# Patient Record
Sex: FEMALE | Race: WHITE | NOT HISPANIC OR LATINO | Employment: OTHER | URBAN - METROPOLITAN AREA
[De-identification: names, ages, dates, MRNs, and addresses within clinical notes are randomized per-mention and may not be internally consistent; named-entity substitution may affect disease eponyms.]

---

## 2017-01-27 ENCOUNTER — APPOINTMENT (OUTPATIENT)
Dept: LAB | Facility: HOSPITAL | Age: 67
End: 2017-01-27
Attending: FAMILY MEDICINE
Payer: MEDICARE

## 2017-01-27 ENCOUNTER — LAB REQUISITION (OUTPATIENT)
Dept: LAB | Facility: HOSPITAL | Age: 67
End: 2017-01-27
Payer: MEDICARE

## 2017-01-27 DIAGNOSIS — M05.731: ICD-10-CM

## 2017-01-27 DIAGNOSIS — M81.8 OTHER OSTEOPOROSIS WITHOUT CURRENT PATHOLOGICAL FRACTURE: ICD-10-CM

## 2017-01-27 LAB
25(OH)D3 SERPL-MCNC: 34.5 NG/ML (ref 30–100)
BASOPHILS # BLD AUTO: 0.06 THOUSANDS/ΜL (ref 0–0.1)
BASOPHILS NFR BLD AUTO: 1 % (ref 0–1)
EOSINOPHIL # BLD AUTO: 0.1 THOUSAND/ΜL (ref 0–0.61)
EOSINOPHIL NFR BLD AUTO: 2 % (ref 0–6)
ERYTHROCYTE [DISTWIDTH] IN BLOOD BY AUTOMATED COUNT: 13.2 % (ref 11.6–15.1)
HCT VFR BLD AUTO: 42.9 % (ref 34.8–46.1)
HGB BLD-MCNC: 14 G/DL (ref 11.5–15.4)
LYMPHOCYTES # BLD AUTO: 1.58 THOUSANDS/ΜL (ref 0.6–4.47)
LYMPHOCYTES NFR BLD AUTO: 33 % (ref 14–44)
MCH RBC QN AUTO: 30 PG (ref 26.8–34.3)
MCHC RBC AUTO-ENTMCNC: 32.6 G/DL (ref 31.4–37.4)
MCV RBC AUTO: 92 FL (ref 82–98)
MONOCYTES # BLD AUTO: 0.48 THOUSAND/ΜL (ref 0.17–1.22)
MONOCYTES NFR BLD AUTO: 10 % (ref 4–12)
NEUTROPHILS # BLD AUTO: 2.53 THOUSANDS/ΜL (ref 1.85–7.62)
NEUTS SEG NFR BLD AUTO: 54 % (ref 43–75)
NRBC BLD AUTO-RTO: 0 /100 WBCS
PLATELET # BLD AUTO: 250 THOUSANDS/UL (ref 149–390)
PMV BLD AUTO: 10.1 FL (ref 8.9–12.7)
RBC # BLD AUTO: 4.66 MILLION/UL (ref 3.81–5.12)
WBC # BLD AUTO: 4.75 THOUSAND/UL (ref 4.31–10.16)

## 2017-01-27 PROCEDURE — 85025 COMPLETE CBC W/AUTO DIFF WBC: CPT | Performed by: INTERNAL MEDICINE

## 2017-01-27 PROCEDURE — 82306 VITAMIN D 25 HYDROXY: CPT

## 2017-01-27 PROCEDURE — 36415 COLL VENOUS BLD VENIPUNCTURE: CPT

## 2017-02-08 ENCOUNTER — ALLSCRIPTS OFFICE VISIT (OUTPATIENT)
Dept: OTHER | Facility: OTHER | Age: 67
End: 2017-02-08

## 2017-02-17 ENCOUNTER — CONVERSION ENCOUNTER (OUTPATIENT)
Dept: RADIOLOGY | Facility: IMAGING CENTER | Age: 67
End: 2017-02-17

## 2017-04-11 ENCOUNTER — HOSPITAL ENCOUNTER (OUTPATIENT)
Dept: RADIOLOGY | Age: 67
Discharge: HOME | End: 2017-04-11
Payer: MEDICARE

## 2017-04-11 DIAGNOSIS — M81.8 OTHER OSTEOPOROSIS WITHOUT CURRENT PATHOLOGICAL FRACTURE: ICD-10-CM

## 2017-04-11 PROCEDURE — 77080 DXA BONE DENSITY AXIAL: CPT

## 2017-04-20 ENCOUNTER — GENERIC CONVERSION - ENCOUNTER (OUTPATIENT)
Dept: OTHER | Facility: OTHER | Age: 67
End: 2017-04-20

## 2017-05-18 ENCOUNTER — ALLSCRIPTS OFFICE VISIT (OUTPATIENT)
Dept: OTHER | Facility: OTHER | Age: 67
End: 2017-05-18

## 2017-05-18 DIAGNOSIS — Z12.31 ENCOUNTER FOR SCREENING MAMMOGRAM FOR MALIGNANT NEOPLASM OF BREAST: ICD-10-CM

## 2017-08-22 ENCOUNTER — ALLSCRIPTS OFFICE VISIT (OUTPATIENT)
Dept: OTHER | Facility: OTHER | Age: 67
End: 2017-08-22

## 2017-08-22 DIAGNOSIS — M81.8 OTHER OSTEOPOROSIS WITHOUT CURRENT PATHOLOGICAL FRACTURE: ICD-10-CM

## 2017-08-22 DIAGNOSIS — E78.5 HYPERLIPIDEMIA: ICD-10-CM

## 2017-08-29 ENCOUNTER — ALLSCRIPTS OFFICE VISIT (OUTPATIENT)
Dept: OTHER | Facility: OTHER | Age: 67
End: 2017-08-29

## 2017-09-06 ENCOUNTER — CONVERSION ENCOUNTER (OUTPATIENT)
Dept: MAMMOGRAPHY | Facility: CLINIC | Age: 67
End: 2017-09-06

## 2017-11-24 ENCOUNTER — ALLSCRIPTS OFFICE VISIT (OUTPATIENT)
Dept: OTHER | Facility: OTHER | Age: 67
End: 2017-11-24

## 2017-11-24 DIAGNOSIS — R35.0 FREQUENCY OF MICTURITION: ICD-10-CM

## 2017-11-24 DIAGNOSIS — E78.5 HYPERLIPIDEMIA: ICD-10-CM

## 2017-11-24 LAB
BILIRUB UR QL STRIP: NORMAL
CLARITY UR: NORMAL
COLOR UR: YELLOW
GLUCOSE (HISTORICAL): NORMAL
HGB UR QL STRIP.AUTO: NORMAL
KETONES UR STRIP-MCNC: NORMAL MG/DL
LEUKOCYTE ESTERASE UR QL STRIP: NORMAL
NITRITE UR QL STRIP: NORMAL
PH UR STRIP.AUTO: 6 [PH]
PROT UR STRIP-MCNC: NORMAL MG/DL
SP GR UR STRIP.AUTO: 1.02
UROBILINOGEN UR QL STRIP.AUTO: 0.2

## 2018-01-12 VITALS
DIASTOLIC BLOOD PRESSURE: 62 MMHG | HEART RATE: 72 BPM | HEIGHT: 65 IN | WEIGHT: 133.5 LBS | TEMPERATURE: 98 F | SYSTOLIC BLOOD PRESSURE: 92 MMHG | OXYGEN SATURATION: 96 % | BODY MASS INDEX: 22.24 KG/M2

## 2018-01-13 VITALS
BODY MASS INDEX: 22.18 KG/M2 | HEART RATE: 78 BPM | SYSTOLIC BLOOD PRESSURE: 98 MMHG | HEIGHT: 65 IN | WEIGHT: 133.13 LBS | DIASTOLIC BLOOD PRESSURE: 62 MMHG | TEMPERATURE: 98.5 F | OXYGEN SATURATION: 98 %

## 2018-01-13 VITALS
BODY MASS INDEX: 21.89 KG/M2 | HEART RATE: 42 BPM | SYSTOLIC BLOOD PRESSURE: 98 MMHG | HEIGHT: 65 IN | TEMPERATURE: 97.5 F | DIASTOLIC BLOOD PRESSURE: 68 MMHG | WEIGHT: 131.38 LBS | OXYGEN SATURATION: 97 %

## 2018-01-14 VITALS
SYSTOLIC BLOOD PRESSURE: 100 MMHG | OXYGEN SATURATION: 96 % | HEIGHT: 66 IN | DIASTOLIC BLOOD PRESSURE: 64 MMHG | WEIGHT: 131.38 LBS | HEART RATE: 68 BPM | TEMPERATURE: 97.8 F | BODY MASS INDEX: 21.11 KG/M2

## 2018-01-15 VITALS
HEART RATE: 72 BPM | SYSTOLIC BLOOD PRESSURE: 96 MMHG | OXYGEN SATURATION: 94 % | TEMPERATURE: 98.5 F | DIASTOLIC BLOOD PRESSURE: 58 MMHG | HEIGHT: 64 IN | BODY MASS INDEX: 22.94 KG/M2 | WEIGHT: 134.38 LBS

## 2018-02-14 ENCOUNTER — OFFICE VISIT (OUTPATIENT)
Dept: INTERNAL MEDICINE CLINIC | Facility: CLINIC | Age: 68
End: 2018-02-14
Payer: MEDICARE

## 2018-02-14 VITALS
SYSTOLIC BLOOD PRESSURE: 98 MMHG | OXYGEN SATURATION: 98 % | WEIGHT: 134.4 LBS | TEMPERATURE: 98.1 F | DIASTOLIC BLOOD PRESSURE: 72 MMHG | BODY MASS INDEX: 22.39 KG/M2 | HEART RATE: 64 BPM | HEIGHT: 65 IN

## 2018-02-14 DIAGNOSIS — M35.00 SJOGREN'S SYNDROME, WITH UNSPECIFIED ORGAN INVOLVEMENT (HCC): Primary | ICD-10-CM

## 2018-02-14 DIAGNOSIS — Q85.01 VON RECKLINGHAUSENS DISEASE (HCC): ICD-10-CM

## 2018-02-14 DIAGNOSIS — G37.9 DEMYELINATING DISORDER (HCC): ICD-10-CM

## 2018-02-14 DIAGNOSIS — I87.2 CHRONIC VENOUS INSUFFICIENCY: ICD-10-CM

## 2018-02-14 DIAGNOSIS — I67.850 CADASIL (CEREBRAL AUTOSOMAL DOMINANT ARTERIOPATHY WITH SUBCORTICAL INFARCTS AND LEUKOENCEPHALOPATHY): ICD-10-CM

## 2018-02-14 DIAGNOSIS — M05.79 RHEUMATOID ARTHRITIS INVOLVING MULTIPLE SITES WITH POSITIVE RHEUMATOID FACTOR (HCC): ICD-10-CM

## 2018-02-14 DIAGNOSIS — Z12.31 ENCOUNTER FOR SCREENING MAMMOGRAM FOR MALIGNANT NEOPLASM OF BREAST: ICD-10-CM

## 2018-02-14 PROBLEM — R92.8 ABNORMAL MAMMOGRAM OF RIGHT BREAST: Status: ACTIVE | Noted: 2017-02-18

## 2018-02-14 PROBLEM — L70.0: Status: ACTIVE | Noted: 2017-08-29

## 2018-02-14 PROCEDURE — 99214 OFFICE O/P EST MOD 30 MIN: CPT | Performed by: INTERNAL MEDICINE

## 2018-02-14 RX ORDER — SIMVASTATIN 40 MG
1 TABLET ORAL DAILY
COMMUNITY
End: 2018-07-18 | Stop reason: SDUPTHER

## 2018-02-14 RX ORDER — LATANOPROST 50 UG/ML
1 SOLUTION/ DROPS OPHTHALMIC DAILY
COMMUNITY
Start: 2017-02-08 | End: 2021-11-29

## 2018-02-14 RX ORDER — HYDROXYCHLOROQUINE SULFATE 200 MG/1
200 TABLET, FILM COATED ORAL DAILY
COMMUNITY

## 2018-02-14 RX ORDER — MULTIVIT-MIN/IRON/FOLIC ACID/K 18-600-40
1 CAPSULE ORAL 2 TIMES DAILY
COMMUNITY
End: 2022-03-01

## 2018-02-14 RX ORDER — GLIMEPIRIDE 2 MG/1
1 TABLET ORAL DAILY
COMMUNITY
End: 2021-11-29

## 2018-02-14 RX ORDER — OXYBUTYNIN CHLORIDE 5 MG/1
0.5 TABLET ORAL 2 TIMES DAILY
COMMUNITY
End: 2018-03-20 | Stop reason: SDUPTHER

## 2018-02-14 RX ORDER — CLOPIDOGREL BISULFATE 75 MG/1
1 TABLET ORAL DAILY
COMMUNITY
End: 2018-02-23 | Stop reason: SDUPTHER

## 2018-02-14 NOTE — PROGRESS NOTES
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) ([facility])   patient need to  Follow-up with the Neurology  Chronic venous insufficiency   stable  Demyelinating disorder ([facility])   stable  Von Reckling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 ([address] )   stable  Rheumatoid arthritis involving multiple sites with positive rheumatoid factor ([facility])   rheumatoid factor and the rheumatoid arthritis is managed by Rheumatology she had mixed connective tissue disorder  Other orders  -     calcium citrate-Vitamin D (CITRACAL PETITES/VITAMIN D) 200 mg-250 units; Take 2 tablets by mouth 2 (two) times a day  -     clopidogrel (PLAVIX) 75 mg tablet; Take 1 tablet by mouth daily  -     hydroxychloroquine (PLAQUENIL) 200 mg tablet; Take 1 tablet by mouth 2 (two) times a day  -     latanoprost (XALATAN) 0 005 % ophthalmic solution; Apply 1 drop to eye  -     oxybutynin (DITROPAN) 5 mg tablet; Take 0 5 tablets by mouth 2 (two) times a day  -     simvastatin (ZOCOR) 40 mg tablet; Take 1 tablet by mouth daily  -     timolol (TIMOPTIC) 0 25 % ophthalmic solution; Apply to eye  -     Cholecalciferol (VITAMIN D) 2000 units CAPS; Take 1 tablet by mouth daily   blood workup discussed with the patient and noted all is well lipid panel was good complete metabolic profile was good CBC was good will follow her up in about 6 month        Subjective:    patient does not have any new problem    Patient ID: Aleja Bhardwaj is a 75 y o  female  HPI  Hyperlipidemia (Follow-Up): The patient states her hyperlipidemia has been stable since the last visit   Comorbid Illnesses: Blood workup from this month was noted lipid profile looking excellent continue with the same treatment no change  She has no comorbid illnesses  Symptoms: denies chest pain,-denies intermittent leg claudication,-denies muscle pain,-denies muscle weakness-and-denies   Rheumatoid Arthritis (Follow-Up): The patient states her rheumatoid arthritis has been stable since the last visit  Comorbid Illnesses: better on prednisone  Interval Events: better with prednisone and Plaquenil , unable to crease the prednisone dose E she is followed up by rheumatologist Dr Ayana Shepherd and he is adjusting the dose of the prednisone in the meanwhile she lost some weight he is doing some exercise and trying to lose weight but we will follow it up  She has no significant interval events  Symptoms: worsened no swelling shoulder pain and swelling,-worsened no swelling hip pain and swelling-and-stable knee pain and swelling  Sjogren Syndrome (Brief): The patient is being seen for a routine clinic follow-up of Sjögren syndrome  Symptoms:  dry eyes-and-dry skin, but-no red eyes,-no photosensitivity,-no blurred vision-and-no contact lens intolerance  Associated symptoms:  anxiety,-fatigue,-arthralgias-and-myalgias, but-no paresthesias,-no swollen glands,-no hearing loss,-no difficulty swallowing,-no gastroesophageal reflux-and-no diarrhea  Osteoporosis (Follow-Up): The patient's osteoporosis has worsened since the last visit  Most recent DXA results: T-score <-2 5  She has no comorbid illnesses  She has no complications  She has no significant interval   The following portions of the patient's history were reviewed and updated as appropriate: allergies, current medications, past family history, past medical history, past social history, past surgical history and problem list     Review of Systems   Constitutional: Negative for activity change, appetite change, chills, fatigue, fever and unexpected weight change  HENT: Negative for congestion, nosebleeds, postnasal drip, sneezing and sore throat  Eyes: Negative for pain, discharge, redness and itching  Respiratory: Negative for cough, chest tightness, shortness of breath and wheezing  Cardiovascular: Negative for chest pain, palpitations and leg swelling  Gastrointestinal: Negative for abdominal distention, abdominal pain, constipation, diarrhea, nausea and vomiting  Genitourinary: Positive for urgency  Negative for difficulty urinating  Skin: Negative for color change, pallor and rash  Neurological: Negative for dizziness, light-headedness and headaches  Hematological: Bruises/bleeds easily (bruises easily while on Plavix)  Objective:    Vitals:    02/14/18 1447   BP: 98/72   Pulse: 64   Temp: 98 1 °F (36 7 °C)   SpO2: 98%        Physical Exam   Constitutional: She is oriented to person, place, and time  She appears well-developed and well-nourished  No distress  HENT:   Head: Normocephalic and atraumatic  Eyes: Conjunctivae and EOM are normal  Pupils are equal, round, and reactive to light  Neck: Normal range of motion  Neck supple  No JVD present  No thyromegaly present  Cardiovascular: Normal rate, regular rhythm and normal heart sounds  No murmur heard  Pulmonary/Chest: Effort normal and breath sounds normal  No respiratory distress  She has no wheezes  Abdominal: Soft  Bowel sounds are normal  She exhibits no distension  Musculoskeletal: Normal range of motion  She exhibits edema  Lymphadenopathy:     She has no cervical adenopathy  Neurological: She is alert and oriented to person, place, and time  She has normal reflexes  No neurological deficit noted   Skin: Skin is warm and dry  No rash noted  No erythema  Multiple small fibromas  Of neurofibromatosis noted   Psychiatric: She has a normal mood and affect   Her behavior is normal

## 2018-02-23 DIAGNOSIS — Z86.73 HISTORY OF STROKE: Primary | ICD-10-CM

## 2018-02-23 RX ORDER — CLOPIDOGREL BISULFATE 75 MG/1
75 TABLET ORAL DAILY
Qty: 90 TABLET | Refills: 0 | Status: SHIPPED | OUTPATIENT
Start: 2018-02-23 | End: 2018-05-23 | Stop reason: SDUPTHER

## 2018-03-07 NOTE — PROGRESS NOTES
History of Present Illness    Revaccination   Vaccine Information: Vaccine(s) Given (names): Pneumovax  Vaccine(s) Given (names): Boostrix  Spoke with patient regarding vaccine out of temperature range and risks and benefits of revaccination  Action(s): Pt will be revaccinated  Appointment scheduled: [phone]  Pt called (attempt 1): [phone]   Messages left on [phone] and [phone]  Other Information: [phone] [facility] left on [phone] and [phone] requesting a call back for a non urgent matter  Tura Semen  [phone] Patient returned the call  Appointment scheduled  Adacel will be changed to Boostrix due to age  kz    Revaccination Completed: [id number]  Active Problems    1  CADASIL (cerebral autosomal dominant arteriopathy with subcortical infarcts and   leukoencephalopathy) (434 91,323 81,437 8) (I63 9,G93 49,I67 89)   2  Cellulitis of right leg without foot (682 6) (L03 115)   3  Demyelinating disorder (341 9) (G37 9)   4  Easy bruising (782 9) (R23 8)   5  Hyperlipidemia (272 4) (E78 5)   6  Idiopathic osteoporosis (733 02) (M81 8)   7  Need for influenza vaccination (V04 81) (Z23)   8  Need for revaccination (V05 9) (Z23)   9  Rheumatoid arthritis (714 0) (M06 9)   10  Sjogren's syndrome (710 2) (M35 00)   11  Stroke Syndrome (436)   12  Von Reckling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 (237 71) (Q85 01)    Immunizations  Influenza --- Paris Dumont: 18-Oct-2010; Jeremy Toledo: [id number] Snowball: [id number] Brightly:  15-Oct-2013; Series5: 14-Oct-2014; Series6: 30-Sep-2015; Series7: 14-Sep-2016   PPSV --- Series1: 15-Oct-2009; Jeremy Toledo: 12-Nov-2014   Td/DT --- Series1: 25-Jul-2012   Tdap --- Series1: 14-Walt-2016   Zoster --- Series1: 20-Feb-2010     Current Meds   1  Cephalexin 500 MG Oral Capsule; TAKE 1 CAPSULE 3 TIMES DAILY UNTIL GONE   2  Citracal Petites/Vitamin D TABS; take 2 tablet twice daily   3   Clopidogrel Bisulfate 75 MG Oral Tablet; TAKE 1 TABLET DAILY   4  Hydroxychloroquine Sulfate 200 MG Oral Tablet; Take 1 tablet twice daily   5  Oxybutynin Chloride 5 MG Oral Tablet; take 1/2 tablet twice daily   6  PredniSONE 5 MG Oral Tablet; Take 1 tablet twice daily   7  Prolia 60 MG/ML Subcutaneous Solution; INJECT SUBCUTANEOUSLY  60 MG / 1 ML   EVERY 6 MONTHS   8  Simvastatin 40 MG Oral Tablet; TAKE 1 TABLET DAILY   9  Timolol Maleate 0 25 % Ophthalmic Solution; INSTILL 1 DROP IN BOTH EYES ONCE   DAILY   10  Travatan Z 0 004 % Ophthalmic Solution; INSTILL 1 DROP IN BOTH EYES ONCE DAILY   11  Vitamin D 2000 UNIT Oral Tablet; Take 1 tablet daily    Allergies    1  No Known Drug Allergies    2  No Known Environmental Allergies   3  No Known Food Allergies    Plan    1   RVAC-Tdap (Boostrix)    Future Appointments    Date/Time Provider Specialty Site   05/18/2017 02:30 PM Radha Main DO Internal Medicine [facility]   02/08/2017 10:45 AM Nicci Velázquez MD Internal Medicine [facility]     Signatures   Electronically signed by : Clark Webb MD; Jan 13 2017 11:00AM EST

## 2018-03-20 DIAGNOSIS — R35.0 URINARY FREQUENCY: Primary | ICD-10-CM

## 2018-03-20 RX ORDER — OXYBUTYNIN CHLORIDE 5 MG/1
2.5 TABLET ORAL 2 TIMES DAILY
Qty: 90 TABLET | Refills: 0 | Status: SHIPPED | OUTPATIENT
Start: 2018-03-20 | End: 2018-03-21 | Stop reason: SDUPTHER

## 2018-03-21 DIAGNOSIS — R35.0 URINARY FREQUENCY: ICD-10-CM

## 2018-03-21 RX ORDER — OXYBUTYNIN CHLORIDE 5 MG/1
2.5 TABLET ORAL 2 TIMES DAILY
Qty: 90 TABLET | Refills: 0 | Status: SHIPPED | OUTPATIENT
Start: 2018-03-21 | End: 2018-06-08 | Stop reason: SDUPTHER

## 2018-03-21 NOTE — TELEPHONE ENCOUNTER
Only med recently ordered and sent to [facility] was Oxybutynin  Please resend to mail order  Called and canceled Rx sent to [facility]  Thank you!

## 2018-03-21 NOTE — TELEPHONE ENCOUNTER
Patient is asking for her medications to be sent to [facility] instead of [facility]    She would like a 90 day supply

## 2018-04-06 ENCOUNTER — OFFICE VISIT (OUTPATIENT)
Dept: NEUROLOGY | Facility: CLINIC | Age: 68
End: 2018-04-06
Payer: MEDICARE

## 2018-04-06 VITALS
HEART RATE: 65 BPM | BODY MASS INDEX: 22.16 KG/M2 | WEIGHT: 133 LBS | DIASTOLIC BLOOD PRESSURE: 65 MMHG | HEIGHT: 65 IN | SYSTOLIC BLOOD PRESSURE: 113 MMHG

## 2018-04-06 DIAGNOSIS — I67.850 CADASIL (CEREBRAL AUTOSOMAL DOMINANT ARTERIOPATHY WITH SUBCORTICAL INFARCTS AND LEUKOENCEPHALOPATHY): ICD-10-CM

## 2018-04-06 DIAGNOSIS — Q85.01 VON RECKLINGHAUSENS DISEASE (HCC): ICD-10-CM

## 2018-04-06 DIAGNOSIS — G37.9 DEMYELINATING DISORDER (HCC): ICD-10-CM

## 2018-04-06 PROCEDURE — 99205 OFFICE O/P NEW HI 60 MIN: CPT | Performed by: PSYCHIATRY & NEUROLOGY

## 2018-04-06 NOTE — PROGRESS NOTES
Patient ID: Tony Patel is a 75 y o  female  Assessment/Plan:                                                                                                                                                           76 y/o F who is here as a new patient for CADASIL  She has had several mini-strokes and is on plavix for them  She used to have headaches when she was younger and they have been better since then  I personally reviewed x-ray films that they brought  PLAN:  -Continue with Plavix for now  -migraines are much better and does not need any medications for it    -recommended that her kids and grandkids should be tested  -will get a MRI brain to see if the disease has progressed  -continue with simvastatin 40mg PO QHS  -no concerns for memory loss yet  We will do [place] at next visit  I would like to follow up in 4 months, I would be happy to see her sooner if the need arises  Patient/Guardian was advised to the call the office if they have any questions and concerns in the meantime  Patient/Guardian does understand that if they have any new stroke like symptoms such as facial droop on one side, weakness/paralysis on either side, speech trouble, numbness on one side, balance issues, any vision changes, or any new headache, to call 9-1-1 immediately or to proceed to the nearest ER immediately  Problem List Items Addressed This Visit     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) ([facility] )    Demyelinating disorder ([facility] )    Von Reckling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 ([facility] )             Subjective:    HPI    This is a 76 y/o F who is here as a new patient to establish care  She was diagnosed with CADASIL in 2005 and was diagnosed at [facility]   She has a sister with CADASIL  She has had a few mini strokes over the years  She used to get migraines but sine menopause they have calmed down    She got [address] mutation tested and it was positive  She does have memory loss and at times she has trouble remembering stuff, but nothing out of the ordinary or concerning  She is on plavix for the history of strokes  She never smoked  She has no questions or concerns today but is wondering if her kids and grandkids to be tested  She is on plaquenil for rheumatoid arthritis  The following portions of the patient's history were reviewed and updated as appropriate:   She  has a past medical history of Arthralgia; Neutropenia ([facility] ); Stroke (cerebrum) ([facility] ); and Transient cerebral ischemia  She   Patient Active Problem List    Diagnosis Date Noted    Closed follicle comedo 46/53/7215    Abnormal mammogram of right breast 02/18/2017    Chronic venous insufficiency 02/08/2017    Easy bruising 01/14/2016    Rheumatoid arthritis with positive rheumatoid factor ([place] [address] ) 11/24/2015    Increased frequency of urination 11/12/2014    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) ([place] [address] ) 09/10/2014    Demyelinating disorder ([place] [address] ) 10/29/2013    Hyperlipidemia 10/29/2013    Idiopathic osteoporosis 10/29/2013    Sjogren's syndrome ([facility] ) 10/29/2013    Von Reckling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 ([place] [address] ) 10/29/2013    Cerebral artery occlusion with cerebral infarction ([place] [address] ) 02/01/2008     She  has a past surgical history that includes Hysterectomy  Her family history includes Diabetes in her father; Lymphoma in her mother; Stroke (age of onset: 68) in her mother  She  reports that she has never smoked  She has never used smokeless tobacco  She reports that she drinks alcohol  She reports that she does not use drugs    Current Outpatient Prescriptions   Medication Sig Dispense Refill    calcium citrate-Vitamin D (CITRACAL PETITES/VITAMIN D) 200 mg-250 units Take 2 tablets by mouth 2 (two) times a day      Cholecalciferol (VITAMIN D) 2000 units CAPS Take 1 tablet by mouth daily      clopidogrel (PLAVIX) 75 mg tablet Take 1 tablet (75 mg total) by mouth daily 90 tablet 0    denosumab (PROLIA) 60 mg/mL Inject 60 mg under the skin every 6 (six) months      hydroxychloroquine (PLAQUENIL) 200 mg tablet Take 1 tablet by mouth 2 (two) times a day      latanoprost (XALATAN) 0 005 % ophthalmic solution Apply 1 drop to eye      oxybutynin (DITROPAN) 5 mg tablet Take 0 5 tablets (2 5 mg total) by mouth 2 (two) times a day 90 tablet 0    simvastatin (ZOCOR) 40 mg tablet Take 1 tablet by mouth daily      timolol (TIMOPTIC) 0 25 % ophthalmic solution Apply to eye       No current facility-administered medications for this visit  Current Outpatient Prescriptions on File Prior to Visit   Medication Sig    calcium citrate-Vitamin D (CITRACAL PETITES/VITAMIN D) 200 mg-250 units Take 2 tablets by mouth 2 (two) times a day    Cholecalciferol (VITAMIN D) 2000 units CAPS Take 1 tablet by mouth daily    clopidogrel (PLAVIX) 75 mg tablet Take 1 tablet (75 mg total) by mouth daily    hydroxychloroquine (PLAQUENIL) 200 mg tablet Take 1 tablet by mouth 2 (two) times a day    latanoprost (XALATAN) 0 005 % ophthalmic solution Apply 1 drop to eye    oxybutynin (DITROPAN) 5 mg tablet Take 0 5 tablets (2 5 mg total) by mouth 2 (two) times a day    simvastatin (ZOCOR) 40 mg tablet Take 1 tablet by mouth daily    timolol (TIMOPTIC) 0 25 % ophthalmic solution Apply to eye     No current facility-administered medications on file prior to visit  She has No Known Allergies        Objective:    Blood pressure 113/65, pulse 65, height 5' 4 75" (1 645 m), weight 60 3 kg (133 lb)  Physical Exam  General: Patient is not in any acute distress  HEENT: normocephalic, atraumatic  Neck: supple  Heart: regular heart rate and rhythm, no murmurs, rubs and or gallops  Chest: Clear to auscultation bilaterally  Abdomen: soft and non-tender  Skin: no lesions  Neurologic Examination:   Mental status: alert, awake, and following commands     Speech: Speech is fluent without any dysarthria, no aphasia noted  Cranial Nerves: Pupils equal round reactive to light and extraocular movements intact  Visual fields full to confrontation  Fundus exam - normal, no papilledema noted  Facial sensation intact to soft touch in V1, V2 and V3 distributions  Face symmetric  Tongue midline, able to move side to side  Palate elevation symmetric uvula midline, no deviation noted  Shoulder shrug strong  Motor: Strength 5/5 in all 4 extremities  No drift  Normal rapid alternating movements  Normal tone  Sensory: Sensation intact to soft touch, vibration and pinprick in all 4 extremities  Cerebellar: Finger-to-nose intact, normal heel to shin  Reflexes: 2+ in all 4 extremities, downgoing toes bilaterally  Gait: Normal gait, tandem walking, normal arm to swing  Neurological Exam      ROS:    Review of Systems   Constitutional: Negative for appetite change and fever  HENT: Negative  Negative for hearing loss, tinnitus, trouble swallowing and voice change  Eyes: Negative  Negative for photophobia and pain  Respiratory: Negative  Negative for shortness of breath  Cardiovascular: Negative  Negative for palpitations  Gastrointestinal: Negative  Negative for nausea and vomiting  Endocrine: Negative  Negative for cold intolerance and heat intolerance  Genitourinary: Negative  Negative for dysuria, frequency and urgency  Musculoskeletal: Negative  Negative for myalgias and neck pain  Skin: Negative  Negative for rash  Neurological: Negative  Negative for dizziness, tremors, seizures, syncope, facial asymmetry, speech difficulty, weakness, light-headedness, numbness and headaches  Hematological: Negative  Does not bruise/bleed easily  Psychiatric/Behavioral: Negative  Negative for confusion, hallucinations and sleep disturbance

## 2018-04-10 ENCOUNTER — CLINICAL SUPPORT (OUTPATIENT)
Dept: INTERNAL MEDICINE CLINIC | Facility: CLINIC | Age: 68
End: 2018-04-10
Payer: MEDICARE

## 2018-04-10 DIAGNOSIS — Z11.59 NEED FOR HEPATITIS C SCREENING TEST: Primary | ICD-10-CM

## 2018-04-10 DIAGNOSIS — I67.850 CADASIL (CEREBRAL AUTOSOMAL DOMINANT ARTERIOPATHY WITH SUBCORTICAL INFARCTS AND LEUKOENCEPHALOPATHY): ICD-10-CM

## 2018-04-10 LAB
BUN SERPL-MCNC: 16 MG/DL (ref 5–25)
CREAT SERPL-MCNC: 0.62 MG/DL (ref 0.6–1.3)
GFR SERPL CREATININE-BSD FRML MDRD: 93 ML/MIN/1.73SQ M
HCV AB SER QL: NORMAL

## 2018-04-10 PROCEDURE — 86803 HEPATITIS C AB TEST: CPT | Performed by: OBSTETRICS & GYNECOLOGY

## 2018-04-10 PROCEDURE — 36415 COLL VENOUS BLD VENIPUNCTURE: CPT

## 2018-04-10 PROCEDURE — 84520 ASSAY OF UREA NITROGEN: CPT | Performed by: PSYCHIATRY & NEUROLOGY

## 2018-04-10 PROCEDURE — 82565 ASSAY OF CREATININE: CPT | Performed by: PSYCHIATRY & NEUROLOGY

## 2018-04-13 DIAGNOSIS — Z86.73 HISTORY OF STROKE: ICD-10-CM

## 2018-04-13 RX ORDER — CLOPIDOGREL BISULFATE 75 MG/1
TABLET ORAL
Qty: 90 TABLET | OUTPATIENT
Start: 2018-04-13

## 2018-04-20 ENCOUNTER — HOSPITAL ENCOUNTER (OUTPATIENT)
Dept: MRI IMAGING | Facility: HOSPITAL | Age: 68
Discharge: HOME | End: 2018-04-20
Attending: PSYCHIATRY & NEUROLOGY
Payer: MEDICARE

## 2018-04-20 DIAGNOSIS — I67.850 CADASIL (CEREBRAL AUTOSOMAL DOMINANT ARTERIOPATHY WITH SUBCORTICAL INFARCTS AND LEUKOENCEPHALOPATHY): ICD-10-CM

## 2018-04-20 PROCEDURE — 70553 MRI BRAIN STEM W/O & W/DYE: CPT

## 2018-04-20 PROCEDURE — A9585 GADOBUTROL INJECTION: HCPCS | Performed by: PSYCHIATRY & NEUROLOGY

## 2018-04-20 RX ADMIN — GADOBUTROL 6 ML: 604.72 INJECTION INTRAVENOUS at 13:12

## 2018-05-14 RX ORDER — SIMVASTATIN 40 MG
TABLET ORAL DAILY
Qty: 90 TABLET | OUTPATIENT
Start: 2018-05-14

## 2018-05-23 DIAGNOSIS — Z86.73 HISTORY OF STROKE: ICD-10-CM

## 2018-05-23 RX ORDER — CLOPIDOGREL BISULFATE 75 MG/1
75 TABLET ORAL DAILY
Qty: 90 TABLET | Refills: 0 | Status: SHIPPED | OUTPATIENT
Start: 2018-05-23 | End: 2018-09-12 | Stop reason: SDUPTHER

## 2018-06-08 ENCOUNTER — OFFICE VISIT (OUTPATIENT)
Dept: INTERNAL MEDICINE CLINIC | Facility: CLINIC | Age: 68
End: 2018-06-08
Payer: MEDICARE

## 2018-06-08 VITALS
SYSTOLIC BLOOD PRESSURE: 98 MMHG | OXYGEN SATURATION: 99 % | BODY MASS INDEX: 21.96 KG/M2 | DIASTOLIC BLOOD PRESSURE: 66 MMHG | HEIGHT: 65 IN | TEMPERATURE: 97.1 F | WEIGHT: 131.8 LBS | HEART RATE: 76 BPM

## 2018-06-08 DIAGNOSIS — I01.1 RHEUMATOID AORTITIS: Primary | ICD-10-CM

## 2018-06-08 DIAGNOSIS — Z12.31 SCREENING MAMMOGRAM, ENCOUNTER FOR: ICD-10-CM

## 2018-06-08 DIAGNOSIS — R35.0 URINARY FREQUENCY: ICD-10-CM

## 2018-06-08 DIAGNOSIS — E78.2 MIXED HYPERLIPIDEMIA: ICD-10-CM

## 2018-06-08 DIAGNOSIS — M81.8 IDIOPATHIC OSTEOPOROSIS: ICD-10-CM

## 2018-06-08 DIAGNOSIS — M81.0 OSTEOPOROSIS, UNSPECIFIED OSTEOPOROSIS TYPE, UNSPECIFIED PATHOLOGICAL FRACTURE PRESENCE: ICD-10-CM

## 2018-06-08 PROCEDURE — 99214 OFFICE O/P EST MOD 30 MIN: CPT | Performed by: FAMILY MEDICINE

## 2018-06-08 PROCEDURE — 96372 THER/PROPH/DIAG INJ SC/IM: CPT | Performed by: FAMILY MEDICINE

## 2018-06-08 RX ORDER — TRAMADOL HYDROCHLORIDE 50 MG/1
50 TABLET ORAL EVERY 6 HOURS PRN
Qty: 30 TABLET | Refills: 2 | Status: SHIPPED | OUTPATIENT
Start: 2018-06-08 | End: 2020-01-02 | Stop reason: ALTCHOICE

## 2018-06-08 RX ORDER — OXYBUTYNIN CHLORIDE 5 MG/1
2.5 TABLET ORAL 2 TIMES DAILY
Qty: 90 TABLET | Refills: 1 | Status: SHIPPED | OUTPATIENT
Start: 2018-06-08 | End: 2018-10-26 | Stop reason: SDUPTHER

## 2018-06-08 NOTE — PROGRESS NOTES
Assessment/Plan:    Rheumatoid aortitis  Throw out old tramadol, new rx given today  1  Idiopathic osteoporosis (733 02) (M81 8)   2  Rheumatoid arthritis (714 0) (M06 9)   3  Hyperlipidemia (272 4) (E78 5)   · Mixed      Discussion Summary:   PROLIA Injection R arm , next due in 6 months, DEXA due after april , 2019  recent DEXA reviewed  increase vit D to 4000IU daily  discussed chronic prednisone use with osteoporosis- off prednisone since June 2017          Problem List Items Addressed This Visit        Cardiovascular and Mediastinum    Rheumatoid aortitis - Primary     Throw out old tramadol, new rx given today         Relevant Medications    traMADol (ULTRAM) 50 mg tablet       Other    Hyperlipidemia    Relevant Orders    Lipid panel    Screening mammogram, encounter for    Relevant Orders    Mammo screening bilateral w cad      Other Visit Diagnoses     Idiopathic osteoporosis        Relevant Orders    Vitamin D 25 hydroxy    Comprehensive metabolic panel            Subjective:      Patient ID: Deborah Otto is a 70 y o  female  HPI  Osteoporosis (Follow-Up): The patient's osteoporosis has been stable since the last visit  Most recent DXA results: T-score <-2 5  Comorbid Illnesses: -3 3 is the worst- on chronic prednisone  She has no comorbid illnesses  She has no complications  She has no significant interval events  Symptoms: The patient is currently asymptomatic  denies back pain-- and-- denies   Associated symptoms include no decrease in height  Home precautions: Osteoporosis counseling and education was reviewed with patient and caregivers, including prevention of falls  Medications: the patient is adherent with her medication regimen  Medication(s): vitamin D,-- a calcium supplement-- and-- prolia  Disease Management: the patient is doing well with her osteoporosis goals   The patient is due for DEXA feb 2017  The following portions of the patient's history were reviewed and updated as appropriate: allergies, current medications, past family history, past medical history, past social history, past surgical history and problem list     Review of Systems      Female:  Constitutional: no fever-- and-- no chills  Eyes: no purulent discharge from the eyes  ENT: no nasal discharge  Cardiovascular: no palpitations-- and-- no lower extremity edema  Respiratory: No complaints of shortness of breath, no wheezing, no cough, no SOB on exertion, no orthopnea, no PND  Gastrointestinal: no nausea-- and-- no vomiting  Genitourinary: nocturia, but-- No complaints of dysuria, no incontinence, no pelvic pain, no dysmenorrhea, no vaginal discharge or bleeding  Musculoskeletal: arthralgias,-- myalgias-- and-- knee pain, not new  Neurological: no dizziness  Psychiatric: no sleep disturbances-- and-- no depression  Hematologic/Lymphatic: a tendency for easy bleeding-- and-- a tendency for easy bruising  Objective:      BP 98/66 (BP Location: Left arm, Patient Position: Sitting, Cuff Size: Child)   Pulse 76   Temp (!) 97 1 °F (36 2 °C) (Oral)   Ht 5' 4 5" (1 638 m)   Wt 59 8 kg (131 lb 12 8 oz)   SpO2 99%   BMI 22 27 kg/m²          Physical Exam     Constitutional  General appearance: No acute distress, well appearing and well nourished  Eyes  Conjunctiva and lids: No swelling, erythema or discharge  Pupils and irises: Equal, round and reactive to light  Ears, Nose, Mouth, and Throat  External inspection of ears and nose: Normal    Oropharynx: Normal with no erythema, edema, exudate or lesions  Pulmonary  Respiratory effort: No increased work of breathing or signs of respiratory distress  Auscultation of lungs: Clear to auscultation  Cardiovascular  Auscultation of heart: Normal rate and rhythm, normal S1 and S2, without murmurs  Examination of extremities for edema and/or varicosities: Normal    Abdomen  Abdomen: Non-tender, no masses     Liver and spleen: No hepatomegaly or splenomegaly     Musculoskeletal  Gait and station: Normal    Skin  Skin and subcutaneous tissue: normal   Psychiatric  Orientation to person, place, and time: Normal    Mood and affect: Normal

## 2018-06-20 LAB
LEFT EYE DIABETIC RETINOPATHY: NORMAL
RIGHT EYE DIABETIC RETINOPATHY: NORMAL

## 2018-07-10 NOTE — TELEPHONE ENCOUNTER
L/m on [facility] Rx line for Oxybutynin  Rx filled for 90 day supply and 1 refill on 6/8/18  Pt was given a printed Rx on the day of her OV

## 2018-07-11 DIAGNOSIS — Z86.73 HISTORY OF STROKE: ICD-10-CM

## 2018-07-11 RX ORDER — CLOPIDOGREL BISULFATE 75 MG/1
TABLET ORAL
Qty: 90 TABLET | OUTPATIENT
Start: 2018-07-11

## 2018-07-18 DIAGNOSIS — E78.5 HYPERLIPIDEMIA, UNSPECIFIED HYPERLIPIDEMIA TYPE: Primary | ICD-10-CM

## 2018-07-18 DIAGNOSIS — R35.0 URINARY FREQUENCY: ICD-10-CM

## 2018-07-18 RX ORDER — SIMVASTATIN 40 MG
40 TABLET ORAL DAILY
Qty: 30 TABLET | Refills: 3 | Status: SHIPPED | OUTPATIENT
Start: 2018-07-18 | End: 2018-10-26 | Stop reason: SDUPTHER

## 2018-07-27 NOTE — TELEPHONE ENCOUNTER
Called [facility] to find out what was going on with the Rx for the pt and they never received the Rx mailed to them by the pt  Gave verbal for them to fill  Called and notified pt

## 2018-08-07 ENCOUNTER — OFFICE VISIT (OUTPATIENT)
Dept: NEUROLOGY | Facility: CLINIC | Age: 68
End: 2018-08-07
Payer: MEDICARE

## 2018-08-07 VITALS
HEART RATE: 63 BPM | SYSTOLIC BLOOD PRESSURE: 110 MMHG | WEIGHT: 131.6 LBS | DIASTOLIC BLOOD PRESSURE: 78 MMHG | BODY MASS INDEX: 22.24 KG/M2

## 2018-08-07 DIAGNOSIS — I67.850 CADASIL (CEREBRAL AUTOSOMAL DOMINANT ARTERIOPATHY WITH SUBCORTICAL INFARCTS AND LEUKOENCEPHALOPATHY): Primary | ICD-10-CM

## 2018-08-07 DIAGNOSIS — E78.2 MIXED HYPERLIPIDEMIA: ICD-10-CM

## 2018-08-07 DIAGNOSIS — I63.50 CEREBRAL ARTERY OCCLUSION WITH CEREBRAL INFARCTION (HCC): ICD-10-CM

## 2018-08-07 PROCEDURE — 99214 OFFICE O/P EST MOD 30 MIN: CPT | Performed by: PSYCHIATRY & NEUROLOGY

## 2018-08-07 NOTE — PROGRESS NOTES
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  She does not have any new stroke-like symptoms  Neurologic examination today is non-focal      Plan:  - for secondary stroke prevention, continue with Plavix and lipitor for stroke prevention    -Will get MRI brain without contrast in 1 year to see the progression of the disease  -As far as memory is concerned her [place] today was a 22/30, she is okay to drive for now  Will repeat [place] test every year will do one next year at her 1 year appointment   -I did recommend that her kids be tested for [address] 3 gene mutation    -Patient does not have migraine headaches  I would like to follow up in 1 year, I would be happy to see her sooner if the need arises  Patient/Guardian was advised to the call the office if they have any questions and concerns in the meantime  Patient/Guardian does understand that if they have any new stroke like symptoms such as facial droop on one side, weakness/paralysis on either side, speech trouble, numbness on one side, balance issues, any vision changes, or any new headache, to call 9-1-1 immediately or to proceed to the nearest ER immediately  Problem List Items Addressed This Visit     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) ([facility] ) - Primary    Cerebral artery occlusion with cerebral infarction ([facility] )    Hyperlipidemia             Subjective:    HPI    This is a 70-year-old female who is here as a follow-up for CADASIL ([address] 3 mutation positive)  Overall she is doing well no new stroke/TIA like symptoms  She is compliant with Plavix and is doing well and tolerating the medication well  She is having trouble with memory, but not with recall with names, locations   She will be at the closet and does not know why she was there, and has some word finding difficulty  She does have headaches, and they are more tension like headaches, and they resolve with tylenol  She used to get migraine when she was younger  She has been on plavix for stroke prevention for several years  She used to see [facility] (Dr Kimi Conrad)  Her sister has CADASIL  She has one son and a daughter who are not tested for the disease  Overall patient doing well  The following portions of the patient's history were reviewed and updated as appropriate:   She  has a past medical history of Arthralgia; Neutropenia ([facility] [address] ); Stroke (cerebrum) ([facility] ); and Transient cerebral ischemia  She   Patient Active Problem List    Diagnosis Date Noted    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    Screening mammogram, encounter for 69/06/2455    Closed follicle comedo 69/30/4852    Abnormal mammogram of right breast 02/18/2017    Chronic venous insufficiency 02/08/2017    Easy bruising 01/14/2016    Rheumatoid arthritis with positive rheumatoid factor ([facility] ) 11/24/2015    Increased frequency of urination 11/12/2014    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) ([facility] ) 09/10/2014    Demyelinating disorder ([place] [address] ) 10/29/2013    Hyperlipidemia 10/29/2013    Osteoporosis 10/29/2013    Sjogren's syndrome ([facility] ) 10/29/2013    [facility]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 ([facility] ) 10/29/2013    Cerebral artery occlusion with cerebral infarction ([facility] ) 02/01/2008     She  has a past surgical history that includes Hysterectomy  Her family history includes Diabetes in her father; Lymphoma in her mother; Stroke (age of onset: 68) in her mother  She  reports that she has never smoked  She has never used smokeless tobacco  She reports that she drinks alcohol  She reports that she does not use drugs    Current Outpatient Prescriptions   Medication Sig Dispense Refill    calcium citrate-Vitamin D (CITRACAL PETITES/VITAMIN D) 200 mg-250 units Take 2 tablets by mouth 2 (two) times a day      Cholecalciferol (VITAMIN D) 2000 units CAPS Take 1 tablet by mouth daily      clopidogrel (PLAVIX) 75 mg tablet Take 1 tablet (75 mg total) by mouth daily 90 tablet 0    denosumab (PROLIA) 60 mg/mL Inject 60 mg under the skin every 6 (six) months      hydroxychloroquine (PLAQUENIL) 200 mg tablet Take 1 tablet by mouth 2 (two) times a day      latanoprost (XALATAN) 0 005 % ophthalmic solution Apply 1 drop to eye      oxybutynin (DITROPAN) 5 mg tablet Take 0 5 tablets (2 5 mg total) by mouth 2 (two) times a day 90 tablet 1    simvastatin (ZOCOR) 40 mg tablet Take 1 tablet (40 mg total) by mouth daily 30 tablet 3    timolol (TIMOPTIC) 0 25 % ophthalmic solution Apply to eye      traMADol (ULTRAM) 50 mg tablet Take 1 tablet (50 mg total) by mouth every 6 (six) hours as needed for moderate pain 30 tablet 2     No current facility-administered medications for this visit  Current Outpatient Prescriptions on File Prior to Visit   Medication Sig    calcium citrate-Vitamin D (CITRACAL PETITES/VITAMIN D) 200 mg-250 units Take 2 tablets by mouth 2 (two) times a day    Cholecalciferol (VITAMIN D) 2000 units CAPS Take 1 tablet by mouth daily    clopidogrel (PLAVIX) 75 mg tablet Take 1 tablet (75 mg total) by mouth daily    denosumab (PROLIA) 60 mg/mL Inject 60 mg under the skin every 6 (six) months    hydroxychloroquine (PLAQUENIL) 200 mg tablet Take 1 tablet by mouth 2 (two) times a day    latanoprost (XALATAN) 0 005 % ophthalmic solution Apply 1 drop to eye    oxybutynin (DITROPAN) 5 mg tablet Take 0 5 tablets (2 5 mg total) by mouth 2 (two) times a day    simvastatin (ZOCOR) 40 mg tablet Take 1 tablet (40 mg total) by mouth daily    timolol (TIMOPTIC) 0 25 % ophthalmic solution Apply to eye    traMADol (ULTRAM) 50 mg tablet Take 1 tablet (50 mg total) by mouth every 6 (six) hours as needed for moderate pain     No current facility-administered medications on file prior to visit  She has No Known Allergies            Objective:    Blood pressure 110/78, pulse 63, weight 59 7 kg (131 lb 9 6 oz)  Physical Exam  General - alert, awake, follows commands  Speech - fluent, no dysarthria noted, no aphasia noted  skin -  no lesions  Cranial nerves: PERRL, EOMI, no facial droop noted, facial sensation intact, tongue midline  Motor 5/5 throughout  Coordination - no ataxia/dysmetria noted  gait - normal    Neurological Exam      ROS:    Review of Systems   Constitutional: Negative  Negative for appetite change and fever  HENT: Negative  Negative for hearing loss, tinnitus, trouble swallowing and voice change  Eyes: Negative  Negative for photophobia and pain  Respiratory: Negative  Negative for shortness of breath  Cardiovascular: Negative  Negative for palpitations  Gastrointestinal: Negative  Negative for nausea and vomiting  Endocrine: Negative  Negative for cold intolerance and heat intolerance  Genitourinary: Negative  Negative for dysuria, frequency and urgency  Musculoskeletal: Negative  Negative for myalgias and neck pain  Skin: Negative  Negative for rash  Neurological: Negative  Negative for dizziness, tremors, seizures, syncope, facial asymmetry, speech difficulty, weakness, light-headedness, numbness and headaches  Hematological: Negative  Does not bruise/bleed easily  Psychiatric/Behavioral: Negative  Negative for confusion, hallucinations and sleep disturbance

## 2018-09-05 ENCOUNTER — HOSPITAL ENCOUNTER (OUTPATIENT)
Dept: MAMMOGRAPHY | Facility: CLINIC | Age: 68
Discharge: HOME | End: 2018-09-05
Payer: MEDICARE

## 2018-09-05 DIAGNOSIS — Z12.31 SCREENING MAMMOGRAM, ENCOUNTER FOR: ICD-10-CM

## 2018-09-05 PROCEDURE — 77067 SCR MAMMO BI INCL CAD: CPT

## 2018-09-12 DIAGNOSIS — Z86.73 HISTORY OF STROKE: ICD-10-CM

## 2018-09-12 RX ORDER — CLOPIDOGREL BISULFATE 75 MG/1
75 TABLET ORAL DAILY
Qty: 90 TABLET | Refills: 0 | Status: SHIPPED | OUTPATIENT
Start: 2018-09-12 | End: 2018-12-10 | Stop reason: SDUPTHER

## 2018-10-10 ENCOUNTER — CLINICAL SUPPORT (OUTPATIENT)
Dept: INTERNAL MEDICINE CLINIC | Facility: CLINIC | Age: 68
End: 2018-10-10
Payer: MEDICARE

## 2018-10-10 DIAGNOSIS — Z23 ENCOUNTER FOR IMMUNIZATION: ICD-10-CM

## 2018-10-10 PROCEDURE — 90662 IIV NO PRSV INCREASED AG IM: CPT

## 2018-10-10 PROCEDURE — G0008 ADMIN INFLUENZA VIRUS VAC: HCPCS

## 2018-10-26 DIAGNOSIS — E78.5 HYPERLIPIDEMIA, UNSPECIFIED HYPERLIPIDEMIA TYPE: ICD-10-CM

## 2018-10-26 DIAGNOSIS — R35.0 URINARY FREQUENCY: ICD-10-CM

## 2018-10-26 RX ORDER — OXYBUTYNIN CHLORIDE 5 MG/1
2.5 TABLET ORAL 2 TIMES DAILY
Qty: 90 TABLET | Refills: 1 | Status: SHIPPED | OUTPATIENT
Start: 2018-10-26 | End: 2019-01-02 | Stop reason: SDUPTHER

## 2018-10-26 RX ORDER — OXYBUTYNIN CHLORIDE 5 MG/1
2.5 TABLET ORAL 2 TIMES DAILY
Qty: 90 TABLET | Refills: 1 | Status: SHIPPED | OUTPATIENT
Start: 2018-10-26 | End: 2018-10-26 | Stop reason: SDUPTHER

## 2018-10-26 RX ORDER — SIMVASTATIN 40 MG
40 TABLET ORAL DAILY
Qty: 90 TABLET | Refills: 1 | Status: SHIPPED | OUTPATIENT
Start: 2018-10-26 | End: 2019-01-02 | Stop reason: SDUPTHER

## 2018-12-04 ENCOUNTER — CLINICAL SUPPORT (OUTPATIENT)
Dept: INTERNAL MEDICINE CLINIC | Facility: CLINIC | Age: 68
End: 2018-12-04
Payer: MEDICARE

## 2018-12-04 DIAGNOSIS — M81.0 OSTEOPOROSIS, UNSPECIFIED OSTEOPOROSIS TYPE, UNSPECIFIED PATHOLOGICAL FRACTURE PRESENCE: Primary | ICD-10-CM

## 2018-12-04 DIAGNOSIS — E78.2 MIXED HYPERLIPIDEMIA: ICD-10-CM

## 2018-12-04 LAB
25(OH)D3 SERPL-MCNC: 53.7 NG/ML (ref 30–100)
ALBUMIN SERPL BCP-MCNC: 3.7 G/DL (ref 3.5–5)
ALP SERPL-CCNC: 49 U/L (ref 46–116)
ALT SERPL W P-5'-P-CCNC: 24 U/L (ref 12–78)
ANION GAP SERPL CALCULATED.3IONS-SCNC: 5 MMOL/L (ref 4–13)
AST SERPL W P-5'-P-CCNC: 17 U/L (ref 5–45)
BILIRUB SERPL-MCNC: 0.75 MG/DL (ref 0.2–1)
BUN SERPL-MCNC: 16 MG/DL (ref 5–25)
CALCIUM SERPL-MCNC: 9 MG/DL (ref 8.3–10.1)
CHLORIDE SERPL-SCNC: 108 MMOL/L (ref 100–108)
CHOLEST SERPL-MCNC: 138 MG/DL (ref 50–200)
CO2 SERPL-SCNC: 31 MMOL/L (ref 21–32)
CREAT SERPL-MCNC: 0.67 MG/DL (ref 0.6–1.3)
GFR SERPL CREATININE-BSD FRML MDRD: 90 ML/MIN/1.73SQ M
GLUCOSE P FAST SERPL-MCNC: 82 MG/DL (ref 65–99)
HDLC SERPL-MCNC: 79 MG/DL (ref 40–60)
LDLC SERPL CALC-MCNC: 47 MG/DL (ref 0–100)
NONHDLC SERPL-MCNC: 59 MG/DL
POTASSIUM SERPL-SCNC: 4 MMOL/L (ref 3.5–5.3)
PROT SERPL-MCNC: 6.5 G/DL (ref 6.4–8.2)
SODIUM SERPL-SCNC: 144 MMOL/L (ref 136–145)
TRIGL SERPL-MCNC: 62 MG/DL

## 2018-12-04 PROCEDURE — 80061 LIPID PANEL: CPT | Performed by: FAMILY MEDICINE

## 2018-12-04 PROCEDURE — 80053 COMPREHEN METABOLIC PANEL: CPT | Performed by: FAMILY MEDICINE

## 2018-12-04 PROCEDURE — 36415 COLL VENOUS BLD VENIPUNCTURE: CPT

## 2018-12-04 PROCEDURE — 82306 VITAMIN D 25 HYDROXY: CPT | Performed by: FAMILY MEDICINE

## 2018-12-10 DIAGNOSIS — Z86.73 HISTORY OF STROKE: ICD-10-CM

## 2018-12-10 RX ORDER — CLOPIDOGREL BISULFATE 75 MG/1
75 TABLET ORAL DAILY
Qty: 90 TABLET | Refills: 1 | Status: SHIPPED | OUTPATIENT
Start: 2018-12-10 | End: 2019-03-04 | Stop reason: SDUPTHER

## 2018-12-11 ENCOUNTER — OFFICE VISIT (OUTPATIENT)
Dept: INTERNAL MEDICINE CLINIC | Age: 68
End: 2018-12-11
Payer: MEDICARE

## 2018-12-11 VITALS
SYSTOLIC BLOOD PRESSURE: 108 MMHG | DIASTOLIC BLOOD PRESSURE: 80 MMHG | HEART RATE: 68 BPM | OXYGEN SATURATION: 98 % | BODY MASS INDEX: 22.36 KG/M2 | HEIGHT: 65 IN | TEMPERATURE: 99 F | WEIGHT: 134.2 LBS

## 2018-12-11 DIAGNOSIS — R79.89 HIGH SERUM HIGH DENSITY LIPOPROTEIN (HDL): Primary | ICD-10-CM

## 2018-12-11 DIAGNOSIS — E78.2 MIXED HYPERLIPIDEMIA: ICD-10-CM

## 2018-12-11 DIAGNOSIS — Z23 NEED FOR VACCINATION AGAINST STREPTOCOCCUS PNEUMONIAE USING PNEUMOCOCCAL CONJUGATE VACCINE 13: ICD-10-CM

## 2018-12-11 DIAGNOSIS — M81.0 AGE RELATED OSTEOPOROSIS, UNSPECIFIED PATHOLOGICAL FRACTURE PRESENCE: ICD-10-CM

## 2018-12-11 DIAGNOSIS — R53.82 CHRONIC FATIGUE: ICD-10-CM

## 2018-12-11 DIAGNOSIS — M05.79 RHEUMATOID ARTHRITIS INVOLVING MULTIPLE SITES WITH POSITIVE RHEUMATOID FACTOR (HCC): ICD-10-CM

## 2018-12-11 PROBLEM — L70.0: Status: RESOLVED | Noted: 2017-08-29 | Resolved: 2018-12-11

## 2018-12-11 PROCEDURE — G0009 ADMIN PNEUMOCOCCAL VACCINE: HCPCS

## 2018-12-11 PROCEDURE — 99214 OFFICE O/P EST MOD 30 MIN: CPT | Performed by: FAMILY MEDICINE

## 2018-12-11 PROCEDURE — 90670 PCV13 VACCINE IM: CPT

## 2018-12-11 PROCEDURE — 96372 THER/PROPH/DIAG INJ SC/IM: CPT | Performed by: FAMILY MEDICINE

## 2018-12-11 NOTE — PROGRESS NOTES
Assessment/Plan:    No problem-specific Assessment & Plan notes found for this encounter  1  Idiopathic osteoporosis (733 02) (M81 8)   2  Rheumatoid arthritis (714 0) (M06 9)   3  Hyperlipidemia (272 4) (E78 5)   · Mixed      Discussion Summary:   PROLIA Injection L arm , next due in 6 months, DEXA due after april , 2019  increase vit D to 4000IU daily  discussed chronic prednisone use with osteoporosis- off prednisone since June 2017, follows with Rheumatology          Problem List Items Addressed This Visit        Musculoskeletal and Integument    Osteoporosis    Relevant Orders    DXA bone density spine hip and pelvis    CBC and differential    TSH baseline    Rheumatoid arthritis with positive rheumatoid factor (HCC)    Relevant Orders    TSH baseline       Other    Hyperlipidemia    Relevant Orders    Lipid panel    Comprehensive metabolic panel    CBC and differential    High serum high density lipoprotein (HDL) - Primary    Relevant Orders    DXA bone density spine hip and pelvis      Other Visit Diagnoses     Chronic fatigue         Relevant Orders    TSH baseline            Subjective:  F/up PROLIA and labs     Patient ID: Mayte Ridley is a 70 y o  female  HPI  Osteoporosis (Follow-Up): The patient's osteoporosis has been stable since the last visit  Most recent DXA results: T-score <-2 5  Comorbid Illnesses: -3 3 is the worst- on chronic prednisone  She has no comorbid illnesses  She has no complications  She has no significant interval events  Symptoms: The patient is currently asymptomatic  denies back pain-- and-- denies   Associated symptoms include no decrease in height  Home precautions: Osteoporosis counseling and education was reviewed with patient and caregivers, including prevention of falls  Medications: the patient is adherent with her medication regimen  Medication(s): vitamin D,-- a calcium supplement-- and-- prolia    Disease Management: the patient is doing well with her osteoporosis goals  The patient is due for DEXA  April 2019        The following portions of the patient's history were reviewed and updated as appropriate: allergies, current medications, past family history, past medical history, past social history, past surgical history and problem list     Review of Systems      Female:  Constitutional: no fever-- and-- no chills  3 lb weight gain   Eyes: no purulent discharge from the eyes  ENT: no nasal discharge  Cardiovascular: no palpitations-- and-- no lower extremity edema  Respiratory: No complaints of shortness of breath, no wheezing, no cough, no SOB on exertion, no orthopnea, no PND  Gastrointestinal: no nausea-- and-- no vomiting  Genitourinary: nocturia, but-- No complaints of dysuria, no incontinence, no pelvic pain, no dysmenorrhea, no vaginal discharge or bleeding  Musculoskeletal: arthralgias,-- myalgias-- and-- knee pain, not new  Neurological: no dizziness  Psychiatric: irregular sleep cycle  Hematologic/Lymphatic: a tendency for easy bleeding-- and-- a tendency for easy bruising  : 1-2 nocturia    Objective:      /80 (BP Location: Left arm, Patient Position: Sitting, Cuff Size: Standard)   Pulse 68   Temp 99 °F (37 2 °C) (Tympanic)   Ht 5' 4 5" (1 638 m)   Wt 60 9 kg (134 lb 3 2 oz)   SpO2 98%   BMI 22 68 kg/m²          Physical Exam     Constitutional  General appearance: No acute distress, well appearing and well nourished  Eyes  Conjunctiva and lids: No swelling, erythema or discharge  Pupils and irises: Equal, round and reactive to light  Ears, Nose, Mouth, and Throat  External inspection of ears and nose: Normal    Oropharynx: Normal with no erythema, edema, exudate or lesions  Pulmonary  Respiratory effort: No increased work of breathing or signs of respiratory distress  Auscultation of lungs: Clear to auscultation       Cardiovascular  Auscultation of heart: Normal rate and rhythm, normal S1 and S2, without murmurs  Examination of extremities for edema and/or varicosities: Normal   Abdomen  Abdomen: Non-tender, no masses  Liver and spleen: No hepatomegaly or splenomegaly  Musculoskeletal  Gait and station: Normal     thin arms bilaterally  Skin  Skin and subcutaneous tissue: normal   Hyper pigmentation right lower extremity    Not new   Psychiatric  Orientation to person, place, and time: Normal    Mood and affect: Normal

## 2019-01-02 DIAGNOSIS — R35.0 URINARY FREQUENCY: ICD-10-CM

## 2019-01-02 DIAGNOSIS — E78.5 HYPERLIPIDEMIA, UNSPECIFIED HYPERLIPIDEMIA TYPE: ICD-10-CM

## 2019-01-02 RX ORDER — OXYBUTYNIN CHLORIDE 5 MG/1
2.5 TABLET ORAL 2 TIMES DAILY
Qty: 90 TABLET | Refills: 1 | Status: SHIPPED | OUTPATIENT
Start: 2019-01-02 | End: 2019-07-08 | Stop reason: SDUPTHER

## 2019-01-02 RX ORDER — SIMVASTATIN 40 MG
40 TABLET ORAL DAILY
Qty: 90 TABLET | Refills: 1 | Status: SHIPPED | OUTPATIENT
Start: 2019-01-02 | End: 2019-07-08 | Stop reason: SDUPTHER

## 2019-02-05 ENCOUNTER — TELEPHONE (OUTPATIENT)
Dept: INTERNAL MEDICINE CLINIC | Age: 69
End: 2019-02-05

## 2019-02-05 NOTE — TELEPHONE ENCOUNTER
Patient is scheduled for her Next Prolia on 06/11/2019 with Dr Majo Drew at the [facility] office

## 2019-03-04 DIAGNOSIS — Z86.73 HISTORY OF STROKE: ICD-10-CM

## 2019-03-04 RX ORDER — CLOPIDOGREL BISULFATE 75 MG/1
75 TABLET ORAL DAILY
Qty: 90 TABLET | Refills: 1 | Status: SHIPPED | OUTPATIENT
Start: 2019-03-04 | End: 2019-09-03 | Stop reason: CLARIF

## 2019-04-04 ENCOUNTER — OFFICE VISIT (OUTPATIENT)
Dept: INTERNAL MEDICINE CLINIC | Facility: CLINIC | Age: 69
End: 2019-04-04
Payer: MEDICARE

## 2019-04-04 ENCOUNTER — TELEPHONE (OUTPATIENT)
Dept: OTHER | Facility: OTHER | Age: 69
End: 2019-04-04

## 2019-04-04 VITALS
HEIGHT: 65 IN | HEART RATE: 80 BPM | SYSTOLIC BLOOD PRESSURE: 122 MMHG | DIASTOLIC BLOOD PRESSURE: 80 MMHG | TEMPERATURE: 98.2 F | BODY MASS INDEX: 22.42 KG/M2 | WEIGHT: 134.6 LBS | OXYGEN SATURATION: 99 %

## 2019-04-04 DIAGNOSIS — S09.93XA FACIAL TRAUMA, INITIAL ENCOUNTER: Primary | ICD-10-CM

## 2019-04-04 PROCEDURE — 99213 OFFICE O/P EST LOW 20 MIN: CPT | Performed by: INTERNAL MEDICINE

## 2019-04-12 ENCOUNTER — TELEPHONE (OUTPATIENT)
Dept: NEUROLOGY | Facility: CLINIC | Age: 69
End: 2019-04-12

## 2019-04-24 ENCOUNTER — EVALUATION (OUTPATIENT)
Dept: PHYSICAL THERAPY | Facility: REHABILITATION | Age: 69
End: 2019-04-24
Payer: MEDICARE

## 2019-04-24 DIAGNOSIS — M79.672 LEFT FOOT PAIN: Primary | ICD-10-CM

## 2019-04-24 PROCEDURE — 97035 APP MDLTY 1+ULTRASOUND EA 15: CPT | Performed by: PHYSICAL THERAPIST

## 2019-04-24 PROCEDURE — 97161 PT EVAL LOW COMPLEX 20 MIN: CPT | Performed by: PHYSICAL THERAPIST

## 2019-04-26 ENCOUNTER — TRANSCRIBE ORDERS (OUTPATIENT)
Dept: PHYSICAL THERAPY | Facility: REHABILITATION | Age: 69
End: 2019-04-26

## 2019-04-26 ENCOUNTER — OFFICE VISIT (OUTPATIENT)
Dept: PHYSICAL THERAPY | Facility: REHABILITATION | Age: 69
End: 2019-04-26
Payer: MEDICARE

## 2019-04-26 DIAGNOSIS — M79.672 LEFT FOOT PAIN: Primary | ICD-10-CM

## 2019-04-26 PROCEDURE — 97140 MANUAL THERAPY 1/> REGIONS: CPT

## 2019-04-26 PROCEDURE — 97112 NEUROMUSCULAR REEDUCATION: CPT

## 2019-04-26 PROCEDURE — 97035 APP MDLTY 1+ULTRASOUND EA 15: CPT

## 2019-04-30 ENCOUNTER — OFFICE VISIT (OUTPATIENT)
Dept: PHYSICAL THERAPY | Facility: REHABILITATION | Age: 69
End: 2019-04-30
Payer: MEDICARE

## 2019-04-30 DIAGNOSIS — M79.672 LEFT FOOT PAIN: Primary | ICD-10-CM

## 2019-04-30 PROCEDURE — 97035 APP MDLTY 1+ULTRASOUND EA 15: CPT | Performed by: PHYSICAL THERAPIST

## 2019-04-30 PROCEDURE — 97112 NEUROMUSCULAR REEDUCATION: CPT | Performed by: PHYSICAL THERAPIST

## 2019-04-30 PROCEDURE — 97140 MANUAL THERAPY 1/> REGIONS: CPT | Performed by: PHYSICAL THERAPIST

## 2019-05-02 ENCOUNTER — OFFICE VISIT (OUTPATIENT)
Dept: PHYSICAL THERAPY | Facility: REHABILITATION | Age: 69
End: 2019-05-02
Payer: MEDICARE

## 2019-05-02 DIAGNOSIS — M79.672 LEFT FOOT PAIN: Primary | ICD-10-CM

## 2019-05-02 PROCEDURE — 97140 MANUAL THERAPY 1/> REGIONS: CPT | Performed by: PHYSICAL THERAPIST

## 2019-05-16 ENCOUNTER — OFFICE VISIT (OUTPATIENT)
Dept: NEUROLOGY | Facility: CLINIC | Age: 69
End: 2019-05-16
Payer: MEDICARE

## 2019-05-16 VITALS
HEART RATE: 80 BPM | BODY MASS INDEX: 22.33 KG/M2 | DIASTOLIC BLOOD PRESSURE: 60 MMHG | WEIGHT: 134 LBS | SYSTOLIC BLOOD PRESSURE: 102 MMHG | HEIGHT: 65 IN

## 2019-05-16 DIAGNOSIS — E78.2 MIXED HYPERLIPIDEMIA: ICD-10-CM

## 2019-05-16 DIAGNOSIS — G45.9 TIA DUE TO EMBOLISM (HCC): ICD-10-CM

## 2019-05-16 DIAGNOSIS — I63.50 CEREBRAL ARTERY OCCLUSION WITH CEREBRAL INFARCTION (HCC): ICD-10-CM

## 2019-05-16 DIAGNOSIS — I74.9 TIA DUE TO EMBOLISM (HCC): ICD-10-CM

## 2019-05-16 DIAGNOSIS — I67.850 CADASIL (CEREBRAL AUTOSOMAL DOMINANT ARTERIOPATHY WITH SUBCORTICAL INFARCTS AND LEUKOENCEPHALOPATHY): Primary | ICD-10-CM

## 2019-05-16 PROCEDURE — 99214 OFFICE O/P EST MOD 30 MIN: CPT | Performed by: PSYCHIATRY & NEUROLOGY

## 2019-05-28 ENCOUNTER — CLINICAL SUPPORT (OUTPATIENT)
Dept: INTERNAL MEDICINE CLINIC | Facility: CLINIC | Age: 69
End: 2019-05-28
Payer: MEDICARE

## 2019-05-28 ENCOUNTER — OFFICE VISIT (OUTPATIENT)
Dept: INTERNAL MEDICINE CLINIC | Facility: CLINIC | Age: 69
End: 2019-05-28
Payer: MEDICARE

## 2019-05-28 VITALS
WEIGHT: 132 LBS | TEMPERATURE: 97.6 F | HEIGHT: 65 IN | SYSTOLIC BLOOD PRESSURE: 116 MMHG | OXYGEN SATURATION: 99 % | DIASTOLIC BLOOD PRESSURE: 80 MMHG | BODY MASS INDEX: 21.99 KG/M2 | HEART RATE: 68 BPM

## 2019-05-28 DIAGNOSIS — E78.2 MIXED HYPERLIPIDEMIA: Primary | ICD-10-CM

## 2019-05-28 DIAGNOSIS — M80.00XA AGE-RELATED OSTEOPOROSIS WITH CURRENT PATHOLOGICAL FRACTURE, INITIAL ENCOUNTER: ICD-10-CM

## 2019-05-28 DIAGNOSIS — R53.82 CHRONIC FATIGUE: ICD-10-CM

## 2019-05-28 DIAGNOSIS — R82.90 URINE ABNORMALITY: ICD-10-CM

## 2019-05-28 DIAGNOSIS — N30.01 ACUTE CYSTITIS WITH HEMATURIA: Primary | ICD-10-CM

## 2019-05-28 DIAGNOSIS — M05.79 RHEUMATOID ARTHRITIS INVOLVING MULTIPLE SITES WITH POSITIVE RHEUMATOID FACTOR (HCC): ICD-10-CM

## 2019-05-28 LAB
ALBUMIN SERPL BCP-MCNC: 4 G/DL (ref 3.5–5)
ALP SERPL-CCNC: 52 U/L (ref 46–116)
ALT SERPL W P-5'-P-CCNC: 24 U/L (ref 12–78)
ANION GAP SERPL CALCULATED.3IONS-SCNC: 7 MMOL/L (ref 4–13)
AST SERPL W P-5'-P-CCNC: 17 U/L (ref 5–45)
BACTERIA UR QL AUTO: ABNORMAL /HPF
BASOPHILS # BLD AUTO: 0.07 THOUSANDS/ΜL (ref 0–0.1)
BASOPHILS NFR BLD AUTO: 2 % (ref 0–1)
BILIRUB SERPL-MCNC: 0.68 MG/DL (ref 0.2–1)
BILIRUB UR QL STRIP: NEGATIVE
BUN SERPL-MCNC: 14 MG/DL (ref 5–25)
CALCIUM SERPL-MCNC: 9.3 MG/DL (ref 8.3–10.1)
CHLORIDE SERPL-SCNC: 109 MMOL/L (ref 100–108)
CHOLEST SERPL-MCNC: 167 MG/DL (ref 50–200)
CLARITY UR: ABNORMAL
CO2 SERPL-SCNC: 30 MMOL/L (ref 21–32)
COLOR UR: YELLOW
CREAT SERPL-MCNC: 0.64 MG/DL (ref 0.6–1.3)
EOSINOPHIL # BLD AUTO: 0.12 THOUSAND/ΜL (ref 0–0.61)
EOSINOPHIL NFR BLD AUTO: 3 % (ref 0–6)
ERYTHROCYTE [DISTWIDTH] IN BLOOD BY AUTOMATED COUNT: 12.5 % (ref 11.6–15.1)
GFR SERPL CREATININE-BSD FRML MDRD: 91 ML/MIN/1.73SQ M
GLUCOSE P FAST SERPL-MCNC: 93 MG/DL (ref 65–99)
GLUCOSE UR STRIP-MCNC: NEGATIVE MG/DL
HCT VFR BLD AUTO: 44.3 % (ref 34.8–46.1)
HDLC SERPL-MCNC: 81 MG/DL (ref 40–60)
HGB BLD-MCNC: 14.1 G/DL (ref 11.5–15.4)
HGB UR QL STRIP.AUTO: ABNORMAL
HYALINE CASTS #/AREA URNS LPF: ABNORMAL /LPF
IMM GRANULOCYTES # BLD AUTO: 0.01 THOUSAND/UL (ref 0–0.2)
IMM GRANULOCYTES NFR BLD AUTO: 0 % (ref 0–2)
KETONES UR STRIP-MCNC: NEGATIVE MG/DL
LDLC SERPL CALC-MCNC: 69 MG/DL (ref 0–100)
LEUKOCYTE ESTERASE UR QL STRIP: ABNORMAL
LYMPHOCYTES # BLD AUTO: 1.64 THOUSANDS/ΜL (ref 0.6–4.47)
LYMPHOCYTES NFR BLD AUTO: 43 % (ref 14–44)
MCH RBC QN AUTO: 30.2 PG (ref 26.8–34.3)
MCHC RBC AUTO-ENTMCNC: 31.8 G/DL (ref 31.4–37.4)
MCV RBC AUTO: 95 FL (ref 82–98)
MONOCYTES # BLD AUTO: 0.41 THOUSAND/ΜL (ref 0.17–1.22)
MONOCYTES NFR BLD AUTO: 11 % (ref 4–12)
NEUTROPHILS # BLD AUTO: 1.58 THOUSANDS/ΜL (ref 1.85–7.62)
NEUTS SEG NFR BLD AUTO: 41 % (ref 43–75)
NITRITE UR QL STRIP: POSITIVE
NON-SQ EPI CELLS URNS QL MICRO: ABNORMAL /HPF
NONHDLC SERPL-MCNC: 86 MG/DL
NRBC BLD AUTO-RTO: 0 /100 WBCS
PH UR STRIP.AUTO: 7 [PH]
PLATELET # BLD AUTO: 201 THOUSANDS/UL (ref 149–390)
PMV BLD AUTO: 10.5 FL (ref 8.9–12.7)
POTASSIUM SERPL-SCNC: 4 MMOL/L (ref 3.5–5.3)
PROT SERPL-MCNC: 6.6 G/DL (ref 6.4–8.2)
PROT UR STRIP-MCNC: NEGATIVE MG/DL
RBC # BLD AUTO: 4.67 MILLION/UL (ref 3.81–5.12)
RBC #/AREA URNS AUTO: ABNORMAL /HPF
SL AMB  POCT GLUCOSE, UA: ABNORMAL
SL AMB LEUKOCYTE ESTERASE,UA: ABNORMAL
SL AMB POCT BILIRUBIN,UA: ABNORMAL
SL AMB POCT BLOOD,UA: ABNORMAL
SL AMB POCT CLARITY,UA: ABNORMAL
SL AMB POCT COLOR,UA: YELLOW
SL AMB POCT KETONES,UA: ABNORMAL
SL AMB POCT NITRITE,UA: ABNORMAL
SL AMB POCT PH,UA: 7
SL AMB POCT SPECIFIC GRAVITY,UA: 1.02
SL AMB POCT URINE PROTEIN: ABNORMAL
SL AMB POCT UROBILINOGEN: 0.2
SODIUM SERPL-SCNC: 146 MMOL/L (ref 136–145)
SP GR UR STRIP.AUTO: 1.01 (ref 1–1.03)
TRIGL SERPL-MCNC: 86 MG/DL
TSH SERPL DL<=0.05 MIU/L-ACNC: 2.1 UIU/ML (ref 0.36–3.74)
UROBILINOGEN UR QL STRIP.AUTO: 0.2 E.U./DL
WBC # BLD AUTO: 3.83 THOUSAND/UL (ref 4.31–10.16)
WBC #/AREA URNS AUTO: ABNORMAL /HPF

## 2019-05-28 PROCEDURE — 85025 COMPLETE CBC W/AUTO DIFF WBC: CPT | Performed by: FAMILY MEDICINE

## 2019-05-28 PROCEDURE — 81001 URINALYSIS AUTO W/SCOPE: CPT | Performed by: NURSE PRACTITIONER

## 2019-05-28 PROCEDURE — 84443 ASSAY THYROID STIM HORMONE: CPT | Performed by: FAMILY MEDICINE

## 2019-05-28 PROCEDURE — 87077 CULTURE AEROBIC IDENTIFY: CPT | Performed by: NURSE PRACTITIONER

## 2019-05-28 PROCEDURE — 99213 OFFICE O/P EST LOW 20 MIN: CPT | Performed by: NURSE PRACTITIONER

## 2019-05-28 PROCEDURE — 87186 SC STD MICRODIL/AGAR DIL: CPT | Performed by: NURSE PRACTITIONER

## 2019-05-28 PROCEDURE — 87086 URINE CULTURE/COLONY COUNT: CPT | Performed by: NURSE PRACTITIONER

## 2019-05-28 PROCEDURE — 80061 LIPID PANEL: CPT | Performed by: FAMILY MEDICINE

## 2019-05-28 PROCEDURE — 80053 COMPREHEN METABOLIC PANEL: CPT | Performed by: FAMILY MEDICINE

## 2019-05-28 PROCEDURE — 81002 URINALYSIS NONAUTO W/O SCOPE: CPT | Performed by: NURSE PRACTITIONER

## 2019-05-28 PROCEDURE — 36415 COLL VENOUS BLD VENIPUNCTURE: CPT

## 2019-05-28 RX ORDER — CEPHALEXIN 500 MG/1
500 CAPSULE ORAL EVERY 6 HOURS SCHEDULED
Qty: 20 CAPSULE | Refills: 0 | Status: SHIPPED | OUTPATIENT
Start: 2019-05-28 | End: 2019-06-02

## 2019-05-30 LAB — BACTERIA UR CULT: ABNORMAL

## 2019-06-04 ENCOUNTER — TRANSCRIBE ORDERS (OUTPATIENT)
Dept: PHYSICAL THERAPY | Facility: REHABILITATION | Age: 69
End: 2019-06-04

## 2019-06-04 DIAGNOSIS — M79.672 LEFT FOOT PAIN: Primary | ICD-10-CM

## 2019-06-07 DIAGNOSIS — Z86.73 HISTORY OF STROKE: ICD-10-CM

## 2019-06-07 RX ORDER — CLOPIDOGREL BISULFATE 75 MG/1
TABLET ORAL
Qty: 90 TABLET | Refills: 0 | OUTPATIENT
Start: 2019-06-07

## 2019-06-11 ENCOUNTER — OFFICE VISIT (OUTPATIENT)
Dept: INTERNAL MEDICINE CLINIC | Facility: CLINIC | Age: 69
End: 2019-06-11
Payer: MEDICARE

## 2019-06-11 VITALS
DIASTOLIC BLOOD PRESSURE: 70 MMHG | TEMPERATURE: 98.4 F | HEIGHT: 65 IN | OXYGEN SATURATION: 94 % | HEART RATE: 68 BPM | SYSTOLIC BLOOD PRESSURE: 102 MMHG | WEIGHT: 131.4 LBS | BODY MASS INDEX: 21.89 KG/M2

## 2019-06-11 DIAGNOSIS — E83.51 HYPOCALCEMIA: ICD-10-CM

## 2019-06-11 DIAGNOSIS — R79.89 HIGH SERUM HIGH DENSITY LIPOPROTEIN (HDL): ICD-10-CM

## 2019-06-11 DIAGNOSIS — M05.79 RHEUMATOID ARTHRITIS INVOLVING MULTIPLE SITES WITH POSITIVE RHEUMATOID FACTOR (HCC): ICD-10-CM

## 2019-06-11 DIAGNOSIS — M80.00XA AGE-RELATED OSTEOPOROSIS WITH CURRENT PATHOLOGICAL FRACTURE, INITIAL ENCOUNTER: Primary | ICD-10-CM

## 2019-06-11 DIAGNOSIS — I63.81 LACUNAR INFARCTION (HCC): ICD-10-CM

## 2019-06-11 DIAGNOSIS — Z00.00 MEDICARE ANNUAL WELLNESS VISIT, SUBSEQUENT: ICD-10-CM

## 2019-06-11 DIAGNOSIS — M35.00 SJOGREN'S SYNDROME, WITH UNSPECIFIED ORGAN INVOLVEMENT (HCC): ICD-10-CM

## 2019-06-11 DIAGNOSIS — J00 ACUTE NASOPHARYNGITIS: ICD-10-CM

## 2019-06-11 DIAGNOSIS — Q85.01 VON RECKLINGHAUSENS DISEASE (HCC): ICD-10-CM

## 2019-06-11 DIAGNOSIS — E78.2 MIXED HYPERLIPIDEMIA: ICD-10-CM

## 2019-06-11 PROBLEM — N30.01 ACUTE CYSTITIS WITH HEMATURIA: Status: RESOLVED | Noted: 2019-05-28 | Resolved: 2019-06-11

## 2019-06-11 PROCEDURE — 96372 THER/PROPH/DIAG INJ SC/IM: CPT | Performed by: FAMILY MEDICINE

## 2019-06-11 PROCEDURE — 99214 OFFICE O/P EST MOD 30 MIN: CPT | Performed by: FAMILY MEDICINE

## 2019-06-11 PROCEDURE — G0439 PPPS, SUBSEQ VISIT: HCPCS | Performed by: FAMILY MEDICINE

## 2019-07-08 DIAGNOSIS — R35.0 URINARY FREQUENCY: ICD-10-CM

## 2019-07-08 DIAGNOSIS — E78.5 HYPERLIPIDEMIA, UNSPECIFIED HYPERLIPIDEMIA TYPE: ICD-10-CM

## 2019-07-08 NOTE — TELEPHONE ENCOUNTER
MED: Simvastatin 40mg - Ditropan 5mg    SUPPLY: 90Day  PHARMACY: [facility] mail order   PATIENT PHONE #: [phone]  LAST OV: 6/11/2019  UPCOMING OV: 12/27/2019

## 2019-07-09 DIAGNOSIS — E78.5 HYPERLIPIDEMIA, UNSPECIFIED HYPERLIPIDEMIA TYPE: ICD-10-CM

## 2019-07-09 DIAGNOSIS — R35.0 URINARY FREQUENCY: ICD-10-CM

## 2019-07-09 RX ORDER — OXYBUTYNIN CHLORIDE 5 MG/1
2.5 TABLET ORAL 2 TIMES DAILY
Qty: 90 TABLET | Refills: 1 | Status: SHIPPED | OUTPATIENT
Start: 2019-07-09 | End: 2019-12-16 | Stop reason: SDUPTHER

## 2019-07-09 RX ORDER — SIMVASTATIN 40 MG
40 TABLET ORAL DAILY
Qty: 90 TABLET | Refills: 1 | Status: SHIPPED | OUTPATIENT
Start: 2019-07-09 | End: 2019-07-09 | Stop reason: SDUPTHER

## 2019-07-09 RX ORDER — SIMVASTATIN 40 MG
40 TABLET ORAL DAILY
Qty: 90 TABLET | Refills: 1 | Status: SHIPPED | OUTPATIENT
Start: 2019-07-09 | End: 2020-01-20 | Stop reason: SDUPTHER

## 2019-07-09 RX ORDER — OXYBUTYNIN CHLORIDE 5 MG/1
2.5 TABLET ORAL 2 TIMES DAILY
Qty: 90 TABLET | Refills: 1 | Status: SHIPPED | OUTPATIENT
Start: 2019-07-09 | End: 2019-07-09 | Stop reason: SDUPTHER

## 2019-07-09 NOTE — TELEPHONE ENCOUNTER
Patient called and stated that she received a phone call from [facility] stating that her prescription for Simvastatin and Ditropan are ready to be picked up  She stated that the medications were supposed to be sent to [address] [place] [address], which is the pharmacy she stated she wanted the medications to be sent to on the refill line  If we could please have them resent to [facility] mail order pharmacy  The patient is extremely upset and would like a call back once the medication has been resent

## 2019-07-23 ENCOUNTER — HOSPITAL ENCOUNTER (OUTPATIENT)
Dept: RADIOLOGY | Age: 69
Discharge: HOME | End: 2019-07-23
Payer: MEDICARE

## 2019-07-23 DIAGNOSIS — R79.89 HIGH SERUM HIGH DENSITY LIPOPROTEIN (HDL): ICD-10-CM

## 2019-07-23 DIAGNOSIS — M81.0 AGE RELATED OSTEOPOROSIS, UNSPECIFIED PATHOLOGICAL FRACTURE PRESENCE: ICD-10-CM

## 2019-07-23 PROCEDURE — 77080 DXA BONE DENSITY AXIAL: CPT

## 2019-08-12 ENCOUNTER — APPOINTMENT (EMERGENCY)
Dept: RADIOLOGY | Facility: HOSPITAL | Age: 69
DRG: 070 | End: 2019-08-12
Payer: MEDICARE

## 2019-08-12 ENCOUNTER — HOSPITAL ENCOUNTER (INPATIENT)
Facility: HOSPITAL | Age: 69
LOS: 1 days | Discharge: HOME | DRG: 070 | End: 2019-08-14
Attending: EMERGENCY MEDICINE | Admitting: INTERNAL MEDICINE
Payer: MEDICARE

## 2019-08-12 DIAGNOSIS — R47.01 APHASIA: Primary | ICD-10-CM

## 2019-08-12 DIAGNOSIS — I67.850 CADASIL (CEREBRAL AUTOSOMAL DOMINANT ARTERIOPATHY WITH SUBCORTICAL INFARCTS AND LEUKOENCEPHALOPATHY): ICD-10-CM

## 2019-08-12 DIAGNOSIS — I63.81 CEREBROVASCULAR ACCIDENT (CVA) DUE TO OCCLUSION OF SMALL ARTERY (HCC): ICD-10-CM

## 2019-08-12 DIAGNOSIS — E34.8 PINEAL GLAND CYST: ICD-10-CM

## 2019-08-12 LAB
ALBUMIN SERPL BCP-MCNC: 3.8 G/DL (ref 3.5–5)
ALP SERPL-CCNC: 74 U/L (ref 46–116)
ALT SERPL W P-5'-P-CCNC: 19 U/L (ref 12–78)
ANION GAP SERPL CALCULATED.3IONS-SCNC: 7 MMOL/L (ref 4–13)
APTT PPP: 25 SECONDS (ref 23–37)
AST SERPL W P-5'-P-CCNC: 15 U/L (ref 5–45)
ATRIAL RATE: 69 BPM
BASOPHILS # BLD AUTO: 0.05 THOUSANDS/ΜL (ref 0–0.1)
BASOPHILS NFR BLD AUTO: 1 % (ref 0–1)
BILIRUB SERPL-MCNC: 0.39 MG/DL (ref 0.2–1)
BUN SERPL-MCNC: 14 MG/DL (ref 5–25)
CALCIUM SERPL-MCNC: 9.4 MG/DL (ref 8.3–10.1)
CHLORIDE SERPL-SCNC: 112 MMOL/L (ref 100–108)
CO2 SERPL-SCNC: 28 MMOL/L (ref 21–32)
CREAT SERPL-MCNC: 0.58 MG/DL (ref 0.6–1.3)
EOSINOPHIL # BLD AUTO: 0.07 THOUSAND/ΜL (ref 0–0.61)
EOSINOPHIL NFR BLD AUTO: 1 % (ref 0–6)
ERYTHROCYTE [DISTWIDTH] IN BLOOD BY AUTOMATED COUNT: 12.9 % (ref 11.6–15.1)
GFR SERPL CREATININE-BSD FRML MDRD: 94 ML/MIN/1.73SQ M
GLUCOSE SERPL-MCNC: 96 MG/DL (ref 65–140)
HCT VFR BLD AUTO: 42.7 % (ref 34.8–46.1)
HGB BLD-MCNC: 13.9 G/DL (ref 11.5–15.4)
IMM GRANULOCYTES # BLD AUTO: 0.02 THOUSAND/UL (ref 0–0.2)
IMM GRANULOCYTES NFR BLD AUTO: 0 % (ref 0–2)
INR PPP: 1.02 (ref 0.84–1.19)
LYMPHOCYTES # BLD AUTO: 1.6 THOUSANDS/ΜL (ref 0.6–4.47)
LYMPHOCYTES NFR BLD AUTO: 26 % (ref 14–44)
MCH RBC QN AUTO: 30 PG (ref 26.8–34.3)
MCHC RBC AUTO-ENTMCNC: 32.6 G/DL (ref 31.4–37.4)
MCV RBC AUTO: 92 FL (ref 82–98)
MONOCYTES # BLD AUTO: 0.6 THOUSAND/ΜL (ref 0.17–1.22)
MONOCYTES NFR BLD AUTO: 10 % (ref 4–12)
NEUTROPHILS # BLD AUTO: 3.74 THOUSANDS/ΜL (ref 1.85–7.62)
NEUTS SEG NFR BLD AUTO: 62 % (ref 43–75)
NRBC BLD AUTO-RTO: 0 /100 WBCS
P AXIS: 112 DEGREES
PLATELET # BLD AUTO: 252 THOUSANDS/UL (ref 149–390)
PMV BLD AUTO: 9.4 FL (ref 8.9–12.7)
POTASSIUM SERPL-SCNC: 4.1 MMOL/L (ref 3.5–5.3)
PR INTERVAL: 160 MS
PROT SERPL-MCNC: 6.7 G/DL (ref 6.4–8.2)
PROTHROMBIN TIME: 13 SECONDS (ref 11.6–14.5)
QRS AXIS: 29 DEGREES
QRSD INTERVAL: 112 MS
QT INTERVAL: 380 MS
QTC INTERVAL: 407 MS
RBC # BLD AUTO: 4.63 MILLION/UL (ref 3.81–5.12)
SODIUM SERPL-SCNC: 147 MMOL/L (ref 136–145)
T WAVE AXIS: 66 DEGREES
VENTRICULAR RATE: 69 BPM
WBC # BLD AUTO: 6.08 THOUSAND/UL (ref 4.31–10.16)

## 2019-08-12 PROCEDURE — 93010 ELECTROCARDIOGRAM REPORT: CPT | Performed by: INTERNAL MEDICINE

## 2019-08-12 PROCEDURE — 70450 CT HEAD/BRAIN W/O DYE: CPT

## 2019-08-12 PROCEDURE — 99220 PR INITIAL OBSERVATION CARE/DAY 70 MINUTES: CPT | Performed by: INTERNAL MEDICINE

## 2019-08-12 PROCEDURE — 80053 COMPREHEN METABOLIC PANEL: CPT | Performed by: EMERGENCY MEDICINE

## 2019-08-12 PROCEDURE — 85730 THROMBOPLASTIN TIME PARTIAL: CPT | Performed by: EMERGENCY MEDICINE

## 2019-08-12 PROCEDURE — NC001 PR NO CHARGE: Performed by: INTERNAL MEDICINE

## 2019-08-12 PROCEDURE — 93005 ELECTROCARDIOGRAM TRACING: CPT

## 2019-08-12 PROCEDURE — 99285 EMERGENCY DEPT VISIT HI MDM: CPT

## 2019-08-12 PROCEDURE — 85610 PROTHROMBIN TIME: CPT | Performed by: EMERGENCY MEDICINE

## 2019-08-12 PROCEDURE — 71046 X-RAY EXAM CHEST 2 VIEWS: CPT

## 2019-08-12 PROCEDURE — 85025 COMPLETE CBC W/AUTO DIFF WBC: CPT | Performed by: EMERGENCY MEDICINE

## 2019-08-12 PROCEDURE — 36415 COLL VENOUS BLD VENIPUNCTURE: CPT | Performed by: EMERGENCY MEDICINE

## 2019-08-12 PROCEDURE — 99284 EMERGENCY DEPT VISIT MOD MDM: CPT | Performed by: EMERGENCY MEDICINE

## 2019-08-12 RX ORDER — MELATONIN
2000 2 TIMES DAILY
Status: DISCONTINUED | OUTPATIENT
Start: 2019-08-13 | End: 2019-08-14 | Stop reason: HOSPADM

## 2019-08-12 RX ORDER — OXYBUTYNIN CHLORIDE 5 MG/1
2.5 TABLET ORAL 2 TIMES DAILY
Status: DISCONTINUED | OUTPATIENT
Start: 2019-08-12 | End: 2019-08-14 | Stop reason: HOSPADM

## 2019-08-12 RX ORDER — PRAVASTATIN SODIUM 80 MG/1
80 TABLET ORAL
Status: DISCONTINUED | OUTPATIENT
Start: 2019-08-12 | End: 2019-08-14 | Stop reason: HOSPADM

## 2019-08-12 RX ORDER — B-COMPLEX WITH VITAMIN C
1 TABLET ORAL
Status: DISCONTINUED | OUTPATIENT
Start: 2019-08-13 | End: 2019-08-14 | Stop reason: HOSPADM

## 2019-08-12 RX ORDER — LATANOPROST 50 UG/ML
1 SOLUTION/ DROPS OPHTHALMIC DAILY
Status: DISCONTINUED | OUTPATIENT
Start: 2019-08-13 | End: 2019-08-14 | Stop reason: HOSPADM

## 2019-08-12 RX ORDER — ACETAMINOPHEN 325 MG/1
650 TABLET ORAL EVERY 6 HOURS PRN
Status: DISCONTINUED | OUTPATIENT
Start: 2019-08-12 | End: 2019-08-14 | Stop reason: HOSPADM

## 2019-08-12 RX ORDER — CLOPIDOGREL BISULFATE 75 MG/1
75 TABLET ORAL DAILY
Status: DISCONTINUED | OUTPATIENT
Start: 2019-08-13 | End: 2019-08-14 | Stop reason: HOSPADM

## 2019-08-12 RX ORDER — TRAMADOL HYDROCHLORIDE 50 MG/1
50 TABLET ORAL EVERY 6 HOURS PRN
Status: DISCONTINUED | OUTPATIENT
Start: 2019-08-12 | End: 2019-08-14 | Stop reason: HOSPADM

## 2019-08-12 RX ORDER — HYDROXYCHLOROQUINE SULFATE 200 MG/1
200 TABLET, FILM COATED ORAL 2 TIMES DAILY
Status: DISCONTINUED | OUTPATIENT
Start: 2019-08-12 | End: 2019-08-14 | Stop reason: HOSPADM

## 2019-08-12 RX ORDER — GLIMEPIRIDE 2 MG/1
1 TABLET ORAL DAILY
Status: DISCONTINUED | OUTPATIENT
Start: 2019-08-13 | End: 2019-08-14 | Stop reason: HOSPADM

## 2019-08-12 RX ADMIN — HYDROXYCHLOROQUINE SULFATE 200 MG: 200 TABLET, FILM COATED ORAL at 21:17

## 2019-08-12 RX ADMIN — PRAVASTATIN SODIUM 80 MG: 80 TABLET ORAL at 21:16

## 2019-08-12 RX ADMIN — OXYBUTYNIN CHLORIDE 2.5 MG: 5 TABLET ORAL at 21:17

## 2019-08-12 NOTE — H&P
History & Physical - SOD      PATIENT INFORMATION      Trae Caceres 78 y o  female MRN: [id number]  Unit/Bed#: [facility] 704-01 Encounter: [id number]  Admitting Physician: Lola Bailey DO  PCP: Chantale Wood DO  Date of Admission:  08/12/19      ASSESSMENTS & PLAN     No problem-specific Assessment & Plan notes found for this encounter  1  Presumed TIA in the setting of CADASIL  · Current stable, patient asymptomatic, back to her baseline  · History of multiple CVAs  · Cranial nerve 3 not intact, unknown if this is new  · CT scan negative for acute pathology, shows chronic lacunar infarcts  · On Plavix 75 mg daily at home, aspirin no longer recommended by neurologist, can continue  · On simvastatin 40 mg daily, switch to pravastatin 80 mg daily  · Neuro checks  · Neurology consult ordered  · MRI ordered  · CBC, BMP ordered  · Follow-up results    2  Hyperlipidemia  · Stable  · On simvastatin 40 mg daily at home, switch to pravastatin 80 mg daily      3  Rheumatoid arthritis/Sjogren syndrome  · Currently on hydroxychloroquine 200 mg b i d   · Stable, can continue home regimen    5  Osteoporosis  · Stable, received Prolia injections every month  · Can continue vitamin-D, calcium    VTE Pharmacologic Prophylaxis: Enoxaparin (Lovenox)   VTE Mechanical Prophylaxis: SCD's      CHIEF COMPLAINT      Aphasia    HISTORY OF PRESENT ILLNESS      Trae Caceres is a 78 y o  female with a past medical history significant for CADASIL and multiple CVAs who presents with aphasia  Patient states she woke up this morning at 10:00 a m , which is later than she usually does, with the frontal headache, difficulty expressing herself  , who was in the room, notes that she was acting strange, was unable to text daughter and required 's assistance  He states that she was still comprehensible, however what she was saying did not make much sense    She denies any blurry vision, changes in hearing, weakness, facial drooping, gait imbalance, dizziness  She states that the headache and aphasia has disappeared by the time she arrived in the ED  Patient has had multiple "mini strokes"in the past, this feels like one of those episodes to her  She currently is asymptomatic, feels like she is back to baseline  While in the ED, patient was hemodynamically stable, had NIH stroke score of 0  CT of head was negative for any acute infarct, did show old chronic lacunar infarcts  REVIEW OF SYSTEMS      ROS  CONSTITUTIONAL: Denies any fever, chills, rigors, and weight loss  HEENT: No earache or tinnitus  Denies hearing loss or visual disturbances  CARDIOVASCULAR: No chest pain or palpitations  RESPIRATORY: Denies any cough, hemoptysis, shortness of breath or dyspnea on exertion  GASTROINTESTINAL: Denies abdominal pain, nausea, vomitng   GENITOURINARY: No problems with urination  Denies any hematuria or dysuria  NEUROLOGIC:  Positive for headache, speech difficulty  Denies any vision change, hearing loss, weakness, gait disturbance, dizziness or vertigo  MUSCULOSKELETAL: Denies any muscle or joint pain  SKIN: Denies skin rashes or itching  ENDOCRINE: Denies excessive thirst  Denies intolerance to heat or cold  PSYCHOSOCIAL: Denies depression or anxiety  Denies any recent memory loss  All other systems reviewed and are negative  PAST MEDICAL & SURGICAL HISTORY      Past Medical History:   Diagnosis Date    Arthralgia     Neutropenia ([address] )     Osteoporosis     [facility]     [facility]og[facility]'s [facility]     Stroke [facility]cerebrum) [facility] [facility]     Transient cerebral ischemia        Past Surgical History:   Procedure Laterality Date    HYSTERECTOMY           MEDICATIONS & ALLERGIES     Prior to Admission medications    Medication Sig Start Date End Date Taking?  Authorizing Provider   calcium citrate-Vitamin D (CITRACAL PETITES/VITAMIN D) 200 mg-250 units Take 2 tablets by mouth 2 (two) times a day   Yes Historical Provider, MD   Cholecalciferol (VITAMIN D) 2000 units CAPS Take 1 tablet by mouth 2 (two) times a day     Yes Historical Provider, MD   clopidogrel (PLAVIX) 75 mg tablet Take 1 tablet (75 mg total) by mouth daily 3/4/19  Yes Pinedale Knee, DO   hydroxychloroquine (PLAQUENIL) 200 mg tablet Take 1 tablet by mouth 2 (two) times a day   Yes Historical Provider, MD   latanoprost (XALATAN) 0 005 % ophthalmic solution Apply 1 drop to eye daily   2/8/17  Yes Historical Provider, MD   oxybutynin (DITROPAN) 5 mg tablet Take 0 5 tablets (2 5 mg total) by mouth 2 (two) times a day 7/9/19  Yes Pinedale Knee, DO   simvastatin (ZOCOR) 40 mg tablet Take 1 tablet (40 mg total) by mouth daily 7/9/19  Yes Sunshine Knee, DO   timolol (TIMOPTIC) 0 25 % ophthalmic solution Apply 1 drop to eye daily     Yes Historical Provider, MD   denosumab (PROLIA) 60 mg/mL Inject 60 mg under the skin every 6 (six) months    Historical Provider, MD   traMADol (ULTRAM) 50 mg tablet Take 1 tablet (50 mg total) by mouth every 6 (six) hours as needed for moderate pain 6/8/18   Pinedale Knee, DO         Allergies: No Known Allergies      SOCIAL HISTORY      Substance Use History:   Social History     Substance and Sexual Activity   Alcohol Use Yes    Alcohol/week: 1 0 standard drinks    Types: 1 Glasses of wine per week    Frequency: 2-4 times a month    Drinks per session: 1 or 2    Binge frequency: Never    Comment: RARELY     Social History     Tobacco Use   Smoking Status Never Smoker   Smokeless Tobacco Never Used     Social History     Substance and Sexual Activity   Drug Use No         FAMILY HISTORY      Family History   Problem Relation Age of Onset    Lymphoma Mother         ACUTE    Stroke Mother 68    Diabetes Father     Stroke Sister        PHYSICAL EXAM      Vitals:   Blood Pressure: 110/63 (08/12/19 1800)  Pulse: 78 (08/12/19 1800)  Temperature: 98 7 °F (37 1 °C) (08/12/19 1538)  Temp Source: Oral (08/12/19 1538)  Respirations: 18 (08/12/19 1800)  Weight - Scale: 59 6 kg (131 lb 6 3 oz) (08/12/19 1531)  SpO2: 98 % (08/12/19 1800)    Physical Exam:   GENERAL: NAD  HEENT:  NC/AT, PERRL, EOMI, MMM, no scleral icterus  CARDIAC:  RRR, +S1/S2, no S3/S4 heard, no m/g/r  PULMONARY:  CTA B/L, no wheezing/rales/rhonci, non-labored breathing  ABDOMEN:  Soft, NT/ND, +BS, no rebound/guarding/rigidity  Extremities:  2+ Pulses in DP/PT  No edema, cyanosis, or clubbing  NEUROLOGIC:  Alert/oriented x3  Cranial nerve 3 not intact, rest were intact  no focal weakness in the upper or lower extremity,  strength 5/5, gross sensation intact  Finger-to-nose normal   SKIN:  No rashes or erythema          ADDITIONAL DATA     Lab Results:     Results from last 7 days   Lab Units 08/12/19  1545   WBC Thousand/uL 6 08   HEMOGLOBIN g/dL 13 9   HEMATOCRIT % 42 7   PLATELETS Thousands/uL 252   NEUTROS PCT % 62   LYMPHS PCT % 26   MONOS PCT % 10   EOS PCT % 1     Results from last 7 days   Lab Units 08/12/19  1545   POTASSIUM mmol/L 4 1   CHLORIDE mmol/L 112*   CO2 mmol/L 28   BUN mg/dL 14   CREATININE mg/dL 0 58*   CALCIUM mg/dL 9 4   ALK PHOS U/L 74   ALT U/L 19   AST U/L 15     Results from last 7 days   Lab Units 08/12/19  1545   INR  1 02       Imaging:     Ct Head Without Contrast    Result Date: 8/12/2019  Narrative: CT BRAIN - WITHOUT CONTRAST INDICATION:   Speech changes (or aphasia), new or progressive  COMPARISON:  None  TECHNIQUE:  CT examination of the brain was performed  In addition to axial images, coronal 2D reformatted images were created and submitted for interpretation  Radiation dose length product (DLP) for this visit:  966 93 mGy-cm   This examination, like all CT scans performed in the [place], was performed utilizing techniques to minimize radiation dose exposure, including the use of iterative  reconstruction and automated exposure control  IMAGE QUALITY:  Diagnostic   FINDINGS: PARENCHYMA: Age-related Central volume loss  Decreased attenuation is noted in periventricular and subcortical white matter demonstrating an appearance that is statistically most likely to represent moderate-advanced microangiopathic change  Age-indeterminate lacunar infarcts in the basal ganglia and thalami bilaterally  No CT signs of acute infarction  No intracranial mass, mass effect or midline shift  No acute parenchymal hemorrhage  VENTRICLES AND EXTRA-AXIAL SPACES:  Normal for the patient's age  VISUALIZED ORBITS AND PARANASAL SINUSES: Large mucous retention cyst in the left maxillary antrum is  CALVARIUM AND EXTRACRANIAL SOFT TISSUES:  Normal      Impression: Age-indeterminate lacunar infarcts in the basal ganglia and thalami bilaterally  No evidence for territorial infarct or hemorrhage  Consider follow-up MRI if there are persistent new neurologic findings  Workstation performed: [id number]     Dxa Bone Density Spine Hip And Pelvis    Result Date: 7/24/2019  Narrative: CENTRAL  DXA SCAN CLINICAL HISTORY:   78year old post-menopausal  female risk factors include rheumatoid arthritis  Prolia, calcium and vitamin D use  TECHNIQUE: Bone densitometry was performed using a Hologic Horizon A bone densitometer  Regions of interest appear properly placed  There are no obvious fractures or other confounding variables which could limit the study  Degenerative changes of the lumbar spine and hip  This will falsely elevate the bone mineral densities in these regions  Scoliotic curvature, concave right  COMPARISON:  Prior study April 11, 2017 RESULTS: LUMBAR SPINE:  L1-L4: BMD 0 819 gm/cm2 T-score -2 1 Z-score 0 0 LEFT TOTAL HIP: BMD 0 610 gm/cm2 T-score -2 7 Z-score -1 2 LEFT FEMORAL NECK: BMD 0 507 gm/cm2 T-score -3 1 Z-score -1 3     Impression: 1  Based on the [facility] classification, the T-score of -3 1 in the left hip is consistent with OSTEOPOROSIS   2  When compared to the prior examination, there has been a 3 % DECREASE in the total bone mineral density of the lumbar spine  There has been NO SIGNIFICANT CHANGE in the total bone mineral density of the left hip  3   According to the [facility], prescription therapy is recommended with a T-score of -2 5 or less in the spine or hip after appropriate evaluation to exclude secondary causes  4   A daily intake of at least 1200 mg Calcium and 800 to 1000 IU of Vitamin D, as well as weight bearing and muscle strengthening exercise, fall prevention and avoidance of tobacco and excessive alcohol intake as  basic preventive measures are suggested  5   Repeat DXA  in 18 - 24 months, on the same machine, as clinically indicated  The 10 year risk of hip fracture is 8%, with the 10 year risk of major osteoporotic fracture being 22%, as calculated by the [facility] fracture risk assessment tool (FRAX)  The current NOF guidelines recommend treating patients with FRAX 10 year risk score of  >3% for hip fracture and >20% for major osteoporotic fracture  WHO CLASSIFICATION: Normal (a T-score of -1 0 or higher) Low bone mineral density (a T-score of less than -1 0 but higher than -2 5) Osteoporosis (a T-score of -2 5 or less) Severe osteoporosis (a T-score of -2 5 or less with a fragility fracture)   Workstation performed: [id number]       EKG, Pathology, and Other Studies Reviewed on Admission:   · EKG: Reviewed      Code Status: Level 1 - Full Code  Admission Status: [facility] D O  Internal Medicine PGY-1  8/12/2019 6:43 PM      Total Time for Visit, including Counseling / Coordination of Care: 1 hour total time spent admitting patient  Greater than 50% of this total time spent on direct patient counseling and coordination of care         ** Please Note: This note is constructed using a voice recognition dictation system   **

## 2019-08-12 NOTE — ED PROVIDER NOTES
History  Chief Complaint   Patient presents with    Weakness - Generalized     Pt reports she "has a history of mini strokes" and thinks she may be experiencing a stroke currently  Pt reporting headache, slurred speech earlier this am       69-year-old female past medical history significant for CADASIL and multiple TIAs who presents for evaluation of difficulty speaking  The patient was in her normal state of health before bed last night, when she awoke this morning around 10:00 a m  Patient states she had a headache and Her daughter noted that she was speaking oddly and did not appear to be her normal self, but then returned to her baseline  Roughly an hour prior to arrival, the patient was having difficulty texting requiring her 's assistance  Patient now feels like she is back to her normal state of health  Patient states she has a history of "mini strokes " She denies any fevers, chills, chest pain, palpitations, shortness of breath, abdominal pain, nausea, vomiting, diarrhea, abdominal pain, calf or swelling  She denies any vision changes or weakness  Prior to Admission Medications   Prescriptions Last Dose Informant Patient Reported? Taking?    Cholecalciferol (VITAMIN D) 2000 units CAPS 8/12/2019 at Unknown time Self Yes Yes   Sig: Take 1 tablet by mouth 2 (two) times a day     calcium citrate-Vitamin D (CITRACAL PETITES/VITAMIN D) 200 mg-250 units 8/12/2019 at Unknown time Self Yes Yes   Sig: Take 2 tablets by mouth 2 (two) times a day   clopidogrel (PLAVIX) 75 mg tablet 8/12/2019 at Unknown time Self No Yes   Sig: Take 1 tablet (75 mg total) by mouth daily   denosumab (PROLIA) 60 mg/mL More than a month at Unknown time Self Yes No   Sig: Inject 60 mg under the skin every 6 (six) months   hydroxychloroquine (PLAQUENIL) 200 mg tablet 8/12/2019 at Unknown time Self Yes Yes   Sig: Take 1 tablet by mouth 2 (two) times a day   latanoprost (XALATAN) 0 005 % ophthalmic solution 8/12/2019 at Unknown time Self Yes Yes   Sig: Apply 1 drop to eye daily     oxybutynin (DITROPAN) 5 mg tablet 8/12/2019 at Unknown time  No Yes   Sig: Take 0 5 tablets (2 5 mg total) by mouth 2 (two) times a day   simvastatin (ZOCOR) 40 mg tablet 8/11/2019 at Unknown time  No Yes   Sig: Take 1 tablet (40 mg total) by mouth daily   timolol (TIMOPTIC) 0 25 % ophthalmic solution 8/11/2019 at Unknown time Self Yes Yes   Sig: Apply 1 drop to eye daily     traMADol (ULTRAM) 50 mg tablet More than a month at Unknown time Self No No   Sig: Take 1 tablet (50 mg total) by mouth every 6 (six) hours as needed for moderate pain      Facility-Administered Medications: None       Past Medical History:   Diagnosis Date    Arthralgia     Neutropenia (HCC)     Stroke (cerebrum) (HCC)     Transient cerebral ischemia        Past Surgical History:   Procedure Laterality Date    HYSTERECTOMY         Family History   Problem Relation Age of Onset    Lymphoma Mother         ACUTE    Stroke Mother 68    Diabetes Father      I have reviewed and agree with the history as documented  Social History     Tobacco Use    Smoking status: Never Smoker    Smokeless tobacco: Never Used   Substance Use Topics    Alcohol use: Yes     Alcohol/week: 1 0 standard drinks     Types: 1 Glasses of wine per week     Frequency: 2-4 times a month     Drinks per session: 1 or 2     Binge frequency: Never     Comment: RARELY    Drug use: No        Review of Systems   Constitutional: Negative for activity change, appetite change, chills, fatigue and fever  HENT: Negative for ear discharge, ear pain, facial swelling, postnasal drip, rhinorrhea, sinus pressure, sinus pain, sore throat, tinnitus, trouble swallowing and voice change  Eyes: Negative for photophobia, pain, discharge, redness, itching and visual disturbance  Respiratory: Negative for cough, choking, chest tightness, shortness of breath and wheezing      Cardiovascular: Negative for chest pain, palpitations and leg swelling  Gastrointestinal: Negative for abdominal pain, constipation, diarrhea, nausea and vomiting  Genitourinary: Negative for dysuria, flank pain and hematuria  Musculoskeletal: Negative for arthralgias, back pain, joint swelling, myalgias, neck pain and neck stiffness  Neurological: Positive for speech difficulty  Negative for dizziness, tremors, seizures, syncope, weakness, light-headedness, numbness and headaches  Hematological: Negative  Psychiatric/Behavioral: Negative  Physical Exam  ED Triage Vitals   Temperature Pulse Respirations Blood Pressure SpO2   08/12/19 1538 08/12/19 1531 08/12/19 1531 08/12/19 1531 08/12/19 1531   98 7 °F (37 1 °C) 85 16 118/77 95 %      Temp Source Heart Rate Source Patient Position - Orthostatic VS BP Location FiO2 (%)   08/12/19 1538 08/12/19 1531 08/12/19 1531 08/12/19 1531 --   Oral Monitor Lying Right arm       Pain Score       08/12/19 1531       No Pain             Orthostatic Vital Signs  Vitals:    08/12/19 1531 08/12/19 1700 08/12/19 1730 08/12/19 1800   BP: 118/77 115/72 103/63 110/63   Pulse: 85 76 76 78   Patient Position - Orthostatic VS: Lying Lying Lying Lying       Physical Exam   Constitutional: She is oriented to person, place, and time  She appears well-developed and well-nourished  HENT:   Head: Normocephalic and atraumatic  Right Ear: External ear normal    Left Ear: External ear normal    Nose: Nose normal    Mouth/Throat: Oropharynx is clear and moist    Eyes: Pupils are equal, round, and reactive to light  Conjunctivae and EOM are normal    Neck: Normal range of motion  Neck supple  No JVD present  Cardiovascular: Normal rate, regular rhythm, normal heart sounds and intact distal pulses  Exam reveals no gallop and no friction rub  No murmur heard  Pulmonary/Chest: Effort normal and breath sounds normal  She has no wheezes  Abdominal: Soft  Bowel sounds are normal  She exhibits no distension   There is no tenderness  There is no rebound  Musculoskeletal: Normal range of motion  She exhibits no edema, tenderness or deformity  Neurological: She is alert and oriented to person, place, and time  She has normal strength  No cranial nerve deficit or sensory deficit  She exhibits normal muscle tone  Coordination normal  GCS eye subscore is 4  GCS verbal subscore is 5  GCS motor subscore is 6  Skin: Skin is warm and dry  Capillary refill takes less than 2 seconds  Psychiatric: She has a normal mood and affect   Her behavior is normal  Thought content normal        ED Medications  Medications - No data to display    Diagnostic Studies  Results Reviewed     Procedure Component Value Units Date/Time    Comprehensive metabolic panel [[id number]]  (Abnormal) Collected:  08/12/19 1545    Lab Status:  Final result Specimen:  Blood from Arm, Right Updated:  08/12/19 1611     Sodium 147 mmol/L      Potassium 4 1 mmol/L      Chloride 112 mmol/L      CO2 28 mmol/L      ANION GAP 7 mmol/L      BUN 14 mg/dL      Creatinine 0 58 mg/dL      Glucose 96 mg/dL      Calcium 9 4 mg/dL      AST 15 U/L      ALT 19 U/L      Alkaline Phosphatase 74 U/L      Total Protein 6 7 g/dL      Albumin 3 8 g/dL      Total Bilirubin 0 39 mg/dL      eGFR 94 ml/min/1 73sq m     Narrative:       [place] guidelines for Chronic Kidney Disease (CKD):     Stage 1 with normal or high GFR (GFR > 90 mL/min/1 73 square meters)    Stage 2 Mild CKD (GFR = 60-89 mL/min/1 73 square meters)    Stage 3A Moderate CKD (GFR = 45-59 mL/min/1 73 square meters)    Stage 3B Moderate CKD (GFR = 30-44 mL/min/1 73 square meters)    Stage 4 Severe CKD (GFR = 15-29 mL/min/1 73 square meters)    Stage 5 End Stage CKD (GFR <15 mL/min/1 73 square meters)  Note: GFR calculation is accurate only with a steady state creatinine    Protime-INR [[id number]]  (Normal) Collected:  08/12/19 1545    Lab Status:  Final result Specimen:  Blood from Arm, Right Updated:  08/12/19 1605     Protime 13 0 seconds      INR 1 02    APTT [[id number]]  (Normal) Collected:  08/12/19 1545    Lab Status:  Final result Specimen:  Blood from Arm, Right Updated:  08/12/19 1605     PTT 25 seconds     CBC and differential [[id number]] Collected:  08/12/19 1545    Lab Status:  Final result Specimen:  Blood from Arm, Right Updated:  08/12/19 1553     WBC 6 08 Thousand/uL      RBC 4 63 Million/uL      Hemoglobin 13 9 g/dL      Hematocrit 42 7 %      MCV 92 fL      MCH 30 0 pg      MCHC 32 6 g/dL      RDW 12 9 %      MPV 9 4 fL      Platelets 644 Thousands/uL      nRBC 0 /100 WBCs      Neutrophils Relative 62 %      Immat GRANS % 0 %      Lymphocytes Relative 26 %      Monocytes Relative 10 %      Eosinophils Relative 1 %      Basophils Relative 1 %      Neutrophils Absolute 3 74 Thousands/µL      Immature Grans Absolute 0 02 Thousand/uL      Lymphocytes Absolute 1 60 Thousands/µL      Monocytes Absolute 0 60 Thousand/µL      Eosinophils Absolute 0 07 Thousand/µL      Basophils Absolute 0 05 Thousands/µL                  CT head without contrast   Final Result by Anjali Garcia DO (08/12 1704)   Age-indeterminate lacunar infarcts in the basal ganglia and thalami bilaterally  No evidence for territorial infarct or hemorrhage  Consider follow-up MRI if there are persistent new neurologic findings                 Workstation performed: [id number]         XR chest 2 views    (Results Pending)         Procedures  ECG 12 Lead Documentation Only  Date/Time: 8/12/2019 3:53 PM  Performed by: Denisha Vila MD  Authorized by: Denisha Vila MD     Indications / Diagnosis:  Stroke eval  ECG reviewed by me, the ED Provider: yes    Patient location:  ED  Previous ECG:     Previous ECG:  Unavailable  Interpretation:     Interpretation: normal    Rate:     ECG rate:  69    ECG rate assessment: normal    Rhythm:     Rhythm: sinus rhythm              ED Course  ED Course as of Aug 12 1810   Mon Aug 12, 2019   1537 Blood Pressure: 118/77   1537 Pulse: 85   1537 Respirations: 16   1537 SpO2: 95 %             Stroke Assessment     Row Name 08/12/19 1546             NIH Stroke Scale    Interval  Baseline      Level of Consciousness (1a )  0      LOC Questions (1b )  0      LOC Commands (1c )  0      Best Gaze (2 )  0      Visual (3 )  0      Facial Palsy (4 )  0      Motor Arm, Left (5a )  0      Motor Arm, Right (5b )  0      Motor Leg, Left (6a )  0      Motor Leg, Right (6b )  0      Limb Ataxia (7 )  0      Sensory (8 )  0      Best Language (9 )  0      Dysarthria (10 )  0      Extinction and Inattention (11 ) (Formerly Neglect)  0      Total  0          First Filed Value   TPA Decision  Patient not a TPA candidate  Patient is not a candidate options  Symptoms resolved/clearly non disabling  MDM  Number of Diagnoses or Management Options  Aphasia: established and improving  Diagnosis management comments: A 23-year-old female presents for evaluation of possible stroke  Symptoms appear to have resolved upon arrival   NIH stroke scale 0  No focal neurologic deficits noted on physical exam   Will order basic labs, EKG, chest x-ray, CT head  Will admit patient for stroke pathway  Labs are grossly unremarkable, CT head negative for acute intracranial abnormality  Patient currently remains asymptomatic  Patient will be admitted to SOD  She remains hemodynamically stable         Amount and/or Complexity of Data Reviewed  Clinical lab tests: ordered and reviewed  Tests in the radiology section of CPT®: ordered and reviewed        Disposition  Final diagnoses:   Aphasia     Time reflects when diagnosis was documented in both MDM as applicable and the Disposition within this note     Time User Action Codes Description Comment    8/12/2019  5:39 PM Adelia Shields Add [R47 [id number]] Aphasia       ED Disposition     ED Disposition Condition Date/Time Comment    Admit Stable Mon Aug 12, 2019  5:41 PM Case was discussed with Dr Nell Moreno and the patient's admission status was agreed to be Admission Status: observation status to the service of Dr Shireen Garcia   Follow-up Information    None         Patient's Medications   Discharge Prescriptions    No medications on file     No discharge procedures on file  ED Provider  Attending physically available and evaluated Yuli Goldsmith  I managed the patient along with the ED Attending      Electronically Signed by         Elle Figueroa MD  08/12/19 2488

## 2019-08-12 NOTE — ED ATTENDING ATTESTATION
Ike Shultz MD, saw and evaluated the patient  I have discussed the patient with the resident/non-physician practitioner and agree with the resident's/non-physician practitioner's findings, Plan of Care, and MDM as documented in the resident's/non-physician practitioner's note, except where noted  All available labs and Radiology studies were reviewed  I was present for key portions of any procedure(s) performed by the resident/non-physician practitioner and I was immediately available to provide assistance  At this point I agree with the current assessment done in the Emergency Department  I have conducted an independent evaluation of this patient a history and physical is as follows:      Final Diagnosis:  1  Aphasia      Chief Complaint   Patient presents with    Weakness - Generalized     Pt reports she "has a history of mini strokes" and thinks she may be experiencing a stroke currently  Pt reporting headache, slurred speech earlier this am         This is a 69-year-old female with a history of TIA, hyperlipidemia, Sjogren syndrome, CADASIL who presents with difficulty speaking  The patient states that she woke up this morning and her daughter was concerned that she was speaking oddly  Also, approximately 1 hour prior to arrival, she was having difficulty texting  The patient states that she does have a history of mini strokes  She states that her symptoms have currently resolved  Denies fever/chills, nausea/vomiting, lightheadedness/dizziness, numbness/weakness, headache, change in vision, URI symptoms, neck pain, chest pain, palpitations, shortness of breath, cough, back pain, flank pain, abdominal pain, diarrhea, hematochezia, melena, dysuria, hematuria, abnormal vaginal discharge/bleeding        PMH:  - TIA, hyperlipidemia, Sjogren syndrome, CADASIL   PSH:  - hysterectomy    PE:   Vitals:    08/12/19 1531 08/12/19 1538 08/12/19 1700   BP: 118/77  115/72   BP Location: Right arm  Right arm   Pulse: 85  76   Resp: 16  16   Temp:  98 7 °F (37 1 °C)    TempSrc:  Oral    SpO2: 95%  95%   Weight: 59 6 kg (131 lb 6 3 oz)         Constitutional: Vital signs are normal  She appears well-developed and well-nourished  She is cooperative  Non-toxic appearance  She does not have a sickly appearance  She does not appear ill  No distress  HENT:   Head: Normocephalic and atraumatic  Right Ear: Hearing, tympanic membrane, external ear and ear canal normal    Left Ear: Hearing, tympanic membrane, external ear and ear canal normal    Nose: Nose normal    Mouth/Throat: Uvula is midline, oropharynx is clear and moist and mucous membranes are normal  No tonsillar exudate  Eyes: Pupils are equal, round, and reactive to light  Conjunctivae, EOM and lids are normal    Neck: Trachea normal, normal range of motion and full passive range of motion without pain  Neck supple  No Brudzinski's sign and no Kernig's sign noted  No thyroid mass present  Cardiovascular: Normal rate, regular rhythm, normal heart sounds and normal pulses  No murmur heard  Pulmonary/Chest: Effort normal and breath sounds normal    Abdominal: Soft  Normal appearance and bowel sounds are normal  There is no tenderness  There is no rigidity, no rebound, no guarding and no CVA tenderness  Neurological: She is alert  She has normal strength and normal reflexes  No cranial nerve deficit or sensory deficit  Cranial nerves 2-12 intact, strength 5/5 throughout, sensation intact throughout  Visual fields normal  Normal finger-to-nose and heel-to-shin  No drift bilateral upper or lower extremities  NIH stroke scale is 0 (see stroke scale in chart)  Psychiatric: She has a normal mood and affect  Her speech is normal and behavior is normal  Thought content normal        A:  - this is a 23-year-old female who presents with stroke-like symptoms  P:  - will check CT head, labs, EKG, chest x-ray    Ultimately, admit to the hospital on the stroke pathway  - 13 point ROS was performed and all are normal unless stated in the history above  - Nursing note reviewed  Vitals reviewed  - Orders placed by myself and/or advanced practitioner / resident     - Previous chart was reviewed  - No language barrier    - History obtained from patient  - There are no limitations to the history obtained  - Critical care time: Not applicable for this patient  - Patient does not need initiation of IV thrombolytics:  NIHSS Score = 0     Medications - No data to display  CT head without contrast   Final Result   Age-indeterminate lacunar infarcts in the basal ganglia and thalami bilaterally  No evidence for territorial infarct or hemorrhage  Consider follow-up MRI if there are persistent new neurologic findings  Workstation performed: [id number]         XR chest 2 views    (Results Pending)     Orders Placed This Encounter   Procedures    ED ECG Documentation Only    CT head without contrast    XR chest 2 views    CBC and differential    Comprehensive metabolic panel    Protime-INR    APTT    Insert peripheral IV    ECG 12 lead    Place in Observation     Labs Reviewed   COMPREHENSIVE METABOLIC PANEL - Abnormal       Result Value Ref Range Status    Sodium 147 (*) 136 - 145 mmol/L Final    Potassium 4 1  3 5 - 5 3 mmol/L Final    Chloride 112 (*) 100 - 108 mmol/L Final    CO2 28  21 - 32 mmol/L Final    ANION GAP 7  4 - 13 mmol/L Final    BUN 14  5 - 25 mg/dL Final    Creatinine 0 58 (*) 0 60 - 1 30 mg/dL Final    Comment: Standardized to IDMS reference method    Glucose 96  65 - 140 mg/dL Final    Comment:   If the patient is fasting, the ADA then defines impaired fasting glucose as > 100 mg/dL and diabetes as > or equal to 123 mg/dL  Specimen collection should occur prior to Sulfasalazine administration due to the potential for falsely depressed results   Specimen collection should occur prior to Sulfapyridine administration due to the potential for falsely elevated results  Calcium 9 4  8 3 - 10 1 mg/dL Final    AST 15  5 - 45 U/L Final    Comment:   Specimen collection should occur prior to Sulfasalazine administration due to the potential for falsely depressed results  ALT 19  12 - 78 U/L Final    Comment:   Specimen collection should occur prior to Sulfasalazine and/or Sulfapyridine administration due to the potential for falsely depressed results       Alkaline Phosphatase 74  46 - 116 U/L Final    Total Protein 6 7  6 4 - 8 2 g/dL Final    Albumin 3 8  3 5 - 5 0 g/dL Final    Total Bilirubin 0 39  0 20 - 1 00 mg/dL Final    eGFR 94  ml/min/1 73sq m Final    Narrative:     [place] guidelines for Chronic Kidney Disease (CKD):     Stage 1 with normal or high GFR (GFR > 90 mL/min/1 73 square meters)    Stage 2 Mild CKD (GFR = 60-89 mL/min/1 73 square meters)    Stage 3A Moderate CKD (GFR = 45-59 mL/min/1 73 square meters)    Stage 3B Moderate CKD (GFR = 30-44 mL/min/1 73 square meters)    Stage 4 Severe CKD (GFR = 15-29 mL/min/1 73 square meters)    Stage 5 End Stage CKD (GFR <15 mL/min/1 73 square meters)  Note: GFR calculation is accurate only with a steady state creatinine   PROTIME-INR - Normal    Protime 13 0  11 6 - 14 5 seconds Final    INR 1 02  0 84 - 1 19 Final   APTT - Normal    PTT 25  23 - 37 seconds Final    Comment: Therapeutic Heparin Range =  60-90 seconds   CBC AND DIFFERENTIAL    WBC 6 08  4 31 - 10 16 Thousand/uL Final    RBC 4 63  3 81 - 5 12 Million/uL Final    Hemoglobin 13 9  11 5 - 15 4 g/dL Final    Hematocrit 42 7  34 8 - 46 1 % Final    MCV 92  82 - 98 fL Final    MCH 30 0  26 8 - 34 3 pg Final    MCHC 32 6  31 4 - 37 4 g/dL Final    RDW 12 9  11 6 - 15 1 % Final    MPV 9 4  8 9 - 12 7 fL Final    Platelets 399  613 - 390 Thousands/uL Final    nRBC 0  /100 WBCs Final    Neutrophils Relative 62  43 - 75 % Final    Immat GRANS % 0  0 - 2 % Final    Lymphocytes Relative 26  14 - 44 % Final    Monocytes Relative 10  4 - 12 % Final    Eosinophils Relative 1  0 - 6 % Final    Basophils Relative 1  0 - 1 % Final    Neutrophils Absolute 3 74  1 85 - 7 62 Thousands/µL Final    Immature Grans Absolute 0 02  0 00 - 0 20 Thousand/uL Final    Lymphocytes Absolute 1 60  0 60 - 4 47 Thousands/µL Final    Monocytes Absolute 0 60  0 17 - 1 22 Thousand/µL Final    Eosinophils Absolute 0 07  0 00 - 0 61 Thousand/µL Final    Basophils Absolute 0 05  0 00 - 0 10 Thousands/µL Final     Time reflects when diagnosis was documented in both MDM as applicable and the Disposition within this note     Time User Action Codes Description Comment    8/12/2019  5:39 PM Check, Allie Rachelle Add [R47 01] Aphasia       ED Disposition     ED Disposition Condition Date/Time Comment    Admit Stable Mon Aug 12, 2019  5:41 PM Case was discussed with Dr Gabbi Aguillon and the patient's admission status was agreed to be Admission Status: observation status to the service of Dr Landy Hensley   Follow-up Information    None       Patient's Medications   Discharge Prescriptions    No medications on file     No discharge procedures on file  Prior to Admission Medications   Prescriptions Last Dose Informant Patient Reported? Taking?    Cholecalciferol (VITAMIN D) 2000 units CAPS 8/12/2019 at Unknown time Self Yes Yes   Sig: Take 1 tablet by mouth 2 (two) times a day     calcium citrate-Vitamin D (CITRACAL PETITES/VITAMIN D) 200 mg-250 units 8/12/2019 at Unknown time Self Yes Yes   Sig: Take 2 tablets by mouth 2 (two) times a day   clopidogrel (PLAVIX) 75 mg tablet 8/12/2019 at Unknown time Self No Yes   Sig: Take 1 tablet (75 mg total) by mouth daily   denosumab (PROLIA) 60 mg/mL More than a month at Unknown time Self Yes No   Sig: Inject 60 mg under the skin every 6 (six) months   hydroxychloroquine (PLAQUENIL) 200 mg tablet 8/12/2019 at Unknown time Self Yes Yes   Sig: Take 1 tablet by mouth 2 (two) times a day   latanoprost (XALATAN) 0 005 % ophthalmic solution 8/12/2019 at Unknown time Self Yes Yes   Sig: Apply 1 drop to eye daily     oxybutynin (DITROPAN) 5 mg tablet 8/12/2019 at Unknown time  No Yes   Sig: Take 0 5 tablets (2 5 mg total) by mouth 2 (two) times a day   simvastatin (ZOCOR) 40 mg tablet 8/11/2019 at Unknown time  No Yes   Sig: Take 1 tablet (40 mg total) by mouth daily   timolol (TIMOPTIC) 0 25 % ophthalmic solution 8/11/2019 at Unknown time Self Yes Yes   Sig: Apply 1 drop to eye daily     traMADol (ULTRAM) 50 mg tablet More than a month at Unknown time Self No No   Sig: Take 1 tablet (50 mg total) by mouth every 6 (six) hours as needed for moderate pain      Facility-Administered Medications: None       Portions of the record may have been created with voice recognition software  Occasional wrong word or "sound a like" substitutions may have occurred due to the inherent limitations of voice recognition software  Read the chart carefully and recognize, using context, where substitutions have occurred        Critical Care Time  Procedures

## 2019-08-13 ENCOUNTER — APPOINTMENT (OUTPATIENT)
Dept: RADIOLOGY | Facility: HOSPITAL | Age: 69
DRG: 070 | End: 2019-08-13
Payer: MEDICARE

## 2019-08-13 ENCOUNTER — APPOINTMENT (OUTPATIENT)
Dept: NON INVASIVE DIAGNOSTICS | Facility: HOSPITAL | Age: 69
DRG: 070 | End: 2019-08-13
Payer: MEDICARE

## 2019-08-13 PROBLEM — E87.0 HYPERNATREMIA: Status: ACTIVE | Noted: 2019-08-13

## 2019-08-13 LAB
ANION GAP SERPL CALCULATED.3IONS-SCNC: 5 MMOL/L (ref 4–13)
BUN SERPL-MCNC: 13 MG/DL (ref 5–25)
CALCIUM SERPL-MCNC: 9 MG/DL (ref 8.3–10.1)
CHLORIDE SERPL-SCNC: 114 MMOL/L (ref 100–108)
CHOLEST SERPL-MCNC: 127 MG/DL (ref 50–200)
CO2 SERPL-SCNC: 29 MMOL/L (ref 21–32)
CREAT SERPL-MCNC: 0.54 MG/DL (ref 0.6–1.3)
ERYTHROCYTE [DISTWIDTH] IN BLOOD BY AUTOMATED COUNT: 12.9 % (ref 11.6–15.1)
EST. AVERAGE GLUCOSE BLD GHB EST-MCNC: 117 MG/DL
GFR SERPL CREATININE-BSD FRML MDRD: 96 ML/MIN/1.73SQ M
GLUCOSE SERPL-MCNC: 86 MG/DL (ref 65–140)
HBA1C MFR BLD: 5.7 % (ref 4.2–6.3)
HCT VFR BLD AUTO: 39.6 % (ref 34.8–46.1)
HDLC SERPL-MCNC: 69 MG/DL (ref 40–60)
HGB BLD-MCNC: 12.6 G/DL (ref 11.5–15.4)
LDLC SERPL CALC-MCNC: 47 MG/DL (ref 0–100)
MCH RBC QN AUTO: 29.9 PG (ref 26.8–34.3)
MCHC RBC AUTO-ENTMCNC: 31.8 G/DL (ref 31.4–37.4)
MCV RBC AUTO: 94 FL (ref 82–98)
PLATELET # BLD AUTO: 213 THOUSANDS/UL (ref 149–390)
PMV BLD AUTO: 9.7 FL (ref 8.9–12.7)
POTASSIUM SERPL-SCNC: 3.7 MMOL/L (ref 3.5–5.3)
RBC # BLD AUTO: 4.22 MILLION/UL (ref 3.81–5.12)
SODIUM SERPL-SCNC: 148 MMOL/L (ref 136–145)
TRIGL SERPL-MCNC: 54 MG/DL
WBC # BLD AUTO: 4.39 THOUSAND/UL (ref 4.31–10.16)

## 2019-08-13 PROCEDURE — 93306 TTE W/DOPPLER COMPLETE: CPT | Performed by: INTERNAL MEDICINE

## 2019-08-13 PROCEDURE — 85027 COMPLETE CBC AUTOMATED: CPT | Performed by: INTERNAL MEDICINE

## 2019-08-13 PROCEDURE — G8979 MOBILITY GOAL STATUS: HCPCS

## 2019-08-13 PROCEDURE — 93306 TTE W/DOPPLER COMPLETE: CPT

## 2019-08-13 PROCEDURE — 80061 LIPID PANEL: CPT | Performed by: INTERNAL MEDICINE

## 2019-08-13 PROCEDURE — 83036 HEMOGLOBIN GLYCOSYLATED A1C: CPT | Performed by: INTERNAL MEDICINE

## 2019-08-13 PROCEDURE — 70551 MRI BRAIN STEM W/O DYE: CPT

## 2019-08-13 PROCEDURE — 99221 1ST HOSP IP/OBS SF/LOW 40: CPT | Performed by: PSYCHIATRY & NEUROLOGY

## 2019-08-13 PROCEDURE — 97162 PT EVAL MOD COMPLEX 30 MIN: CPT

## 2019-08-13 PROCEDURE — G8980 MOBILITY D/C STATUS: HCPCS

## 2019-08-13 PROCEDURE — 99233 SBSQ HOSP IP/OBS HIGH 50: CPT | Performed by: INTERNAL MEDICINE

## 2019-08-13 PROCEDURE — G8978 MOBILITY CURRENT STATUS: HCPCS

## 2019-08-13 PROCEDURE — 80048 BASIC METABOLIC PNL TOTAL CA: CPT | Performed by: INTERNAL MEDICINE

## 2019-08-13 RX ORDER — ASPIRIN 81 MG/1
81 TABLET, CHEWABLE ORAL DAILY
Status: DISCONTINUED | OUTPATIENT
Start: 2019-08-13 | End: 2019-08-14 | Stop reason: HOSPADM

## 2019-08-13 RX ORDER — POTASSIUM CHLORIDE 20 MEQ/1
40 TABLET, EXTENDED RELEASE ORAL ONCE
Status: COMPLETED | OUTPATIENT
Start: 2019-08-13 | End: 2019-08-13

## 2019-08-13 RX ORDER — ASPIRIN 81 MG/1
81 TABLET, CHEWABLE ORAL DAILY
Status: DISCONTINUED | OUTPATIENT
Start: 2019-08-14 | End: 2019-08-13

## 2019-08-13 RX ORDER — LANOLIN ALCOHOL/MO/W.PET/CERES
3 CREAM (GRAM) TOPICAL
Status: DISCONTINUED | OUTPATIENT
Start: 2019-08-13 | End: 2019-08-14 | Stop reason: HOSPADM

## 2019-08-13 RX ADMIN — CALCIUM CARBONATE-VITAMIN D TAB 500 MG-200 UNIT 1 TABLET: 500-200 TAB at 08:57

## 2019-08-13 RX ADMIN — LATANOPROST 1 DROP: 50 SOLUTION OPHTHALMIC at 21:00

## 2019-08-13 RX ADMIN — PRAVASTATIN SODIUM 80 MG: 80 TABLET ORAL at 17:16

## 2019-08-13 RX ADMIN — VITAMIN D, TAB 1000IU (100/BT) 2000 UNITS: 25 TAB at 17:16

## 2019-08-13 RX ADMIN — VITAMIN D, TAB 1000IU (100/BT) 2000 UNITS: 25 TAB at 08:57

## 2019-08-13 RX ADMIN — ASPIRIN 81 MG 81 MG: 81 TABLET ORAL at 17:18

## 2019-08-13 RX ADMIN — POTASSIUM CHLORIDE 40 MEQ: 1500 TABLET, EXTENDED RELEASE ORAL at 08:57

## 2019-08-13 RX ADMIN — OXYBUTYNIN CHLORIDE 2.5 MG: 5 TABLET ORAL at 08:57

## 2019-08-13 RX ADMIN — HYDROXYCHLOROQUINE SULFATE 200 MG: 200 TABLET, FILM COATED ORAL at 17:16

## 2019-08-13 RX ADMIN — TIMOLOL MALEATE 1 DROP: 2.5 SOLUTION OPHTHALMIC at 08:58

## 2019-08-13 RX ADMIN — OXYBUTYNIN CHLORIDE 2.5 MG: 5 TABLET ORAL at 17:16

## 2019-08-13 RX ADMIN — MELATONIN 3 MG: 3 TAB ORAL at 21:45

## 2019-08-13 RX ADMIN — CLOPIDOGREL BISULFATE 75 MG: 75 TABLET ORAL at 08:57

## 2019-08-13 RX ADMIN — HYDROXYCHLOROQUINE SULFATE 200 MG: 200 TABLET, FILM COATED ORAL at 08:58

## 2019-08-13 RX ADMIN — ENOXAPARIN SODIUM 40 MG: 40 INJECTION SUBCUTANEOUS at 09:00

## 2019-08-13 RX ADMIN — LATANOPROST 1 DROP: 50 SOLUTION OPHTHALMIC at 08:58

## 2019-08-13 NOTE — SOCIAL WORK
CM met with patient to explain role and discuss DC planning  Daughter Bethanie at bedside  Patient lives in a 2 floor home with 2 DAWN with her   Patient functions independent PTA with no use of DME  No Hx of HHC or IP SNF  Patient reports occasional drinker, no other drug/substance use  No Hx of mental health  Patient drives  PCP is Dr Gopal Bosch, she has prescription coverage, and uses [facility] mail order pharmacy and [facility] in [place] as needed  Patient has living will and Eric Clock is reported to be her  then children as alternative healthcare agents, CM requested those documents  Upon DC, patient states she can be transported home by her   Contact: -Agustin Phone: [phone]    CM reviewed d/c planning process including the following: identifying help at home, patient preference for d/c planning needs, Discharge Lounge, [facility] Meds to Bed program, availability of treatment team to discuss questions or concerns patient and/or family may have regarding understanding medications and recognizing signs and symptoms once discharged  CM also encouraged patient to follow up with all recommended appointments after discharge  Patient advised of importance for patient and family to participate in managing patients medical well being  Patient/caregiver received discharge checklist   Content reviewed  Patient/caregiver encouraged to participate in discharge plan of care prior to discharge home  Keri Baig (Student)  Reviewed and approved by JANA Al

## 2019-08-13 NOTE — ASSESSMENT & PLAN NOTE
Lipid panel demonstrates elevated HDL; cholesterol and triglycerides normal    -Switched to pravastatin 80 mg daily, continue

## 2019-08-13 NOTE — H&P
[facility] Senior Admission Note   Unit/Bed # @DBLINK (TQT,89867)@ Encounter: [id number]  SOD Team C           [facility] 78 y o  female [phone]       Patient seen and examined  Reviewed H&P per Dr Larkin Bronx  Agree with the assessment and plan except   67-year-old female with medical history of CADASIL followed by Dr Marquez Neal of neurology as an outpatient and with multiple TIAs over the last decade or so  Patient and  report no chronic neurologic deficits and report the patient is currently at baseline  CT head negative for acute pathology but showing chronic lacunar infarcts  On Plavix and simvastatin outpatient per Neurology  Patient admitted for stroke pathway after episode of expressive aphasia this morning  Patient and family deny any other neurologic symptoms at this time  NIH stroke scale of 0 on presentation to the ED  Vital signs within normal limits  Lab workup centrally unremarkable  Without neurologic deficit on my neuro exam     Assessment/Plan: Principal Problem:    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 [address] )     TIA:  Symptoms currently resolved  Back to baseline  CT head negative  In the setting of CADASIL  -continue Plavix and statin therapy  -frequent neuro checks  -neurology consulted  -MRI/echo ordered  -tele  -A1c/lipid panel  -PT/OT    Hyperlipidemia  -statin therapy    RA/Sjogren syndrome:  Follows with Rheumatology out of [facility]  -continue hydroxychloroquine    Osteoporosis:  -continue home vitamin-D and calcium  -patient received Prolia injections every month      Disposition:  OBSERVATION    Expected LOS: <2 Jackeline Mora MD

## 2019-08-13 NOTE — PHYSICIAN ADVISOR
Current patient class: Observation  The patient is currently on Hospital Day: 2 at [address] [place] [address]      This patient was originally admitted to the hospital under observation status  After admission the patient was reevaluated and determined to require further hospitalization  The patient is now documented to require at least a 2nd midnight in the hospital  As such the patient is now expected to satisfy the 2 midnight benchmark and is therefore eligible for inpatient admission  After review of the relevant documentation, labs, vital signs and test results, the patient is appropriate for INPATIENT ADMISSION  Admission to the hospital as an inpatient is a complex decision making process which requires the practitioner to consider the patients presenting complaint, history and physical examination and all relevant testing  With this in mind, in this case, the patient was deemed appropriate for INPATIENT ADMISSION  After review of the documentation and testing available at the time of the admission I concur with this clinical determination of medical necessity  Rationale is as follows: The patient is a 78 yrs Female who presented to the ED at 8/12/2019  3:22 PM with a chief complaint of Weakness - Generalized (Pt reports she "has a history of mini strokes" and thinks she may be experiencing a stroke currently   Pt reporting headache, slurred speech earlier this am  )    The patients vitals on arrival were ED Triage Vitals   Temperature Pulse Respirations Blood Pressure SpO2   08/12/19 1538 08/12/19 1531 08/12/19 1531 08/12/19 1531 08/12/19 1531   98 7 °F (37 1 °C) 85 16 118/77 95 %      Temp Source Heart Rate Source Patient Position - Orthostatic VS BP Location FiO2 (%)   08/12/19 1538 08/12/19 1531 08/12/19 1531 08/12/19 1531 --   Oral Monitor Lying Right arm       Pain Score       08/12/19 1531       No Pain           Past Medical History:   Diagnosis Date    Arthralgia     Neutropenia (HCC)     Osteoporosis     [facility] [address] )     Sjogren's syndrome ([facility] [address] )     Stroke (cerebrum) ([facility] [address] )     Transient cerebral ischemia      Past Surgical History:   Procedure Laterality Date    HYSTERECTOMY         The patient was admitted to the hospital at N/A on N/A for the following diagnosis:  Aphasia [R47 01]  Weakness [R53 1]    Consults have been placed to:   IP CONSULT TO NEUROLOGY  IP CONSULT TO CASE MANAGEMENT    Vitals:    08/13/19 0600 08/13/19 0741 08/13/19 1047 08/13/19 1525   BP:  125/78 116/76 115/74   Pulse:  73 66 69   Resp:  16 16 18   Temp:  98 4 °F (36 9 °C) 98 2 °F (36 8 °C) 97 6 °F (36 4 °C)   TempSrc:    Oral   SpO2:  97% 97% 97%   Weight: 58 3 kg (128 lb 8 5 oz)      Height: 5' 4" (1 626 m)          Most recent labs:    Recent Labs     08/12/19  1545 08/13/19  0528   WBC 6 08 4 39   HGB 13 9 12 6   HCT 42 7 39 6    213   K 4 1 3 7   CALCIUM 9 4 9 0   BUN 14 13   CREATININE 0 58* 0 54*   INR 1 02  --    AST 15  --    ALT 19  --    ALKPHOS 74  --        Scheduled Meds:  Current Facility-Administered Medications:  acetaminophen 650 mg Oral Q6H PRN Nasir Velásquez MD   calcium carbonate-vitamin D 1 tablet Oral Daily With Breakfast Nasir Velásquez MD   cholecalciferol 2,000 Units Oral BID Nasir Velásquez MD   clopidogrel 75 mg Oral Daily Nasir Velásquez MD   enoxaparin 40 mg Subcutaneous Daily Nasir Velásquez MD   hydroxychloroquine 200 mg Oral BID Nasir Velásquez MD   latanoprost 1 drop Both Eyes Daily Nasir Velásquez MD   oxybutynin 2 5 mg Oral BID Nasir Velásquez MD   pravastatin 80 mg Oral Daily With Tere Marley MD   timolol 1 drop Ophthalmic Daily Nasir Velásquez MD   traMADol 50 mg Oral Q6H PRN Nasir Velásquez MD     Continuous Infusions:   PRN Meds:   acetaminophen    traMADol    Surgical procedures (if appropriate):    Current patient class: Observation  The patient is currently on Hospital Day: 2 at   [facility] [address]      This patient was originally admitted to the hospital under observation status  After admission the patient was reevaluated and determined to require further hospitalization  The patient is now documented to require at least a 2nd midnight in the hospital  As such the patient is now expected to satisfy the 2 midnight benchmark and is therefore eligible for inpatient admission  After review of the relevant documentation, labs, vital signs and test results, the patient is appropriate for INPATIENT ADMISSION  Admission to the hospital as an inpatient is a complex decision making process which requires the practitioner to consider the patients presenting complaint, history and physical examination and all relevant testing  With this in mind, in this case, the patient was deemed appropriate for INPATIENT ADMISSION  After review of the documentation and testing available at the time of the admission I concur with this clinical determination of medical necessity  Rationale is as follows: The patient is a 78 yrs Female who presented to the ED at 8/12/2019  3:22 PM with a chief complaint of Weakness - Generalized (Pt reports she "has a history of mini strokes" and thinks she may be experiencing a stroke currently  Pt reporting headache, slurred speech earlier this am  )  The patient does have a history that is complicated by 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  Patient presented with aphasia and issues with fine motor movement as well as frontal headaches  Given the patient's history the patient was admitted to the hospital   Upon further imaging on MRI the patient was found to have a punctate acute infarct in the left occipital lobe    Given the positive findings and speaking to the primary team the patient is going to cross the 2 midnight benchmark given the above and is therefore inpatient admission appropriate based on Medicare guidelines  This is secondary to medical necessity with an acute CVA      The patients vitals on arrival were ED Triage Vitals   Temperature Pulse Respirations Blood Pressure SpO2   08/12/19 1538 08/12/19 1531 08/12/19 1531 08/12/19 1531 08/12/19 1531   98 7 °F (37 1 °C) 85 16 118/77 95 %      Temp Source Heart Rate Source Patient Position - Orthostatic VS BP Location FiO2 (%)   08/12/19 1538 08/12/19 1531 08/12/19 1531 08/12/19 1531 --   Oral Monitor Lying Right arm       Pain Score       08/12/19 1531       No Pain           Past Medical History:   Diagnosis Date    Arthralgia     Neutropenia (HCC)     Osteoporosis     Rheumatoid arthritis ([address] )     Sjogren's syndrome ([facility] [address] )     Stroke (cerebrum) (HCC)     Transient cerebral ischemia      Past Surgical History:   Procedure Laterality Date    HYSTERECTOMY         The patient was admitted to the hospital at N/A on N/A for the following diagnosis:  Aphasia [R47 01]  Weakness [R53 1]    Consults have been placed to:   IP CONSULT TO NEUROLOGY  IP CONSULT TO CASE MANAGEMENT    Vitals:    08/13/19 0600 08/13/19 0741 08/13/19 1047 08/13/19 1525   BP:  125/78 116/76 115/74   Pulse:  73 66 69   Resp:  16 16 18   Temp:  98 4 °F (36 9 °C) 98 2 °F (36 8 °C) 97 6 °F (36 4 °C)   TempSrc:    Oral   SpO2:  97% 97% 97%   Weight: 58 3 kg (128 lb 8 5 oz)      Height: 5' 4" (1 626 m)          Most recent labs:    Recent Labs     08/12/19  1545 08/13/19  0528   WBC 6 08 4 39   HGB 13 9 12 6   HCT 42 7 39 6    213   K 4 1 3 7   CALCIUM 9 4 9 0   BUN 14 13   CREATININE 0 58* 0 54*   INR 1 02  --    AST 15  --    ALT 19  --    ALKPHOS 74  --        Scheduled Meds:  Current Facility-Administered Medications:  acetaminophen 650 mg Oral Q6H PRN Sunil Collins MD   calcium carbonate-vitamin D 1 tablet Oral Daily With Breakfast Sunil Collins MD   cholecalciferol 2,000 Units Oral BID Sunil Collins MD   clopidogrel 75 mg Oral Daily Sunil Collins MD   enoxaparin 40 mg Subcutaneous Daily Dick Ayala MD   hydroxychloroquine 200 mg Oral BID Dick Ayala MD   latanoprost 1 drop Both Eyes Daily Dick Ayala MD   oxybutynin 2 5 mg Oral BID Dick Ayala MD   pravastatin 80 mg Oral Daily With Linnette Lobato MD   timolol 1 drop Ophthalmic Daily Dick Ayala MD   traMADol 50 mg Oral Q6H PRN Dick Ayala MD     Continuous Infusions:   PRN Meds:   acetaminophen    traMADol    Surgical procedures (if appropriate):

## 2019-08-13 NOTE — OCCUPATIONAL THERAPY NOTE
Occupational Therapy Cancellation Note  OT orders received, pt's chart reviewed  Attempted to see patient for initial OT evaluation, however pt unavailable  OT to continue to follow and re-attempt as time permits      Gaston Market, MOT, OTR/L

## 2019-08-13 NOTE — UTILIZATION REVIEW
Initial Clinical Review    Admission: Date/Time/Statement: 08/12/19 1741  Observation changed to inpatient 8-13-19 08/13/19 1611 for continued evaluation of tia      Inpatient Admission Once     Transfer Service: General Medicine    Expected Discharge Time: Evening    Expected Discharge Date: 08/14/19       Question Answer   Admitting Physician Shane Loya [facility]   Estimated length of stay More than 2 Midnights   Certification I certify that inpatient services are medically necessary for this patient for a duration of greater than two midnights  See H&P and MD Progress Notes for additional information about the patient's course of treatment  ED Arrival Information     Expected Arrival Acuity Means of Arrival Escorted By Service Admission Type    - 8/12/2019 15:16 Emergent Walk-In Family Member General Medicine Emergency    Arrival Complaint    possible stroke        Chief Complaint   Patient presents with    Weakness - Generalized     Pt reports she "has a history of mini strokes" and thinks she may be experiencing a stroke currently  Pt reporting headache, slurred speech earlier this am       Assessment/Plan:    48-year-old female past medical history significant for Sjogren syndrome, CADASIL and multiple TIAs who presents for evaluation of difficulty speaking  The patient was in her normal state of health before bed last night, when she awoke this morning around 10:00 a m  Patient states she had a headache and Her daughter noted that she was speaking oddly and did not appear to be her normal self, but then returned to her baseline  Roughly an hour prior to arrival, the patient was having difficulty texting requiring her 's assistance  Patient now feels like she is back to her normal state of health  On arrival  GCS =15  CT of the brain shows age indeterminate lacunar infarcts in the basal ganglia and thalami bilaterally    No territorial infarct of hemorrhage noted   NihSS =0  Patient not a candidate for TPA because symptoms resolved/ clearly non disabling  Ed medication : none  Admit to observation medical for TIA   Cranial nerve 3 not intact, unknown if this is new  Plan includes   · CT scan negative for acute pathology, shows chronic lacunar infarcts  · On Plavix 75 mg daily at home, aspirin no longer recommended by neurologist, can continue  · On simvastatin 40 mg daily, switch to pravastatin 80 mg daily  · Neuro checks  · telemetry  · Neurology consult ordered  · MRI ordered  · CBC, BMP ordered    Addendum  Echo results pending   MRI brain  Results pending     ED Triage Vitals   08/12/19 1538 08/12/19 1531 08/12/19 1531 08/12/19 1531 08/12/19 1531   98 7 °F (37 1 °C) 85 16 118/77 95 %      No Pain       08/13/19 57 7 kg (127 lb 3 3 oz)     Additional Vital Signs:     Date/Time  Temp  Pulse  Resp  BP  MAP (mmHg)  SpO2  O2 Device  Patient Position - Orthostatic VS   08/13/19 07:41:43  98 4 °F (36 9 °C)  73  16  125/78  94  97 %       08/13/19 01:08:15    65    127/78  94  98 %       08/12/19 2000    70    122/72  89  98 %       08/12/19 1900    75  16  126/70  92  98 %  None (Room air)     08/12/19 18:52:23  98 7 °F (37 1 °C)  76  16  124/79  94  98 %       08/12/19 1800    78  18  110/63    98 %  None (Room air)  Lying   08/12/19 1730    76  16  103/63    95 %  None (Room air)  Lying   08/12/19 1700    76  16  115/72    95 %  None (Room air)  Lying     Date and Time Eye Opening Best Verbal Response Best Motor Response Kt Coma Scale Score   08/13/19 0000 4 5 6 15   08/12/19 2200 4 5 6 15   08/12/19 2100 4 5 6 15   08/12/19 2000 4 5 6 15   08/12/19 1900 4 5 6 15   08/12/19 1618 4 5 6 15       Pertinent Labs/Diagnostic Test Results:     ECHO  Ef =60%    MRI brain    There is a punctate acute infarct identified within the left occipital lobe seen on series 3 image 13 without mass effect or hemorrhagic transformation    Extensive confluent white matter change throughout both cerebral hemispheres with multiple old lacunar infarcts noted within the basal ganglia and brainstem consistent with the provided history of CADASIL    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 in long axis  6-12 month follow-up examination recommended with contrast enhancement  EKG   Normal sinus rhythm  Normal ECG  No previous ECGs available    CT Brain   Impression:     Age-indeterminate lacunar infarcts in the basal ganglia and thalami bilaterally  No evidence for territorial infarct or hemorrhage  Consider follow-up MRI if there are persistent new neurologic findings       Chest x ray  No acute finding    Results from last 7 days   Lab Units 08/13/19  0528 08/12/19  1545   WBC Thousand/uL 4 39 6 08   HEMOGLOBIN g/dL 12 6 13 9   HEMATOCRIT % 39 6 42 7   PLATELETS Thousands/uL 213 252   NEUTROS ABS Thousands/µL  --  3 74         Results from last 7 days   Lab Units 08/13/19  0528 08/12/19  1545   SODIUM mmol/L 148* 147*   POTASSIUM mmol/L 3 7 4 1   CHLORIDE mmol/L 114* 112*   CO2 mmol/L 29 28   ANION GAP mmol/L 5 7   BUN mg/dL 13 14   CREATININE mg/dL 0 54* 0 58*   EGFR ml/min/1 73sq m 96 94   CALCIUM mg/dL 9 0 9 4     Results from last 7 days   Lab Units 08/12/19  1545   AST U/L 15   ALT U/L 19   ALK PHOS U/L 74   TOTAL PROTEIN g/dL 6 7   ALBUMIN g/dL 3 8   TOTAL BILIRUBIN mg/dL 0 39         Results from last 7 days   Lab Units 08/13/19  0528 08/12/19  1545   GLUCOSE RANDOM mg/dL 86 96       Results from last 7 days   Lab Units 08/13/19  0528   HEMOGLOBIN A1C % 5 7   EAG mg/dl 117       Results from last 7 days   Lab Units 08/12/19  1545   PROTIME seconds 13 0   INR  1 02   PTT seconds 25       ED Treatment:   Medication Administration from 08/12/2019 1516 to 08/12/2019 1838     None        Past Medical History:   Diagnosis    Arthralgia    Neutropenia (HCC)    Osteoporosis    [facility] arthritis [facility]    [facility]jogren's syndrome [facility]    [facility]    Transient cerebral ischemia     Present on Admission:   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)   Hyperlipidemia   Osteoporosis   Rheumatoid arthritis with positive rheumatoid factor (HCC)   [facility]'s syndrome [facility]   Von Reckling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 ([facility]      Admitting Diagnosis:     Aphasia [R47 01]  Weakness [R53 1]    Age/Sex: 78 y o  female     Admission Orders:    Telemetry  Neuro checks q4 hr  Stroke education  Dysphagia assessment prior to po intake  Npo sips  PT OT eval and atreat  Consult neurology   Sequential compression device   ECHO  MRI BRAIN  HBA1C   LIPID PANEL WITH LDL     acetaminophen 650 mg Oral Q6H PRN   calcium carbonate-vitamin D 1 tablet Oral Daily With Breakfast   cholecalciferol 2,000 Units Oral BID   clopidogrel 75 mg Oral Daily   enoxaparin 40 mg Subcutaneous Daily   hydroxychloroquine 200 mg Oral BID   latanoprost 1 drop Both Eyes Daily   oxybutynin 2 5 mg Oral BID   pravastatin 80 mg Oral Daily With Dinner   timolol 1 drop Ophthalmic Daily   traMADol 50 mg Oral Q6H PRN       IP CONSULT TO NEUROLOGY  IP CONSULT TO CASE MANAGEMENT    Network Utilization Review Department  Phone: [phone]; Fax [phone]  [email or web address]  ATTENTION: Please call with any questions or concerns to [phone]  and carefully listen to the prompts so that you are directed to the right person  Send all requests for admission clinical reviews, approved or denied determinations and any other requests to fax [phone]   All voicemails are confidential

## 2019-08-13 NOTE — RESTORATIVE TECHNICIAN NOTE
Restorative Specialist Mobility Note       Activity: Ambulate in [place], [address] privileges, Ambulate in room, Chair, Dangle, Stand at bedside(Educated/encouraged pt to ambulate with assistance 3-4 x's/day   Pt callbell, phone/tray within reach )     Assistive Device: None       Kailey SANCHEZ, Restorative Technician, [employer]

## 2019-08-13 NOTE — PHYSICAL THERAPY NOTE
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)    [facility]erebral artery occlusion with [facility] infarction [facility]    Chronic venous insufficiency    Hyperlipidemia    Osteoporosis    Rheumatoid arthritis with positive rheumatoid factor ([facility]    [facility]jogren's syndrome [facility]    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    Rheumatoid aortitis    Screening mammogram, encounter for    High serum high density lipoprotein (HDL)    Facial trauma, initial encounter    Lacunar infarction ([facility]    Medicare annual wellness visit, subsequent    Acute nasopharyngitis    Hypernatremia       Past Medical History  Past Medical History:   Diagnosis Date    Arthralgia     Neutropenia ([facility]     Osteoporosis     Rheumatoid arthritis ([facility]     Sjogren's syndrome ([facility]     Stroke [facility]cerebrum) ([facility]     Transient cerebral ischemia        Past Surgical History  Past Surgical History:   Procedure Laterality Date    HYSTERECTOMY          08/13/19 1015   Note Type   Note type Eval only   Pain Assessment   Pain Assessment 0-10   Pain Score No Pain   Home Living   Type of Home House   Additional Comments Resides w/  in 2 story home    Indep self care, ambulates w/ out device   Prior Function   Level of [place] Independent with ADLs and functional mobility   Falls in the last 6 months 0   Restrictions/Precautions   Weight Bearing Precautions Per Order No   General   Family/Caregiver Present Yes  (dtr)   Cognition   Overall Cognitive Status WFL   Arousal/Participation Responsive   Orientation Level Oriented X4   Memory Unable to assess   Following Commands Follows all commands and directions without difficulty   RLE Assessment   RLE Assessment WFL   LLE Assessment   LLE Assessment WFL   Bed Mobility   Supine to Sit 7  Independent   Sit to Supine 7  Independent   Transfers   Sit to Stand 7  Independent   Stand to Sit 7  Independent   Ambulation/Elevation   Gait pattern   (mild lateral sway, no LOB)   Gait Assistance 5  Supervision   Additional items Assist x 1   Assistive Device None   Distance 360'   Balance   Static Sitting Normal   Dynamic Sitting Good   Static Standing Good   Dynamic Standing Good   Ambulatory Good   Endurance Deficit   Endurance Deficit No   Activity Tolerance   Activity Tolerance Patient tolerated treatment well   Nurse Made Aware yes   Assessment   Prognosis Good   Assessment Pt seen for moderate complexity physical therapy evaluation  Pt is a 77 y/o female w/ history/comorbidities of CADASIL (cerebral autosomal dominant arteriopathy w/ subcortical infarcts and leukoencephalopathy), multiple CVAs, OP, RA, who is now admitted w/ acute onset word finding issues, frontal HA  Due to acute medical issues, unclear medical dx, note evolving clinical picture  PT consulted to assess mobility, dc needs  At present time, despite acute medical issues, note pt to be functioning close to or at mobility baseline  No continued [place] acute care PT needs identified  will sign off    Pt may mobilize ad anai while here, and may d/c home when stable w/ no continued therapy needs   Goals   Patient Goals to go home   Treatment Day 0   Plan   PT Frequency   (d/c PT)   Recommendation   Recommendation D/C PT   PT - OK to Discharge Yes   Modified Frandy Scale   Modified [place] Scale 0   Barthel Index   Feeding 10   Bathing 5   Grooming Score 5   Dressing Score 10   Bladder Score 10   Bowels Score 10   Toilet Use Score 10   Transfers (Bed/Chair) Score 15   Mobility (Level Surface) Score 15   Stairs Score 10   Barthel Index Score 100           Torsten Mendez PT, DPT, CSRS

## 2019-08-13 NOTE — PLAN OF CARE
Problem: Potential for Falls  Goal: Patient will remain free of falls  Description  INTERVENTIONS:  - Assess patient frequently for physical needs  -  Identify cognitive and physical deficits and behaviors that affect risk of falls  -  [place] fall precautions as indicated by assessment   - Educate patient/family on patient safety including physical limitations  - Instruct patient to call for assistance with activity based on assessment  - Modify environment to reduce risk of injury  - Consider OT/PT consult to assist with strengthening/mobility  Outcome: Progressing     Problem: Communication Impairment  Goal: Ability to express needs and understand communication  Description  Assess patient's communication skills and ability to understand information  Patient will demonstrate use of effective communication techniques, alternative methods of communication and understanding even if not able to speak  - Encourage communication and provide alternate methods of communication as needed  - Collaborate with case management/ for discharge needs  - Include patient/family/caregiver in decisions related to communication  Outcome: Progressing     Problem: NEUROSENSORY - ADULT  Goal: Achieves stable or improved neurological status  Description  INTERVENTIONS  - Monitor and report changes in neurological status  - Maintain blood pressure and fluid volume within ordered parameters to optimize cerebral perfusion  - Monitor temperature, glucose, and sodium or any other associated labs   Initiate appropriate interventions as ordered  - Monitor for seizure activity      Outcome: Progressing     Problem: PAIN - ADULT  Goal: Verbalizes/displays adequate comfort level or baseline comfort level  Description  Interventions:  - Encourage patient to monitor pain and request assistance  - Assess pain using appropriate pain scale  - Administer analgesics based on type and severity of pain and evaluate response  - Implement non-pharmacological measures as appropriate and evaluate response     Outcome: Progressing     Problem: INFECTION - ADULT  Goal: Absence or prevention of progression during hospitalization  Description  INTERVENTIONS:  - Assess and monitor for signs and symptoms of infection  - Monitor lab/diagnostic results  - Monitor all insertion sites, i e  indwelling lines, tubes, and drains  - Administer medications as ordered  - Instruct and encourage patient and family to use good hand hygiene technique     Outcome: Progressing     Problem: SAFETY ADULT  Goal: Patient will remain free of falls  Description  INTERVENTIONS:  - Assess patient frequently for physical needs  -  Identify cognitive and physical deficits and behaviors that affect risk of falls    -  [place] fall precautions as indicated by assessment   - Educate patient/family on patient safety including physical limitations  - Instruct patient to call for assistance with activity based on assessment  - Modify environment to reduce risk of injury  - Consider OT/PT consult to assist with strengthening/mobility  Outcome: Progressing     Problem: DISCHARGE PLANNING  Goal: Discharge to home or other facility with appropriate resources  Description  INTERVENTIONS:  - Identify barriers to discharge w/patient and caregiver  - Arrange for needed discharge resources and transportation as appropriate  - Identify discharge learning needs (meds, wound care, etc )  - Arrange for interpretive services to assist at discharge as needed  - Refer to Case Management Department for coordinating discharge planning if the patient needs post-hospital services based on physician/advanced practitioner order or complex needs related to functional status, cognitive ability, or social support system  Outcome: Progressing     Problem: Knowledge Deficit  Goal: Patient/family/caregiver demonstrates understanding of disease process, treatment plan, medications, and discharge instructions  Description  Complete learning assessment and assess knowledge base  Interventions:  - Provide teaching at level of understanding  - Provide teaching via preferred learning methods  Outcome: Progressing     Problem: Nutrition/Hydration-ADULT  Goal: Nutrient/Hydration intake appropriate for improving, restoring or maintaining nutritional needs  Description  Monitor and assess patient's nutrition/hydration status for malnutrition (ex- brittle hair, bruises, dry skin, pale skin and conjunctiva, muscle wasting, smooth red tongue, and disorientation)  Collaborate with interdisciplinary team and initiate plan and interventions as ordered  Monitor patient's weight and dietary intake as ordered or per policy  Utilize nutrition screening tool and intervene per policy  Determine patient's food preferences and provide high-protein, high-caloric foods as appropriate  INTERVENTIONS:  - Monitor oral intake, urinary output, labs, and treatment plans  - Assess nutrition and hydration status and recommend course of action  - Evaluate amount of meals eaten  - Assist patient with eating if necessary   - Allow adequate time for meals  - Order, calculate, and assess calorie counts as needed  - Assess need for intravenous fluids  - Provide specific nutrition/hydration education as appropriate   Outcome: Progressing     Problem: Neurological Deficit  Goal: Neurological status is stable or improving  Description  Interventions:  - Monitor and assess patient's level of consciousness, motor function, sensory function, and level of assistance needed for ADLs  - Monitor and report changes from baseline  Collaborate with interdisciplinary team to initiate plan and implement interventions as ordered  - Provide and maintain a safe environment  - Utilize fall precautions  - Utilize aspiration precautions       Outcome: Progressing

## 2019-08-13 NOTE — ASSESSMENT & PLAN NOTE
Present on admission  Unknown etiology possibly 2/2 Sjogren's vs medications   Na 148 from 147; baseline Na 143  -encourage PO intake   -Follow-up with BMP in outpatient with PCP for resolution

## 2019-08-13 NOTE — CONSULTS
HPI:  Ms Adore Mazariegos is a 76-year-old female with a history of multiple strokes, hyperlipidemia, Sjogren syndrome, osteoporosis and NOTCH3 gene mutation positive CADASIL who presented yesterday due to difficulty expressing herself and incomprehensible speech that had resolved upon arrival to the ED  NIH stroke score was 0  She felt it was a typical episode for her and she has had 4-5 "mini strokes" like this in the past  CT scan showed only chronic lacunar infarcts  She has no chronic neurological deficits  She was continued on plavix 75mg and switched from home simvastatin 40mg to pravastatin 80mg daily  She has been at her baseline neurologically since arrival  She feels like her normal self and has no complaints  Objective:  MS  AAO x3    CNs  CN II - pupils equal and reactive to light  CN III, IV, VI - EOM intact  Visual field intact  CN V - temporalis and masseter are intact and symmetric  Light touch sensation intact bilaterally in V1, V2, and V3    CN VII - symmetrical face expression and smile  CN VIII - hearing grossly intact  CN IX - uvula midline  CN XI - SCM and trapezius 5/5  CN XII - tongue is midline     Motor  Full range of motion and 5/5 strength in bilateral upper and lower extremities  Normal bulk and tone  Sensory  Light touch, vibration, temperature, and pinprick sensation are intact bilaterally in upper and lower extremities  Gait, coordination, reflexes  Gait was normal  Reflexes 2+ in upper and lower extremities bilaterally      Scheduled Meds:  Current Facility-Administered Medications:  acetaminophen 650 mg Oral Q6H PRN Ashlee Velasco MD   calcium carbonate-vitamin D 1 tablet Oral Daily With Breakfast Ashlee Velasco MD   cholecalciferol 2,000 Units Oral BID Ashlee Velasco MD   clopidogrel 75 mg Oral Daily Ashlee Velasco MD   enoxaparin 40 mg Subcutaneous Daily Ashlee Velasco MD   hydroxychloroquine 200 mg Oral BID Ashlee Velasco MD   latanoprost 1 drop Both Eyes Daily Cedric Herrera Clair Barrera MD   oxybutynin 2 5 mg Oral BID Doroteo Rosado MD   pravastatin 80 mg Oral Daily With Carla Jeffers MD   timolol 1 drop Ophthalmic Daily Doroteo Rosado MD   traMADol 50 mg Oral Q6H PRN Doroteo Rosado MD     Continuous Infusions:   PRN Meds:     acetaminophen    traMADol    Ct Head Without Contrast   Result Date: 8/12/2019  Impression: Age-indeterminate lacunar infarcts in the basal ganglia and thalami bilaterally  No evidence for territorial infarct or hemorrhage  MRI without contrast 8/13/19  IMPRESSION:     There is a punctate acute infarct identified within the left occipital lobe seen on series 3 image 13 without mass effect or hemorrhagic transformation      Extensive confluent white matter change throughout both cerebral hemispheres with multiple old lacunar infarcts noted within the basal ganglia and brainstem consistent with the provided history of CADASIL      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 in long axis  6-12 month follow-up examination recommended with contrast enhancement  Assessment:  #TIA/Stroke  Her aphasia resolved rapidly and spontaneously on its own  Although CT was negative, MRI did show an acute infarct in the left occipital lobe  However, the location of the infarct correlates poorly with her aphasia symptoms  #CADASIL  Known +NOTCH3 mutation  Multiple TIAs/strokes in the past likely secondary to CADASIL  MRI shows extensive white matter change and multiple chronic infarcts, as expected  Follows with Dr Kami Veras outpatient  #Pineal mass  1 5cm complex cystic mass within pineal gland was identified on MRI  Noted to be enlarged on scan from 2012, but unclear whether size is stable  #Hyperlipidemia  Pravastatin 80 mg daily    #Sjogren syndrome  Continue home medications    #Osteoporosis  Continue home medications    Plan:  Given acute infarct, we will keep her overnight tonight to monitor     Continue plavix 75mg and pravastatin 80mg for stroke prevention  Continue stroke workup including echo, telemetry, HbA1c, and lipid panel  Consult PT and OT  Follow up with Dr Matt Ragsdale outpatient  MRI with contrast in 6-12 months for evaluation of pineal mass

## 2019-08-13 NOTE — PROGRESS NOTES
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)  Active Problems:    Hypernatremia    Hyperlipidemia    Osteoporosis    Rheumatoid arthritis with positive rheumatoid factor (HCC)    Sjogren's syndrome ([address] )    Amaya Flock Reckling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 ([facility] [address] )      * 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)  Assessment & Plan  Patient with history of CADASIL diagnosed over 10 years presents aphasia, issues with fine motor movement, frontal headache discovered by  upon waking up yesterday morning at 10 am   Her last episode was a few months ago  Per patient, symptoms seem to have resolved  CT head 8/13: shows chronic lacunar infarcts    -Continue plavix 75 mg and pravastatin 80 mg daily   -Continue neuro checks   -Appreciate neurology recommendations  -MRI pending; echo pending      Hypernatremia  Assessment & Plan  Present on admission  Unknown etiology possibly 2/2 Sjogren's vs medications   Na 148 from 147; baseline Na 143  -Monitor with daily BMPs       Von Reckling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 ([facility] [address] )  Assessment & Plan  Stable    Sjogren's syndrome ([facility] [address] )  Assessment & Plan  Stable; continue home regimen hydroxychloroquine 200 mg bid    Rheumatoid arthritis with positive rheumatoid factor (HCC)  Assessment & Plan  Stable; Continue home regimen of hydroxychloroquine 200 mg bid    Osteoporosis  Assessment & Plan  Patient receives Prolia injections every month   -Continue home vitamin-D and calcium    Hyperlipidemia  Assessment & Plan  Lipid panel demonstrates elevated HDL; cholesterol and triglycerides normal    -Switched to pravastatin 80 mg daily, continue  Disposition: Discharge after neurology recommendations and MRI     SUBJECTIVE     Patient seen and examined  No acute events overnight  Patient reports resolution of symptoms  She wonders whether she had to come to the hospital floor with she should have just waited for symptoms resolved on their own  She is advised that she should seek medical attention any symptoms arise because they may be ischemic strokes and not just TIAs  She follows in the outpatient neurologist regularly  Explained plan as of now and she understands and agrees  OBJECTIVE     Vitals:    19 0108 19 0600 19 0741   BP: 122/72 127/78  125/78   BP Location:       Pulse: 70 65  73   Resp:    16   Temp:    98 4 °F (36 9 °C)   TempSrc:       SpO2: 98% 98%  97%   Weight:   57 7 kg (127 lb 3 3 oz)    Height:   5' 4" (1 626 m)       Temperature:   Temp (24hrs), Av 6 °F (37 °C), Min:98 4 °F (36 9 °C), Max:98 7 °F (37 1 °C)    Temperature: 98 4 °F (36 9 °C)  Intake & Output:  I/O        07 -  0700 701 -  0700 701 -  0700           Unmeasured Urine Occurrence  1 x         Weights:   IBW: 54 7 kg    Body mass index is 21 83 kg/m²  Weight (last 2 days)     Date/Time   Weight    19 0600   57 7 (127 21)    19 1531   59 6 (131 39)            Physical Exam   Constitutional: She is oriented to person, place, and time  She appears well-developed and well-nourished  HENT:   Head: Normocephalic and atraumatic  Mouth/Throat: Oropharynx is clear and moist    Eyes: Conjunctivae are normal  No scleral icterus  Cardiovascular: Normal rate, regular rhythm and normal heart sounds  Pulmonary/Chest: Effort normal and breath sounds normal    Abdominal: Soft  Bowel sounds are normal  She exhibits no distension  There is no tenderness     Genitourinary:   Genitourinary Comments: deferred Musculoskeletal: Normal range of motion  She exhibits no edema  Neurological: She is alert and oriented to person, place, and time  She has normal strength  She displays no atrophy and no tremor  No cranial nerve deficit or sensory deficit  Coordination normal    Skin: Skin is warm and dry  Psychiatric: She has a normal mood and affect  Her speech is normal and behavior is normal  Thought content normal  She is attentive  Nursing note and vitals reviewed  LABORATORY DATA     Labs: I have personally reviewed pertinent reports  Results from last 7 days   Lab Units 08/13/19  0528 08/12/19  1545   WBC Thousand/uL 4 39 6 08   HEMOGLOBIN g/dL 12 6 13 9   HEMATOCRIT % 39 6 42 7   PLATELETS Thousands/uL 213 252   NEUTROS PCT %  --  62   MONOS PCT %  --  10      Results from last 7 days   Lab Units 08/13/19 0528 08/12/19  1545   POTASSIUM mmol/L 3 7 4 1   CHLORIDE mmol/L 114* 112*   CO2 mmol/L 29 28   BUN mg/dL 13 14   CREATININE mg/dL 0 54* 0 58*   CALCIUM mg/dL 9 0 9 4   ALK PHOS U/L  --  74   ALT U/L  --  19   AST U/L  --  15              Results from last 7 days   Lab Units 08/12/19  1545   INR  1 02   PTT seconds 25               IMAGING & DIAGNOSTIC TESTING     Radiology Results: I have personally reviewed pertinent reports  Xr Chest 2 Views    Result Date: 8/13/2019  Impression: No acute cardiopulmonary disease  Workstation performed: [id number]     Ct Head Without Contrast    Result Date: 8/12/2019  Impression: Age-indeterminate lacunar infarcts in the basal ganglia and thalami bilaterally  No evidence for territorial infarct or hemorrhage  Consider follow-up MRI if there are persistent new neurologic findings  Workstation performed: [id number]     Other Diagnostic Testing: I have personally reviewed pertinent reports      ACTIVE MEDICATIONS     Current Facility-Administered Medications   Medication Dose Route Frequency    acetaminophen (TYLENOL) tablet 650 mg  650 mg Oral Q6H PRN    calcium carbonate-vitamin D (OSCAL-D) 500 mg-200 units per tablet 1 tablet  1 tablet Oral Daily With Breakfast    cholecalciferol (VITAMIN D3) tablet 2,000 Units  2,000 Units Oral BID    clopidogrel (PLAVIX) tablet 75 mg  75 mg Oral Daily    enoxaparin (LOVENOX) subcutaneous injection 40 mg  40 mg Subcutaneous Daily    hydroxychloroquine (PLAQUENIL) tablet 200 mg  200 mg Oral BID    latanoprost (XALATAN) 0 005 % ophthalmic solution 1 drop  1 drop Both Eyes Daily    oxybutynin (DITROPAN) tablet 2 5 mg  2 5 mg Oral BID    potassium chloride (K-DUR,KLOR-CON) CR tablet 40 mEq  40 mEq Oral Once    pravastatin (PRAVACHOL) tablet 80 mg  80 mg Oral Daily With Dinner    timolol (TIMOPTIC) 0 25 % ophthalmic solution 1 drop  1 drop Ophthalmic Daily    traMADol (ULTRAM) tablet 50 mg  50 mg Oral Q6H PRN       VTE Pharmacologic Prophylaxis: Enoxaparin (Lovenox)  VTE Mechanical Prophylaxis: sequential compression device    Portions of the record may have been created with voice recognition software  Occasional wrong word or "sound a like" substitutions may have occurred due to the inherent limitations of voice recognition software    Read the chart carefully and recognize, using context, where substitutions have occurred   ==  Kodak Aldridge MD  [address] [facility]  Internal Medicine Residency PGY-1

## 2019-08-13 NOTE — ASSESSMENT & PLAN NOTE
Patient with history of CADASIL diagnosed over 10 years presents aphasia, issues with fine motor movement, frontal headache discovered by  upon waking up yesterday morning at 10 am   Her last episode was a few months ago  Per patient, symptoms seem to have resolved  CT head 8/13: shows chronic lacunar infarcts  MR head demonstrated new small area of ischemic, stroke in L occipital lobe   Echo unremarkable    -Continue neuro checks   -Will require outpatient neurology follow-up in 1 month

## 2019-08-14 ENCOUNTER — TELEPHONE (OUTPATIENT)
Dept: NEUROLOGY | Facility: CLINIC | Age: 69
End: 2019-08-14

## 2019-08-14 VITALS
HEIGHT: 64 IN | DIASTOLIC BLOOD PRESSURE: 69 MMHG | HEART RATE: 60 BPM | WEIGHT: 128.53 LBS | TEMPERATURE: 98.4 F | OXYGEN SATURATION: 97 % | BODY MASS INDEX: 21.94 KG/M2 | RESPIRATION RATE: 18 BRPM | SYSTOLIC BLOOD PRESSURE: 105 MMHG

## 2019-08-14 PROCEDURE — 99233 SBSQ HOSP IP/OBS HIGH 50: CPT | Performed by: INTERNAL MEDICINE

## 2019-08-14 PROCEDURE — 99231 SBSQ HOSP IP/OBS SF/LOW 25: CPT | Performed by: PSYCHIATRY & NEUROLOGY

## 2019-08-14 RX ORDER — ASPIRIN 81 MG/1
81 TABLET, CHEWABLE ORAL DAILY
Qty: 30 TABLET | Refills: 0 | Status: SHIPPED | OUTPATIENT
Start: 2019-08-15 | End: 2019-09-03 | Stop reason: CLARIF

## 2019-08-14 RX ADMIN — TIMOLOL MALEATE 1 DROP: 2.5 SOLUTION OPHTHALMIC at 09:43

## 2019-08-14 RX ADMIN — VITAMIN D, TAB 1000IU (100/BT) 2000 UNITS: 25 TAB at 09:34

## 2019-08-14 RX ADMIN — CALCIUM CARBONATE-VITAMIN D TAB 500 MG-200 UNIT 1 TABLET: 500-200 TAB at 09:34

## 2019-08-14 RX ADMIN — ASPIRIN 81 MG 81 MG: 81 TABLET ORAL at 09:34

## 2019-08-14 RX ADMIN — HYDROXYCHLOROQUINE SULFATE 200 MG: 200 TABLET, FILM COATED ORAL at 09:43

## 2019-08-14 RX ADMIN — OXYBUTYNIN CHLORIDE 2.5 MG: 5 TABLET ORAL at 09:35

## 2019-08-14 RX ADMIN — CLOPIDOGREL BISULFATE 75 MG: 75 TABLET ORAL at 09:34

## 2019-08-14 RX ADMIN — ENOXAPARIN SODIUM 40 MG: 40 INJECTION SUBCUTANEOUS at 09:34

## 2019-08-14 NOTE — ASSESSMENT & PLAN NOTE
Likely 2/2 CADASIL  Small area of infarction in L occiptal lobe   No current symptoms   -add ASA 81 mg  -Will require outpatient follow-up within this month with outpatient neurology   -Continue plavix 75 mg and pravastatin 80 mg daily   -Per neurology, added daily ASA 81 mg

## 2019-08-14 NOTE — PROGRESS NOTES
Progress Note - Neurology   Yeny Blanco 78 y o  female MRN: [id number]  [facility] Encounter: [id number]    Assessment:  #TIA/Stroke  Her aphasia resolved rapidly and spontaneously on its own  Although CT was negative, MRI did show an acute infarct in the left occipital lobe  However, the location of the infarct correlates poorly with her aphasia symptoms  HbA1c and LDL within normal limits  #CADASIL  Known +NOTCH3 mutation  Multiple TIAs/strokes in the past likely secondary to CADASIL  MRI shows extensive white matter change and multiple chronic infarcts, as expected  Follows with Dr Luis Alberto Jade outpatient  #Pineal mass  1 5cm complex cystic mass within pineal gland was identified on MRI  Noted to be enlarged on scan from 2012, but unclear whether size is stable  #Hyperlipidemia  Pravastatin 80 mg daily  LDL of 47  #Sjogren syndrome  Continue home medications    #Osteoporosis  Continue home medications     Plan:  Continue plavix 75mg, aspirin 81mg, and pravastatin 80mg for stroke prevention  Follow up with Dr Luis Alberto Jade outpatient  MRI with contrast in 6-12 months for evaluation of pineal mass  Subjective:   Ms Carl Polo continues to have no complaints and no symptoms  No acute events overnight  She was started on aspirin 81mg along with the plavix 75mg and pravastatin 80mg  Vitals: Blood pressure 113/90, pulse 71, temperature 98 2 °F (36 8 °C), temperature source Oral, resp  rate 18, height 5' 4" (1 626 m), weight 58 3 kg (128 lb 8 5 oz), SpO2 97 %  ,Body mass index is 22 06 kg/m²        Scheduled Meds:  Current Facility-Administered Medications:  acetaminophen 650 mg Oral Q6H PRN Soledad Juárez MD   aspirin 81 mg Oral Daily Kodak Aldridge MD   calcium carbonate-vitamin D 1 tablet Oral Daily With Breakfast Soledad Juárez MD   cholecalciferol 2,000 Units Oral BID Soledad Juárez MD   clopidogrel 75 mg Oral Daily Soledad Juárez MD   enoxaparin 40 mg Subcutaneous Daily Fay Trevino Yuli Wallace MD   hydroxychloroquine 200 mg Oral BID Nasir Velásquez MD   latanoprost 1 drop Both Eyes Daily Nasir Velásquez MD   melatonin 3 mg Oral HS Julienandre DO Ruben   oxybutynin 2 5 mg Oral BID Nasir Velásquez MD   pravastatin 80 mg Oral Daily With Tere Marley MD   timolol 1 drop Ophthalmic Daily Nasir Velásquez MD   traMADol 50 mg Oral Q6H PRN Nasir Velásquez MD     Continuous Infusions:   PRN Meds:   acetaminophen    traMADol    Lab, Imaging and other studies:   CBC:   Results from last 7 days   Lab Units 08/13/19  0528 08/12/19  1545   WBC Thousand/uL 4 39 6 08   RBC Million/uL 4 22 4 63   HEMOGLOBIN g/dL 12 6 13 9   HEMATOCRIT % 39 6 42 7   MCV fL 94 92   PLATELETS Thousands/uL 213 252   , BMP/CMP:   Results from last 7 days   Lab Units 08/13/19  0528 08/12/19  1545   SODIUM mmol/L 148* 147*   POTASSIUM mmol/L 3 7 4 1   CHLORIDE mmol/L 114* 112*   CO2 mmol/L 29 28   BUN mg/dL 13 14   CREATININE mg/dL 0 54* 0 58*   CALCIUM mg/dL 9 0 9 4   AST U/L  --  15   ALT U/L  --  19   ALK PHOS U/L  --  74   EGFR ml/min/1 73sq m 96 94   , HgBA1C:   Results from last 7 days   Lab Units 08/13/19  0528   HEMOGLOBIN A1C % 5 7   , Lipid Profile:   Results from last 7 days   Lab Units 08/13/19  0528   HDL mg/dL 69*   LDL CALC mg/dL 47   TRIGLYCERIDES mg/dL 54     VTE Prophylaxis: Enoxaparin (Lovenox)      Objective:  MS  AAO x3    CNs  CN II - pupils equal and reactive to light  CN III, IV, VI - EOM intact  Visual field intact  CN V - temporalis and masseter are intact and symmetric  Light touch sensation intact bilaterally in V1, V2, and V3    CN VII - symmetrical face expression and smile  CN VIII - hearing grossly intact  CN IX - uvula midline  CN XI - SCM and trapezius 5/5  CN XII - tongue is midline     Motor  Full range of motion and 5/5 strength in bilateral upper and lower extremities  Normal bulk and tone    Sensory  Light touch, vibration, temperature, and pinprick sensation are intact bilaterally in upper and lower extremities  Gait, coordination, reflexes  Gait was normal  Reflexes 2+ in upper and lower extremities bilaterally  Scheduled Meds:  Current Facility-Administered Medications:  acetaminophen 650 mg Oral Q6H PRN Lucita Norwood MD   aspirin 81 mg Oral Daily Nicholas Downing MD   calcium carbonate-vitamin D 1 tablet Oral Daily With Breakfast Lucita Norwood MD   cholecalciferol 2,000 Units Oral BID Lucita Norwood MD   clopidogrel 75 mg Oral Daily Lucita Norwood MD   enoxaparin 40 mg Subcutaneous Daily Lucita Norwood MD   hydroxychloroquine 200 mg Oral BID Lucita Norwood MD   latanoprost 1 drop Both Eyes Daily Lucita Norwood MD   melatonin 3 mg Oral HS Noe Banks DO   oxybutynin 2 5 mg Oral BID Lucita Norwood MD   pravastatin 80 mg Oral Daily With Ryanul MD Demetria   timolol 1 drop Ophthalmic Daily Lucita Norwood MD   traMADol 50 mg Oral Q6H PRN Lucita Norwood MD     Continuous Infusions:   PRN Meds:   acetaminophen    traMADol    Ct Head Without Contrast   Result Date: 8/12/2019  Impression: Age-indeterminate lacunar infarcts in the basal ganglia and thalami bilaterally  No evidence for territorial infarct or hemorrhage  MRI without contrast 8/13/19  IMPRESSION:     There is a punctate acute infarct identified within the left occipital lobe seen on series 3 image 13 without mass effect or hemorrhagic transformation      Extensive confluent white matter change throughout both cerebral hemispheres with multiple old lacunar infarcts noted within the basal ganglia and brainstem consistent with the provided history of CADASIL      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 in long axis  6-12 month follow-up examination recommended with contrast enhancement

## 2019-08-14 NOTE — PLAN OF CARE
Problem: Nutrition/Hydration-ADULT  Goal: Nutrient/Hydration intake appropriate for improving, restoring or maintaining nutritional needs  Description  Monitor and assess patient's nutrition/hydration status for malnutrition (ex- brittle hair, bruises, dry skin, pale skin and conjunctiva, muscle wasting, smooth red tongue, and disorientation)  Collaborate with interdisciplinary team and initiate plan and interventions as ordered  Monitor patient's weight and dietary intake as ordered or per policy  Utilize nutrition screening tool and intervene per policy  Determine patient's food preferences and provide high-protein, high-caloric foods as appropriate       INTERVENTIONS:  - Monitor oral intake, urinary output, labs, and treatment plans  - Assess nutrition and hydration status and recommend course of action  - Evaluate amount of meals eaten  - Assist patient with eating if necessary   - Allow adequate time for meals  - Order, calculate, and assess calorie counts as needed  - Assess need for intravenous fluids  - Provide specific nutrition/hydration education as appropriate   Outcome: Completed

## 2019-08-14 NOTE — PROGRESS NOTES
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)    Hypernatremia    Hyperlipidemia    Osteoporosis    Rheumatoid arthritis with positive rheumatoid factor (HCC)    Sjogren's syndrome ([facility] [address] )    Vane Lars Reckling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 ([facility] [address] )      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)  Assessment & Plan  Patient with history of CADASIL diagnosed over 10 years presents aphasia, issues with fine motor movement, frontal headache discovered by  upon waking up yesterday morning at 10 am   Her last episode was a few months ago  Per patient, symptoms seem to have resolved  CT head 8/13: shows chronic lacunar infarcts  MR head demonstrated new small area of ischemic, stroke in L occipital lobe  Echo unremarkable    -Continue plavix 75 mg and pravastatin 80 mg daily   -Per neurology, added daily ASA 81 mg   -Continue neuro checks   -Will require outpatient neurology follow-up in 1 month      * Cerebrovascular accident (CVA) due to vascular occlusion [facility])  Assessment & Plan  Likely 2/2 CADASIL  Small area of infarction in L occiptal lobe  No current symptoms   -Added 81 mg ASA   -Continue plavix and statin   -Will require outpatient follow-up within this month with outpatient neurology     Hypernatremia  Assessment & Plan  Present on admission  Unknown etiology possibly 2/2 Sjogren's vs medications   Na 148 from 147; baseline Na 143  -encourage PO intake   -Follow-up with BMP in outpatient with PCP for resolution      Von Reckling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 ([facility] [address] )  Assessment & Plan  Stable    Sjogren's syndrome ([facility] [address] )  Assessment & Plan  Stable; continue home regimen hydroxychloroquine 200 mg bid    Rheumatoid arthritis with positive rheumatoid factor (HCC)  Assessment & Plan  Stable; Continue home regimen of hydroxychloroquine 200 mg bid    Osteoporosis  Assessment & Plan  Patient receives Prolia injections every month   -Continue home vitamin-D and calcium    Hyperlipidemia  Assessment & Plan  Lipid panel demonstrates elevated HDL; cholesterol and triglycerides normal    -Switched to pravastatin 80 mg daily, continue  Disposition: Discharge today     SUBJECTIVE     Patient seen and examined  No acute events overnight  She is nervous about falling asleep and perhaps having a stroke that may irreversible  Reassured her that with the medications she is on this risk is low, however still present  She is ready to leave  No current complaints  OBJECTIVE     Vitals:    19 1939 19 2215 19 0745 19 1122   BP: 112/72 110/70 113/90 105/69   Pulse: 68 71 71 60   Resp:   18 18   Temp: 97 7 °F (36 5 °C) 98 3 °F (36 8 °C) 98 2 °F (36 8 °C) 98 4 °F (36 9 °C)   TempSrc:   Oral    SpO2: 96% 96% 97% 97%   Weight:       Height:          Temperature:   Temp (24hrs), Av °F (36 7 °C), Min:97 6 °F (36 4 °C), Max:98 4 °F (36 9 °C)    Temperature: 98 4 °F (36 9 °C)  Intake & Output:  I/O        07 -  0700  07 -  0700  07 - 08/15 0700    P  O   720     Total Intake(mL/kg)  720 (12 3)     Net  +720            Unmeasured Urine Occurrence 1 x 3 x         Weights:   IBW: 54 7 kg    Body mass index is 22 06 kg/m²  Weight (last 2 days)     Date/Time   Weight    19 0600   58 3 (128 53)    19 1531   59 6 (131 39)            Physical Exam   Constitutional: She is oriented to person, place, and time  She appears well-developed and well-nourished  She appears cachectic     HENT: Head: Normocephalic and atraumatic  Mouth/Throat: Oropharynx is clear and moist    Eyes: Conjunctivae are normal  No scleral icterus  Neck: No thyromegaly present  Cardiovascular: Normal rate, regular rhythm and normal heart sounds  Pulmonary/Chest: Effort normal and breath sounds normal    Abdominal: Soft  Bowel sounds are normal  She exhibits no distension  There is no tenderness  Genitourinary:   Genitourinary Comments: deferred   Musculoskeletal: Normal range of motion  She exhibits no edema  Lymphadenopathy:     She has no cervical adenopathy  Neurological: She is alert and oriented to person, place, and time  No cranial nerve deficit or sensory deficit  She exhibits normal muscle tone  Skin: Skin is warm and dry  Nursing note and vitals reviewed  LABORATORY DATA     Labs: I have personally reviewed pertinent reports  Results from last 7 days   Lab Units 08/13/19  0528 08/12/19  1545   WBC Thousand/uL 4 39 6 08   HEMOGLOBIN g/dL 12 6 13 9   HEMATOCRIT % 39 6 42 7   PLATELETS Thousands/uL 213 252   NEUTROS PCT %  --  62   MONOS PCT %  --  10      Results from last 7 days   Lab Units 08/13/19  0528 08/12/19  1545   POTASSIUM mmol/L 3 7 4 1   CHLORIDE mmol/L 114* 112*   CO2 mmol/L 29 28   BUN mg/dL 13 14   CREATININE mg/dL 0 54* 0 58*   CALCIUM mg/dL 9 0 9 4   ALK PHOS U/L  --  74   ALT U/L  --  19   AST U/L  --  15              Results from last 7 days   Lab Units 08/12/19  1545   INR  1 02   PTT seconds 25               IMAGING & DIAGNOSTIC TESTING     Radiology Results: I have personally reviewed pertinent reports  Xr Chest 2 Views    Result Date: 8/13/2019  Impression: No acute cardiopulmonary disease  Workstation performed: [id number]     Ct Head Without Contrast    Result Date: 8/12/2019  Impression: Age-indeterminate lacunar infarcts in the basal ganglia and thalami bilaterally  No evidence for territorial infarct or hemorrhage   Consider follow-up MRI if there are persistent new neurologic findings  Workstation performed: [id number]     Mri Brain Wo Contrast    Result Date: 8/13/2019  Impression: There is a punctate acute infarct identified within the left occipital lobe seen on series 3 image 13 without mass effect or hemorrhagic transformation  Extensive confluent white matter change throughout both cerebral hemispheres with multiple old lacunar infarcts noted within the basal ganglia and brainstem consistent with the provided history of CADASIL  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 in long axis  6-12 month follow-up examination recommended with contrast enhancement  Workstation performed: [id number]     Other Diagnostic Testing: I have personally reviewed pertinent reports  ACTIVE MEDICATIONS     Current Facility-Administered Medications   Medication Dose Route Frequency    acetaminophen (TYLENOL) tablet 650 mg  650 mg Oral Q6H PRN    aspirin chewable tablet 81 mg  81 mg Oral Daily    calcium carbonate-vitamin D (OSCAL-D) 500 mg-200 units per tablet 1 tablet  1 tablet Oral Daily With Breakfast    cholecalciferol (VITAMIN D3) tablet 2,000 Units  2,000 Units Oral BID    clopidogrel (PLAVIX) tablet 75 mg  75 mg Oral Daily    enoxaparin (LOVENOX) subcutaneous injection 40 mg  40 mg Subcutaneous Daily    hydroxychloroquine (PLAQUENIL) tablet 200 mg  200 mg Oral BID    latanoprost (XALATAN) 0 005 % ophthalmic solution 1 drop  1 drop Both Eyes Daily    melatonin tablet 3 mg  3 mg Oral HS    oxybutynin (DITROPAN) tablet 2 5 mg  2 5 mg Oral BID    pravastatin (PRAVACHOL) tablet 80 mg  80 mg Oral Daily With Dinner    timolol (TIMOPTIC) 0 25 % ophthalmic solution 1 drop  1 drop Ophthalmic Daily    traMADol (ULTRAM) tablet 50 mg  50 mg Oral Q6H PRN       VTE Pharmacologic Prophylaxis: Enoxaparin (Lovenox)  VTE Mechanical Prophylaxis: sequential compression device    Portions of the record may have been created with voice recognition software  Occasional wrong word or "sound a like" substitutions may have occurred due to the inherent limitations of voice recognition software    Read the chart carefully and recognize, using context, where substitutions have occurred   ==  Naa Rico MD  [address]  Internal Medicine Residency PGY-1

## 2019-08-14 NOTE — RESTORATIVE TECHNICIAN NOTE
Restorative Specialist Mobility Note       Activity: Ambulate in [place], Ambulate in room, Bathroom privileges, Chair, Dangle, Stand at bedside(Educated/encouraged pt to ambulate with assistance 3-4 x's/day   Pt callbell, phone/tray within reach )     Assistive Device: None       Jacques SANCHEZ, Restorative Technician, [employer]

## 2019-08-15 ENCOUNTER — TRANSITIONAL CARE MANAGEMENT (OUTPATIENT)
Dept: INTERNAL MEDICINE CLINIC | Facility: CLINIC | Age: 69
End: 2019-08-15

## 2019-08-15 ENCOUNTER — TELEPHONE (OUTPATIENT)
Dept: INTERNAL MEDICINE CLINIC | Facility: CLINIC | Age: 69
End: 2019-08-15

## 2019-08-16 ENCOUNTER — OFFICE VISIT (OUTPATIENT)
Dept: INTERNAL MEDICINE CLINIC | Facility: CLINIC | Age: 69
End: 2019-08-16
Payer: MEDICARE

## 2019-08-16 VITALS
BODY MASS INDEX: 21.86 KG/M2 | DIASTOLIC BLOOD PRESSURE: 80 MMHG | WEIGHT: 131.2 LBS | OXYGEN SATURATION: 98 % | HEIGHT: 65 IN | TEMPERATURE: 97.9 F | HEART RATE: 70 BPM | SYSTOLIC BLOOD PRESSURE: 118 MMHG

## 2019-08-16 DIAGNOSIS — E87.0 HYPERNATREMIA: ICD-10-CM

## 2019-08-16 DIAGNOSIS — I63.81 CEREBROVASCULAR ACCIDENT (CVA) DUE TO OCCLUSION OF SMALL ARTERY (HCC): Primary | ICD-10-CM

## 2019-08-16 DIAGNOSIS — E78.2 MIXED HYPERLIPIDEMIA: ICD-10-CM

## 2019-08-16 LAB
ANION GAP SERPL CALCULATED.3IONS-SCNC: 4 MMOL/L (ref 4–13)
BUN SERPL-MCNC: 15 MG/DL (ref 5–25)
CALCIUM SERPL-MCNC: 9 MG/DL (ref 8.3–10.1)
CHLORIDE SERPL-SCNC: 107 MMOL/L (ref 100–108)
CO2 SERPL-SCNC: 31 MMOL/L (ref 21–32)
CREAT SERPL-MCNC: 0.62 MG/DL (ref 0.6–1.3)
GFR SERPL CREATININE-BSD FRML MDRD: 92 ML/MIN/1.73SQ M
GLUCOSE SERPL-MCNC: 94 MG/DL (ref 65–140)
POTASSIUM SERPL-SCNC: 4.1 MMOL/L (ref 3.5–5.3)
SODIUM SERPL-SCNC: 142 MMOL/L (ref 136–145)

## 2019-08-16 PROCEDURE — 80048 BASIC METABOLIC PNL TOTAL CA: CPT | Performed by: NURSE PRACTITIONER

## 2019-08-16 PROCEDURE — 99496 TRANSJ CARE MGMT HIGH F2F 7D: CPT | Performed by: NURSE PRACTITIONER

## 2019-08-16 PROCEDURE — 36415 COLL VENOUS BLD VENIPUNCTURE: CPT | Performed by: NURSE PRACTITIONER

## 2019-08-16 NOTE — PATIENT INSTRUCTIONS
Continue same medications     Keep neurology follow up as scheduled in September  For any new symptoms of facial drooping, one sided weakness, speech difficulty, trouble swallowing, etc  Call 9-1-1 or go directly to ER    Go for BMP

## 2019-08-16 NOTE — ASSESSMENT & PLAN NOTE
Reviewed CT scan and MRI with patient in office  Neuro exam matches neuro exam from hospital   Convergence is still not present but otherwise cranial nerves are intact  Strength is intact bilaterally  Rapid alternating movements intact  Finger to nose, heel to shin intact  Romberg negative  Gait normal   Speech clear  Thought content normal    Patient is now currently on Plavix as well as aspirin 81 mg daily  Continue to keep LDL less than 70 which is currently at goal   Continue simvastatin 40 mg daily    Patient will follow up with her neurologist on 09/03/2019    Reviewed red flag symptoms  Patient asked what to do if this would happen again as she is not sure because sometimes it resolves on its own  I told her to always error on the side of calling 911 or going to the ER for any of these symptoms even as minor as they may seem  Discussed the importance of timing with stroke  Discussed that strokes could be fatal and cause permanent damage    Patient verbalized understanding and is aware that for any stroke-like symptoms she will call 911 or go to the ER

## 2019-08-16 NOTE — PROGRESS NOTES
[facility]  Transition of Care Visit  Patient ID: Ismael Ritter    : 1950  Age/Gender: 78 y o  female     DATE: 2019      Assessment/Plan:    Problem List Items Addressed This Visit        Cardiovascular and Mediastinum    Cerebrovascular accident (CVA) due to vascular occlusion ([address] ) - Primary     Reviewed CT scan and MRI with patient in office  Neuro exam matches neuro exam from hospital   Convergence is still not present but otherwise cranial nerves are intact  Strength is intact bilaterally  Rapid alternating movements intact  Finger to nose, heel to shin intact  Romberg negative  Gait normal   Speech clear  Thought content normal    Patient is now currently on Plavix as well as aspirin 81 mg daily  Continue to keep LDL less than 70 which is currently at goal   Continue simvastatin 40 mg daily    Patient will follow up with her neurologist on 2019    Reviewed red flag symptoms  Patient asked what to do if this would happen again as she is not sure because sometimes it resolves on its own  I told her to always error on the side of calling 911 or going to the ER for any of these symptoms even as minor as they may seem  Discussed the importance of timing with stroke  Discussed that strokes could be fatal and cause permanent damage  Patient verbalized understanding and is aware that for any stroke-like symptoms she will call 911 or go to the ER            Other    Hyperlipidemia     Cholesterol at goal  Goal of LDL less than 70  LDL currently 47  Patient has resume simvastatin 40 mg q h s  Hypernatremia     Recheck BMP         Relevant Orders    Basic metabolic panel (Completed)            Subjective:       Ismael Ritter is a 78 y o  female who is here for TCM follow up on 2019    During the TCM phone call the patient stated:    TCM Call (since 2019)     Date and time call was made  8/15/2019  8:36 AM Hospital care reviewed  Records reviewed    Patient was hospitialized at  [facility]    Date of Admission  08/12/19    Date of discharge  08/14/19    Diagnosis  CVA due to vascular occlusion    Disposition  Home    Were the patients medications reviewed and updated  Yes    Current Symptoms  None      TCM Call (since 7/16/2019)     Post hospital issues  None    Should patient be enrolled in anticoag monitoring? No    Scheduled for follow up? Yes    Patients specialists  Neurologist    Neurologist name  Jinny Wilson MD 9/3/19    Neurologist contact #  [phone]    Did you obtain your prescribed medications  Yes    Do you need help managing your prescriptions or medications  No    Is transportation to your appointment needed  No    I have advised the patient to call PCP with any new or worsening symptoms  Luz Maria Sierra MA          [facility]     Patient is being seen today transition of care management from her recent hospitalization at [facility]  She was admitted on 8:12 through the emergency department  She went in complaining of aphasia and then later trouble texting on her way to the ER  Her symptoms had resolved by the time she reached the ER  Patient does have a medical history of CAD a BRIANNA and is followed by Neurology, Dr Mcgowan   She has a history of multiple TIAs but now chronic neurological deficit  She was admitted for stroke pathway  A CT of the head showed   "IMPRESSION:  Age-indeterminate lacunar infarcts in the basal ganglia and thalami bilaterally  No evidence for territorial infarct or hemorrhage    Consider follow-up MRI if there are persistent new neurologic findings "   And later an MRI of the brain showed,  "IMPRESSION:     There is a punctate acute infarct identified within the left occipital lobe seen on series 3 image 13 without mass effect or hemorrhagic transformation      Extensive confluent white matter change throughout both cerebral hemispheres with multiple old lacunar infarcts noted within the basal ganglia and brainstem consistent with the provided history of CADASIL      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 in long axis  6-12 month follow-up examination recommended with contrast enhancement "    Per the discharge note for her TIA/stroke the area on the MRI that showed the acute infarct in the left occipital lobe correlated poorly with the aphasia symptoms she was experiencing  She will follow up with Neurology with a repeat MRI with and without contrast    Her hemoglobin A1c and LDL are within normal limits  Goal of LDL is less than 70  Patient's current LDL is 47  Her statin medication was changed to pravastatin 80 mg daily only was inpatient, discharged back on her simvastatin 40mg     Upon her admission to the hospital she was currently on Plavix 75 mg daily  Aspirin 81 mg was added to her regimen    She denies any lasting effects of the episode  She states she went for a walk with PT  She states does not need any outpatient therapy  She feels that she is back at baseline  Denies any speech impairments, weakness, trouble swallowing or trouble ambulating  She is leaving Sunday for a trip to [facility] with her  to take care of their grandchild for a week  She has an appt to see her neurologist on 9/3/2019      The following portions of the patient's history were reviewed and updated as appropriate: allergies, current medications, past family history, past medical history, past social history, past surgical history and problem list     Review of Systems   Constitutional: Negative for activity change, appetite change, chills and fever  HENT: Negative for trouble swallowing  Respiratory: Negative for cough, chest tightness, shortness of breath and wheezing  Cardiovascular: Negative for chest pain, palpitations and leg swelling  Gastrointestinal: Negative for diarrhea, nausea and vomiting     Neurological: Positive for headaches (mild headache this morning, frontal  )  Negative for dizziness, seizures, syncope, speech difficulty, weakness and light-headedness           Patient Active Problem List   Diagnosis    Abnormal mammogram of right breast    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)    Cerebrovascular accident (CVA) due to vascular occlusion (HCC)    Chronic venous insufficiency    Hyperlipidemia    Osteoporosis    Rheumatoid arthritis with positive rheumatoid factor (HCC)    Sjogren's syndrome (HCC)    [facility]ckling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 )    Rheumatoid aortitis    Screening mammogram, encounter for    High serum high density lipoprotein (HDL)    Facial trauma, initial encounter    [facility] infarction [facility] )    Medicare annual wellness visit, subsequent    Acute nasopharyngitis    Hypernatremia       Past Medical History:   Diagnosis Date    Arthralgia     Neutropenia (HCC)     Osteoporosis     Rheumatoid arthritis ([facility] )     Sjogren's syndrome ([facility] )     Stroke (cerebrum) ([place] [address] )     Transient cerebral ischemia        Past Surgical History:   Procedure Laterality Date    HYSTERECTOMY           Current Outpatient Medications:     aspirin 81 mg chewable tablet, Chew 1 tablet (81 mg total) daily for 30 days, Disp: 30 tablet, Rfl: 0    calcium citrate-Vitamin D (CITRACAL PETITES/VITAMIN D) 200 mg-250 units, Take 2 tablets by mouth 2 (two) times a day, Disp: , Rfl:     Cholecalciferol (VITAMIN D) 2000 units CAPS, Take 1 tablet by mouth 2 (two) times a day  , Disp: , Rfl:     clopidogrel (PLAVIX) 75 mg tablet, Take 1 tablet (75 mg total) by mouth daily, Disp: 90 tablet, Rfl: 1    denosumab (PROLIA) 60 mg/mL, Inject 60 mg under the skin every 6 (six) months, Disp: , Rfl:     hydroxychloroquine (PLAQUENIL) 200 mg tablet, Take 1 tablet by mouth 2 (two) times a day, Disp: , Rfl:     latanoprost (XALATAN) 0 005 % ophthalmic solution, Apply 1 drop to eye daily  , Disp: , Rfl:     oxybutynin (DITROPAN) 5 mg tablet, Take 0 5 tablets (2 5 mg total) by mouth 2 (two) times a day, Disp: 90 tablet, Rfl: 1    simvastatin (ZOCOR) 40 mg tablet, Take 1 tablet (40 mg total) by mouth daily, Disp: 90 tablet, Rfl: 1    timolol (TIMOPTIC) 0 25 % ophthalmic solution, Apply 1 drop to eye daily  , Disp: , Rfl:     traMADol (ULTRAM) 50 mg tablet, Take 1 tablet (50 mg total) by mouth every 6 (six) hours as needed for moderate pain, Disp: 30 tablet, Rfl: 2    No Known Allergies    Social History     Socioeconomic History    Marital status: /Civil Union     Spouse name: None    Number of children: None    Years of education: None    Highest education level: None   Occupational History    Occupation: Privately   Social Needs    Financial resource strain: None    Food insecurity:     Worry: None     Inability: None    Transportation needs:     Medical: None     Non-medical: None   Tobacco Use    Smoking status: Never Smoker    Smokeless tobacco: Never Used   Substance and Sexual Activity    Alcohol use:  Yes     Alcohol/week: 1 0 standard drinks     Types: 1 Glasses of wine per week     Frequency: 2-4 times a month     Drinks per session: 1 or 2     Binge frequency: Never     Comment: RARELY    Drug use: No    Sexual activity: None   Lifestyle    Physical activity:     Days per week: None     Minutes per session: None    Stress: None   Relationships    Social connections:     Talks on phone: None     Gets together: None     Attends Congregation service: None     Active member of club or organization: None     Attends meetings of clubs or organizations: None     Relationship status: None    Intimate partner violence:     Fear of current or ex partner: None     Emotionally abused: None     Physically abused: None     Forced sexual activity: None   Other Topics Concern    None   Social History Narrative    Exercise habits: the gym; frequency is 2 days per week    Travel history: patient denies being  Out of the country during 1/2014-11/12/2014       Family History   Problem Relation Age of Onset    Lymphoma Mother         ACUTE    Stroke Mother 68    Diabetes Father     Stroke Sister        PHQ-9 Depression Screening    PHQ-9:    Frequency of the following problems over the past two weeks:              Health Maintenance   Topic Date Due    INFLUENZA VACCINE  10/15/2019 (Originally 7/1/2019)    Fall Risk  06/11/2020    Depression Screening PHQ  06/11/2020    Urinary Incontinence Screening  06/11/2020    Medicare Annual Wellness Visit (AWV)  06/11/2020    BMI: Adult  08/13/2020    DXA SCAN  07/23/2021    CRC Screening: Colonoscopy  12/24/2024    DTaP,Tdap,and Td Vaccines (3 - Td) 12/28/2026    Hepatitis C Screening  Completed    Pneumococcal Vaccine: 65+ Years  Completed    Pneumococcal Vaccine: Pediatrics (0 to 5 Years) and At-Risk Patients (6 to 59 Years)  Aged Out    HEPATITIS B VACCINES  Aged [employer] History   Administered Date(s) Administered    INFLUENZA 12/18/2013, 10/01/2014, 11/18/2014, 09/30/2015, 09/14/2016, 10/03/2017    Influenza Split High Dose Preservative Free IM 10/14/2014, 09/30/2015, 09/14/2016, 10/03/2017    Influenza TIV (IM) 10/18/2010, 10/25/2011, 10/17/2012, 10/15/2013    Influenza, high dose seasonal 0 5 mL 10/10/2018    Pneumococcal Conjugate 13-Valent 12/11/2018    Pneumococcal Polysaccharide PPV23 10/15/2009, 11/12/2014, 12/28/2016    Td (adult), adsorbed 07/25/2012    Tdap 01/14/2016, 12/28/2016    Zoster 02/20/2010        Objective:  Vitals:    08/16/19 0819   BP: 98/69   BP Location: Left arm   Patient Position: Sitting   Cuff Size: Adult   Pulse: 70   Temp: 97 9 °F (36 6 °C)   TempSrc: Oral   SpO2: 98%   Weight: 59 5 kg (131 lb 3 2 oz)   Height: 5' 4 5" (1 638 m)     Wt Readings from Last 3 Encounters:   08/16/19 59 5 kg (131 lb 3 2 oz)   08/13/19 58 3 kg (128 lb 8 5 oz)   06/11/19 59 6 kg (131 lb 6 4 oz)     Body mass index is 22 17 kg/m²  No exam data present       Physical Exam   Constitutional: She is oriented to person, place, and time  She appears well-developed and well-nourished  No distress  HENT:   Head: Normocephalic and atraumatic  Right Ear: Tympanic membrane and external ear normal    Left Ear: Tympanic membrane and external ear normal    Nose: Nose normal    Mouth/Throat: Oropharynx is clear and moist  No oropharyngeal exudate, posterior oropharyngeal edema or posterior oropharyngeal erythema  Eyes: Pupils are equal, round, and reactive to light  Conjunctivae and EOM are normal    Neck: Normal range of motion  Neck supple  Carotid bruit is not present  Cardiovascular: Normal rate, regular rhythm and normal heart sounds  No murmur heard  Pulmonary/Chest: Effort normal and breath sounds normal  No respiratory distress  She has no decreased breath sounds  She has no wheezes  She has no rhonchi  Musculoskeletal: She exhibits no edema  Lymphadenopathy:     She has no cervical adenopathy  Neurological: She is alert and oriented to person, place, and time  She has normal strength  She displays no tremor  A cranial nerve deficit (intact except for convergence was not intact) is present  She exhibits normal muscle tone  She displays a negative Romberg sign  Coordination (Duyen intact, finger to nose intact, heel to shin intact) and gait normal    Speech clear   Skin: Skin is warm and dry  She is not diaphoretic  Psychiatric: She has a normal mood and affect  Her behavior is normal    Vitals reviewed            Future Appointments   Date Time Provider Delicia Sanchezi   9/3/2019 10:00 AM Godfrey Stewart MD NEURO [facility]   11/5/2019 10:00 AM Godfrey Stewart MD NEURO [facility]   12/27/2019 10:00 AM Vasyl Huynh DO [address]    Patient Care Team:  Vasyl Huynh DO as PCP - General (Family Medicine)  Pam Proctor MD (Rheumatology)

## 2019-08-16 NOTE — ASSESSMENT & PLAN NOTE
Cholesterol at goal  Goal of LDL less than 70  LDL currently 47  Patient has resume simvastatin 40 mg q h s

## 2019-08-19 ENCOUNTER — TELEPHONE (OUTPATIENT)
Dept: NEUROLOGY | Facility: CLINIC | Age: 69
End: 2019-08-19

## 2019-08-19 NOTE — TELEPHONE ENCOUNTER
Called patient LVM for patient to call back to make apt with Dr Elsy Becerril and it can be for 30mins   Bogdan Bhatt

## 2019-08-20 ENCOUNTER — TELEPHONE (OUTPATIENT)
Dept: NEUROLOGY | Facility: CLINIC | Age: 69
End: 2019-08-20

## 2019-08-20 NOTE — TELEPHONE ENCOUNTER
Lea, were you able to reach patient regarding the reschedule? Looks like best contact number is [phone]

## 2019-08-20 NOTE — TELEPHONE ENCOUNTER
The purpose of this phone call is to assess patient's general wellbeing or for any assistance needed with follow-up care  Called patient since discharge, she has not experienced any new or worsening stroke symptoms  Denies any residual symptoms following hospitalization  States she has returned to baseline  Patient ambulates independently, preforms all her own ADLs, also manages her own medications, appointments, and affairs  Patient had a follow up appointment with her PCP 8/16  She has appointments with Dr Oracio Archibald 9/3 and 11/5  Informed patient that she was trying to be reached to reschedule  Patient states she did already  Will route this encounter to [address]  I reviewed medications with her  There have not been any changes since discharge  she had no difficulties obtaining medications  Reports taking all as prescribed with no missed doses or medication side effects  Patient denies signs of bleeding  I reviewed stroke symptoms and risk factor management with her  she verbalizes understanding of information  Patient does not monitor BP at home  she is a non smoker  No specific diet  Patient requesting additional information  Mailed "what you need to know about stroke" binder and my card to home address on file  Patient does not have any questions or concerns at this time

## 2019-08-26 ENCOUNTER — TRANSCRIBE ORDERS (OUTPATIENT)
Dept: ADMINISTRATIVE | Facility: HOSPITAL | Age: 69
End: 2019-08-26

## 2019-08-26 ENCOUNTER — TELEPHONE (OUTPATIENT)
Dept: NEUROLOGY | Facility: CLINIC | Age: 69
End: 2019-08-26

## 2019-08-26 ENCOUNTER — APPOINTMENT (OUTPATIENT)
Dept: LAB | Facility: HOSPITAL | Age: 69
End: 2019-08-26
Attending: PSYCHIATRY & NEUROLOGY
Payer: MEDICARE

## 2019-08-26 DIAGNOSIS — G45.9 TIA DUE TO EMBOLISM (HCC): ICD-10-CM

## 2019-08-26 DIAGNOSIS — M80.00XA AGE-RELATED OSTEOPOROSIS WITH CURRENT PATHOLOGICAL FRACTURE, INITIAL ENCOUNTER: ICD-10-CM

## 2019-08-26 DIAGNOSIS — E83.51 HYPOCALCEMIA: ICD-10-CM

## 2019-08-26 DIAGNOSIS — E78.2 MIXED HYPERLIPIDEMIA: ICD-10-CM

## 2019-08-26 DIAGNOSIS — I74.9 TIA DUE TO EMBOLISM (HCC): ICD-10-CM

## 2019-08-26 LAB
25(OH)D3 SERPL-MCNC: 54.4 NG/ML (ref 30–100)
ALBUMIN SERPL BCP-MCNC: 3.9 G/DL (ref 3.5–5)
ALP SERPL-CCNC: 69 U/L (ref 46–116)
ALT SERPL W P-5'-P-CCNC: 17 U/L (ref 12–78)
ANION GAP SERPL CALCULATED.3IONS-SCNC: 7 MMOL/L (ref 4–13)
AST SERPL W P-5'-P-CCNC: 15 U/L (ref 5–45)
BILIRUB SERPL-MCNC: 0.48 MG/DL (ref 0.2–1)
BUN SERPL-MCNC: 14 MG/DL (ref 5–25)
CALCIUM SERPL-MCNC: 9.2 MG/DL (ref 8.3–10.1)
CHLORIDE SERPL-SCNC: 111 MMOL/L (ref 100–108)
CK SERPL-CCNC: 59 U/L (ref 26–192)
CO2 SERPL-SCNC: 27 MMOL/L (ref 21–32)
CREAT SERPL-MCNC: 0.62 MG/DL (ref 0.6–1.3)
GFR SERPL CREATININE-BSD FRML MDRD: 92 ML/MIN/1.73SQ M
GLUCOSE SERPL-MCNC: 81 MG/DL (ref 65–140)
PA ADP BLD-ACNC: 54 PRU (ref 194–418)
POTASSIUM SERPL-SCNC: 3.8 MMOL/L (ref 3.5–5.3)
PROT SERPL-MCNC: 6.7 G/DL (ref 6.4–8.2)
SODIUM SERPL-SCNC: 145 MMOL/L (ref 136–145)
TSH SERPL DL<=0.05 MIU/L-ACNC: 1.04 UIU/ML (ref 0.36–3.74)

## 2019-08-26 PROCEDURE — 82306 VITAMIN D 25 HYDROXY: CPT

## 2019-08-26 PROCEDURE — 80053 COMPREHEN METABOLIC PANEL: CPT

## 2019-08-26 PROCEDURE — 36415 COLL VENOUS BLD VENIPUNCTURE: CPT

## 2019-08-26 PROCEDURE — 82550 ASSAY OF CK (CPK): CPT

## 2019-08-26 PROCEDURE — 85576 BLOOD PLATELET AGGREGATION: CPT

## 2019-08-26 PROCEDURE — 84443 ASSAY THYROID STIM HORMONE: CPT

## 2019-08-26 NOTE — TELEPHONE ENCOUNTER
Spoke with patient and she did not want to come in today for her follow-up post stroke  Patient stated that she would like to wait until her apt which is on 09-03-19   [facility]

## 2019-09-03 ENCOUNTER — OFFICE VISIT (OUTPATIENT)
Dept: NEUROLOGY | Facility: CLINIC | Age: 69
End: 2019-09-03
Payer: MEDICARE

## 2019-09-03 VITALS
WEIGHT: 132 LBS | HEIGHT: 64 IN | DIASTOLIC BLOOD PRESSURE: 70 MMHG | SYSTOLIC BLOOD PRESSURE: 110 MMHG | BODY MASS INDEX: 22.53 KG/M2 | HEART RATE: 72 BPM

## 2019-09-03 DIAGNOSIS — I67.850 CADASIL (CEREBRAL AUTOSOMAL DOMINANT ARTERIOPATHY WITH SUBCORTICAL INFARCTS AND LEUKOENCEPHALOPATHY): Primary | ICD-10-CM

## 2019-09-03 DIAGNOSIS — R41.3 MEMORY LOSS: ICD-10-CM

## 2019-09-03 DIAGNOSIS — I63.81 LACUNAR INFARCTION (HCC): ICD-10-CM

## 2019-09-03 PROCEDURE — 99214 OFFICE O/P EST MOD 30 MIN: CPT | Performed by: PSYCHIATRY & NEUROLOGY

## 2019-09-03 PROCEDURE — 1124F ACP DISCUSS-NO DSCNMKR DOCD: CPT | Performed by: PSYCHIATRY & NEUROLOGY

## 2019-09-03 NOTE — PROGRESS NOTES
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  No new TIA/CVA like symptoms  She had a small punctate left occipital stroke on MRI brain from 8/18 which I personally reviewed the images of  Etiology is likely 2/2 CADASIL, patient was plavix with a therapeutic P2y12 assay while she had the stroke, but aspirin was added to the regimen while in the hospital     History of multiple CVA/TIA with CADASIL  Memory loss (last MOCA 22/30)     PLAN:  -c/w with combination of aspirin/plavix and simvastatin for secondary stroke prevention  -if experiencing any side effects from any of these medications, I advised the patient to please call us    -Continue with BP goal < 130/80, BP is at goal currently  -does not smoke  -I advised patient to avoid using NSAIDs for headaches or other pain and instead just stick to tylenol    -I educated regarding mediterranean style diet and regular exercise regimen of brisk walking atleast 4-5 times a week  -I educated patient and family regarding medication compliance and stroke risk factor modifications  Mild memory issues  -[facility] [address] [address] [facility]/30   -will get another MOCA today     -I would like to follow up in 6 months  I would be happy to see the patient sooner if any new questions/concerns arise  Patient/Guardian was advised to the call the office if they have any questions and concerns in the meantime  Patient/Guardian does understand that if they have any new stroke like symptoms such as facial droop on one side, weakness/paralysis on either side, speech trouble, numbness on one side, balance issues, any vision changes, extreme dizziness or any new headache, to call 9-1-1 immediately or to proceed to the nearest ER immediately            Problem List Items Addressed This Visit        Cardiovascular and Mediastinum    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) - Primary       Nervous and Auditory    Lacunar infarction [facility])       Other    Memory loss             Subjective:    HPI    This is a 77 y/o F who is here as a follow up today of CADASIL  She had an episode of expressive aphasia and trouble using her phone and this occurred in August 2019 which lasted for a few minutes and resolved by the time she arrived to the ER  Patient went to the ED and reviewed her records  She states that she did not any other symptoms at this time  She was already maintained on plavix and atorvastatin for stroke prevention  She had P2y12 assay which was therapeutic  She was started on aspirin while she was in the hospital     She is on aspirin and plavix now and is doing well  She is compliant with her medications  She denies any new TIA/CVA like symptoms  She says that she still has trouble with word finding and recall at times  She continues to drive and does not have any issues with her ADLs  The following portions of the patient's history were reviewed and updated as appropriate:   She  has a past medical history of Arthralgia, Memory loss (9/3/2019), Neutropenia ([address] ), Osteoporosis, Rheumatoid arthritis ([address] ), Sjogren's syndrome ([address] ), Stroke (cerebrum) ([address] ), and Transient cerebral ischemia    She   Patient Active Problem List    Diagnosis Date Noted    Memory loss 09/03/2019    Hypernatremia 08/13/2019    Lacunar infarction ([address] ) 06/11/2019    Medicare annual wellness visit, subsequent 06/11/2019    Acute nasopharyngitis 06/11/2019    Facial trauma, initial encounter 04/04/2019    High serum high density lipoprotein (HDL) 12/11/2018    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    Screening mammogram, encounter for 06/08/2018    Abnormal mammogram of right breast 02/18/2017    Chronic venous insufficiency 02/08/2017    Rheumatoid arthritis with positive rheumatoid factor ([place] [address] ) 11/24/2015    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) 09/10/2014    Hyperlipidemia 10/29/2013    Osteoporosis 10/29/2013    Sjogren's syndrome ([place] [address] ) 10/29/2013    Von Reckling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 ([place] [address] ) 10/29/2013    Cerebrovascular accident (CVA) due to vascular occlusion ([place] [address] ) 02/01/2008     She  has a past surgical history that includes Hysterectomy  Her family history includes Diabetes in her father; Lymphoma in her mother; Stroke in her sister; Stroke (age of onset: 68) in her mother  She  reports that she has never smoked  She has never used smokeless tobacco  She reports that she drinks about 1 0 standard drinks of alcohol per week  She reports that she does not use drugs  Current Outpatient Medications   Medication Sig Dispense Refill    calcium citrate-Vitamin D (CITRACAL PETITES/VITAMIN D) 200 mg-250 units Take 2 tablets by mouth 2 (two) times a day      Cholecalciferol (VITAMIN D) 2000 units CAPS Take 1 tablet by mouth 2 (two) times a day        denosumab (PROLIA) 60 mg/mL Inject 60 mg under the skin every 6 (six) months      hydroxychloroquine (PLAQUENIL) 200 mg tablet Take 1 tablet by mouth 2 (two) times a day      latanoprost (XALATAN) 0 005 % ophthalmic solution Apply 1 drop to eye daily        oxybutynin (DITROPAN) 5 mg tablet Take 0 5 tablets (2 5 mg total) by mouth 2 (two) times a day 90 tablet 1    simvastatin (ZOCOR) 40 mg tablet Take 1 tablet (40 mg total) by mouth daily 90 tablet 1    timolol (TIMOPTIC) 0 25 % ophthalmic solution Apply 1 drop to eye daily        traMADol (ULTRAM) 50 mg tablet Take 1 tablet (50 mg total) by mouth every 6 (six) hours as needed for moderate pain 30 tablet 2     No current facility-administered medications for this visit        Current Outpatient Medications on File Prior to Visit   Medication Sig    calcium citrate-Vitamin D (CITRACAL PETITES/VITAMIN D) 200 mg-250 units Take 2 tablets by mouth 2 (two) times a day    Cholecalciferol (VITAMIN D) 2000 units CAPS Take 1 tablet by mouth 2 (two) times a day      denosumab (PROLIA) 60 mg/mL Inject 60 mg under the skin every 6 (six) months    hydroxychloroquine (PLAQUENIL) 200 mg tablet Take 1 tablet by mouth 2 (two) times a day    latanoprost (XALATAN) 0 005 % ophthalmic solution Apply 1 drop to eye daily      oxybutynin (DITROPAN) 5 mg tablet Take 0 5 tablets (2 5 mg total) by mouth 2 (two) times a day    simvastatin (ZOCOR) 40 mg tablet Take 1 tablet (40 mg total) by mouth daily    timolol (TIMOPTIC) 0 25 % ophthalmic solution Apply 1 drop to eye daily      traMADol (ULTRAM) 50 mg tablet Take 1 tablet (50 mg total) by mouth every 6 (six) hours as needed for moderate pain    [DISCONTINUED] aspirin 81 mg chewable tablet Chew 1 tablet (81 mg total) daily for 30 days    [DISCONTINUED] clopidogrel (PLAVIX) 75 mg tablet Take 1 tablet (75 mg total) by mouth daily     No current facility-administered medications on file prior to visit  She has No Known Allergies            Objective:    Blood pressure 110/70, pulse 72, height 5' 4" (1 626 m), weight 59 9 kg (132 lb)  Physical Exam  General - Patient is alert, awake, follows commands  Speech - no dysarthria noted, no aphasia noted  Neuro:   Cranial nerves: PERRL, EOMI, no facial droop noted, facial sensation intact, tongue midline  Motor: 5/5 throughout  Sensory - intact to soft touch throughout  Reflexes - 2+ throughout  Coordination - no ataxia/dysmetria noted    ROS:  I reviewed ROS  Review of Systems   Constitutional: Negative  Negative for appetite change and fever  HENT: Negative  Negative for hearing loss, tinnitus, trouble swallowing and voice change  Eyes: Negative  Negative for photophobia and pain  Respiratory: Negative  Negative for shortness of breath  Cardiovascular: Negative  Negative for palpitations  Gastrointestinal: Negative    Negative for nausea and vomiting  Endocrine: Negative  Negative for cold intolerance and heat intolerance  Genitourinary: Negative  Negative for dysuria, frequency and urgency  Musculoskeletal: Negative  Negative for myalgias and neck pain  Skin: Negative  Negative for rash  Neurological: Negative for dizziness, tremors, seizures, syncope, facial asymmetry, speech difficulty, weakness, light-headedness, numbness and headaches  Hematological: Negative  Does not bruise/bleed easily  Psychiatric/Behavioral: Negative  Negative for confusion, hallucinations and sleep disturbance

## 2019-09-06 ENCOUNTER — TELEPHONE (OUTPATIENT)
Dept: INTERNAL MEDICINE CLINIC | Facility: CLINIC | Age: 69
End: 2019-09-06

## 2019-09-10 ENCOUNTER — TRANSCRIBE ORDERS (OUTPATIENT)
Dept: ADMINISTRATIVE | Facility: HOSPITAL | Age: 69
End: 2019-09-10

## 2019-09-10 ENCOUNTER — HOSPITAL ENCOUNTER (OUTPATIENT)
Dept: RADIOLOGY | Facility: IMAGING CENTER | Age: 69
Discharge: HOME | End: 2019-09-10
Payer: MEDICARE

## 2019-09-10 VITALS — WEIGHT: 132 LBS | BODY MASS INDEX: 22.53 KG/M2 | HEIGHT: 64 IN

## 2019-09-10 DIAGNOSIS — Z12.31 ENCOUNTER FOR SCREENING MAMMOGRAM FOR BREAST CANCER: ICD-10-CM

## 2019-09-10 DIAGNOSIS — Z12.31 ENCOUNTER FOR SCREENING MAMMOGRAM FOR BREAST CANCER: Primary | ICD-10-CM

## 2019-09-10 DIAGNOSIS — Z12.31 ENCOUNTER FOR SCREENING MAMMOGRAM FOR MALIGNANT NEOPLASM OF BREAST: ICD-10-CM

## 2019-09-10 PROCEDURE — 77067 SCR MAMMO BI INCL CAD: CPT

## 2019-09-20 DIAGNOSIS — I63.81 CEREBROVASCULAR ACCIDENT (CVA) DUE TO OCCLUSION OF SMALL ARTERY (HCC): Primary | ICD-10-CM

## 2019-09-20 RX ORDER — CLOPIDOGREL BISULFATE 75 MG/1
75 TABLET ORAL DAILY
Qty: 90 TABLET | Refills: 1 | Status: SHIPPED | OUTPATIENT
Start: 2019-09-20 | End: 2020-02-26 | Stop reason: SDUPTHER

## 2019-09-20 RX ORDER — CLOPIDOGREL BISULFATE 75 MG/1
75 TABLET ORAL DAILY
COMMUNITY
End: 2019-09-20 | Stop reason: SDUPTHER

## 2019-09-20 NOTE — TELEPHONE ENCOUNTER
Looks like pt was to continue Plavix according to last OV note on 08/16/2019 and was mistakenly d/c'd by Neuro  Their not also states that she should be taking it along w ASA 81

## 2019-09-27 DIAGNOSIS — E78.5 HYPERLIPIDEMIA, UNSPECIFIED HYPERLIPIDEMIA TYPE: ICD-10-CM

## 2019-09-27 DIAGNOSIS — R35.0 URINARY FREQUENCY: ICD-10-CM

## 2019-09-27 RX ORDER — OXYBUTYNIN CHLORIDE 5 MG/1
TABLET ORAL
Qty: 90 TABLET | Refills: 0 | OUTPATIENT
Start: 2019-09-27

## 2019-09-27 RX ORDER — SIMVASTATIN 40 MG
TABLET ORAL
Qty: 90 TABLET | Refills: 0 | OUTPATIENT
Start: 2019-09-27

## 2019-10-08 ENCOUNTER — CLINICAL SUPPORT (OUTPATIENT)
Dept: INTERNAL MEDICINE CLINIC | Facility: CLINIC | Age: 69
End: 2019-10-08
Payer: MEDICARE

## 2019-10-08 DIAGNOSIS — Z23 FLU VACCINE NEED: ICD-10-CM

## 2019-10-08 DIAGNOSIS — E78.2 MIXED HYPERLIPIDEMIA: Primary | ICD-10-CM

## 2019-10-08 LAB
CHOLEST SERPL-MCNC: 163 MG/DL (ref 50–200)
HDLC SERPL-MCNC: 81 MG/DL (ref 40–60)
LDLC SERPL CALC-MCNC: 71 MG/DL (ref 0–100)
NONHDLC SERPL-MCNC: 82 MG/DL
TRIGL SERPL-MCNC: 54 MG/DL

## 2019-10-08 PROCEDURE — G0008 ADMIN INFLUENZA VIRUS VAC: HCPCS

## 2019-10-08 PROCEDURE — 36415 COLL VENOUS BLD VENIPUNCTURE: CPT

## 2019-10-08 PROCEDURE — 80061 LIPID PANEL: CPT | Performed by: FAMILY MEDICINE

## 2019-10-08 PROCEDURE — 90662 IIV NO PRSV INCREASED AG IM: CPT

## 2019-12-16 ENCOUNTER — OFFICE VISIT (OUTPATIENT)
Dept: INTERNAL MEDICINE CLINIC | Facility: CLINIC | Age: 69
End: 2019-12-16
Payer: MEDICARE

## 2019-12-16 VITALS
TEMPERATURE: 98.6 F | HEART RATE: 64 BPM | OXYGEN SATURATION: 99 % | SYSTOLIC BLOOD PRESSURE: 118 MMHG | HEIGHT: 64 IN | WEIGHT: 132.6 LBS | BODY MASS INDEX: 22.64 KG/M2 | DIASTOLIC BLOOD PRESSURE: 70 MMHG

## 2019-12-16 DIAGNOSIS — R35.0 URINARY FREQUENCY: ICD-10-CM

## 2019-12-16 DIAGNOSIS — R39.9 UTI SYMPTOMS: Primary | ICD-10-CM

## 2019-12-16 PROBLEM — J00 ACUTE NASOPHARYNGITIS: Status: RESOLVED | Noted: 2019-06-11 | Resolved: 2019-12-16

## 2019-12-16 LAB
SL AMB  POCT GLUCOSE, UA: ABNORMAL
SL AMB LEUKOCYTE ESTERASE,UA: ABNORMAL
SL AMB POCT BILIRUBIN,UA: ABNORMAL
SL AMB POCT BLOOD,UA: ABNORMAL
SL AMB POCT CLARITY,UA: CLEAR
SL AMB POCT COLOR,UA: YELLOW
SL AMB POCT KETONES,UA: ABNORMAL
SL AMB POCT NITRITE,UA: ABNORMAL
SL AMB POCT PH,UA: 7
SL AMB POCT SPECIFIC GRAVITY,UA: 1.02
SL AMB POCT URINE PROTEIN: ABNORMAL
SL AMB POCT UROBILINOGEN: 0.2

## 2019-12-16 PROCEDURE — 81003 URINALYSIS AUTO W/O SCOPE: CPT | Performed by: INTERNAL MEDICINE

## 2019-12-16 PROCEDURE — 99213 OFFICE O/P EST LOW 20 MIN: CPT | Performed by: INTERNAL MEDICINE

## 2019-12-16 RX ORDER — ASPIRIN 81 MG
81 TABLET, DELAYED RELEASE (ENTERIC COATED) ORAL DAILY
COMMUNITY

## 2019-12-16 RX ORDER — OXYBUTYNIN CHLORIDE 5 MG/1
5 TABLET ORAL 2 TIMES DAILY
Qty: 180 TABLET | Refills: 1 | Status: SHIPPED | OUTPATIENT
Start: 2019-12-16 | End: 2020-06-22 | Stop reason: SDUPTHER

## 2019-12-16 NOTE — PROGRESS NOTES
Assessment/Plan:    Urinary frequency  Point of care urine dip was negative for leukocytes and nitrites, with trace blood  Will defer on antibiotics at this time  Discussed scheduled bathroom breaks as well as increasing oxybutynin for 5 mg daily to 5 mg twice a day  She is to contact office for persistent uncontrolled symptoms  Diagnoses and all orders for this visit:    UTI symptoms  -     POCT urine dip auto non-scope    Urinary frequency  -     oxybutynin (DITROPAN) 5 mg tablet; Take 1 tablet (5 mg total) by mouth 2 (two) times a day    Other orders  -     aspirin (ASPIR-LOW) 81 mg EC tablet; Take 81 mg by mouth daily                Subjective:      Patient ID: Yeyo Mai is a 78 y o  female  78year old female is seen today with concern for UTI  She has been experiencing increased urinary urgency and frequency  She denies any dysuria or incontinence  She is on oxybutynin 5 mg daily and does not follow up with urology since 2014  Urinary Frequency    This is a chronic problem  The current episode started more than 1 year ago  The problem occurs every urination  The problem has been unchanged  There has been no fever  She is not sexually active  There is no history of pyelonephritis  Associated symptoms include frequency and urgency  Pertinent negatives include no chills, discharge, flank pain, hematuria, hesitancy, nausea, possible pregnancy, sweats or vomiting  Chief Complaint   Patient presents with    Urinary Tract Infection     C/O uti symptoms  C/O frquency and urgenct, urinary intontinance         The following portions of the patient's history were reviewed and updated as appropriate: allergies, current medications, past family history, past medical history, past social history, past surgical history and problem list     Review of Systems   Constitutional: Negative for activity change, appetite change, chills, diaphoresis, fatigue and fever     HENT: Negative for congestion, postnasal drip, rhinorrhea, sinus pressure, sinus pain, sneezing and sore throat  Eyes: Negative for visual disturbance  Respiratory: Negative for apnea, cough, choking, chest tightness, shortness of breath and wheezing  Cardiovascular: Negative for chest pain, palpitations and leg swelling  Gastrointestinal: Negative for abdominal distention, abdominal pain, anal bleeding, blood in stool, constipation, diarrhea, nausea and vomiting  Endocrine: Negative for cold intolerance and heat intolerance  Genitourinary: Positive for frequency and urgency  Negative for difficulty urinating, dysuria, flank pain, hematuria and hesitancy  Musculoskeletal: Negative  Skin: Negative  Neurological: Negative for dizziness, weakness, light-headedness, numbness and headaches  Hematological: Negative for adenopathy  Psychiatric/Behavioral: Negative for agitation, sleep disturbance and suicidal ideas  All other systems reviewed and are negative          Past Medical History:   Diagnosis Date    Arthralgia     Memory loss 9/3/2019    [facility])     Osteoporosis     [facility]     Sjogren's syndrome ([facility])     Stroke (cerebrum) ([facility])     Transient cerebral ischemia          Current Outpatient Medications:     aspirin (ASPIR-LOW) 81 mg EC tablet, Take 81 mg by mouth daily, Disp: , Rfl:     calcium citrate-Vitamin D (CITRACAL PETITES/VITAMIN D) 200 mg-250 units, Take 2 tablets by mouth 2 (two) times a day, Disp: , Rfl:     Cholecalciferol (VITAMIN D) 2000 units CAPS, Take 1 tablet by mouth 2 (two) times a day  , Disp: , Rfl:     clopidogrel (PLAVIX) 75 mg tablet, Take 1 tablet (75 mg total) by mouth daily, Disp: 90 tablet, Rfl: 1    denosumab (PROLIA) 60 mg/mL, Inject 60 mg under the skin every 6 (six) months, Disp: , Rfl:     hydroxychloroquine (PLAQUENIL) 200 mg tablet, Take 1 tablet by mouth 2 (two) times a day, Disp: , Rfl:     latanoprost (XALATAN) 0 005 % ophthalmic solution, Apply 1 drop to eye daily  , Disp: , Rfl:     oxybutynin (DITROPAN) 5 mg tablet, Take 1 tablet (5 mg total) by mouth 2 (two) times a day, Disp: 180 tablet, Rfl: 1    simvastatin (ZOCOR) 40 mg tablet, Take 1 tablet (40 mg total) by mouth daily, Disp: 90 tablet, Rfl: 1    timolol (TIMOPTIC) 0 25 % ophthalmic solution, Apply 1 drop to eye daily  , Disp: , Rfl:     traMADol (ULTRAM) 50 mg tablet, Take 1 tablet (50 mg total) by mouth every 6 (six) hours as needed for moderate pain, Disp: 30 tablet, Rfl: 2    No Known Allergies    Social History   Past Surgical History:   Procedure Laterality Date    BREAST EXCISIONAL BIOPSY Left 1983    benign    BREAST EXCISIONAL BIOPSY Left 1982    benign    BREAST EXCISIONAL BIOPSY Right 1982    benign    HYSTERECTOMY      age 47   [facility] OOPHORECTOMY      both     Family History   Problem Relation Age of Onset    Lymphoma Mother 61        ACUTE    Stroke Mother 68    Diabetes Father     No Known Problems Maternal Grandmother     No Known Problems Maternal Grandfather     No Known Problems Paternal Grandmother     No Known Problems Paternal Grandfather     Stroke Sister     No Known Problems Daughter     No Known Problems Sister     No Known Problems Maternal Aunt     No Known Problems Maternal Aunt     No Known Problems Paternal Aunt     No Known Problems Paternal Aunt        Objective:  /70 (BP Location: Left arm, Patient Position: Sitting, Cuff Size: Child)   Pulse 64   Temp 98 6 °F (37 °C) (Oral)   Ht 5' 4" (1 626 m)   Wt 60 1 kg (132 lb 9 6 oz)   SpO2 99%   BMI 22 76 kg/m²     Recent Results (from the past 1344 hour(s))   POCT urine dip auto non-scope    Collection Time: 12/16/19 11:13 AM   Result Value Ref Range     COLOR,UA yellow     CLARITY,UA clear     SPECIFIC GRAVITY,UA 1 020      PH,UA 7 0     LEUKOCYTE ESTERASE,UA Neg     NITRITE,UA Neg     GLUCOSE, UA Neg     KETONES,UA Neg     BILIRUBIN,UA Neg     BLOOD,UA trace-intact     POCT URINE PROTEIN Neg     SL AMB POCT UROBILINOGEN 0 2             Physical Exam

## 2020-01-02 ENCOUNTER — OFFICE VISIT (OUTPATIENT)
Dept: INTERNAL MEDICINE CLINIC | Age: 70
End: 2020-01-02
Payer: MEDICARE

## 2020-01-02 VITALS
HEIGHT: 68 IN | WEIGHT: 132.4 LBS | DIASTOLIC BLOOD PRESSURE: 60 MMHG | OXYGEN SATURATION: 97 % | HEART RATE: 59 BPM | SYSTOLIC BLOOD PRESSURE: 104 MMHG | BODY MASS INDEX: 20.07 KG/M2 | TEMPERATURE: 97.2 F

## 2020-01-02 DIAGNOSIS — M80.00XA AGE-RELATED OSTEOPOROSIS WITH CURRENT PATHOLOGICAL FRACTURE, INITIAL ENCOUNTER: Primary | ICD-10-CM

## 2020-01-02 DIAGNOSIS — I67.850 CADASIL (CEREBRAL AUTOSOMAL DOMINANT ARTERIOPATHY WITH SUBCORTICAL INFARCTS AND LEUKOENCEPHALOPATHY): ICD-10-CM

## 2020-01-02 DIAGNOSIS — I63.81 CEREBROVASCULAR ACCIDENT (CVA) DUE TO OCCLUSION OF SMALL ARTERY (HCC): ICD-10-CM

## 2020-01-02 DIAGNOSIS — I63.81 MULTIPLE LACUNAR INFARCTS (HCC): ICD-10-CM

## 2020-01-02 PROCEDURE — 99214 OFFICE O/P EST MOD 30 MIN: CPT | Performed by: INTERNAL MEDICINE

## 2020-01-02 PROCEDURE — 96372 THER/PROPH/DIAG INJ SC/IM: CPT | Performed by: INTERNAL MEDICINE

## 2020-01-02 NOTE — PROGRESS NOTES
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)  -     CBC and differential; Future  -     Comprehensive metabolic panel; Future  -     UA w Reflex to Microscopic w Reflex to Culture  -     Lipid panel; Future    Cerebrovascular accident (CVA) due to occlusion of small artery (HCC)  -     Comprehensive metabolic panel; Future  -     Lipid panel; Future    Multiple lacunar infarcts [facility])             Subjective:   Chief Complaint   Patient presents with    Follow-up     Chronic conditions-Prolia due today  No refills needed  No new health concerns to discuss  Would like to have results of DEXA scan reviewed  Patient ID: Miguel Pham is a 78 y o  female  HPI  This is a very pleasant 78 years young lady who has a history of CADASIL with multiple lacunar infarcts also history of a mixed connective tissue disorder she is followed by the neurologist and rheumatologist she is here for her Prolia injection and regular follow-up  Last year she had DEXA scan which shows the osteoporosis and no significant improvement in her osteoporosis are vitamin-D levels are normal she is taking calcium she is the high risk for the fracture I will give her injection of the Prolia but she may need a little bit more than that in case if the next time the DEXA scan is not improving  Change in the DEXA scan could be secondary to areas which were scanned    Overall she is doing good she is stable I reviewed her medications she also have a history of hypercholesterolemia  The following portions of the patient's history were reviewed and updated as appropriate: allergies, current medications, past family history, past medical history, past social history, past surgical history and problem list     Review of Systems   Constitutional: Positive for fatigue  Negative for chills  HENT: Negative for congestion, ear pain, hearing loss, postnasal drip, sinus pressure, sore throat and voice change  Eyes: Negative for pain, discharge and visual disturbance  Respiratory: Negative for cough, chest tightness and shortness of breath  Cardiovascular: Negative for chest pain, palpitations and leg swelling  Gastrointestinal: Negative for abdominal pain, blood in stool, diarrhea, nausea and rectal pain  Genitourinary: Negative for difficulty urinating, dysuria and urgency  Musculoskeletal: Positive for arthralgias  Negative for joint swelling  Skin: Negative for rash  Allergic/Immunologic: Negative for environmental allergies and food allergies  Neurological: Positive for speech difficulty and weakness  Negative for dizziness, tremors, numbness and headaches  Hematological: Negative for adenopathy  Psychiatric/Behavioral: Negative for behavioral problems and hallucinations           Past Medical History:   Diagnosis Date    Arthralgia     Memory loss 9/3/2019    [facility])     Osteoporosis     [facility] [address] )     Sjogren's syndrome ([facility])     Stroke (cerebrum) ([facility])     Transient cerebral ischemia          Current Outpatient Medications:     aspirin (ASPIR-LOW) 81 mg EC tablet, Take 81 mg by mouth daily, Disp: , Rfl:     calcium citrate-Vitamin D (CITRACAL PETITES/VITAMIN D) 200 mg-250 units, Take 2 tablets by mouth 2 (two) times a day, Disp: , Rfl:     Cholecalciferol (VITAMIN D) 2000 units CAPS, Take 1 tablet by mouth 2 (two) times a day  , Disp: , Rfl:     clopidogrel (PLAVIX) 75 mg tablet, Take 1 tablet (75 mg total) by mouth daily, Disp: 90 tablet, Rfl: 1    denosumab (PROLIA) 60 mg/mL, Inject 60 mg under the skin every 6 (six) months, Disp: , Rfl:     hydroxychloroquine (PLAQUENIL) 200 mg tablet, Take 1 tablet by mouth AM dose once every other day, PM dose is daily  , Disp: , Rfl:     latanoprost (XALATAN) 0 005 % ophthalmic solution, Apply 1 drop to eye daily  , Disp: , Rfl:     oxybutynin (DITROPAN) 5 mg tablet, Take 1 tablet (5 mg total) by mouth 2 (two) times a day, Disp: 180 tablet, Rfl: 1    simvastatin (ZOCOR) 40 mg tablet, Take 1 tablet (40 mg total) by mouth daily, Disp: 90 tablet, Rfl: 1    timolol (TIMOPTIC) 0 25 % ophthalmic solution, Apply 1 drop to eye daily  , Disp: , Rfl:     Current Facility-Administered Medications:     denosumab (PROLIA) subcutaneous injection 60 mg, 60 mg, Subcutaneous, Once, Leobardo Bullock MD    No Known Allergies    Social History   Past Surgical History:   Procedure Laterality Date    BREAST EXCISIONAL BIOPSY Left 1983    benign    BREAST EXCISIONAL BIOPSY Left 1982    benign    BREAST EXCISIONAL BIOPSY Right 1982    benign    HYSTERECTOMY      age 47   [facility] OOPHORECTOMY      both     Family History   Problem Relation Age of Onset    Lymphoma Mother 61        ACUTE    Stroke Mother 68    Diabetes Father     No Known Problems Maternal Grandmother     No Known Problems Maternal Grandfather     No Known Problems Paternal Grandmother     No Known Problems Paternal Grandfather     Stroke Sister     No Known Problems Daughter     No Known Problems Sister     No Known Problems Maternal Aunt     No Known Problems Maternal Aunt     No Known Problems Paternal Aunt     No Known Problems Paternal Aunt        Objective:  /60 (BP Location: Left arm, Patient Position: Sitting, Cuff Size: Standard)   Pulse 59   Temp (!) 97 2 °F (36 2 °C) (Tympanic)   Ht 5' 8" (1 727 m)   Wt 60 1 kg (132 lb 6 4 oz)   SpO2 97%   BMI 20 13 kg/m²        Physical Exam   Constitutional: She is oriented to person, place, and time  She appears well-developed  HENT:   Head: Normocephalic  Eyes: Pupils are equal, round, and reactive to light  Neck: Normal range of motion  Cardiovascular: Intact distal pulses  No murmur heard  Pulmonary/Chest: Breath sounds normal    Abdominal: Bowel sounds are normal    Musculoskeletal: She exhibits no edema or tenderness  Neurological: She is alert and oriented to person, place, and time  Skin: Skin is warm

## 2020-01-10 ENCOUNTER — OFFICE VISIT (OUTPATIENT)
Dept: INTERNAL MEDICINE CLINIC | Age: 70
End: 2020-01-10
Payer: MEDICARE

## 2020-01-10 VITALS
BODY MASS INDEX: 22.53 KG/M2 | HEART RATE: 60 BPM | HEIGHT: 64 IN | OXYGEN SATURATION: 98 % | TEMPERATURE: 98.8 F | WEIGHT: 132 LBS | DIASTOLIC BLOOD PRESSURE: 80 MMHG | SYSTOLIC BLOOD PRESSURE: 110 MMHG

## 2020-01-10 DIAGNOSIS — R35.0 URINARY FREQUENCY: ICD-10-CM

## 2020-01-10 DIAGNOSIS — R39.15 URINARY URGENCY: Primary | ICD-10-CM

## 2020-01-10 LAB
BACTERIA UR QL AUTO: ABNORMAL /HPF
BILIRUB UR QL STRIP: NEGATIVE
CLARITY UR: CLEAR
COLOR UR: YELLOW
GLUCOSE UR STRIP-MCNC: NEGATIVE MG/DL
HGB UR QL STRIP.AUTO: NEGATIVE
HYALINE CASTS #/AREA URNS LPF: ABNORMAL /LPF
KETONES UR STRIP-MCNC: NEGATIVE MG/DL
LEUKOCYTE ESTERASE UR QL STRIP: ABNORMAL
NITRITE UR QL STRIP: NEGATIVE
NON-SQ EPI CELLS URNS QL MICRO: ABNORMAL /HPF
PH UR STRIP.AUTO: 6 [PH]
PROT UR STRIP-MCNC: NEGATIVE MG/DL
RBC #/AREA URNS AUTO: ABNORMAL /HPF
SP GR UR STRIP.AUTO: 1.02 (ref 1–1.03)
UROBILINOGEN UR QL STRIP.AUTO: 0.2 E.U./DL
WBC #/AREA URNS AUTO: ABNORMAL /HPF

## 2020-01-10 PROCEDURE — 4040F PNEUMOC VAC/ADMIN/RCVD: CPT | Performed by: FAMILY MEDICINE

## 2020-01-10 PROCEDURE — 99213 OFFICE O/P EST LOW 20 MIN: CPT | Performed by: FAMILY MEDICINE

## 2020-01-10 PROCEDURE — 1036F TOBACCO NON-USER: CPT | Performed by: FAMILY MEDICINE

## 2020-01-10 PROCEDURE — 81001 URINALYSIS AUTO W/SCOPE: CPT | Performed by: FAMILY MEDICINE

## 2020-01-10 PROCEDURE — 1160F RVW MEDS BY RX/DR IN RCRD: CPT | Performed by: FAMILY MEDICINE

## 2020-01-10 NOTE — PROGRESS NOTES
Assessment/Plan:    No problem-specific Assessment & Plan notes found for this encounter  Problem List Items Addressed This Visit        Other    Urinary frequency    Relevant Orders    UA (URINE) with reflex to Scope    UA w Reflex to Microscopic w Reflex to Culture - Clinic Collect (Completed)    Urine Microscopic (Completed)    Urinary urgency - Primary    Relevant Orders    POCT urine dip auto non-scope    Urine culture    UA w Reflex to Microscopic w Reflex to Culture - Clinic Collect (Completed)    Urine Microscopic (Completed)            Subjective:      Patient ID: Lisa Juares is a 78 y o  female      HPI    The following portions of the patient's history were reviewed and updated as appropriate: allergies, current medications, past family history, past medical history, past social history, past surgical history and problem list     Review of Systems    Constitutional:  Denies fever or chills   Eyes:  Denies double or blurry vision  HENT:  Denies nasal congestion or sore throat   Respiratory:  Denies cough or shortness of breath or wheezing  Cardiovascular:  Denies palpitations or chest pain  GI:  Denies abdominal pain, nausea, or vomiting  Integument:  Denies rash , no open areas  Neurologic:  Denies headache or focal weakness      Objective:      /80 (BP Location: Left arm, Patient Position: Sitting, Cuff Size: Adult)   Pulse 60   Temp 98 8 °F (37 1 °C) (Tympanic)   Ht 5' 4" (1 626 m)   Wt 59 9 kg (132 lb)   SpO2 98%   BMI 22 66 kg/m²          Physical Exam    Constitutional:  Well developed, well nourished, no acute distress, non-toxic appearance   Eyes:  PERRL, conjunctiva normal , non icteric sclera  HENT:  Atraumatic, oropharynx moist  Neck-  supple   Respiratory:  CTA b/l, normal breath sounds, no rales, no wheezing   Cardiovascular:  RRR, no murmurs, no LE edema b/l  GI:  Soft, nondistended, normal bowel sounds x 4, nontender, no organomegaly, no mass, no rebound, no guarding Neurologic:  no focal deficits noted   Psychiatric:  Speech and behavior appropriate , AAO x 3

## 2020-01-15 ENCOUNTER — TELEPHONE (OUTPATIENT)
Dept: INTERNAL MEDICINE CLINIC | Facility: CLINIC | Age: 70
End: 2020-01-15

## 2020-01-15 DIAGNOSIS — R35.0 URINARY FREQUENCY: Primary | ICD-10-CM

## 2020-01-15 PROBLEM — E87.0 HYPERNATREMIA: Status: RESOLVED | Noted: 2019-08-13 | Resolved: 2020-01-15

## 2020-01-15 PROBLEM — R41.3 MEMORY LOSS: Status: RESOLVED | Noted: 2019-09-03 | Resolved: 2020-01-15

## 2020-01-15 NOTE — TELEPHONE ENCOUNTER
Patient is leaving Friday for a trip she would like to know status of urine that was sent out  Please revew and call her

## 2020-01-16 ENCOUNTER — HOSPITAL ENCOUNTER (OUTPATIENT)
Dept: RADIOLOGY | Facility: IMAGING CENTER | Age: 70
Discharge: HOME | End: 2020-01-16
Payer: MEDICARE

## 2020-01-16 DIAGNOSIS — R35.0 URINARY FREQUENCY: ICD-10-CM

## 2020-01-16 PROCEDURE — 76770 US EXAM ABDO BACK WALL COMP: CPT

## 2020-01-20 DIAGNOSIS — E78.5 HYPERLIPIDEMIA, UNSPECIFIED HYPERLIPIDEMIA TYPE: ICD-10-CM

## 2020-01-20 RX ORDER — SIMVASTATIN 40 MG
40 TABLET ORAL DAILY
Qty: 90 TABLET | Refills: 1 | Status: SHIPPED | OUTPATIENT
Start: 2020-01-20 | End: 2020-06-22 | Stop reason: SDUPTHER

## 2020-01-22 ENCOUNTER — TELEPHONE (OUTPATIENT)
Dept: INTERNAL MEDICINE CLINIC | Facility: CLINIC | Age: 70
End: 2020-01-22

## 2020-01-22 NOTE — TELEPHONE ENCOUNTER
Patient calling for results of ultrasound of the kidney and bladder that she had completed on 1/16/20  She stated that if she does not answer you can leave a detailed message on her answering machine

## 2020-01-23 NOTE — TELEPHONE ENCOUNTER
Patient called back and stated that she received her test results yesterday and everything cam back negative in regards to the US of her kidneys  She is asking what she should do if her symptoms continue

## 2020-02-26 DIAGNOSIS — I63.81 CEREBROVASCULAR ACCIDENT (CVA) DUE TO OCCLUSION OF SMALL ARTERY (HCC): ICD-10-CM

## 2020-02-26 RX ORDER — CLOPIDOGREL BISULFATE 75 MG/1
75 TABLET ORAL DAILY
Qty: 90 TABLET | Refills: 1 | Status: SHIPPED | OUTPATIENT
Start: 2020-02-26 | End: 2020-08-19 | Stop reason: SDUPTHER

## 2020-03-05 ENCOUNTER — TELEPHONE (OUTPATIENT)
Dept: NEUROLOGY | Facility: CLINIC | Age: 70
End: 2020-03-05

## 2020-03-05 ENCOUNTER — OFFICE VISIT (OUTPATIENT)
Dept: NEUROLOGY | Facility: CLINIC | Age: 70
End: 2020-03-05
Payer: MEDICARE

## 2020-03-05 VITALS
BODY MASS INDEX: 22.2 KG/M2 | WEIGHT: 130 LBS | SYSTOLIC BLOOD PRESSURE: 94 MMHG | HEART RATE: 69 BPM | HEIGHT: 64 IN | DIASTOLIC BLOOD PRESSURE: 68 MMHG

## 2020-03-05 DIAGNOSIS — I67.850 CADASIL (CEREBRAL AD ARTERIOPATHY W INFARCTS AND LEUKOENCEPHALOPATHY): Primary | ICD-10-CM

## 2020-03-05 DIAGNOSIS — Z86.73 HISTORY OF STROKE: ICD-10-CM

## 2020-03-05 DIAGNOSIS — R41.3 MEMORY LOSS: ICD-10-CM

## 2020-03-05 PROCEDURE — 4040F PNEUMOC VAC/ADMIN/RCVD: CPT | Performed by: PHYSICIAN ASSISTANT

## 2020-03-05 PROCEDURE — 1160F RVW MEDS BY RX/DR IN RCRD: CPT | Performed by: PHYSICIAN ASSISTANT

## 2020-03-05 PROCEDURE — 99214 OFFICE O/P EST MOD 30 MIN: CPT | Performed by: PHYSICIAN ASSISTANT

## 2020-03-05 PROCEDURE — 1036F TOBACCO NON-USER: CPT | Performed by: PHYSICIAN ASSISTANT

## 2020-03-05 PROCEDURE — 3008F BODY MASS INDEX DOCD: CPT | Performed by: PHYSICIAN ASSISTANT

## 2020-03-05 NOTE — ASSESSMENT & PLAN NOTE
· She will continue ASA, Plavix and a statin  · She will follow a Mediterranean diet and get regular exercise

## 2020-03-05 NOTE — ASSESSMENT & PLAN NOTE
· Pt will continue the current medications  · She will return to the ER with any new symptoms suggestive of stroke including focal weakness, sudden visual change or worsening speech problems  · Brain MRI with contrast will be repeated at this time  · Bloodwork will be obtained prior to the MRI  I have spent 25 minutes with Patient and family today in which greater than 50% of this time was spent in counseling/coordination of care regarding Prognosis, Risks and benefits of tx options, Intructions for management, Patient and family education, Importance of tx compliance, Risk factor reductions and Impressions  She will follow-up in 6 months

## 2020-03-05 NOTE — PATIENT INSTRUCTIONS
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)  · Pt will continue the current medications  · She will return to the ER with any new symptoms suggestive of stroke including focal weakness, sudden visual change or worsening speech problems  · Brain MRI with contrast will be repeated at this time  · Bloodwork will be obtained prior to the MRI  I have spent 25 minutes with Patient and family today in which greater than 50% of this time was spent in counseling/coordination of care regarding Prognosis, Risks and benefits of tx options, Intructions for management, Patient and family education, Importance of tx compliance, Risk factor reductions and Impressions  She will follow-up in 6 months  History of stroke  · She will continue ASA, Plavix and a statin  · She will follow a Mediterranean diet and get regular exercise

## 2020-03-05 NOTE — PROGRESS NOTES
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)  · Pt will continue the current medications  · She will return to the ER with any new symptoms suggestive of stroke including focal weakness, sudden visual change or worsening speech problems  · Brain MRI with contrast will be repeated at this time  · Bloodwork will be obtained prior to the MRI  I have spent 25 minutes with Patient and family today in which greater than 50% of this time was spent in counseling/coordination of care regarding Prognosis, Risks and benefits of tx options, Intructions for management, Patient and family education, Importance of tx compliance, Risk factor reductions and Impressions  She will follow-up in 6 months  History of stroke  · She will continue ASA, Plavix and a statin  · She will follow a Mediterranean diet and get regular exercise  Problem List Items Addressed This Visit        Other    History of stroke     · She will continue ASA, Plavix and a statin  · She will follow a Mediterranean diet and get regular exercise  Other Visit Diagnoses     CADASIL (cerebral AD arteriopathy w infarcts and leukoencephalopathy)    -  Primary    Relevant Orders    MRI brain with and without contrast    Basic metabolic panel    Memory loss                 Subjective:    HPI    Raghav Cohen is a 79 y/o F who is here as a follow up today of CADASIL  She also has glaucoma, hyperlipidemia, cataracts and overactive bladder  She had an episode of expressive aphasia and trouble using her phone and this occurred in August 2019 which lasted for a few minutes and resolved by the time she arrived to the ER  Patient went to the ED and reviewed her records  She states that she did not any other symptoms at this time  She was already maintained on plavix and atorvastatin for stroke prevention   She had P2y12 assay which was therapeutic  She was started on aspirin while she was in the hospital      She is on aspirin and plavix now and is doing well  She is compliant with her medications  She denies any new TIA/CVA like symptoms  She says that she still has trouble with word finding and recall at times  She states that at times she really needs to think hard about what she is trying to say in order to say it  Her  has not noticed anything new since her last appt  She continues to drive but not at night  She does not have any issues with her ADLs  The following portions of the patient's history were reviewed and updated as appropriate: allergies, current medications, past family history, past medical history, past social history, past surgical history and problem list          Objective:    Blood pressure 94/68, pulse 69, height 5' 4" (1 626 m), weight 59 kg (130 lb)  Physical Exam   Eyes: Pupils are equal, round, and reactive to light  Lids are normal    Neurological: She has normal strength and normal reflexes  Coordination normal    Psychiatric: Her speech is normal    Vitals reviewed  Neurological Exam  Mental Status   Oriented to person, place, time and situation  Recent and remote memory are intact  Speech is normal  Language is fluent with no aphasia  Attention and concentration are normal  Fund of knowledge is appropriate for level of education  Apraxia absent  Cranial Nerves  CN II: Visual acuity is normal  Visual fields full to confrontation  CN III, IV, VI: Extraocular movements intact bilaterally  Normal lids and orbits bilaterally  Pupils equal round and reactive to light bilaterally  CN V: Facial sensation is normal   CN VII: Full and symmetric facial movement  CN VIII: Hearing is normal   CN IX, X: Palate elevates symmetrically  Normal gag reflex  CN XI: Shoulder shrug strength is normal   CN XII: Tongue midline without atrophy or fasciculations      Motor   Strength is 5/5 throughout all four extremities  Sensory  Sensation is intact to light touch, pinprick, vibration and proprioception in all four extremities  Reflexes  Deep tendon reflexes are 2+ and symmetric in all four extremities with downgoing toes bilaterally  Coordination  Finger-to-nose, rapid alternating movements and heel-to-shin normal bilaterally without dysmetria  Gait  Normal casual, toe, heel and tandem gait  ROS:    Review of Systems   Constitutional: Negative  Negative for appetite change and fever  HENT: Negative  Negative for hearing loss, tinnitus, trouble swallowing and voice change  Eyes: Negative  Negative for photophobia and pain  Respiratory: Negative  Negative for shortness of breath  Cardiovascular: Negative  Negative for palpitations  Gastrointestinal: Negative  Negative for nausea and vomiting  Endocrine: Negative  Negative for cold intolerance and heat intolerance  Genitourinary: Negative for dysuria and urgency  Musculoskeletal: Negative  Negative for myalgias and neck pain  Skin: Negative  Negative for rash  Allergic/Immunologic: Negative  Neurological: Negative  Negative for dizziness, tremors, seizures, syncope, facial asymmetry, speech difficulty, weakness, light-headedness, numbness and headaches  Hematological: Negative  Does not bruise/bleed easily  Psychiatric/Behavioral: Positive for sleep disturbance (not well )  Negative for confusion and hallucinations  Review of systems personally reviewed

## 2020-03-20 ENCOUNTER — HOSPITAL ENCOUNTER (OUTPATIENT)
Dept: RADIOLOGY | Facility: IMAGING CENTER | Age: 70
Discharge: HOME | End: 2020-03-20
Payer: MEDICARE

## 2020-03-20 DIAGNOSIS — I67.850 CADASIL (CEREBRAL AD ARTERIOPATHY W INFARCTS AND LEUKOENCEPHALOPATHY): ICD-10-CM

## 2020-03-20 PROCEDURE — 70553 MRI BRAIN STEM W/O & W/DYE: CPT

## 2020-03-20 PROCEDURE — A9585 GADOBUTROL INJECTION: HCPCS | Performed by: PHYSICIAN ASSISTANT

## 2020-03-20 RX ADMIN — GADOBUTROL 6 ML: 604.72 INJECTION INTRAVENOUS at 11:00

## 2020-06-22 DIAGNOSIS — R35.0 URINARY FREQUENCY: ICD-10-CM

## 2020-06-22 DIAGNOSIS — E78.5 HYPERLIPIDEMIA, UNSPECIFIED HYPERLIPIDEMIA TYPE: ICD-10-CM

## 2020-06-22 RX ORDER — OXYBUTYNIN CHLORIDE 5 MG/1
5 TABLET ORAL 2 TIMES DAILY
Qty: 180 TABLET | Refills: 1 | Status: SHIPPED | OUTPATIENT
Start: 2020-06-22 | End: 2021-02-05 | Stop reason: SDUPTHER

## 2020-06-22 RX ORDER — SIMVASTATIN 40 MG
40 TABLET ORAL DAILY
Qty: 90 TABLET | Refills: 1 | Status: SHIPPED | OUTPATIENT
Start: 2020-06-22 | End: 2020-11-30 | Stop reason: SDUPTHER

## 2020-07-20 ENCOUNTER — TELEPHONE (OUTPATIENT)
Dept: INTERNAL MEDICINE CLINIC | Facility: CLINIC | Age: 70
End: 2020-07-20

## 2020-07-20 ENCOUNTER — OFFICE VISIT (OUTPATIENT)
Dept: INTERNAL MEDICINE CLINIC | Facility: CLINIC | Age: 70
End: 2020-07-20
Payer: MEDICARE

## 2020-07-20 VITALS
DIASTOLIC BLOOD PRESSURE: 68 MMHG | RESPIRATION RATE: 18 BRPM | WEIGHT: 121.8 LBS | BODY MASS INDEX: 20.79 KG/M2 | SYSTOLIC BLOOD PRESSURE: 102 MMHG | OXYGEN SATURATION: 96 % | TEMPERATURE: 98.2 F | HEIGHT: 64 IN | HEART RATE: 63 BPM

## 2020-07-20 DIAGNOSIS — I67.850 CADASIL (CEREBRAL AUTOSOMAL DOMINANT ARTERIOPATHY WITH SUBCORTICAL INFARCTS AND LEUKOENCEPHALOPATHY): ICD-10-CM

## 2020-07-20 DIAGNOSIS — R79.89 HIGH SERUM HIGH DENSITY LIPOPROTEIN (HDL): ICD-10-CM

## 2020-07-20 DIAGNOSIS — Z12.31 SCREENING MAMMOGRAM, ENCOUNTER FOR: ICD-10-CM

## 2020-07-20 DIAGNOSIS — M80.00XA AGE-RELATED OSTEOPOROSIS WITH CURRENT PATHOLOGICAL FRACTURE, INITIAL ENCOUNTER: ICD-10-CM

## 2020-07-20 DIAGNOSIS — E78.2 MIXED HYPERLIPIDEMIA: ICD-10-CM

## 2020-07-20 DIAGNOSIS — Z00.00 MEDICARE ANNUAL WELLNESS VISIT, SUBSEQUENT: ICD-10-CM

## 2020-07-20 DIAGNOSIS — H04.129 DRY EYE: Primary | ICD-10-CM

## 2020-07-20 PROBLEM — R39.15 URINARY URGENCY: Status: RESOLVED | Noted: 2020-01-10 | Resolved: 2020-07-20

## 2020-07-20 PROBLEM — S09.93XA FACIAL TRAUMA, INITIAL ENCOUNTER: Status: RESOLVED | Noted: 2019-04-04 | Resolved: 2020-07-20

## 2020-07-20 PROCEDURE — 1170F FXNL STATUS ASSESSED: CPT | Performed by: FAMILY MEDICINE

## 2020-07-20 PROCEDURE — 99214 OFFICE O/P EST MOD 30 MIN: CPT | Performed by: FAMILY MEDICINE

## 2020-07-20 PROCEDURE — G0439 PPPS, SUBSEQ VISIT: HCPCS | Performed by: FAMILY MEDICINE

## 2020-07-20 PROCEDURE — 1125F AMNT PAIN NOTED PAIN PRSNT: CPT | Performed by: FAMILY MEDICINE

## 2020-07-20 PROCEDURE — 1123F ACP DISCUSS/DSCN MKR DOCD: CPT | Performed by: FAMILY MEDICINE

## 2020-07-20 PROCEDURE — 1160F RVW MEDS BY RX/DR IN RCRD: CPT | Performed by: FAMILY MEDICINE

## 2020-07-20 PROCEDURE — 96372 THER/PROPH/DIAG INJ SC/IM: CPT | Performed by: FAMILY MEDICINE

## 2020-07-20 PROCEDURE — 1036F TOBACCO NON-USER: CPT | Performed by: FAMILY MEDICINE

## 2020-07-20 PROCEDURE — 4040F PNEUMOC VAC/ADMIN/RCVD: CPT | Performed by: FAMILY MEDICINE

## 2020-07-20 RX ORDER — CYCLOSPORINE 0.5 MG/ML
1 EMULSION OPHTHALMIC 2 TIMES DAILY
COMMUNITY

## 2020-07-20 NOTE — PROGRESS NOTES
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)       Musculoskeletal and Integument    Osteoporosis    Relevant Medications    denosumab (PROLIA) subcutaneous injection 60 mg    Other Relevant Orders    Vitamin D 25 hydroxy       Other    Hyperlipidemia    Relevant Orders    Comprehensive metabolic panel    Lipid panel    Screening mammogram, encounter for    Relevant Orders    Mammo screening bilateral w cad    High serum high density lipoprotein (HDL)    Medicare annual wellness visit, subsequent    Dry eye - Primary           Preventive health issues were discussed with patient, and age appropriate screening tests were ordered as noted in patient's After Visit Summary  Personalized health advice and appropriate referrals for health education or preventive services given if needed, as noted in patient's After Visit Summary       History of Present Illness:     Patient presents for Medicare Annual Wellness visit    Patient Care Team:  Vimal Thurman DO as PCP - General (Family Medicine)  Ene Wise MD (Rheumatology)     Problem List:     Patient Active Problem List   Diagnosis    Abnormal mammogram of right breast    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)    Cerebrovascular accident (CVA) due to vascular occlusion ([address]    Chronic venous insufficiency    Hyperlipidemia    Osteoporosis    Rheumatoid arthritis with positive rheumatoid factor ([address] )    Sjogren's syndrome ([address] )    Von Reckling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 ([address] )    Rheumatoid aortitis    Screening mammogram, encounter for    High serum high density lipoprotein (HDL)    History of stroke    Medicare annual wellness visit, subsequent    Multiple lacunar infarcts ([address] )    Dry eye      Past Medical and Surgical History:     Past Medical History:   Diagnosis Date    Arthralgia     Memory loss 9/3/2019    Neutropenia (HCC)     Osteoporosis     Rheumatoid arthritis ([address] )     Sjogren's syndrome ([address] )     Stroke (cerebrum) (HCC)     Transient cerebral ischemia      Past Surgical History:   Procedure Laterality Date    BREAST EXCISIONAL BIOPSY Left 1983    benign    BREAST EXCISIONAL BIOPSY Left 1982    benign    BREAST EXCISIONAL BIOPSY Right 1982    benign    HYSTERECTOMY      age 47   [facility] OOPHORECTOMY      both      Family History:     Family History   Problem Relation Age of Onset    Lymphoma Mother 61        ACUTE    Stroke Mother 68    Diabetes Father     No Known Problems Maternal Grandmother     No Known Problems Maternal Grandfather     No Known Problems Paternal Grandmother     No Known Problems Paternal Grandfather     Stroke Sister     No Known Problems Daughter     No Known Problems Sister     No Known Problems Maternal Aunt     No Known Problems Maternal Aunt     No Known Problems Paternal Aunt     No Known Problems Paternal Aunt       Social History:        Social History     Socioeconomic History    Marital status: /Civil Union     Spouse name: None    Number of children: None    Years of education: None    Highest education level: None   Occupational History    Occupation: Privately   Social Needs    Financial resource strain: None    Food insecurity     Worry: None     Inability: None    Transportation needs     Medical: None     Non-medical: None   Tobacco Use    Smoking status: Never Smoker    Smokeless tobacco: Never Used   Substance and Sexual Activity    Alcohol use:  Yes     Alcohol/week: 1 0 standard drinks     Types: 1 Glasses of wine per week     Frequency: 2-4 times a month     Drinks per session: 1 or 2     Binge frequency: Never     Comment: RARELY    Drug use: No    Sexual activity: None   Lifestyle    Physical activity     Days per week: None     Minutes per session: None    Stress: None   Relationships    Social connections     Talks on phone: None     Gets together: None     Attends Sabianism service: None     Active member of club or organization: None     Attends meetings of clubs or organizations: None     Relationship status: None    Intimate partner violence     Fear of current or ex partner: None     Emotionally abused: None     Physically abused: None     Forced sexual activity: None   Other Topics Concern    None   Social History Narrative    Exercise habits: the gym; frequency is 2 days per week    Travel history: patient denies being  Out of the country during 1/2014-11/12/2014      Medications and Allergies:     Current Outpatient Medications   Medication Sig Dispense Refill    aspirin (ASPIR-LOW) 81 mg EC tablet Take 81 mg by mouth daily      calcium citrate-Vitamin D (CITRACAL PETITES/VITAMIN D) 200 mg-250 units Take 2 tablets by mouth 2 (two) times a day      Cholecalciferol (VITAMIN D) 2000 units CAPS Take 1 tablet by mouth 2 (two) times a day        clopidogrel (PLAVIX) 75 mg tablet Take 1 tablet (75 mg total) by mouth daily 90 tablet 1    denosumab (PROLIA) 60 mg/mL Inject 60 mg under the skin every 6 (six) months      hydroxychloroquine (PLAQUENIL) 200 mg tablet Take 1 tablet by mouth       latanoprost (XALATAN) 0 005 % ophthalmic solution Apply 1 drop to eye daily        oxybutynin (DITROPAN) 5 mg tablet Take 1 tablet (5 mg total) by mouth 2 (two) times a day 180 tablet 1    simvastatin (ZOCOR) 40 mg tablet Take 1 tablet (40 mg total) by mouth daily 90 tablet 1    timolol (TIMOPTIC) 0 25 % ophthalmic solution Apply 1 drop to eye daily        cycloSPORINE (RESTASIS) 0 05 % ophthalmic emulsion Apply 1 drop to eye 2 (two) times a day       Current Facility-Administered Medications   Medication Dose Route Frequency Provider Last Rate Last Dose    denosumab (PROLIA) subcutaneous injection 60 mg  60 mg Subcutaneous Once Katrina Certain, DO         No Known Allergies   Immunizations:     Immunization History   Administered Date(s) Administered    INFLUENZA 12/18/2013, 10/01/2014, 11/18/2014, 09/30/2015, 09/14/2016, 10/03/2017    Influenza Split High Dose Preservative Free IM 10/14/2014, 09/30/2015, 09/14/2016, 10/03/2017    Influenza TIV (IM) 10/18/2010, 10/25/2011, 10/17/2012, 10/15/2013    Influenza, high dose seasonal 0 5 mL 10/10/2018, 10/08/2019    Pneumococcal Conjugate 13-Valent 12/11/2018    Pneumococcal Polysaccharide PPV23 10/15/2009, 11/12/2014, 12/28/2016    Td (adult), adsorbed 07/25/2012    Tdap 01/14/2016, 12/28/2016    Zoster 02/20/2010      Health Maintenance:         Topic Date Due    DXA SCAN  07/23/2021    Hepatitis C Screening  Completed         Topic Date Due    Influenza Vaccine  07/01/2020      Medicare Health Risk Assessment:     /68 (BP Location: Left arm, Patient Position: Sitting, Cuff Size: Standard)   Pulse 63   Temp 98 2 °F (36 8 °C) (Temporal)   Resp 18   Ht 5' 4"   Wt 55 2 kg (121 lb 12 8 oz)   SpO2 96%   BMI 20 91 kg/m²      [employer] is here for her Subsequent Wellness visit  Last Medicare Wellness visit information reviewed, patient interviewed and updates made to the record today  Health Risk Assessment:   Patient rates overall health as very good  Patient feels that their physical health rating is same  Eyesight was rated as slightly worse  Hearing was rated as same  Patient feels that their emotional and mental health rating is same  Pain experienced in the last 7 days has been none  Patient states that she has experienced no weight loss or gain in last 6 months  Depression Screening:   PHQ-2 Score: 0      Fall Risk Screening: In the past year, patient has experienced: no history of falling in past year      Urinary Incontinence Screening:   Patient has not leaked urine accidently in the last six months  Home Safety:  Patient does not have trouble with stairs inside or outside of their home  Patient has working smoke alarms and has no working carbon monoxide detector  Home safety hazards include: loose rugs on the floor  Nutrition:   Current diet is Other (please comment)  [place] to hydrate eyes    Medications:   Patient is currently taking over-the-counter supplements  OTC medications include: see medication list  Patient is able to manage medications  Activities of Daily Living (ADLs)/Instrumental Activities of Daily Living (IADLs):   Walk and transfer into and out of bed and chair?: Yes  Dress and groom yourself?: Yes    Bathe or shower yourself?: Yes    Feed yourself? Yes  Do your laundry/housekeeping?: Yes  Manage your money, pay your bills and track your expenses?: Yes  Make your own meals?: Yes    Do your own shopping?: Yes    Advance Care Planning:   Living will: Yes    Durable POA for healthcare: Yes    Advanced directive: Yes      Comments: Her  Josselyn Hebert    Cognitive Screening:   Provider or family/friend/caregiver concerned regarding cognition?: No    PREVENTIVE SCREENINGS      Cardiovascular Screening:    General: Screening Not Indicated and History Lipid Disorder      Diabetes Screening:     General: Screening Current      Colorectal Cancer Screening:     General: Screening Current      Breast Cancer Screening:     General: Screening Current      Cervical Cancer Screening:    General: Screening Not Indicated      Osteoporosis Screening:    General: Screening Not Indicated and History Osteoporosis      Lung Cancer Screening:     General: Screening Not Indicated      Hepatitis C Screening:    General: Screening Current    Other Counseling Topics:   Alcohol use counseling, car/seat belt/driving safety, skin self-exam, sunscreen and regular weightbearing exercise and calcium and vitamin D intake         Carlos Leonard, DO

## 2020-07-20 NOTE — PROGRESS NOTES
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)    5  Age-related osteoporosis with current pathological fracture, initial encounter  -     Vitamin D 25 hydroxy; Future  -     denosumab (PROLIA) subcutaneous injection 60 mg    6  Medicare annual wellness visit, subsequent    7  Screening mammogram, encounter for  -     Mammo screening bilateral w cad; Future; Expected date: 07/20/2020            There are no Patient Instructions on file for this visit  Return in about 6 months (around 1/20/2021) for Recheck  Subjective:      Patient ID: Scot Lawrence is a 69 y o  female  Chief Complaint   Patient presents with   [facility] Wellness Visit     also needs her prolia injection had bw done last week       HPI    Hyperlipidemia, on Zocor and doing well  Laboratory data reviewed and is all is well controlled  Tolerating well July 2020, excellent controlled lipid levels     Vitamin-D deficiency  Has been on over-the-counter calcium and vitamin-D  Doing well  On Prolia and has bone density    Osteoporosis (Follow-Up): The patient's osteoporosis has been stable since the last visit  Most recent DXA results: T-score <-2 5  Comorbid Illnesses: -3 3 is the worst- on chronic prednisone  She has no comorbid illnesses  She has no complications  She has no significant interval events  Symptoms: The patient is currently asymptomatic  denies back pain-- and-- denies   Associated symptoms include no decrease in height   Home precautions: Osteoporosis counseling and education was reviewed with patient and caregivers, including prevention of falls   Medications: the patient is adherent with her medication regimen   Medication(s): vitamin D,-- a calcium supplement-- and-- prolia  Trinh Bloom Management: the patient is doing well with her osteoporosis goals  The patient is due for DEXA  April 2019-appointment scheduled in July July 2020, here for Prolia  Tolerating well without any issues     Urinary frequency, on Ditropan and sees minimal results but would like to continue  No issues  July 2020, has dry eye and was placed on dry eye diet which is bland and morning according to her however it has helped with her urinary issues     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  Continue with Plavix and Neurology follow-ups  No residual affects but feels that she is slower in her daily activities and speaking      Bilateral cataracts, will be having cataract surgery at the end of August     The following portions of the patient's history were reviewed and updated as appropriate: allergies, current medications, past family history, past medical history, past social history, past surgical history and problem list     Review of Systems      Constitutional:  Denies fever or chills , 9 lb decrease intentional  Eyes:  Denies double or blurry vision  HENT:  Denies nasal congestion or sore throat   Respiratory:  Denies cough or shortness of breath or wheezing  Cardiovascular:  Denies palpitations or chest pain  GI:  Denies abdominal pain, nausea, or vomiting  Integument:  Denies rash , no open areas  Neurologic:  Denies headache or focal weakness, no dizziness        Current Outpatient Medications   Medication Sig Dispense Refill    aspirin (ASPIR-LOW) 81 mg EC tablet Take 81 mg by mouth daily      calcium citrate-Vitamin D (CITRACAL PETITES/VITAMIN D) 200 mg-250 units Take 2 tablets by mouth 2 (two) times a day      Cholecalciferol (VITAMIN D) 2000 units CAPS Take 1 tablet by mouth 2 (two) times a day        clopidogrel (PLAVIX) 75 mg tablet Take 1 tablet (75 mg total) by mouth daily 90 tablet 1    denosumab (PROLIA) 60 mg/mL Inject 60 mg under the skin every 6 (six) months      hydroxychloroquine (PLAQUENIL) 200 mg tablet Take 1 tablet by mouth       latanoprost (XALATAN) 0 005 % ophthalmic solution Apply 1 drop to eye daily        oxybutynin (DITROPAN) 5 mg tablet Take 1 tablet (5 mg total) by mouth 2 (two) times a day 180 tablet 1    simvastatin (ZOCOR) 40 mg tablet Take 1 tablet (40 mg total) by mouth daily 90 tablet 1    timolol (TIMOPTIC) 0 25 % ophthalmic solution Apply 1 drop to eye daily        cycloSPORINE (RESTASIS) 0 05 % ophthalmic emulsion Apply 1 drop to eye 2 (two) times a day       Current Facility-Administered Medications   Medication Dose Route Frequency Provider Last Rate Last Dose    denosumab (PROLIA) subcutaneous injection 60 mg  60 mg Subcutaneous Once Chayo Huff DO           Objective:    /68 (BP Location: Left arm, Patient Position: Sitting, Cuff Size: Standard)   Pulse 63   Temp 98 2 °F (36 8 °C) (Temporal)   Resp 18   Ht 5' 4" (1 626 m)   Wt 55 2 kg (121 lb 12 8 oz)   SpO2 96%   BMI 20 91 kg/m²        Physical Exam       Constitutional:  Well developed, well nourished, no acute distress, non-toxic appearance   Eyes:  PERRL, conjunctiva normal , non icteric sclera  HENT:  Atraumatic, oropharynx moist  Neck-  supple   Respiratory:  CTA b/l, normal breath sounds, no rales, no wheezing   Cardiovascular:  RRR, no murmurs, no LE edema b/l  GI:  Soft, nondistended, normal bowel sounds x 4, nontender, no organomegaly, no mass, no rebound, no guarding   Neurologic:  no focal deficits noted   Psychiatric:  Speech and behavior appropriate , AAO x 3        Chayo Huff DO

## 2020-07-20 NOTE — TELEPHONE ENCOUNTER
Patient had prolia shot today 7/20/20  She is scheduled for a 6 month follow up on 1/25/21, which is 6 months  Her next prolia can be added to that appt

## 2020-08-13 ENCOUNTER — CONSULT (OUTPATIENT)
Dept: INTERNAL MEDICINE CLINIC | Facility: CLINIC | Age: 70
End: 2020-08-13
Payer: MEDICARE

## 2020-08-13 VITALS
OXYGEN SATURATION: 97 % | DIASTOLIC BLOOD PRESSURE: 78 MMHG | SYSTOLIC BLOOD PRESSURE: 108 MMHG | TEMPERATURE: 98.3 F | BODY MASS INDEX: 20.62 KG/M2 | HEIGHT: 64 IN | WEIGHT: 120.8 LBS | HEART RATE: 62 BPM | RESPIRATION RATE: 18 BRPM

## 2020-08-13 DIAGNOSIS — E78.2 MIXED HYPERLIPIDEMIA: ICD-10-CM

## 2020-08-13 DIAGNOSIS — I63.81 CEREBROVASCULAR ACCIDENT (CVA) DUE TO OCCLUSION OF SMALL ARTERY (HCC): ICD-10-CM

## 2020-08-13 DIAGNOSIS — M35.00 SJOGREN'S SYNDROME, WITH UNSPECIFIED ORGAN INVOLVEMENT (HCC): ICD-10-CM

## 2020-08-13 DIAGNOSIS — Z01.818 PREOP EXAM FOR INTERNAL MEDICINE: ICD-10-CM

## 2020-08-13 DIAGNOSIS — H26.9 CATARACT OF BOTH EYES, UNSPECIFIED CATARACT TYPE: Primary | ICD-10-CM

## 2020-08-13 DIAGNOSIS — M05.79 RHEUMATOID ARTHRITIS INVOLVING MULTIPLE SITES WITH POSITIVE RHEUMATOID FACTOR (HCC): ICD-10-CM

## 2020-08-13 DIAGNOSIS — I67.850 CADASIL (CEREBRAL AUTOSOMAL DOMINANT ARTERIOPATHY WITH SUBCORTICAL INFARCTS AND LEUKOENCEPHALOPATHY): ICD-10-CM

## 2020-08-13 PROCEDURE — 1036F TOBACCO NON-USER: CPT | Performed by: NURSE PRACTITIONER

## 2020-08-13 PROCEDURE — 1160F RVW MEDS BY RX/DR IN RCRD: CPT | Performed by: NURSE PRACTITIONER

## 2020-08-13 PROCEDURE — 1170F FXNL STATUS ASSESSED: CPT | Performed by: NURSE PRACTITIONER

## 2020-08-13 PROCEDURE — 4040F PNEUMOC VAC/ADMIN/RCVD: CPT | Performed by: NURSE PRACTITIONER

## 2020-08-13 PROCEDURE — 99214 OFFICE O/P EST MOD 30 MIN: CPT | Performed by: NURSE PRACTITIONER

## 2020-08-13 PROCEDURE — 3008F BODY MASS INDEX DOCD: CPT | Performed by: NURSE PRACTITIONER

## 2020-08-13 NOTE — PATIENT INSTRUCTIONS
We will fax forms to the eye doctors office  Please notify the office of any medical changes prior to your surgery

## 2020-08-13 NOTE — PROGRESS NOTES
Subjective:    Kory Turcios is a 69y o  year old female who presents to the office today for a preoperative consultation at the request of surgeon Dr Rufus Mendes who plans on performing cataract surgery of the left eye on August 25, 2020 and cataract surgery of the right eye on Sept 8, 2020  Planned anesthesia: MAC/topical  The patient has the following known anesthesia issues: she has a history of vomiting with general anesthesia   Patients bleeding risk: no recent abnormal bleeding and she is on ASA 81mg daily and Plavix 75mg daily        Patient is able to walk 4 blocks without symptoms  Patient is able to walk up 2 flights of stairs without symptoms  Significant past medical history includes:    History of stroke - followed by neurology, currently on plavix, ASA and statin  CADASIL - followed by neurology     Rheumatoid arthritis - Plaquenil per rheumatology   Sjogren's -    Patient denies any history of arrythmia, CAD, CAD with hx MI, CAD with recent PCI, CHF, chronic liver disease, acute hepatitis, coagulation delay, primary hypercoagulable state, pulmonary embolism, DVT, use of anticoagulants, diabetes, insulin use, thyroid disease, neck osteoarthritis, TMJ osteoarthritis, wears dentures, seizure disorder, asthma, COPD, MANNIE, renal disease    Tobacco use: None  Alcohol use: Social  Illicit drug use: None    Symptoms:   Easy bleeding: no  Easy bruising: yes  Frequent nose bleeds: no  Chest pain: no  Cough: no  Dyspnea on exertion:no  Edema: no  Palpitations: no  Wheezing: no    Living situation: Patient lives with her   He will be caring for her after surgery  shedoes not have post-op concerns       The following portions of the patient's history were reviewed and updated as appropriate: allergies, current medications, past family history, past medical history, past social history, past surgical history and problem list     Review of Systems  Review of Systems   Constitutional: Negative for appetite change, chills, diaphoresis, fatigue and fever  HENT: Negative for congestion, ear pain, postnasal drip, rhinorrhea, sinus pressure, sinus pain and sore throat  Eyes: Positive for visual disturbance (cataracts bilaterally)  Respiratory: Negative for cough, chest tightness, shortness of breath and wheezing  Cardiovascular: Negative for chest pain, palpitations and leg swelling  Gastrointestinal: Negative for abdominal pain, diarrhea, nausea and vomiting  Genitourinary: Positive for frequency (chronic)  Negative for dysuria and urgency  Neurological: Negative for dizziness, seizures, light-headedness and headaches  Past Medical History:   Diagnosis Date    Arthralgia     Memory loss 9/3/2019    Neutropenia ([facility] [address] [facility]     Osteoporosis     Rheumatoid arthritis [facility] [address] [facility]     Sjogren's syndrome ([facility] [address] [facility]     [facility]     Transient cerebral ischemia      Past Surgical History:   Procedure Laterality Date    BREAST EXCISIONAL BIOPSY Left 1983    benign    BREAST EXCISIONAL BIOPSY Left 1982    benign    BREAST EXCISIONAL BIOPSY Right 1982    benign    HYSTERECTOMY      age 47   Paul Abt OOPHORECTOMY      both     Social History     Tobacco Use    Smoking status: Never Smoker    Smokeless tobacco: Never Used   Substance Use Topics    Alcohol use:  Yes     Alcohol/week: 1 0 standard drinks     Types: 1 Glasses of wine per week     Frequency: 2-4 times a month     Drinks per session: 1 or 2     Binge frequency: Never     Comment: RARELY    Drug use: No     Family History   Problem Relation Age of Onset    Lymphoma Mother 61        ACUTE    Stroke Mother 68    Diabetes Father     No Known Problems Maternal Grandmother     No Known Problems Maternal Grandfather     No Known Problems Paternal Grandmother     No Known Problems Paternal Grandfather     Stroke Sister     No Known Problems Daughter     No Known Problems Sister     No Known Problems Maternal Aunt     No Known Problems Maternal Aunt     No Known Problems Paternal Aunt     No Known Problems Paternal Aunt      Patient has no known allergies  Current Outpatient Medications   Medication Sig Dispense Refill    aspirin (ASPIR-LOW) 81 mg EC tablet Take 81 mg by mouth daily      calcium citrate-Vitamin D (CITRACAL PETITES/VITAMIN D) 200 mg-250 units Take 2 tablets by mouth 2 (two) times a day      Cholecalciferol (VITAMIN D) 2000 units CAPS Take 1 tablet by mouth 2 (two) times a day        clopidogrel (PLAVIX) 75 mg tablet Take 1 tablet (75 mg total) by mouth daily 90 tablet 1    cycloSPORINE (RESTASIS) 0 05 % ophthalmic emulsion Apply 1 drop to eye 2 (two) times a day      denosumab (PROLIA) 60 mg/mL Inject 60 mg under the skin every 6 (six) months      hydroxychloroquine (PLAQUENIL) 200 mg tablet Take 1 tablet by mouth       latanoprost (XALATAN) 0 005 % ophthalmic solution Apply 1 drop to eye daily        oxybutynin (DITROPAN) 5 mg tablet Take 1 tablet (5 mg total) by mouth 2 (two) times a day 180 tablet 1    simvastatin (ZOCOR) 40 mg tablet Take 1 tablet (40 mg total) by mouth daily 90 tablet 1    timolol (TIMOPTIC) 0 25 % ophthalmic solution Apply 1 drop to eye daily         No current facility-administered medications for this visit  Objective:       /78 (BP Location: Left arm, Patient Position: Sitting, Cuff Size: Standard)   Pulse 62   Temp 98 3 °F (36 8 °C) (Temporal)   Resp 18   Ht 5' 4" (1 626 m)   Wt 54 8 kg (120 lb 12 8 oz)   SpO2 97%   BMI 20 74 kg/m²   Physical Exam         Cardiographics  ECG: EKG 8/14/19  Normal sinus rhythm  Normal ECG     Lab Review   Labs reviewed from 7/10/2020  Lipid panel at goal  CBC normal  CMP normal     Assessment:     69 y o  female with planned surgery as above  Known risk factors for perioperative complications: None      Cardiac Risk Estimation: clifford    Rosy Marques is cleared from a cardiovascular standpoint to proceed with surgery    she is at a low risk from a cardiovascular standpoint at this time without any additional cardiac testing  Reevaluation needed if he should present with symptoms prior to surgery  Plan:  Preoperative workup as follows none  Change in medication regimen before surgery: none, continue medication regimen including morning of surgery, with sip of water  Assessment/Plan:    Problem List Items Addressed This Visit        Cardiovascular and Mediastinum    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)     Followed by neurology          Cerebrovascular accident (CVA) due to vascular occlusion (HCC)     ASA, plavix, statin  Followed by neurology            Musculoskeletal and Integument    Rheumatoid arthritis with positive rheumatoid factor ([address] )     On Plaquenil   Managed by rheumatology            Other    Hyperlipidemia     Lipids at goal         Sjogren's syndrome [facility])     Managed by rheumatology         Cataract of both eyes - Primary    Preop exam for internal medicine               BMI Counseling: Body mass index is 20 74 kg/m²   BMI normal

## 2020-08-19 DIAGNOSIS — I63.81 CEREBROVASCULAR ACCIDENT (CVA) DUE TO OCCLUSION OF SMALL ARTERY (HCC): ICD-10-CM

## 2020-08-19 RX ORDER — CLOPIDOGREL BISULFATE 75 MG/1
75 TABLET ORAL DAILY
Qty: 90 TABLET | Refills: 1 | Status: SHIPPED | OUTPATIENT
Start: 2020-08-19 | End: 2020-08-24 | Stop reason: SDUPTHER

## 2020-08-24 RX ORDER — CLOPIDOGREL BISULFATE 75 MG/1
75 TABLET ORAL DAILY
Qty: 90 TABLET | Refills: 1 | Status: SHIPPED | OUTPATIENT
Start: 2020-08-24 | End: 2020-11-30 | Stop reason: SDUPTHER

## 2020-09-01 ENCOUNTER — OFFICE VISIT (OUTPATIENT)
Dept: NEUROLOGY | Facility: CLINIC | Age: 70
End: 2020-09-01
Payer: MEDICARE

## 2020-09-01 VITALS
BODY MASS INDEX: 20.32 KG/M2 | DIASTOLIC BLOOD PRESSURE: 70 MMHG | SYSTOLIC BLOOD PRESSURE: 100 MMHG | WEIGHT: 119 LBS | HEART RATE: 68 BPM | HEIGHT: 64 IN | TEMPERATURE: 97.8 F

## 2020-09-01 DIAGNOSIS — Z86.73 HISTORY OF STROKE: ICD-10-CM

## 2020-09-01 DIAGNOSIS — I67.850 CADASIL (CEREBRAL AUTOSOMAL DOMINANT ARTERIOPATHY WITH SUBCORTICAL INFARCTS AND LEUKOENCEPHALOPATHY): Primary | ICD-10-CM

## 2020-09-01 DIAGNOSIS — I63.81 CEREBROVASCULAR ACCIDENT (CVA) DUE TO OCCLUSION OF SMALL ARTERY (HCC): ICD-10-CM

## 2020-09-01 PROCEDURE — 99215 OFFICE O/P EST HI 40 MIN: CPT | Performed by: PSYCHIATRY & NEUROLOGY

## 2020-09-01 NOTE — PROGRESS NOTES
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)  -continue with aspirin/plavix and simvastatin   -LDL within goal < 100, it is at 64 from 7/10  -no DM  -MOCA today 25/30    -she is bruising with aspirin and plavix but does not want to change her regimen at this time    -will get Mri brain in 6 months  Cerebrovascular accident (CVA) due to occlusion of small artery (HCC)    History of stroke  -c/w with combination of aspirin/plavix and simvastatin  for secondary stroke prevention  -BP goal < 130/80, BP is at goal    -Defer to PCP regarding BP management  -does not smoke at this time   -I advised patient to avoid using NSAIDs for headaches or other pain  -Recommend mediterranean diet & regular exercise regimen atleast 4-5 times a week for 20-30 minutes  -I educated patient/family regarding medication compliance       Follow up - 6 months  I would be happy to see the patient sooner if any new questions/concerns arise  Patient/Guardian was advised to the call the office if they have any questions and concerns in the meantime  Patient/Guardian does understand that if they have any new stroke like symptoms such as facial droop on one side, weakness/paralysis on either side, speech trouble, numbness on one side, balance issues, any vision changes, extreme dizziness or any new headache, to call 9-1-1 immediately or to proceed to the nearest ER immediately  Total combined time spent 41 minutes today  Greater than 50% of total time was spent with the patient and or family counseling and or coordination of care   A brief description of coordination of care: stroke education risk, medication compliance, reviewing EMR, CADASIL, MOCA testing  Subjective:    HPI    This is a 68 y/o F who is here as a follow up for history of CADASIL  She is doing well overall  She does not have any new TIA/CVA like symptoms  She is maintained on aspirin, plavix and simvastatin for stroke prevention  She says that she has been bruising but does not want to change medications at this time  She is concerned about dementia today  She seems forgetful at times especially with trying to find the right wrod  She can perform her ADLs  The following portions of the patient's history were reviewed and updated as appropriate:   She  has a past medical history of Arthralgia, Memory loss (9/3/2019), Neutropenia ([address] ), Osteoporosis, Rheumatoid arthritis ([address] ), Sjogren's syndrome ([address] ), Stroke (cerebrum) ([address] ), and Transient cerebral ischemia    She   Patient Active Problem List    Diagnosis Date Noted    Cataract of both eyes 08/13/2020    Preop exam for internal medicine 08/13/2020    Dry eye 07/20/2020    Multiple lacunar infarcts ([address] ) 01/02/2020    History of stroke 06/11/2019    Medicare annual wellness visit, subsequent 06/11/2019    High serum high density lipoprotein (HDL) 12/11/2018    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    Screening mammogram, encounter for 06/08/2018    Abnormal mammogram of right breast 02/18/2017    Chronic venous insufficiency 02/08/2017    Rheumatoid arthritis with positive rheumatoid factor ([address] ) 11/24/2015    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) 09/10/2014    Hyperlipidemia 10/29/2013    Osteoporosis 10/29/2013    Sjogren's syndrome ([address] ) 10/29/2013    Von Reckling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 ([address] ) 10/29/2013    Cerebrovascular accident (CVA) due to vascular occlusion ([address] ) 02/01/2008     She  has a past surgical history that includes Breast excisional biopsy (Left, 1983); Breast excisional biopsy (Left, 1982); Breast excisional biopsy (Right, 1982); Hysterectomy; and Oophorectomy  Her family history includes Diabetes in her father; Lymphoma (age of onset: 61) in her mother; No Known Problems in her daughter, maternal aunt, maternal aunt, maternal grandfather, maternal grandmother, paternal aunt, paternal aunt, paternal grandfather, paternal grandmother, and sister; Stroke in her sister; Stroke (age of onset: 68) in her mother  She  reports that she has never smoked  She has never used smokeless tobacco  She reports current alcohol use of about 1 0 standard drinks of alcohol per week  She reports that she does not use drugs  Current Outpatient Medications   Medication Sig Dispense Refill    aspirin (ASPIR-LOW) 81 mg EC tablet Take 81 mg by mouth daily      calcium citrate-Vitamin D (CITRACAL PETITES/VITAMIN D) 200 mg-250 units Take 2 tablets by mouth 2 (two) times a day      Cholecalciferol (VITAMIN D) 2000 units CAPS Take 1 tablet by mouth 2 (two) times a day        clopidogrel (PLAVIX) 75 mg tablet Take 1 tablet (75 mg total) by mouth daily 90 tablet 1    cycloSPORINE (RESTASIS) 0 05 % ophthalmic emulsion Apply 1 drop to eye 2 (two) times a day      denosumab (PROLIA) 60 mg/mL Inject 60 mg under the skin every 6 (six) months      hydroxychloroquine (PLAQUENIL) 200 mg tablet Take 1 tablet by mouth       latanoprost (XALATAN) 0 005 % ophthalmic solution Apply 1 drop to eye daily        oxybutynin (DITROPAN) 5 mg tablet Take 1 tablet (5 mg total) by mouth 2 (two) times a day 180 tablet 1    simvastatin (ZOCOR) 40 mg tablet Take 1 tablet (40 mg total) by mouth daily 90 tablet 1    timolol (TIMOPTIC) 0 25 % ophthalmic solution Apply 1 drop to eye daily         No current facility-administered medications for this visit        Current Outpatient Medications on File Prior to Visit   Medication Sig    aspirin (ASPIR-LOW) 81 mg EC tablet Take 81 mg by mouth daily  calcium citrate-Vitamin D (CITRACAL PETITES/VITAMIN D) 200 mg-250 units Take 2 tablets by mouth 2 (two) times a day    Cholecalciferol (VITAMIN D) 2000 units CAPS Take 1 tablet by mouth 2 (two) times a day      clopidogrel (PLAVIX) 75 mg tablet Take 1 tablet (75 mg total) by mouth daily    cycloSPORINE (RESTASIS) 0 05 % ophthalmic emulsion Apply 1 drop to eye 2 (two) times a day    denosumab (PROLIA) 60 mg/mL Inject 60 mg under the skin every 6 (six) months    hydroxychloroquine (PLAQUENIL) 200 mg tablet Take 1 tablet by mouth     latanoprost (XALATAN) 0 005 % ophthalmic solution Apply 1 drop to eye daily      oxybutynin (DITROPAN) 5 mg tablet Take 1 tablet (5 mg total) by mouth 2 (two) times a day    simvastatin (ZOCOR) 40 mg tablet Take 1 tablet (40 mg total) by mouth daily    timolol (TIMOPTIC) 0 25 % ophthalmic solution Apply 1 drop to eye daily       No current facility-administered medications on file prior to visit  She has No Known Allergies            Objective:    Blood pressure 100/70, pulse 68, temperature 97 8 °F (36 6 °C), temperature source Temporal, height 5' 4" (1 626 m), weight 54 kg (119 lb)  Physical Exam  General - alert, awake, follows commands  Cranial nerves - PERRL, no facial droop noted, EOMI  Motor 5/5 throughout  Sensory - intact to soft touch  Gait - normal      ROS:  I personally reviewed ROS   Review of Systems   Constitutional: Negative  Negative for appetite change and fever  HENT: Negative  Negative for hearing loss, tinnitus, trouble swallowing and voice change  Eyes: Negative  Negative for photophobia and pain  Respiratory: Negative  Negative for shortness of breath  Cardiovascular: Negative  Negative for palpitations  Gastrointestinal: Negative  Negative for nausea and vomiting  Endocrine: Negative  Negative for cold intolerance  Genitourinary: Negative  Negative for dysuria, frequency and urgency  Musculoskeletal: Negative  Negative for myalgias and neck pain  Skin: Negative  Negative for rash  Neurological: Negative  Negative for dizziness, tremors, seizures, syncope, facial asymmetry, speech difficulty, weakness, light-headedness, numbness and headaches  Hematological: Negative  Does not bruise/bleed easily  Psychiatric/Behavioral: Negative  Negative for confusion, hallucinations and sleep disturbance

## 2020-10-15 ENCOUNTER — HOSPITAL ENCOUNTER (OUTPATIENT)
Dept: RADIOLOGY | Facility: IMAGING CENTER | Age: 70
Discharge: HOME | End: 2020-10-15
Payer: MEDICARE

## 2020-10-15 VITALS — BODY MASS INDEX: 19.63 KG/M2 | HEIGHT: 64 IN | WEIGHT: 115 LBS

## 2020-10-15 DIAGNOSIS — Z12.31 SCREENING MAMMOGRAM, ENCOUNTER FOR: ICD-10-CM

## 2020-10-15 PROCEDURE — 77067 SCR MAMMO BI INCL CAD: CPT

## 2020-10-19 ENCOUNTER — HOSPITAL ENCOUNTER (OUTPATIENT)
Dept: RADIOLOGY | Facility: IMAGING CENTER | Age: 70
Discharge: HOME | End: 2020-10-19
Payer: MEDICARE

## 2020-10-19 DIAGNOSIS — I67.850 CADASIL (CEREBRAL AUTOSOMAL DOMINANT ARTERIOPATHY WITH SUBCORTICAL INFARCTS AND LEUKOENCEPHALOPATHY): ICD-10-CM

## 2020-10-19 PROCEDURE — G1004 CDSM NDSC: HCPCS

## 2020-10-19 PROCEDURE — 70551 MRI BRAIN STEM W/O DYE: CPT

## 2020-11-10 ENCOUNTER — HOSPITAL ENCOUNTER (OUTPATIENT)
Dept: MAMMOGRAPHY | Facility: CLINIC | Age: 70
Discharge: HOME | End: 2020-11-10
Payer: MEDICARE

## 2020-11-10 VITALS — HEIGHT: 64 IN | WEIGHT: 115 LBS | BODY MASS INDEX: 19.63 KG/M2

## 2020-11-10 DIAGNOSIS — R92.8 ABNORMAL SCREENING MAMMOGRAM: ICD-10-CM

## 2020-11-10 PROCEDURE — 76642 ULTRASOUND BREAST LIMITED: CPT

## 2020-11-10 PROCEDURE — G0279 TOMOSYNTHESIS, MAMMO: HCPCS

## 2020-11-10 PROCEDURE — 77065 DX MAMMO INCL CAD UNI: CPT

## 2020-11-30 ENCOUNTER — TELEPHONE (OUTPATIENT)
Dept: INTERNAL MEDICINE CLINIC | Facility: CLINIC | Age: 70
End: 2020-11-30

## 2020-11-30 DIAGNOSIS — E78.5 HYPERLIPIDEMIA, UNSPECIFIED HYPERLIPIDEMIA TYPE: ICD-10-CM

## 2020-11-30 DIAGNOSIS — I63.81 CEREBROVASCULAR ACCIDENT (CVA) DUE TO OCCLUSION OF SMALL ARTERY (HCC): ICD-10-CM

## 2020-11-30 RX ORDER — SIMVASTATIN 40 MG
40 TABLET ORAL DAILY
Qty: 2 TABLET | Refills: 0 | Status: SHIPPED | OUTPATIENT
Start: 2020-11-30 | End: 2021-01-07 | Stop reason: SDUPTHER

## 2020-11-30 RX ORDER — CLOPIDOGREL BISULFATE 75 MG/1
75 TABLET ORAL DAILY
Qty: 3 TABLET | Refills: 0 | Status: SHIPPED | OUTPATIENT
Start: 2020-11-30 | End: 2021-03-03 | Stop reason: SDUPTHER

## 2020-12-31 ENCOUNTER — TELEPHONE (OUTPATIENT)
Dept: NEUROLOGY | Facility: CLINIC | Age: 70
End: 2020-12-31

## 2021-01-07 DIAGNOSIS — E78.5 HYPERLIPIDEMIA, UNSPECIFIED HYPERLIPIDEMIA TYPE: ICD-10-CM

## 2021-01-07 RX ORDER — SIMVASTATIN 40 MG
40 TABLET ORAL DAILY
Qty: 90 TABLET | Refills: 1 | Status: SHIPPED | OUTPATIENT
Start: 2021-01-07 | End: 2021-07-09 | Stop reason: SDUPTHER

## 2021-01-25 ENCOUNTER — TELEPHONE (OUTPATIENT)
Dept: INTERNAL MEDICINE CLINIC | Age: 71
End: 2021-01-25

## 2021-01-25 ENCOUNTER — OFFICE VISIT (OUTPATIENT)
Dept: INTERNAL MEDICINE CLINIC | Age: 71
End: 2021-01-25
Payer: MEDICARE

## 2021-01-25 ENCOUNTER — TELEPHONE (OUTPATIENT)
Dept: ADMINISTRATIVE | Facility: OTHER | Age: 71
End: 2021-01-25

## 2021-01-25 VITALS
TEMPERATURE: 98.1 F | BODY MASS INDEX: 20.45 KG/M2 | HEART RATE: 58 BPM | DIASTOLIC BLOOD PRESSURE: 76 MMHG | HEIGHT: 64 IN | WEIGHT: 119.8 LBS | SYSTOLIC BLOOD PRESSURE: 110 MMHG

## 2021-01-25 DIAGNOSIS — I63.81 MULTIPLE LACUNAR INFARCTS (HCC): ICD-10-CM

## 2021-01-25 DIAGNOSIS — I63.81 CEREBROVASCULAR ACCIDENT (CVA) DUE TO OCCLUSION OF SMALL ARTERY (HCC): ICD-10-CM

## 2021-01-25 DIAGNOSIS — M81.0 AGE-RELATED OSTEOPOROSIS WITHOUT CURRENT PATHOLOGICAL FRACTURE: ICD-10-CM

## 2021-01-25 DIAGNOSIS — E78.2 MIXED HYPERLIPIDEMIA: Primary | ICD-10-CM

## 2021-01-25 DIAGNOSIS — H26.9 CATARACT OF BOTH EYES, UNSPECIFIED CATARACT TYPE: ICD-10-CM

## 2021-01-25 DIAGNOSIS — H04.129 DRY EYE: ICD-10-CM

## 2021-01-25 DIAGNOSIS — M35.00 SJOGREN'S SYNDROME, WITH UNSPECIFIED ORGAN INVOLVEMENT (HCC): ICD-10-CM

## 2021-01-25 DIAGNOSIS — M80.00XA AGE-RELATED OSTEOPOROSIS WITH CURRENT PATHOLOGICAL FRACTURE, INITIAL ENCOUNTER: ICD-10-CM

## 2021-01-25 DIAGNOSIS — R79.89 HIGH SERUM HIGH DENSITY LIPOPROTEIN (HDL): ICD-10-CM

## 2021-01-25 DIAGNOSIS — M05.79 RHEUMATOID ARTHRITIS INVOLVING MULTIPLE SITES WITH POSITIVE RHEUMATOID FACTOR (HCC): ICD-10-CM

## 2021-01-25 DIAGNOSIS — Z51.81 MEDICATION MONITORING ENCOUNTER: ICD-10-CM

## 2021-01-25 PROBLEM — Z01.818 PREOP EXAM FOR INTERNAL MEDICINE: Status: RESOLVED | Noted: 2020-08-13 | Resolved: 2021-01-25

## 2021-01-25 PROBLEM — I01.1 RHEUMATOID AORTITIS: Status: RESOLVED | Noted: 2018-06-08 | Resolved: 2021-01-25

## 2021-01-25 PROCEDURE — 93000 ELECTROCARDIOGRAM COMPLETE: CPT | Performed by: FAMILY MEDICINE

## 2021-01-25 PROCEDURE — 96372 THER/PROPH/DIAG INJ SC/IM: CPT | Performed by: FAMILY MEDICINE

## 2021-01-25 PROCEDURE — 99214 OFFICE O/P EST MOD 30 MIN: CPT | Performed by: FAMILY MEDICINE

## 2021-01-25 NOTE — PROGRESS NOTES
Assessment/Plan:    1  Mixed hyperlipidemia  -     Lipid panel; Future  -     Comprehensive metabolic panel; Future  -     POCT ECG    2  Age-related osteoporosis with current pathological fracture, initial encounter  -     denosumab (PROLIA) subcutaneous injection 60 mg  -     DXA bone density spine hip and pelvis; Future; Expected date: 01/25/2021    3  High serum high density lipoprotein (HDL)  -     POCT ECG    4  Multiple lacunar infarcts ([facility])    5  Rheumatoid arthritis involving multiple sites with positive rheumatoid factor (HCC)  -     CBC and differential; Future    6  Sjogren's syndrome, with unspecified organ involvement [facility])  -     Ambulatory referral to Ophthalmology; Future    7  Cerebrovascular accident (CVA) due to occlusion of small artery (HCC)    8  Age-related osteoporosis without current pathological fracture   -     DXA bone density spine hip and pelvis; Future; Expected date: 01/25/2021    9  Dry eye  -     Ambulatory referral to Ophthalmology; Future    10  Cataract of both eyes, unspecified cataract type  -     Ambulatory referral to Ophthalmology; Future    11  Medication monitoring encounter        PROLIA given Left arm today, next de in 6 months, DEXA is due after July 24, 2021  There are no Patient Instructions on file for this visit  Return in about 6 months (around 7/25/2021) for Recheck with [address]  Subjective:      Patient ID: Duran Hernandez is a 69 y o  female  Chief Complaint   Patient presents with    Follow-up     pt  presents for follow up for age related osteoporosis  pt  here for PROLIA inj   Review labs        HPI  Hyperlipidemia, on Zocor and doing well   Laboratory data reviewed and is all is well controlled   Tolerating well July 2020, excellent controlled lipid levels     Vitamin-D deficiency   Has been on over-the-counter calcium and vitamin-D   Doing well   On Prolia and has bone density     Osteoporosis (Follow-Up):  The patient's osteoporosis has been stable since the last visit  Most recent DXA results: T-score <-2 5  Comorbid Illnesses: -3 3 is the worst- on chronic prednisone  She has no comorbid illnesses  She has no complications  She has no significant interval events  Symptoms: The patient is currently asymptomatic  denies back pain-- and-- denies   Associated symptoms include no decrease in height   Home precautions: Osteoporosis counseling and education was reviewed with patient and caregivers, including prevention of falls   Medications: the patient is adherent with her medication regimen  Medication(s): vitamin D,-- a calcium supplement-- and-- prolia   Disease Management: the patient is doing well with her osteoporosis goals  The patient is due for DEXA  2019-appointment scheduled in , here for Prolia  Tolerating well without any issues  2021: Prolia due today, doing well  , due for DEXA in july     Urinary frequency, on Ditropan and sees minimal results but would like to continue   No issues  2020, has dry eye and was placed on dry eye diet which is bland and morning according to her however it has helped with her urinary issues  2021 stable,   it to once a day due to dry   Still helping with urine but she is drinking a lot more water, wakes up every 2 hours to urinate      chronic lunar infarct on last CT scan   Continue with Plavix and Neurology follow-ups   No residual affects but feels that she is slower in her daily activities and speaking          The following portions of the patient's history were reviewed and updated as appropriate: allergies, current medications, past family history, past medical history, past social history, past surgical history and problem list     Review of Systems      Constitutional:  Denies fever or chills   Eyes:  Denies double or blurry vision  HENT:  Denies nasal congestion or sore throat   Respiratory:  Denies cough or shortness of breath or wheezing  Cardiovascular:  Denies palpitations or chest pain  GI:  Denies abdominal pain, nausea, or vomiting, no loose stools  Integument:  Denies rash , no open areas  Neurologic:  Denies headache or focal weakness, no dizziness        Current Outpatient Medications   Medication Sig Dispense Refill    aspirin (ASPIR-LOW) 81 mg EC tablet Take 81 mg by mouth daily      calcium citrate-Vitamin D (CITRACAL PETITES/VITAMIN D) 200 mg-250 units Take 1 tablet by mouth 2 (two) times a day       Cholecalciferol (VITAMIN D) 2000 units CAPS Take 1 tablet by mouth 2 (two) times a day        clopidogrel (PLAVIX) 75 mg tablet Take 1 tablet (75 mg total) by mouth daily 3 tablet 0    cycloSPORINE (RESTASIS) 0 05 % ophthalmic emulsion Apply 1 drop to eye 2 (two) times a day      denosumab (PROLIA) 60 mg/mL Inject 60 mg under the skin every 6 (six) months      hydroxychloroquine (PLAQUENIL) 200 mg tablet Take 200 mg by mouth daily       oxybutynin (DITROPAN) 5 mg tablet Take 1 tablet (5 mg total) by mouth 2 (two) times a day 180 tablet 1    simvastatin (ZOCOR) 40 mg tablet Take 1 tablet (40 mg total) by mouth daily 90 tablet 1    latanoprost (XALATAN) 0 005 % ophthalmic solution Apply 1 drop to eye daily        timolol (TIMOPTIC) 0 25 % ophthalmic solution Apply 1 drop to eye daily         Current Facility-Administered Medications   Medication Dose Route Frequency Provider Last Rate Last Admin    denosumab (PROLIA) subcutaneous injection 60 mg  60 mg Subcutaneous Once Leanneadolfo Ramirez DO           Objective:    /76 (BP Location: Left arm, Patient Position: Sitting, Cuff Size: Standard)   Pulse 58   Temp 98 1 °F (36 7 °C) (Temporal)   Ht 5' 4" (1 626 m)   Wt 54 3 kg (119 lb 12 8 oz)   BMI 20 56 kg/m²        Physical Exam    Constitutional:  Well developed, well nourished, no acute distress, non-toxic appearance   Eyes:  PERRL, conjunctiva normal , non icteric sclera  HENT:  Atraumatic, oropharynx moist  Neck- supple   Respiratory:  CTA b/l, normal breath sounds, no rales, no wheezing   Cardiovascular:  RRR, no murmurs, no LE edema b/l  GI:  Soft, nondistended, normal bowel sounds x 4, nontender, no organomegaly, no mass, no rebound, no guarding   Neurologic:  no focal deficits noted   Psychiatric:  Speech and behavior appropriate , AAO x 3     EKG: normal EKG, normal sinus rhythm        Lendel Pyo, DO

## 2021-01-25 NOTE — TELEPHONE ENCOUNTER
Upon review of the In Basket request we have found that the patient has aged out of the measure and/or the document date is outside of the measure timeframe  Any additional questions or concerns should be emailed to the Practice Liaisons via [email or web address] email, please do not reply via In Basket      Thank you  Tracey Moore

## 2021-01-25 NOTE — TELEPHONE ENCOUNTER
----- Message from Isai Hernandez sent at 1/25/2021 10:47 AM EST -----  Regarding: Pap  01/25/21 10:48 AM    Hello, our patient Michelle Dale has had Pap Smear (HPV) aka Cervical Cancer Screening completed/performed  Please assist in updating the patient chart by making an External outreach to [facility] located in [address] [place] [address] (Dr Kayli Chavarria)  The date of service is 2019      Thank you,  Ksenia Polk MA  [facility]

## 2021-01-27 ENCOUNTER — TELEPHONE (OUTPATIENT)
Dept: INTERNAL MEDICINE CLINIC | Facility: CLINIC | Age: 71
End: 2021-01-27

## 2021-01-27 NOTE — TELEPHONE ENCOUNTER
Patient calling because she had a visit with Dr Malik Victor on 1/25/21 but she doesn't remember discussing her most recent lab results  She is asking if someone can give her a call to go over those with her

## 2021-01-28 NOTE — TELEPHONE ENCOUNTER
I called and left a VM message with the response from Dr Malik Victor with a request that she call back for any further questions

## 2021-02-05 DIAGNOSIS — R35.0 URINARY FREQUENCY: ICD-10-CM

## 2021-02-05 RX ORDER — OXYBUTYNIN CHLORIDE 5 MG/1
5 TABLET ORAL 2 TIMES DAILY
Qty: 180 TABLET | Refills: 1 | Status: SHIPPED | OUTPATIENT
Start: 2021-02-05 | End: 2022-01-31 | Stop reason: SDUPTHER

## 2021-03-03 DIAGNOSIS — I63.81 CEREBROVASCULAR ACCIDENT (CVA) DUE TO OCCLUSION OF SMALL ARTERY (HCC): ICD-10-CM

## 2021-03-03 RX ORDER — CLOPIDOGREL BISULFATE 75 MG/1
75 TABLET ORAL DAILY
Qty: 90 TABLET | Refills: 1 | Status: SHIPPED | OUTPATIENT
Start: 2021-03-03 | End: 2021-09-07 | Stop reason: SDUPTHER

## 2021-03-10 DIAGNOSIS — Z23 ENCOUNTER FOR IMMUNIZATION: ICD-10-CM

## 2021-06-23 ENCOUNTER — OFFICE VISIT (OUTPATIENT)
Dept: NEUROLOGY | Facility: CLINIC | Age: 71
End: 2021-06-23
Payer: MEDICARE

## 2021-06-23 VITALS
SYSTOLIC BLOOD PRESSURE: 100 MMHG | BODY MASS INDEX: 20.14 KG/M2 | WEIGHT: 118 LBS | HEIGHT: 64 IN | DIASTOLIC BLOOD PRESSURE: 60 MMHG | HEART RATE: 60 BPM

## 2021-06-23 DIAGNOSIS — I63.81 MULTIPLE LACUNAR INFARCTS (HCC): ICD-10-CM

## 2021-06-23 DIAGNOSIS — I67.850 CADASIL (CEREBRAL AUTOSOMAL DOMINANT ARTERIOPATHY WITH SUBCORTICAL INFARCTS AND LEUKOENCEPHALOPATHY): ICD-10-CM

## 2021-06-23 DIAGNOSIS — I67.850 CADASIL (CEREBRAL AD ARTERIOPATHY W INFARCTS AND LEUKOENCEPHALOPATHY): Primary | ICD-10-CM

## 2021-06-23 DIAGNOSIS — Z86.73 HISTORY OF STROKE: ICD-10-CM

## 2021-06-23 PROCEDURE — 99215 OFFICE O/P EST HI 40 MIN: CPT | Performed by: PSYCHIATRY & NEUROLOGY

## 2021-06-23 NOTE — PROGRESS NOTES
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)  -patient currently on aspirin and plavix combination and simvastatin for stroke prevention  -she is bleeding/bruising but not excessive and she has been doing well on the aspirin and plavix combination with stroke reduction, so will just keep her on it for now  -her MRI brain is stable which I personally reviewed the images of  -will recommend getting MRI brain without contrast in 1 year   -she does have occasional headaches  -no obvious dementia symptoms    -     Cancel: MRI brain without contrast; Future  -     MRI brain without contrast; Future    History of stroke    Multiple lacunar infarcts (HCC)    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)       Follow up - 1 year  I would be happy to see the patient sooner if any new questions/concerns arise  Patient/Guardian was advised to the call the office if they have any questions and concerns in the meantime  Patient/Guardian does understand that if they have any new stroke like symptoms such as facial droop on one side, weakness/paralysis on either side, speech trouble, numbness on one side, balance issues, any vision changes, extreme dizziness or any new headache, to call 9-1-1 immediately or to proceed to the nearest ER immediately  Subjective:    HPI    This is a 65 y/o  Female who is here as a follow up for history of CADASIL with notch 3 mutation  Patient is doing well overall  Denies any new TIA/CVA like symptoms  Patient is complaint w/ her medication and is tolerating them well but does have bleeding/bruising at times   Patient says that she has some short term memory issues, but overall is ok  The following portions of the patient's history were reviewed and updated as appropriate:   She  has a past medical history of Arthralgia, Memory loss (9/3/2019), Neutropenia ([address] ), Osteoporosis, Rheumatoid arthritis ([address] ), Sjogren's syndrome ([address] ), Stroke (cerebrum) ([facility] [address] ), and Transient cerebral ischemia  She   Patient Active Problem List    Diagnosis Date Noted    Medication monitoring encounter 01/25/2021    Cataract of both eyes 08/13/2020    Dry eye 07/20/2020    Multiple lacunar infarcts ([facility] [address] ) 01/02/2020    History of stroke 06/11/2019    Medicare annual wellness visit, subsequent 06/11/2019    High serum high density lipoprotein (HDL) 12/11/2018    Screening mammogram, encounter for 06/08/2018    Abnormal mammogram of right breast 02/18/2017    Chronic venous insufficiency 02/08/2017    Rheumatoid arthritis with positive rheumatoid factor ([facility] [address] ) 11/24/2015    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) 09/10/2014    Hyperlipidemia 10/29/2013    Osteoporosis 10/29/2013    Sjogren's syndrome ([address] ) 10/29/2013    Von Reckling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 ([facility] [address] ) 10/29/2013    Cerebrovascular accident (CVA) due to vascular occlusion ([facility] [address] ) 02/01/2008     She  has a past surgical history that includes Breast excisional biopsy (Left, 1983); Breast excisional biopsy (Left, 1982); Breast excisional biopsy (Right, 1982); Hysterectomy; Oophorectomy; and Cataract extraction, bilateral   Her family history includes Diabetes in her father; Lymphoma (age of onset: 61) in her mother; No Known Problems in her daughter, maternal aunt, maternal aunt, maternal grandfather, maternal grandmother, paternal aunt, paternal aunt, paternal grandfather, paternal grandmother, and sister; Stroke in her sister; Stroke (age of onset: 68) in her mother  She  reports that she has never smoked   She has never used smokeless tobacco  She reports current alcohol use of about 1 0 standard drinks of alcohol per week  She reports that she does not use drugs  Current Outpatient Medications   Medication Sig Dispense Refill    aspirin (ASPIR-LOW) 81 mg EC tablet Take 81 mg by mouth daily      calcium citrate-Vitamin D (CITRACAL PETITES/VITAMIN D) 200 mg-250 units Take 1 tablet by mouth 2 (two) times a day       Cholecalciferol (VITAMIN D) 2000 units CAPS Take 1 tablet by mouth 2 (two) times a day        clopidogrel (PLAVIX) 75 mg tablet Take 1 tablet (75 mg total) by mouth daily 90 tablet 1    cycloSPORINE (RESTASIS) 0 05 % ophthalmic emulsion Apply 1 drop to eye 2 (two) times a day      denosumab (PROLIA) 60 mg/mL Inject 60 mg under the skin every 6 (six) months      hydroxychloroquine (PLAQUENIL) 200 mg tablet Take 200 mg by mouth daily       oxybutynin (DITROPAN) 5 mg tablet Take 1 tablet (5 mg total) by mouth 2 (two) times a day 180 tablet 1    simvastatin (ZOCOR) 40 mg tablet Take 1 tablet (40 mg total) by mouth daily 90 tablet 1    latanoprost (XALATAN) 0 005 % ophthalmic solution Apply 1 drop to eye daily   (Patient not taking: Reported on 6/23/2021)      timolol (TIMOPTIC) 0 25 % ophthalmic solution Apply 1 drop to eye daily   (Patient not taking: Reported on 6/23/2021)       No current facility-administered medications for this visit       Current Outpatient Medications on File Prior to Visit   Medication Sig    aspirin (ASPIR-LOW) 81 mg EC tablet Take 81 mg by mouth daily    calcium citrate-Vitamin D (CITRACAL PETITES/VITAMIN D) 200 mg-250 units Take 1 tablet by mouth 2 (two) times a day     Cholecalciferol (VITAMIN D) 2000 units CAPS Take 1 tablet by mouth 2 (two) times a day      clopidogrel (PLAVIX) 75 mg tablet Take 1 tablet (75 mg total) by mouth daily    cycloSPORINE (RESTASIS) 0 05 % ophthalmic emulsion Apply 1 drop to eye 2 (two) times a day    denosumab (PROLIA) 60 mg/mL Inject 60 mg under the skin every 6 (six) months    hydroxychloroquine (PLAQUENIL) 200 mg tablet Take 200 mg by mouth daily     oxybutynin (DITROPAN) 5 mg tablet Take 1 tablet (5 mg total) by mouth 2 (two) times a day    simvastatin (ZOCOR) 40 mg tablet Take 1 tablet (40 mg total) by mouth daily    latanoprost (XALATAN) 0 005 % ophthalmic solution Apply 1 drop to eye daily   (Patient not taking: Reported on 6/23/2021)    timolol (TIMOPTIC) 0 25 % ophthalmic solution Apply 1 drop to eye daily   (Patient not taking: Reported on 6/23/2021)     No current facility-administered medications on file prior to visit  She has No Known Allergies            Objective:    Blood pressure 100/60, pulse 60, height 5' 4" (1 626 m), weight 53 5 kg (118 lb)  Physical Exam  General - Patient is alert, awake, oriented to time, place and person, follows commands  Speech - no dysarthria noted, no aphasia noted  Neuro:   Cranial nerves: PERRL, EOMI, facial sensation intact, able to raise eyebrows symmetrically, cannot assess facial droop and tongue deviation due to face mask requirement  Motor: 5/5 throughout, normal tone, no pronator drift noted  Sensory - intact to soft touch throughout  Reflexes - 2+ throughout  Coordination - no ataxia/dysmetria noted  Gait - normal    ROS:  I personally reviewed ROS   Review of Systems   Constitutional: Negative  Negative for appetite change and fever  HENT: Negative  Negative for hearing loss, tinnitus, trouble swallowing and voice change  Eyes: Negative  Negative for photophobia and pain  Respiratory: Negative  Negative for shortness of breath  Cardiovascular: Negative  Negative for palpitations  Gastrointestinal: Negative  Negative for nausea and vomiting  Endocrine: Negative  Negative for cold intolerance  Genitourinary: Positive for frequency and urgency  Negative for dysuria  Musculoskeletal: Negative  Negative for myalgias and neck pain  Skin: Negative  Negative for rash  Neurological: Negative    Negative for dizziness, tremors, seizures, syncope, facial asymmetry, speech difficulty, weakness, light-headedness, numbness and headaches  Hematological: Negative  Does not bruise/bleed easily  Psychiatric/Behavioral: Positive for sleep disturbance  Negative for confusion and hallucinations

## 2021-07-09 DIAGNOSIS — E78.5 HYPERLIPIDEMIA, UNSPECIFIED HYPERLIPIDEMIA TYPE: ICD-10-CM

## 2021-07-09 RX ORDER — SIMVASTATIN 40 MG
40 TABLET ORAL DAILY
Qty: 90 TABLET | Refills: 1 | Status: SHIPPED | OUTPATIENT
Start: 2021-07-09 | End: 2022-01-14 | Stop reason: SDUPTHER

## 2021-07-27 ENCOUNTER — OFFICE VISIT (OUTPATIENT)
Dept: INTERNAL MEDICINE CLINIC | Facility: CLINIC | Age: 71
End: 2021-07-27
Payer: MEDICARE

## 2021-07-27 VITALS
TEMPERATURE: 98.1 F | SYSTOLIC BLOOD PRESSURE: 120 MMHG | BODY MASS INDEX: 20.14 KG/M2 | OXYGEN SATURATION: 98 % | DIASTOLIC BLOOD PRESSURE: 74 MMHG | WEIGHT: 118 LBS | HEART RATE: 64 BPM | HEIGHT: 64 IN

## 2021-07-27 DIAGNOSIS — R35.0 FREQUENCY OF URINATION: ICD-10-CM

## 2021-07-27 DIAGNOSIS — N30.01 ACUTE CYSTITIS WITH HEMATURIA: Primary | ICD-10-CM

## 2021-07-27 LAB
BACTERIA UR QL AUTO: ABNORMAL /HPF
BILIRUB UR QL STRIP: NEGATIVE
CLARITY UR: CLEAR
COLOR UR: YELLOW
GLUCOSE UR STRIP-MCNC: NEGATIVE MG/DL
HGB UR QL STRIP.AUTO: ABNORMAL
HYALINE CASTS #/AREA URNS LPF: ABNORMAL /LPF
KETONES UR STRIP-MCNC: NEGATIVE MG/DL
LEUKOCYTE ESTERASE UR QL STRIP: ABNORMAL
NITRITE UR QL STRIP: NEGATIVE
NON-SQ EPI CELLS URNS QL MICRO: ABNORMAL /HPF
PH UR STRIP.AUTO: 6 [PH]
PROT UR STRIP-MCNC: NEGATIVE MG/DL
RBC #/AREA URNS AUTO: ABNORMAL /HPF
SL AMB  POCT GLUCOSE, UA: ABNORMAL
SL AMB LEUKOCYTE ESTERASE,UA: ABNORMAL
SL AMB POCT BILIRUBIN,UA: ABNORMAL
SL AMB POCT BLOOD,UA: ABNORMAL
SL AMB POCT CLARITY,UA: CLEAR
SL AMB POCT COLOR,UA: YELLOW
SL AMB POCT KETONES,UA: ABNORMAL
SL AMB POCT NITRITE,UA: ABNORMAL
SL AMB POCT PH,UA: 5.5
SL AMB POCT SPECIFIC GRAVITY,UA: 1.01
SL AMB POCT URINE PROTEIN: ABNORMAL
SL AMB POCT UROBILINOGEN: 0.2
SP GR UR STRIP.AUTO: 1.01 (ref 1–1.03)
UROBILINOGEN UR QL STRIP.AUTO: 0.2 E.U./DL
WBC #/AREA URNS AUTO: ABNORMAL /HPF

## 2021-07-27 PROCEDURE — 87086 URINE CULTURE/COLONY COUNT: CPT | Performed by: NURSE PRACTITIONER

## 2021-07-27 PROCEDURE — 81001 URINALYSIS AUTO W/SCOPE: CPT | Performed by: NURSE PRACTITIONER

## 2021-07-27 PROCEDURE — 99213 OFFICE O/P EST LOW 20 MIN: CPT | Performed by: NURSE PRACTITIONER

## 2021-07-27 PROCEDURE — 87186 SC STD MICRODIL/AGAR DIL: CPT | Performed by: NURSE PRACTITIONER

## 2021-07-27 PROCEDURE — 87077 CULTURE AEROBIC IDENTIFY: CPT | Performed by: NURSE PRACTITIONER

## 2021-07-27 PROCEDURE — 81003 URINALYSIS AUTO W/O SCOPE: CPT | Performed by: NURSE PRACTITIONER

## 2021-07-27 RX ORDER — CEPHALEXIN 500 MG/1
500 CAPSULE ORAL EVERY 6 HOURS SCHEDULED
Qty: 20 CAPSULE | Refills: 0 | Status: SHIPPED | OUTPATIENT
Start: 2021-07-27 | End: 2021-08-01

## 2021-07-27 NOTE — ASSESSMENT & PLAN NOTE
Urine dip +moderate blood and leukocytes   Start keflex x 5 days   Check UA with reflex culture  Drink plenty of water  Follow up if symptoms worsen or do not improve

## 2021-07-27 NOTE — PROGRESS NOTES
Assessment/Plan:    Problem List Items Addressed This Visit        Genitourinary    Acute cystitis with hematuria - Primary     Urine dip +moderate blood and leukocytes   Start keflex x 5 days   Check UA with reflex culture  Drink plenty of water  Follow up if symptoms worsen or do not improve          Relevant Medications    cephalexin (KEFLEX) 500 mg capsule    Other Relevant Orders    UA w Reflex to Microscopic w Reflex to Culture - Clinic Collect      Other Visit Diagnoses     Frequency of urination        Relevant Orders    POCT urine dip auto non-scope (Completed)          BMI Counseling: Body mass index is 20 25 kg/m²  [employer] software was used to dictate this note  It may contain errors with dictating incorrect words or incorrect spelling  Please contact the provider directly with any questions  Subjective:      Patient ID: Bong Ferrera is a 66 y o  female  HPI     Patient presents today with concern for possible UTI  She has been having dysuria with every episode of voiding, increased frequency, and she is getting the urge to void and sometimes she is unable to void  She denies recurrent UTIs    The following portions of the patient's history were reviewed and updated as appropriate: allergies, current medications, past family history, past medical history, past social history, past surgical history and problem list     Review of Systems   Constitutional: Negative for chills and fever  Gastrointestinal: Negative for abdominal pain, nausea and vomiting  Genitourinary: Positive for dysuria and frequency  Negative for difficulty urinating, hematuria, pelvic pain, vaginal bleeding and vaginal discharge           Past Medical History:   Diagnosis Date    Arthralgia     Memory loss 9/3/2019    Neutropenia ([facility] [address] [address] )     Osteoporosis     Rheumatoid arthritis ([facility] [address] )     Sjogren's syndrome ([facility] [address] )     Stroke (cerebrum) (HCC)     Transient cerebral ischemia          Current Outpatient Medications:     aspirin (ASPIR-LOW) 81 mg EC tablet, Take 81 mg by mouth daily, Disp: , Rfl:     calcium citrate-Vitamin D (CITRACAL PETITES/VITAMIN D) 200 mg-250 units, Take 1 tablet by mouth 2 (two) times a day , Disp: , Rfl:     Cholecalciferol (VITAMIN D) 2000 units CAPS, Take 1 tablet by mouth 2 (two) times a day  , Disp: , Rfl:     clopidogrel (PLAVIX) 75 mg tablet, Take 1 tablet (75 mg total) by mouth daily, Disp: 90 tablet, Rfl: 1    cycloSPORINE (RESTASIS) 0 05 % ophthalmic emulsion, Apply 1 drop to eye 2 (two) times a day, Disp: , Rfl:     denosumab (PROLIA) 60 mg/mL, Inject 60 mg under the skin every 6 (six) months, Disp: , Rfl:     hydroxychloroquine (PLAQUENIL) 200 mg tablet, Take 200 mg by mouth daily , Disp: , Rfl:     oxybutynin (DITROPAN) 5 mg tablet, Take 1 tablet (5 mg total) by mouth 2 (two) times a day (Patient taking differently: Take 5 mg by mouth daily ), Disp: 180 tablet, Rfl: 1    simvastatin (ZOCOR) 40 mg tablet, Take 1 tablet (40 mg total) by mouth daily, Disp: 90 tablet, Rfl: 1    cephalexin (KEFLEX) 500 mg capsule, Take 1 capsule (500 mg total) by mouth every 6 (six) hours for 5 days, Disp: 20 capsule, Rfl: 0    latanoprost (XALATAN) 0 005 % ophthalmic solution, Apply 1 drop to eye daily   (Patient not taking: Reported on 7/27/2021), Disp: , Rfl:     timolol (TIMOPTIC) 0 25 % ophthalmic solution, Apply 1 drop to eye daily   (Patient not taking: Reported on 6/23/2021), Disp: , Rfl:     No Known Allergies    Social History   Past Surgical History:   Procedure Laterality Date    BREAST EXCISIONAL BIOPSY Left 1983    benign    BREAST EXCISIONAL BIOPSY Left 1982    benign    BREAST EXCISIONAL BIOPSY Right 1982    benign    CATARACT EXTRACTION, BILATERAL      HYSTERECTOMY      age 47   [facility] OOPHORECTOMY      both     Family History   Problem Relation Age of Onset    Lymphoma Mother 61        ACUTE    Stroke Mother 68    Diabetes Father     No Known Problems Maternal Grandmother     No Known Problems Maternal Grandfather     No Known Problems Paternal Grandmother     No Known Problems Paternal Grandfather     Stroke Sister     No Known Problems Daughter     No Known Problems Sister     No Known Problems Maternal Aunt     No Known Problems Maternal Aunt     No Known Problems Paternal Aunt     No Known Problems Paternal Aunt        Objective:  /74 (BP Location: Left arm, Patient Position: Sitting, Cuff Size: Adult)   Pulse 64   Temp 98 1 °F (36 7 °C) (Tympanic)   Ht 5' 4" (1 626 m)   Wt 53 5 kg (118 lb)   SpO2 98%   BMI 20 25 kg/m²      Physical Exam  Vitals reviewed  Constitutional:       General: She is not in acute distress  Appearance: Normal appearance  She is not diaphoretic  HENT:      Head: Normocephalic and atraumatic  Eyes:      Extraocular Movements: Extraocular movements intact  Conjunctiva/sclera: Conjunctivae normal       Pupils: Pupils are equal, round, and reactive to light  Cardiovascular:      Rate and Rhythm: Normal rate and regular rhythm  Heart sounds: Normal heart sounds  Pulmonary:      Effort: Pulmonary effort is normal  No respiratory distress  Breath sounds: Normal breath sounds  No wheezing, rhonchi or rales  Abdominal:      General: Abdomen is flat  There is no distension  Palpations: Abdomen is soft  There is no mass  Tenderness: There is no abdominal tenderness  There is no guarding  Neurological:      Mental Status: She is alert and oriented to person, place, and time  Mental status is at baseline     Psychiatric:         Mood and Affect: Mood normal          Behavior: Behavior normal

## 2021-07-29 LAB — BACTERIA UR CULT: ABNORMAL

## 2021-08-02 ENCOUNTER — HOSPITAL ENCOUNTER (OUTPATIENT)
Dept: RADIOLOGY | Facility: IMAGING CENTER | Age: 71
Discharge: HOME | End: 2021-08-02
Payer: MEDICARE

## 2021-08-02 DIAGNOSIS — M81.0 AGE-RELATED OSTEOPOROSIS WITHOUT CURRENT PATHOLOGICAL FRACTURE: ICD-10-CM

## 2021-08-02 DIAGNOSIS — M80.00XA AGE-RELATED OSTEOPOROSIS WITH CURRENT PATHOLOGICAL FRACTURE, INITIAL ENCOUNTER: ICD-10-CM

## 2021-08-02 PROCEDURE — 77080 DXA BONE DENSITY AXIAL: CPT

## 2021-08-05 ENCOUNTER — TELEPHONE (OUTPATIENT)
Dept: INTERNAL MEDICINE CLINIC | Facility: CLINIC | Age: 71
End: 2021-08-05

## 2021-08-05 NOTE — TELEPHONE ENCOUNTER
Per Dr Memo Vega to see if we can get patient approved for the Evenity  I ran the insurance through [address] [place] [address] and she is covered at 100% for the rest of this year    Per Dr Memo Vega to switch her upcoming appt from Prolia to Evenity    I marked in the appointment note to not have patient sign anything till Dr Memo Vega speaks with the patient before giving her the injections     Will order for her as well on this matter

## 2021-08-12 ENCOUNTER — OFFICE VISIT (OUTPATIENT)
Dept: INTERNAL MEDICINE CLINIC | Age: 71
End: 2021-08-12
Payer: MEDICARE

## 2021-08-12 VITALS
WEIGHT: 118.1 LBS | SYSTOLIC BLOOD PRESSURE: 90 MMHG | HEART RATE: 68 BPM | OXYGEN SATURATION: 98 % | DIASTOLIC BLOOD PRESSURE: 60 MMHG | BODY MASS INDEX: 18.98 KG/M2 | TEMPERATURE: 97.8 F | HEIGHT: 66 IN

## 2021-08-12 DIAGNOSIS — E78.2 MIXED HYPERLIPIDEMIA: ICD-10-CM

## 2021-08-12 DIAGNOSIS — Z00.00 MEDICARE ANNUAL WELLNESS VISIT, SUBSEQUENT: ICD-10-CM

## 2021-08-12 DIAGNOSIS — M05.79 RHEUMATOID ARTHRITIS INVOLVING MULTIPLE SITES WITH POSITIVE RHEUMATOID FACTOR (HCC): Primary | ICD-10-CM

## 2021-08-12 DIAGNOSIS — M80.00XA AGE-RELATED OSTEOPOROSIS WITH CURRENT PATHOLOGICAL FRACTURE, INITIAL ENCOUNTER: ICD-10-CM

## 2021-08-12 PROBLEM — N30.01 ACUTE CYSTITIS WITH HEMATURIA: Status: RESOLVED | Noted: 2019-05-28 | Resolved: 2021-08-12

## 2021-08-12 PROCEDURE — 96372 THER/PROPH/DIAG INJ SC/IM: CPT

## 2021-08-12 PROCEDURE — 1123F ACP DISCUSS/DSCN MKR DOCD: CPT | Performed by: FAMILY MEDICINE

## 2021-08-12 PROCEDURE — G0438 PPPS, INITIAL VISIT: HCPCS | Performed by: FAMILY MEDICINE

## 2021-08-12 PROCEDURE — 99214 OFFICE O/P EST MOD 30 MIN: CPT | Performed by: FAMILY MEDICINE

## 2021-08-12 NOTE — PATIENT INSTRUCTIONS
Medicare Preventive Visit Patient Instructions  Thank you for completing your Welcome to Medicare Visit or Medicare Annual Wellness Visit today  Your next wellness visit will be due in one year (8/13/2022)  The screening/preventive services that you may require over the next 5-10 years are detailed below  Some tests may not apply to you based off risk factors and/or age  Screening tests ordered at today's visit but not completed yet may show as past due  Also, please note that scanned in results may not display below  Preventive Screenings:  Service Recommendations Previous Testing/Comments   Colorectal Cancer Screening  * Colonoscopy    * Fecal Occult Blood Test (FOBT)/Fecal Immunochemical Test (FIT)  * Fecal DNA/Cologuard Test  * Flexible Sigmoidoscopy Age: 54-65 years old   Colonoscopy: every 10 years (may be performed more frequently if at higher risk)  OR  FOBT/FIT: every 1 year  OR  Cologuard: every 3 years  OR  Sigmoidoscopy: every 5 years  Screening may be recommended earlier than age 48 if at higher risk for colorectal cancer  Also, an individualized decision between you and your healthcare provider will decide whether screening between the ages of 74-80 would be appropriate  Colonoscopy: 12/24/2014  FOBT/FIT: Not on file  Cologuard: Not on file  Sigmoidoscopy: Not on file    Screening Current     Breast Cancer Screening Age: 36 years old  Frequency: every 1-2 years  Not required if history of left and right mastectomy Mammogram: 11/10/2020    Screening Current   Cervical Cancer Screening Between the ages of 21-29, pap smear recommended once every 3 years  Between the ages of 33-67, can perform pap smear with HPV co-testing every 5 years     Recommendations may differ for women with a history of total hysterectomy, cervical cancer, or abnormal pap smears in past  Pap Smear: Not on file    Screening Not Indicated   Hepatitis C Screening Once for adults born between 1945 and 1965  More frequently in patients at high risk for Hepatitis C Hep C Antibody: 04/10/2018    Screening Current   Diabetes Screening 1-2 times per year if you're at risk for diabetes or have pre-diabetes Fasting glucose: 93 mg/dL   A1C: 5 7 %        Cholesterol Screening Once every 5 years if you don't have a lipid disorder  May order more often based on risk factors  Lipid panel: 07/26/2021    Screening Not Indicated  History Lipid Disorder     Other Preventive Screenings Covered by Medicare:  1  Abdominal Aortic Aneurysm (AAA) Screening: covered once if your at risk  You're considered to be at risk if you have a family history of AAA  2  Lung Cancer Screening: covers low dose CT scan once per year if you meet all of the following conditions: (1) Age 50-69; (2) No signs or symptoms of lung cancer; (3) Current smoker or have quit smoking within the last 15 years; (4) You have a tobacco smoking history of at least 30 pack years (packs per day multiplied by number of years you smoked); (5) You get a written order from a healthcare provider  3  Glaucoma Screening: covered annually if you're considered high risk: (1) You have diabetes OR (2) Family history of glaucoma OR (3)  aged 48 and older OR (3)  American aged 72 and older  3  Osteoporosis Screening: covered every 2 years if you meet one of the following conditions: (1) You're estrogen deficient and at risk for osteoporosis based off medical history and other findings; (2) Have a vertebral abnormality; (3) On glucocorticoid therapy for more than 3 months; (4) Have primary hyperparathyroidism; (5) On osteoporosis medications and need to assess response to drug therapy  · Last bone density test (DXA Scan): 08/02/2021   5  HIV Screening: covered annually if you're between the age of 15-65  Also covered annually if you are younger than 13 and older than 72 with risk factors for HIV infection   For pregnant patients, it is covered up to 3 times per pregnancy  Immunizations:  Immunization Recommendations   Influenza Vaccine Annual influenza vaccination during flu season is recommended for all persons aged >= 6 months who do not have contraindications   Pneumococcal Vaccine (Prevnar and Pneumovax)  * Prevnar = PCV13  * Pneumovax = PPSV23   Adults 25-60 years old: 1-3 doses may be recommended based on certain risk factors  Adults 72 years old: Prevnar (PCV13) vaccine recommended followed by Pneumovax (PPSV23) vaccine  If already received PPSV23 since turning 65, then PCV13 recommended at least one year after PPSV23 dose  Hepatitis B Vaccine 3 dose series if at intermediate or high risk (ex: diabetes, end stage renal disease, liver disease)   Tetanus (Td) Vaccine - COST NOT COVERED BY MEDICARE PART B Following completion of primary series, a booster dose should be given every 10 years to maintain immunity against tetanus  Td may also be given as tetanus wound prophylaxis  Tdap Vaccine - COST NOT COVERED BY MEDICARE PART B Recommended at least once for all adults  For pregnant patients, recommended with each pregnancy  Shingles Vaccine (Shingrix) - COST NOT COVERED BY MEDICARE PART B  2 shot series recommended in those aged 48 and above     Health Maintenance Due:      Topic Date Due    DXA SCAN  08/02/2023    Colorectal Cancer Screening  12/24/2024    Hepatitis C Screening  Completed     Immunizations Due:      Topic Date Due    Influenza Vaccine (1) 09/01/2021     Advance Directives   What are advance directives? Advance directives are legal documents that state your wishes and plans for medical care  These plans are made ahead of time in case you lose your ability to make decisions for yourself  Advance directives can apply to any medical decision, such as the treatments you want, and if you want to donate organs  What are the types of advance directives?   There are many types of advance directives, and each state has rules about how to use them  You may choose a combination of any of the following:  · Living will: This is a written record of the treatment you want  You can also choose which treatments you do not want, which to limit, and which to stop at a certain time  This includes surgery, medicine, IV fluid, and tube feedings  · Durable power of  for healthcare [facility]): This is a written record that states who you want to make healthcare choices for you when you are unable to make them for yourself  This person, called a proxy, is usually a family member or a friend  You may choose more than 1 proxy  · Do not resuscitate (DNR) order:  A DNR order is used in case your heart stops beating or you stop breathing  It is a request not to have certain forms of treatment, such as CPR  A DNR order may be included in other types of advance directives  · Medical directive: This covers the care that you want if you are in a coma, near death, or unable to make decisions for yourself  You can list the treatments you want for each condition  Treatment may include pain medicine, surgery, blood transfusions, dialysis, IV or tube feedings, and a ventilator (breathing machine)  · Values history: This document has questions about your views, beliefs, and how you feel and think about life  This information can help others choose the care that you would choose  Why are advance directives important? An advance directive helps you control your care  Although spoken wishes may be used, it is better to have your wishes written down  Spoken wishes can be misunderstood, or not followed  Treatments may be given even if you do not want them  An advance directive may make it easier for your family to make difficult choices about your care  Urinary Incontinence   Urinary incontinence (UI)  is when you lose control of your bladder  UI develops because your bladder cannot store or empty urine properly   The 3 most common types of UI are stress incontinence, urge incontinence, or both  Medicines:   · May be given to help strengthen your bladder control  Report any side effects of medication to your healthcare provider  Do pelvic muscle exercises often:  Your pelvic muscles help you stop urinating  Squeeze these muscles tight for 5 seconds, then relax for 5 seconds  Gradually work up to squeezing for 10 seconds  Do 3 sets of 15 repetitions a day, or as directed  This will help strengthen your pelvic muscles and improve bladder control  Train your bladder:  Go to the bathroom at set times, such as every 2 hours, even if you do not feel the urge to go  You can also try to hold your urine when you feel the urge to go  For example, hold your urine for 5 minutes when you feel the urge to go  As that becomes easier, hold your urine for 10 minutes  Self-care:   · Keep a UI record  Write down how often you leak urine and how much you leak  Make a note of what you were doing when you leaked urine  · Drink liquids as directed  You may need to limit the amount of liquid you drink to help control your urine leakage  Do not drink any liquid right before you go to bed  Limit or do not have drinks that contain caffeine or alcohol  · Prevent constipation  Eat a variety of high-fiber foods  Good examples are high-fiber cereals, beans, vegetables, and whole-grain breads  Walking is the best way to trigger your intestines to have a bowel movement  · Exercise regularly and maintain a healthy weight  Weight loss and exercise will decrease pressure on your bladder and help you control your leakage  · Use a catheter as directed  to help empty your bladder  A catheter is a tiny, plastic tube that is put into your bladder to drain your urine  · Go to behavior therapy as directed  Behavior therapy may be used to help you learn to control your urge to urinate      Underweight  Underweight is defined as having a body mass index (BMI) of less than 18 5 kg/m2   Anorexia  is a loss of appetite, decreased food intake, or both  Your appetite naturally decreases as you get older  You also get full faster than you used to  This occurs because your body needs less energy  Other body changes can also lead to a decreased appetite  Even though some appetite loss is normal, you still need to get enough calories and nutrients to keep you healthy  You can start to lose too much weight if you do not eat as much food as your body needs  Unwanted weight loss can cause health problems, or worsen health problems you already have  You can also become dehydrated if you do not drink enough liquid  How to eat healthy and get enough nutrients:   · Choose healthy foods  Eat a variety of fruits, vegetables, whole grains, low-fat dairy foods, lean meats, and other protein foods  Limit foods high in fat, sugar, and salt  Limit or avoid alcohol as directed  Work with a dietitian to help you plan your meals if you need to follow a special diet  A dietitian can also teach you how to modify foods if you have trouble chewing or swallowing  · Snack on healthy foods between meals  if you only eat a small amount during meals  Snacks provide extra healthy nutrients and calories between meals  Examples include fruit, cheese, and whole grain crackers  · Drink liquids as directed  to avoid dehydration  Drink liquids between meals if they cause you to get full too quickly during meals  Ask how much liquid to drink each day and which liquids are best for you  · Use herbs, spices, and flavor enhancers to add flavor to foods  Avoid using herbs and spice blends that also contain sodium  Ask your healthcare provider or dietitian about flavor enhancers  Flavor enhancers with ham, natural rodarte, and roast beef flavors can also be sprinkled on food to add flavor  · Share meals with others as often as you can  Eating with others may help you to eat better during meal time   Ask family members, neighbors, or friends to join you for lunch  There are also senior centers where you can meet people, and share meals with them  · Ask family and friends for help  with shopping or preparing foods  Ask for a ride to the grocery store, if needed  © Copyright [employer] 2018 Information is for End User's use only and may not be sold, redistributed or otherwise used for commercial purposes   All illustrations and images included in CareNotes® are the copyrighted property of [employer]  or [address] [place] [address]

## 2021-08-12 NOTE — PROGRESS NOTES
Assessment/Plan:    1  Rheumatoid arthritis involving multiple sites with positive rheumatoid factor ([facility] [address] )    2  Mixed hyperlipidemia  -     Lipid panel; Future  -     Comprehensive metabolic panel; Future    3  Age-related osteoporosis with current pathological fracture, initial encounter  -     romosozumab-aqqg (EVENITY) subcutaneous injection 210 mg  -     Vitamin D 25 hydroxy; Future    4  Medicare annual wellness visit, subsequent            Patient Instructions       Medicare Preventive Visit Patient Instructions  Thank you for completing your Welcome to Medicare Visit or Medicare Annual Wellness Visit today  Your next wellness visit will be due in one year (8/13/2022)  The screening/preventive services that you may require over the next 5-10 years are detailed below  Some tests may not apply to you based off risk factors and/or age  Screening tests ordered at today's visit but not completed yet may show as past due  Also, please note that scanned in results may not display below  Preventive Screenings:  Service Recommendations Previous Testing/Comments   Colorectal Cancer Screening  * Colonoscopy    * Fecal Occult Blood Test (FOBT)/Fecal Immunochemical Test (FIT)  * Fecal DNA/Cologuard Test  * Flexible Sigmoidoscopy Age: 54-65 years old   Colonoscopy: every 10 years (may be performed more frequently if at higher risk)  OR  FOBT/FIT: every 1 year  OR  Cologuard: every 3 years  OR  Sigmoidoscopy: every 5 years  Screening may be recommended earlier than age 48 if at higher risk for colorectal cancer  Also, an individualized decision between you and your healthcare provider will decide whether screening between the ages of 74-80 would be appropriate   Colonoscopy: 12/24/2014  FOBT/FIT: Not on file  Cologuard: Not on file  Sigmoidoscopy: Not on file    Screening Current     Breast Cancer Screening Age: 36 years old  Frequency: every 1-2 years  Not required if history of left and right mastectomy Mammogram: 11/10/2020    Screening Current   Cervical Cancer Screening Between the ages of 18-28, pap smear recommended once every 3 years  Between the ages of 33-67, can perform pap smear with HPV co-testing every 5 years  Recommendations may differ for women with a history of total hysterectomy, cervical cancer, or abnormal pap smears in past  Pap Smear: Not on file    Screening Not Indicated   Hepatitis C Screening Once for adults born between 1945 and 1965  More frequently in patients at high risk for Hepatitis C Hep C Antibody: 04/10/2018    Screening Current   Diabetes Screening 1-2 times per year if you're at risk for diabetes or have pre-diabetes Fasting glucose: 93 mg/dL   A1C: 5 7 %        Cholesterol Screening Once every 5 years if you don't have a lipid disorder  May order more often based on risk factors  Lipid panel: 07/26/2021    Screening Not Indicated  History Lipid Disorder     Other Preventive Screenings Covered by Medicare:  1  Abdominal Aortic Aneurysm (AAA) Screening: covered once if your at risk  You're considered to be at risk if you have a family history of AAA  2  Lung Cancer Screening: covers low dose CT scan once per year if you meet all of the following conditions: (1) Age 50-69; (2) No signs or symptoms of lung cancer; (3) Current smoker or have quit smoking within the last 15 years; (4) You have a tobacco smoking history of at least 30 pack years (packs per day multiplied by number of years you smoked); (5) You get a written order from a healthcare provider  3  Glaucoma Screening: covered annually if you're considered high risk: (1) You have diabetes OR (2) Family history of glaucoma OR (3)  aged 48 and older OR (3)  American aged 72 and older  3   Osteoporosis Screening: covered every 2 years if you meet one of the following conditions: (1) You're estrogen deficient and at risk for osteoporosis based off medical history and other findings; (2) Have a vertebral abnormality; (3) On glucocorticoid therapy for more than 3 months; (4) Have primary hyperparathyroidism; (5) On osteoporosis medications and need to assess response to drug therapy  · Last bone density test (DXA Scan): 08/02/2021   5  HIV Screening: covered annually if you're between the age of 15-65  Also covered annually if you are younger than 13 and older than 72 with risk factors for HIV infection  For pregnant patients, it is covered up to 3 times per pregnancy  Immunizations:  Immunization Recommendations   Influenza Vaccine Annual influenza vaccination during flu season is recommended for all persons aged >= 6 months who do not have contraindications   Pneumococcal Vaccine (Prevnar and Pneumovax)  * Prevnar = PCV13  * Pneumovax = PPSV23   Adults 25-60 years old: 1-3 doses may be recommended based on certain risk factors  Adults 72 years old: Prevnar (PCV13) vaccine recommended followed by Pneumovax (PPSV23) vaccine  If already received PPSV23 since turning 65, then PCV13 recommended at least one year after PPSV23 dose  Hepatitis B Vaccine 3 dose series if at intermediate or high risk (ex: diabetes, end stage renal disease, liver disease)   Tetanus (Td) Vaccine - COST NOT COVERED BY MEDICARE PART B Following completion of primary series, a booster dose should be given every 10 years to maintain immunity against tetanus  Td may also be given as tetanus wound prophylaxis  Tdap Vaccine - COST NOT COVERED BY MEDICARE PART B Recommended at least once for all adults  For pregnant patients, recommended with each pregnancy     Shingles Vaccine (Shingrix) - COST NOT COVERED BY MEDICARE PART B  2 shot series recommended in those aged 48 and above     Health Maintenance Due:      Topic Date Due    DXA SCAN  08/02/2023    Colorectal Cancer Screening  12/24/2024    Hepatitis C Screening  Completed     Immunizations Due:      Topic Date Due    Influenza Vaccine (1) 09/01/2021     Advance Directives   What are advance directives? Advance directives are legal documents that state your wishes and plans for medical care  These plans are made ahead of time in case you lose your ability to make decisions for yourself  Advance directives can apply to any medical decision, such as the treatments you want, and if you want to donate organs  What are the types of advance directives? There are many types of advance directives, and each state has rules about how to use them  You may choose a combination of any of the following:  · Living will: This is a written record of the treatment you want  You can also choose which treatments you do not want, which to limit, and which to stop at a certain time  This includes surgery, medicine, IV fluid, and tube feedings  · Durable power of  for healthcare [facility]): This is a written record that states who you want to make healthcare choices for you when you are unable to make them for yourself  This person, called a proxy, is usually a family member or a friend  You may choose more than 1 proxy  · Do not resuscitate (DNR) order:  A DNR order is used in case your heart stops beating or you stop breathing  It is a request not to have certain forms of treatment, such as CPR  A DNR order may be included in other types of advance directives  · Medical directive: This covers the care that you want if you are in a coma, near death, or unable to make decisions for yourself  You can list the treatments you want for each condition  Treatment may include pain medicine, surgery, blood transfusions, dialysis, IV or tube feedings, and a ventilator (breathing machine)  · Values history: This document has questions about your views, beliefs, and how you feel and think about life  This information can help others choose the care that you would choose  Why are advance directives important? An advance directive helps you control your care   Although spoken wishes may be used, it is better to have your wishes written down  Spoken wishes can be misunderstood, or not followed  Treatments may be given even if you do not want them  An advance directive may make it easier for your family to make difficult choices about your care  Urinary Incontinence   Urinary incontinence (UI)  is when you lose control of your bladder  UI develops because your bladder cannot store or empty urine properly  The 3 most common types of UI are stress incontinence, urge incontinence, or both  Medicines:   · May be given to help strengthen your bladder control  Report any side effects of medication to your healthcare provider  Do pelvic muscle exercises often:  Your pelvic muscles help you stop urinating  Squeeze these muscles tight for 5 seconds, then relax for 5 seconds  Gradually work up to squeezing for 10 seconds  Do 3 sets of 15 repetitions a day, or as directed  This will help strengthen your pelvic muscles and improve bladder control  Train your bladder:  Go to the bathroom at set times, such as every 2 hours, even if you do not feel the urge to go  You can also try to hold your urine when you feel the urge to go  For example, hold your urine for 5 minutes when you feel the urge to go  As that becomes easier, hold your urine for 10 minutes  Self-care:   · Keep a UI record  Write down how often you leak urine and how much you leak  Make a note of what you were doing when you leaked urine  · Drink liquids as directed  You may need to limit the amount of liquid you drink to help control your urine leakage  Do not drink any liquid right before you go to bed  Limit or do not have drinks that contain caffeine or alcohol  · Prevent constipation  Eat a variety of high-fiber foods  Good examples are high-fiber cereals, beans, vegetables, and whole-grain breads  Walking is the best way to trigger your intestines to have a bowel movement  · Exercise regularly and maintain a healthy weight    Weight loss and exercise will decrease pressure on your bladder and help you control your leakage  · Use a catheter as directed  to help empty your bladder  A catheter is a tiny, plastic tube that is put into your bladder to drain your urine  · Go to behavior therapy as directed  Behavior therapy may be used to help you learn to control your urge to urinate  Underweight  Underweight is defined as having a body mass index (BMI) of less than 18 5 kg/m2   Anorexia  is a loss of appetite, decreased food intake, or both  Your appetite naturally decreases as you get older  You also get full faster than you used to  This occurs because your body needs less energy  Other body changes can also lead to a decreased appetite  Even though some appetite loss is normal, you still need to get enough calories and nutrients to keep you healthy  You can start to lose too much weight if you do not eat as much food as your body needs  Unwanted weight loss can cause health problems, or worsen health problems you already have  You can also become dehydrated if you do not drink enough liquid  How to eat healthy and get enough nutrients:   · Choose healthy foods  Eat a variety of fruits, vegetables, whole grains, low-fat dairy foods, lean meats, and other protein foods  Limit foods high in fat, sugar, and salt  Limit or avoid alcohol as directed  Work with a dietitian to help you plan your meals if you need to follow a special diet  A dietitian can also teach you how to modify foods if you have trouble chewing or swallowing  · Snack on healthy foods between meals  if you only eat a small amount during meals  Snacks provide extra healthy nutrients and calories between meals  Examples include fruit, cheese, and whole grain crackers  · Drink liquids as directed  to avoid dehydration  Drink liquids between meals if they cause you to get full too quickly during meals  Ask how much liquid to drink each day and which liquids are best for you     · Use herbs, spices, and flavor enhancers to add flavor to foods  Avoid using herbs and spice blends that also contain sodium  Ask your healthcare provider or dietitian about flavor enhancers  Flavor enhancers with ham, natural rodarte, and roast beef flavors can also be sprinkled on food to add flavor  · Share meals with others as often as you can  Eating with others may help you to eat better during meal time  Ask family members, neighbors, or friends to join you for lunch  There are also senior centers where you can meet people, and share meals with them  · Ask family and friends for help  with shopping or preparing foods  Ask for a ride to the grocery store, if needed  © Copyright [address] 2018 Information is for End User's use only and may not be sold, redistributed or otherwise used for commercial purposes  All illustrations and images included in CareNotes® are the copyrighted property of [employer]  or [employer]         Return for    Subjective:      Patient ID: Mandy Lovelace is a 66 y o  female  Chief Complaint   Patient presents with    Follow-up     Follow up chronic conditions  Labs done 7/26 and 8/27/21  Dexa scan done and she would liket ot discuss findings  She is here today for Prolia/or Weimob Maintenance     AWV due today    Medicare Wellness Visit     Subsequent       HPI    The following portions of the patient's history were reviewed and updated as appropriate: allergies, current medications, past family history, past medical history, past social history, past surgical history and problem list     Review of Systems      Current Outpatient Medications   Medication Sig Dispense Refill    aspirin (ASPIR-LOW) 81 mg EC tablet Take 81 mg by mouth daily      calcium citrate-Vitamin D (CITRACAL PETITES/VITAMIN D) 200 mg-250 units Take 1 tablet by mouth 2 (two) times a day       Cholecalciferol (VITAMIN D) 2000 units CAPS Take 1 tablet by mouth 2 (two) times a day        clopidogrel (PLAVIX) 75 mg tablet Take 1 tablet (75 mg total) by mouth daily 90 tablet 1    cycloSPORINE (RESTASIS) 0 05 % ophthalmic emulsion Apply 1 drop to eye 2 (two) times a day      denosumab (PROLIA) 60 mg/mL Inject 60 mg under the skin every 6 (six) months      hydroxychloroquine (PLAQUENIL) 200 mg tablet Take 200 mg by mouth daily       oxybutynin (DITROPAN) 5 mg tablet Take 1 tablet (5 mg total) by mouth 2 (two) times a day 180 tablet 1    simvastatin (ZOCOR) 40 mg tablet Take 1 tablet (40 mg total) by mouth daily 90 tablet 1    latanoprost (XALATAN) 0 005 % ophthalmic solution Apply 1 drop to eye daily   (Patient not taking: Reported on 7/27/2021)      timolol (TIMOPTIC) 0 25 % ophthalmic solution Apply 1 drop to eye daily   (Patient not taking: Reported on 6/23/2021)       Current Facility-Administered Medications   Medication Dose Route Frequency Provider Last Rate Last Admin    romosozumab-aqqg (EVENITY) subcutaneous injection 210 mg  210 mg Subcutaneous (multi inj) Once Charlotte Matute DO           Objective:    BP 90/60 (BP Location: Left arm, Patient Position: Sitting, Cuff Size: Standard)   Pulse 68   Temp 97 8 °F (36 6 °C) (Temporal)   Ht 5' 5 5" (1 664 m)   Wt 53 6 kg (118 lb 1 6 oz)   SpO2 98% Comment: Room Air  BMI 19 35 kg/m²        Physical Exam           Charlotte Matute DO

## 2021-08-12 NOTE — PROGRESS NOTES
Assessment and Plan:     Problem List Items Addressed This Visit        Musculoskeletal and Integument    Osteoporosis    Relevant Medications    romosozumab-aqqg (EVENITY) subcutaneous injection 210 mg (Start on 8/12/2021 12:15 PM)    Other Relevant Orders    Vitamin D 25 hydroxy    Rheumatoid arthritis with positive rheumatoid factor ([address] ) - Primary       Other    Hyperlipidemia    Relevant Orders    Lipid panel    Comprehensive metabolic panel    Medicare annual wellness visit, subsequent           Preventive health issues were discussed with patient, and age appropriate screening tests were ordered as noted in patient's After Visit Summary  Personalized health advice and appropriate referrals for health education or preventive services given if needed, as noted in patient's After Visit Summary       History of Present Illness:     Patient presents for Medicare Annual Wellness visit    Patient Care Team:  Alejandro Godinez DO as PCP - General (Family Medicine)  Rosita Smith MD (Rheumatology)     Problem List:     Patient Active Problem List   Diagnosis    Abnormal mammogram of right breast    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)    Cerebrovascular accident (CVA) due to vascular occlusion ([address] )    Chronic venous insufficiency    Hyperlipidemia    Osteoporosis    Rheumatoid arthritis with positive rheumatoid factor ([address] )    Sjogren's syndrome ([address] )    Von Reckling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 ([address] )    Screening mammogram, encounter for    High serum high density lipoprotein (HDL)    History of stroke    Medicare annual wellness visit, subsequent    Multiple lacunar infarcts ([address] )    Dry eye    Cataract of both eyes    Medication monitoring encounter      Past Medical and Surgical History:     Past Medical History:   Diagnosis Date    Arthralgia     Memory loss 9/3/2019    Neutropenia ([address] )     Osteoporosis     Rheumatoid arthritis ([address] )     Sjogren's syndrome ([address] )     Stroke (cerebrum) (HCC)     Transient cerebral ischemia      Past Surgical History:   Procedure Laterality Date    BREAST EXCISIONAL BIOPSY Left 1983    benign    BREAST EXCISIONAL BIOPSY Left 1982    benign    BREAST EXCISIONAL BIOPSY Right 1982    benign    CATARACT EXTRACTION, BILATERAL      HYSTERECTOMY      age 47   [facility] OOPHORECTOMY      both      Family History:     Family History   Problem Relation Age of Onset    Lymphoma Mother 61        ACUTE    Stroke Mother 68    Diabetes Father     No Known Problems Maternal Grandmother     No Known Problems Maternal Grandfather     No Known Problems Paternal Grandmother     No Known Problems Paternal Grandfather     Stroke Sister     No Known Problems Daughter     No Known Problems Sister     No Known Problems Maternal Aunt     No Known Problems Maternal Aunt     No Known Problems Paternal Aunt     No Known Problems Paternal Aunt       Social History:     Social History     Socioeconomic History    Marital status: /Civil Union     Spouse name: None    Number of children: None    Years of education: None    Highest education level: None   Occupational History    Occupation: Privately   Tobacco Use    Smoking status: Never Smoker    Smokeless tobacco: Never Used   Vaping Use    Vaping Use: Never used   Substance and Sexual Activity    Alcohol use:  Yes     Alcohol/week: 7 0 standard drinks     Types: 7 Glasses of wine per week    Drug use: No    Sexual activity: None   Other Topics Concern    None   Social History Narrative    Exercise habits: the gym; frequency is 2 days per week    Travel history: patient denies being  Out of the country during 1/2014-11/12/2014     Social Determinants of Health     Financial Resource Strain:     Difficulty of Paying Living Expenses:    Food Insecurity:     Worried About Running Out of Food in the Last Year:     [address] in the Last Year:    Transportation Needs:     Lack of Transportation (Medical):      Lack of Transportation (Non-Medical):    Physical Activity:     Days of Exercise per Week:     Minutes of Exercise per Session:    Stress:     Feeling of Stress :    Social Connections:     Frequency of Communication with Friends and Family:     Frequency of Social Gatherings with Friends and Family:     Attends [employer] Services:     Active Member of Clubs or Organizations:     Attends Club or Organization Meetings:     Marital Status:    Intimate Partner Violence:     Fear of Current or Ex-Partner:     Emotionally Abused:     Physically Abused:     Sexually Abused:       Medications and Allergies:     Current Outpatient Medications   Medication Sig Dispense Refill    aspirin (ASPIR-LOW) 81 mg EC tablet Take 81 mg by mouth daily      calcium citrate-Vitamin D (CITRACAL PETITES/VITAMIN D) 200 mg-250 units Take 1 tablet by mouth 2 (two) times a day       Cholecalciferol (VITAMIN D) 2000 units CAPS Take 1 tablet by mouth 2 (two) times a day        clopidogrel (PLAVIX) 75 mg tablet Take 1 tablet (75 mg total) by mouth daily 90 tablet 1    cycloSPORINE (RESTASIS) 0 05 % ophthalmic emulsion Apply 1 drop to eye 2 (two) times a day      denosumab (PROLIA) 60 mg/mL Inject 60 mg under the skin every 6 (six) months      hydroxychloroquine (PLAQUENIL) 200 mg tablet Take 200 mg by mouth daily       oxybutynin (DITROPAN) 5 mg tablet Take 1 tablet (5 mg total) by mouth 2 (two) times a day 180 tablet 1    simvastatin (ZOCOR) 40 mg tablet Take 1 tablet (40 mg total) by mouth daily 90 tablet 1    latanoprost (XALATAN) 0 005 % ophthalmic solution Apply 1 drop to eye daily   (Patient not taking: Reported on 7/27/2021)      timolol (TIMOPTIC) 0 25 % ophthalmic solution Apply 1 drop to eye daily   (Patient not taking: Reported on 6/23/2021)       Current Facility-Administered Medications   Medication Dose Route Frequency Provider Last Rate Last Admin    romosozumab-aqqg (EVENITY) subcutaneous injection 210 mg  210 mg Subcutaneous (multi inj) Once Kilo Castillo, DO         No Known Allergies   Immunizations:     Immunization History   Administered Date(s) Administered    INFLUENZA 12/18/2013, 10/01/2014, 11/18/2014, 09/30/2015, 09/14/2016, 10/03/2017, 10/20/2020    Influenza Split High Dose Preservative Free IM 10/14/2014, 09/30/2015, 09/14/2016, 10/03/2017    Influenza, high dose seasonal 0 7 mL 10/10/2018, 10/08/2019    Influenza, seasonal, injectable 10/18/2010, 10/25/2011, 10/17/2012, 10/15/2013    Pneumococcal Conjugate 13-Valent 12/11/2018    Pneumococcal Polysaccharide PPV23 10/15/2009, 11/12/2014, 12/28/2016    SARS-CoV-2 / COVID-19 mRNA IM (Pfizer-BioNTech) 03/03/2021, 03/24/2021    Td (adult), adsorbed 07/25/2012    Tdap 01/14/2016, 12/28/2016    Zoster 02/20/2010    influenza, trivalent, adjuvanted 10/05/2020      Health Maintenance:         Topic Date Due    DXA SCAN  08/02/2023    Colorectal Cancer Screening  12/24/2024    Hepatitis C Screening  Completed         Topic Date Due    Influenza Vaccine (1) 09/01/2021      Medicare Health Risk Assessment:     BP 90/60 (BP Location: Left arm, Patient Position: Sitting, Cuff Size: Standard)   Pulse 68   Temp 97 8 °F (36 6 °C) (Temporal)   Ht 5' 5 5" (1 664 m)   Wt 53 6 kg (118 lb 1 6 oz)   SpO2 98% Comment: Room Air  BMI 19 35 kg/m²      Gina He is here for her Subsequent Wellness visit  Health Risk Assessment:   Patient rates overall health as very good  Patient feels that their physical health rating is same  Patient is satisfied with their life  Eyesight was rated as same  Hearing was rated as same  Patient feels that their emotional and mental health rating is same  Patients states they are sometimes angry  Patient states they are often unusually tired/fatigued  Pain experienced in the last 7 days has been none   Patient states that she has experienced no weight loss or gain in last 6 months  Depression Screening:   PHQ-2 Score: 0      Fall Risk Screening: In the past year, patient has experienced: no history of falling in past year      Urinary Incontinence Screening:   Patient has not leaked urine accidently in the last six months  Home Safety:  Patient does not have trouble with stairs inside or outside of their home  Patient has working smoke alarms Home safety hazards include: none  Nutrition:   Current diet is Regular and No Added Salt  Medications:   Patient is currently taking over-the-counter supplements  OTC medications include: see medication list  Patient is able to manage medications  Activities of Daily Living (ADLs)/Instrumental Activities of Daily Living (IADLs):   Walk and transfer into and out of bed and chair?: Yes  Dress and groom yourself?: Yes    Bathe or shower yourself?: Yes    Feed yourself? Yes  Do your laundry/housekeeping?: Yes  Manage your money, pay your bills and track your expenses?: Yes  Make your own meals?: Yes    Do your own shopping?: Yes    Previous Hospitalizations:   Any hospitalizations or ED visits within the last 12 months?: No      Advance Care Planning:   Living will: Yes    Durable POA for healthcare:  Yes    Advanced directive: Yes      PREVENTIVE SCREENINGS      Cardiovascular Screening:    General: Screening Not Indicated and History Lipid Disorder      Colorectal Cancer Screening:     General: Screening Current      Breast Cancer Screening:     General: Screening Current      Cervical Cancer Screening:    General: Screening Not Indicated      Osteoporosis Screening:    General: Screening Not Indicated and History Osteoporosis      Lung Cancer Screening:     General: Screening Not Indicated      Hepatitis C Screening:    General: Screening Current    Screening, Brief Intervention, and Referral to Treatment (SBIRT)    Screening  Typical number of drinks in a day: 0  Typical number of drinks in a week: 1  Interpretation: Low risk drinking behavior      Single Item Drug Screening:  How often have you used an illegal drug (including marijuana) or a prescription medication for non-medical reasons in the past year? never    Single Item Drug Screen Score: 0  Interpretation: Negative screen for possible drug use disorder      Felicia Harris,

## 2021-08-13 ENCOUNTER — TELEPHONE (OUTPATIENT)
Dept: INTERNAL MEDICINE CLINIC | Facility: CLINIC | Age: 71
End: 2021-08-13

## 2021-08-13 NOTE — TELEPHONE ENCOUNTER
Patient will call back to speak with me to reschedule her two appointments for the Evenity that she has on 11/01/2021 and 12/07/2021

## 2021-09-07 DIAGNOSIS — I63.81 CEREBROVASCULAR ACCIDENT (CVA) DUE TO OCCLUSION OF SMALL ARTERY (HCC): ICD-10-CM

## 2021-09-08 RX ORDER — CLOPIDOGREL BISULFATE 75 MG/1
75 TABLET ORAL DAILY
Qty: 90 TABLET | Refills: 1 | Status: SHIPPED | OUTPATIENT
Start: 2021-09-08 | End: 2022-01-31 | Stop reason: SDUPTHER

## 2021-09-22 ENCOUNTER — OFFICE VISIT (OUTPATIENT)
Dept: INTERNAL MEDICINE CLINIC | Age: 71
End: 2021-09-22
Payer: MEDICARE

## 2021-09-22 VITALS
HEART RATE: 63 BPM | TEMPERATURE: 97.7 F | SYSTOLIC BLOOD PRESSURE: 100 MMHG | WEIGHT: 120.6 LBS | HEIGHT: 66 IN | OXYGEN SATURATION: 96 % | BODY MASS INDEX: 19.38 KG/M2 | DIASTOLIC BLOOD PRESSURE: 70 MMHG

## 2021-09-22 DIAGNOSIS — Q85.01 VON RECKLINGHAUSENS DISEASE (HCC): ICD-10-CM

## 2021-09-22 DIAGNOSIS — I74.9 TIA DUE TO EMBOLISM (HCC): ICD-10-CM

## 2021-09-22 DIAGNOSIS — E44.1 MILD PROTEIN-CALORIE MALNUTRITION (HCC): Primary | ICD-10-CM

## 2021-09-22 DIAGNOSIS — G45.9 TIA DUE TO EMBOLISM (HCC): ICD-10-CM

## 2021-09-22 DIAGNOSIS — M80.00XA AGE-RELATED OSTEOPOROSIS WITH CURRENT PATHOLOGICAL FRACTURE, INITIAL ENCOUNTER: ICD-10-CM

## 2021-09-22 PROCEDURE — 96372 THER/PROPH/DIAG INJ SC/IM: CPT | Performed by: INTERNAL MEDICINE

## 2021-09-22 PROCEDURE — 99214 OFFICE O/P EST MOD 30 MIN: CPT | Performed by: INTERNAL MEDICINE

## 2021-09-22 NOTE — PROGRESS NOTES
Assessment/Plan:     Diagnoses and all orders for this visit:    Mild protein-calorie malnutrition ([address] )  She is doing well again she need to gained some weight eat better  TIA due to embolism ([address] )  No neurological deficit  Von Reckling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 ([address] )  Stable  Age-related osteoporosis with current pathological fracture, initial encounter  -     romosozumab-aqqg (EVENITY) subcutaneous injection 210 mg             M*Modal software was used to dictate this note  It may contain errors with dictating incorrect words or incorrect spelling  Please contact the provider directly with any questions  Subjective:   Chief Complaint   Patient presents with    Evenity inj   Follow-up        Patient ID: Maryln Simmonds is a 66 y o  female  HPI  Is a very pleasant 66 years young lady who is here today for the regular follow-up and for her injection for osteoporosis she is doing well review of the system is essentially unremarkable she had history of Sjogren syndrome, cerebral autosomal dominant arteriopathy with subcortical infarct she is doing well neurologically she is doing stable no neurological deficit or neurological problem recently she has already had her COVID vaccine and we will do the booster shot as soon as it available for her  Osteoporosis she is doing well she is taking calcium and vitamin-D today she got her 2nd injection of Evenity she tolerated well follow-up in 1 month  The following portions of the patient's history were reviewed and updated as appropriate: allergies, current medications, past family history, past medical history, past social history, past surgical history and problem list     Review of Systems   HENT: Negative  Eyes: Negative  Respiratory: Negative  Cardiovascular: Negative  Gastrointestinal: Negative  Endocrine: Negative            Past Medical History:   Diagnosis Date    Arthralgia     Memory loss 9/3/2019    Neutropenia ([address] )     Osteoporosis     Rheumat[facility]     Sjogren's syndrome ([facility] )     Stroke (cerebrum) (HCC)     Transient cerebral ischemia          Current Outpatient Medications:     aspirin (ASPIR-LOW) 81 mg EC tablet, Take 81 mg by mouth daily, Disp: , Rfl:     calcium citrate-Vitamin D (CITRACAL PETITES/VITAMIN D) 200 mg-250 units, Take 1 tablet by mouth 2 (two) times a day , Disp: , Rfl:     Cholecalciferol (VITAMIN D) 2000 units CAPS, Take 1 tablet by mouth 2 (two) times a day  , Disp: , Rfl:     clopidogrel (PLAVIX) 75 mg tablet, Take 1 tablet (75 mg total) by mouth daily, Disp: 90 tablet, Rfl: 1    cycloSPORINE (RESTASIS) 0 05 % ophthalmic emulsion, Apply 1 drop to eye 2 (two) times a day, Disp: , Rfl:     hydroxychloroquine (PLAQUENIL) 200 mg tablet, Take 200 mg by mouth daily , Disp: , Rfl:     oxybutynin (DITROPAN) 5 mg tablet, Take 1 tablet (5 mg total) by mouth 2 (two) times a day, Disp: 180 tablet, Rfl: 1    simvastatin (ZOCOR) 40 mg tablet, Take 1 tablet (40 mg total) by mouth daily, Disp: 90 tablet, Rfl: 1    denosumab (PROLIA) 60 mg/mL, Inject 60 mg under the skin every 6 (six) months (Patient not taking: Reported on 9/22/2021), Disp: , Rfl:     latanoprost (XALATAN) 0 005 % ophthalmic solution, Apply 1 drop to eye daily   (Patient not taking: Reported on 7/27/2021), Disp: , Rfl:     timolol (TIMOPTIC) 0 25 % ophthalmic solution, Apply 1 drop to eye daily   (Patient not taking: Reported on 6/23/2021), Disp: , Rfl:     No Known Allergies    Social History   Past Surgical History:   Procedure Laterality Date    BREAST EXCISIONAL BIOPSY Left 1983    benign    BREAST EXCISIONAL BIOPSY Left 1982    benign    BREAST EXCISIONAL BIOPSY Right 1982    benign    CATARACT EXTRACTION, BILATERAL      HYSTERECTOMY      age 47   [employer] OOPHORECTOMY      both     Family History   Problem Relation Age of Onset    Lymphoma Mother 61        ACUTE    Stroke Mother 68    Diabetes Father     No Known Problems Maternal Grandmother     No Known Problems Maternal Grandfather     No Known Problems Paternal Grandmother     No Known Problems Paternal Grandfather     Stroke Sister     No Known Problems Daughter     No Known Problems Sister     No Known Problems Maternal Aunt     No Known Problems Maternal Aunt     No Known Problems Paternal Aunt     No Known Problems Paternal Aunt        Objective:  /70 (BP Location: Left arm, Patient Position: Sitting, Cuff Size: Adult)   Pulse 63   Temp 97 7 °F (36 5 °C) (Temporal)   Ht 5' 5 5" (1 664 m)   Wt 54 7 kg (120 lb 9 6 oz)   SpO2 96%   BMI 19 76 kg/m²        Physical Exam  Constitutional:       Appearance: She is well-developed  Eyes:      Pupils: Pupils are equal, round, and reactive to light  Cardiovascular:      Rate and Rhythm: Normal rate  Pulmonary:      Effort: Pulmonary effort is normal       Breath sounds: Normal breath sounds  Musculoskeletal:      Cervical back: Normal range of motion and neck supple  Neurological:      General: No focal deficit present  Mental Status: She is oriented to person, place, and time  Mental status is at baseline

## 2021-10-15 ENCOUNTER — CLINICAL SUPPORT (OUTPATIENT)
Dept: INTERNAL MEDICINE CLINIC | Facility: CLINIC | Age: 71
End: 2021-10-15
Payer: MEDICARE

## 2021-10-15 DIAGNOSIS — Z23 NEEDS FLU SHOT: Primary | ICD-10-CM

## 2021-10-15 PROCEDURE — 90662 IIV NO PRSV INCREASED AG IM: CPT

## 2021-10-15 PROCEDURE — G0008 ADMIN INFLUENZA VIRUS VAC: HCPCS

## 2021-10-25 ENCOUNTER — OFFICE VISIT (OUTPATIENT)
Dept: INTERNAL MEDICINE CLINIC | Age: 71
End: 2021-10-25
Payer: MEDICARE

## 2021-10-25 VITALS
SYSTOLIC BLOOD PRESSURE: 106 MMHG | OXYGEN SATURATION: 99 % | WEIGHT: 118.3 LBS | HEIGHT: 65 IN | BODY MASS INDEX: 19.71 KG/M2 | DIASTOLIC BLOOD PRESSURE: 70 MMHG | HEART RATE: 56 BPM | TEMPERATURE: 97.9 F

## 2021-10-25 DIAGNOSIS — M25.561 PAIN AND SWELLING OF RIGHT KNEE: ICD-10-CM

## 2021-10-25 DIAGNOSIS — M80.00XA AGE-RELATED OSTEOPOROSIS WITH CURRENT PATHOLOGICAL FRACTURE, INITIAL ENCOUNTER: Primary | ICD-10-CM

## 2021-10-25 DIAGNOSIS — M25.461 PAIN AND SWELLING OF RIGHT KNEE: ICD-10-CM

## 2021-10-25 DIAGNOSIS — R30.0 DYSURIA: ICD-10-CM

## 2021-10-25 PROCEDURE — 99214 OFFICE O/P EST MOD 30 MIN: CPT | Performed by: INTERNAL MEDICINE

## 2021-10-25 PROCEDURE — 96372 THER/PROPH/DIAG INJ SC/IM: CPT | Performed by: INTERNAL MEDICINE

## 2021-10-26 ENCOUNTER — TELEPHONE (OUTPATIENT)
Dept: INTERNAL MEDICINE CLINIC | Facility: CLINIC | Age: 71
End: 2021-10-26

## 2021-10-26 ENCOUNTER — APPOINTMENT (OUTPATIENT)
Dept: LAB | Age: 71
End: 2021-10-26
Payer: MEDICARE

## 2021-10-26 ENCOUNTER — APPOINTMENT (OUTPATIENT)
Dept: RADIOLOGY | Age: 71
End: 2021-10-26
Payer: MEDICARE

## 2021-10-26 DIAGNOSIS — M25.461 PAIN AND SWELLING OF RIGHT KNEE: ICD-10-CM

## 2021-10-26 DIAGNOSIS — M25.561 PAIN AND SWELLING OF RIGHT KNEE: ICD-10-CM

## 2021-10-26 LAB
BACTERIA UR QL AUTO: ABNORMAL /HPF
BILIRUB UR QL STRIP: NEGATIVE
CAOX CRY URNS QL MICRO: ABNORMAL /HPF
CLARITY UR: ABNORMAL
COLOR UR: YELLOW
GLUCOSE UR STRIP-MCNC: NEGATIVE MG/DL
HGB UR QL STRIP.AUTO: ABNORMAL
KETONES UR STRIP-MCNC: NEGATIVE MG/DL
LEUKOCYTE ESTERASE UR QL STRIP: NEGATIVE
MUCOUS THREADS UR QL AUTO: ABNORMAL
NITRITE UR QL STRIP: NEGATIVE
NON-SQ EPI CELLS URNS QL MICRO: ABNORMAL /HPF
PH UR STRIP.AUTO: 5.5 [PH]
PROT UR STRIP-MCNC: ABNORMAL MG/DL
RBC #/AREA URNS AUTO: ABNORMAL /HPF
SP GR UR STRIP.AUTO: 1.02 (ref 1–1.03)
UROBILINOGEN UR QL STRIP.AUTO: 0.2 E.U./DL
WBC #/AREA URNS AUTO: ABNORMAL /HPF

## 2021-10-26 PROCEDURE — 73562 X-RAY EXAM OF KNEE 3: CPT

## 2021-10-26 PROCEDURE — 81001 URINALYSIS AUTO W/SCOPE: CPT | Performed by: INTERNAL MEDICINE

## 2021-10-27 ENCOUNTER — TELEPHONE (OUTPATIENT)
Dept: INTERNAL MEDICINE CLINIC | Facility: CLINIC | Age: 71
End: 2021-10-27

## 2021-10-27 DIAGNOSIS — N39.0 URINARY TRACT INFECTION WITHOUT HEMATURIA, SITE UNSPECIFIED: Primary | ICD-10-CM

## 2021-10-27 RX ORDER — CEPHALEXIN 500 MG/1
500 CAPSULE ORAL EVERY 8 HOURS SCHEDULED
Qty: 15 CAPSULE | Refills: 0 | Status: SHIPPED | OUTPATIENT
Start: 2021-10-27 | End: 2021-11-01

## 2021-11-02 ENCOUNTER — TELEPHONE (OUTPATIENT)
Dept: INTERNAL MEDICINE CLINIC | Facility: CLINIC | Age: 71
End: 2021-11-02

## 2021-11-09 ENCOUNTER — EVALUATION (OUTPATIENT)
Dept: PHYSICAL THERAPY | Facility: REHABILITATION | Age: 71
End: 2021-11-09
Payer: MEDICARE

## 2021-11-09 DIAGNOSIS — M17.11 PRIMARY OSTEOARTHRITIS OF RIGHT KNEE: ICD-10-CM

## 2021-11-09 PROCEDURE — 97110 THERAPEUTIC EXERCISES: CPT | Performed by: PHYSICAL THERAPIST

## 2021-11-09 PROCEDURE — 97162 PT EVAL MOD COMPLEX 30 MIN: CPT | Performed by: PHYSICAL THERAPIST

## 2021-11-11 ENCOUNTER — OFFICE VISIT (OUTPATIENT)
Dept: PHYSICAL THERAPY | Facility: REHABILITATION | Age: 71
End: 2021-11-11
Payer: MEDICARE

## 2021-11-11 DIAGNOSIS — M17.11 PRIMARY OSTEOARTHRITIS OF RIGHT KNEE: Primary | ICD-10-CM

## 2021-11-11 PROCEDURE — 97110 THERAPEUTIC EXERCISES: CPT

## 2021-11-15 ENCOUNTER — OFFICE VISIT (OUTPATIENT)
Dept: PHYSICAL THERAPY | Facility: REHABILITATION | Age: 71
End: 2021-11-15
Payer: MEDICARE

## 2021-11-15 DIAGNOSIS — M17.11 PRIMARY OSTEOARTHRITIS OF RIGHT KNEE: Primary | ICD-10-CM

## 2021-11-15 PROCEDURE — 97110 THERAPEUTIC EXERCISES: CPT | Performed by: PHYSICAL THERAPIST

## 2021-11-16 ENCOUNTER — OFFICE VISIT (OUTPATIENT)
Dept: PHYSICAL THERAPY | Facility: REHABILITATION | Age: 71
End: 2021-11-16
Payer: MEDICARE

## 2021-11-16 DIAGNOSIS — M17.11 PRIMARY OSTEOARTHRITIS OF RIGHT KNEE: Primary | ICD-10-CM

## 2021-11-16 PROCEDURE — 97110 THERAPEUTIC EXERCISES: CPT

## 2021-11-18 ENCOUNTER — OFFICE VISIT (OUTPATIENT)
Dept: PHYSICAL THERAPY | Facility: REHABILITATION | Age: 71
End: 2021-11-18
Payer: MEDICARE

## 2021-11-18 DIAGNOSIS — M17.11 PRIMARY OSTEOARTHRITIS OF RIGHT KNEE: Primary | ICD-10-CM

## 2021-11-18 PROCEDURE — 97110 THERAPEUTIC EXERCISES: CPT

## 2021-11-23 ENCOUNTER — OFFICE VISIT (OUTPATIENT)
Dept: PHYSICAL THERAPY | Facility: REHABILITATION | Age: 71
End: 2021-11-23
Payer: MEDICARE

## 2021-11-23 DIAGNOSIS — M17.11 PRIMARY OSTEOARTHRITIS OF RIGHT KNEE: Primary | ICD-10-CM

## 2021-11-23 PROCEDURE — 97110 THERAPEUTIC EXERCISES: CPT | Performed by: PHYSICAL THERAPIST

## 2021-11-29 ENCOUNTER — TELEPHONE (OUTPATIENT)
Dept: INTERNAL MEDICINE CLINIC | Facility: CLINIC | Age: 71
End: 2021-11-29

## 2021-11-29 ENCOUNTER — OFFICE VISIT (OUTPATIENT)
Dept: INTERNAL MEDICINE CLINIC | Facility: CLINIC | Age: 71
End: 2021-11-29
Payer: MEDICARE

## 2021-11-29 VITALS
TEMPERATURE: 98.5 F | WEIGHT: 118 LBS | HEART RATE: 60 BPM | SYSTOLIC BLOOD PRESSURE: 118 MMHG | BODY MASS INDEX: 19.66 KG/M2 | HEIGHT: 65 IN | OXYGEN SATURATION: 99 % | DIASTOLIC BLOOD PRESSURE: 80 MMHG

## 2021-11-29 DIAGNOSIS — M80.00XA AGE-RELATED OSTEOPOROSIS WITH CURRENT PATHOLOGICAL FRACTURE, INITIAL ENCOUNTER: Primary | ICD-10-CM

## 2021-11-29 DIAGNOSIS — M17.11 PRIMARY OSTEOARTHRITIS OF RIGHT KNEE: ICD-10-CM

## 2021-11-29 PROCEDURE — 99213 OFFICE O/P EST LOW 20 MIN: CPT | Performed by: INTERNAL MEDICINE

## 2021-11-29 PROCEDURE — 96372 THER/PROPH/DIAG INJ SC/IM: CPT | Performed by: INTERNAL MEDICINE

## 2021-11-30 ENCOUNTER — OFFICE VISIT (OUTPATIENT)
Dept: PHYSICAL THERAPY | Facility: REHABILITATION | Age: 71
End: 2021-11-30
Payer: MEDICARE

## 2021-11-30 DIAGNOSIS — M17.11 PRIMARY OSTEOARTHRITIS OF RIGHT KNEE: Primary | ICD-10-CM

## 2021-11-30 PROCEDURE — 97110 THERAPEUTIC EXERCISES: CPT

## 2021-12-02 ENCOUNTER — OFFICE VISIT (OUTPATIENT)
Dept: PHYSICAL THERAPY | Facility: REHABILITATION | Age: 71
End: 2021-12-02
Payer: MEDICARE

## 2021-12-02 DIAGNOSIS — M17.11 PRIMARY OSTEOARTHRITIS OF RIGHT KNEE: Primary | ICD-10-CM

## 2021-12-02 PROCEDURE — 97110 THERAPEUTIC EXERCISES: CPT

## 2021-12-07 ENCOUNTER — APPOINTMENT (OUTPATIENT)
Dept: PHYSICAL THERAPY | Facility: REHABILITATION | Age: 71
End: 2021-12-07
Payer: MEDICARE

## 2021-12-08 ENCOUNTER — TELEPHONE (OUTPATIENT)
Dept: INTERNAL MEDICINE CLINIC | Facility: CLINIC | Age: 71
End: 2021-12-08

## 2021-12-09 ENCOUNTER — OFFICE VISIT (OUTPATIENT)
Dept: PHYSICAL THERAPY | Facility: REHABILITATION | Age: 71
End: 2021-12-09
Payer: MEDICARE

## 2021-12-09 DIAGNOSIS — M17.11 PRIMARY OSTEOARTHRITIS OF RIGHT KNEE: Primary | ICD-10-CM

## 2021-12-09 PROCEDURE — 97110 THERAPEUTIC EXERCISES: CPT

## 2021-12-14 ENCOUNTER — OFFICE VISIT (OUTPATIENT)
Dept: PHYSICAL THERAPY | Facility: REHABILITATION | Age: 71
End: 2021-12-14
Payer: MEDICARE

## 2021-12-14 DIAGNOSIS — M17.11 PRIMARY OSTEOARTHRITIS OF RIGHT KNEE: Primary | ICD-10-CM

## 2021-12-14 PROCEDURE — 97110 THERAPEUTIC EXERCISES: CPT

## 2021-12-16 ENCOUNTER — EVALUATION (OUTPATIENT)
Dept: PHYSICAL THERAPY | Facility: REHABILITATION | Age: 71
End: 2021-12-16
Payer: MEDICARE

## 2021-12-16 DIAGNOSIS — M17.11 PRIMARY OSTEOARTHRITIS OF RIGHT KNEE: Primary | ICD-10-CM

## 2021-12-16 PROCEDURE — 97110 THERAPEUTIC EXERCISES: CPT | Performed by: PHYSICAL THERAPIST

## 2021-12-16 PROCEDURE — 97140 MANUAL THERAPY 1/> REGIONS: CPT | Performed by: PHYSICAL THERAPIST

## 2021-12-21 ENCOUNTER — OFFICE VISIT (OUTPATIENT)
Dept: PHYSICAL THERAPY | Facility: REHABILITATION | Age: 71
End: 2021-12-21
Payer: MEDICARE

## 2021-12-21 DIAGNOSIS — M17.11 PRIMARY OSTEOARTHRITIS OF RIGHT KNEE: Primary | ICD-10-CM

## 2021-12-21 PROCEDURE — 97110 THERAPEUTIC EXERCISES: CPT

## 2021-12-23 ENCOUNTER — OFFICE VISIT (OUTPATIENT)
Dept: PHYSICAL THERAPY | Facility: REHABILITATION | Age: 71
End: 2021-12-23
Payer: MEDICARE

## 2021-12-23 DIAGNOSIS — M17.11 PRIMARY OSTEOARTHRITIS OF RIGHT KNEE: Primary | ICD-10-CM

## 2021-12-23 PROCEDURE — 97110 THERAPEUTIC EXERCISES: CPT

## 2021-12-28 ENCOUNTER — OFFICE VISIT (OUTPATIENT)
Dept: PHYSICAL THERAPY | Facility: REHABILITATION | Age: 71
End: 2021-12-28
Payer: MEDICARE

## 2021-12-28 DIAGNOSIS — M17.11 PRIMARY OSTEOARTHRITIS OF RIGHT KNEE: Primary | ICD-10-CM

## 2021-12-28 PROCEDURE — 97110 THERAPEUTIC EXERCISES: CPT | Performed by: PHYSICAL THERAPIST

## 2021-12-30 ENCOUNTER — OFFICE VISIT (OUTPATIENT)
Dept: PHYSICAL THERAPY | Facility: REHABILITATION | Age: 71
End: 2021-12-30
Payer: MEDICARE

## 2021-12-30 ENCOUNTER — OFFICE VISIT (OUTPATIENT)
Dept: INTERNAL MEDICINE CLINIC | Facility: OTHER | Age: 71
End: 2021-12-30
Payer: MEDICARE

## 2021-12-30 VITALS
OXYGEN SATURATION: 97 % | TEMPERATURE: 97.9 F | HEART RATE: 70 BPM | BODY MASS INDEX: 20.19 KG/M2 | WEIGHT: 121.2 LBS | HEIGHT: 65 IN | SYSTOLIC BLOOD PRESSURE: 102 MMHG | DIASTOLIC BLOOD PRESSURE: 64 MMHG

## 2021-12-30 DIAGNOSIS — M17.11 PRIMARY OSTEOARTHRITIS OF RIGHT KNEE: Primary | ICD-10-CM

## 2021-12-30 DIAGNOSIS — M80.00XA AGE-RELATED OSTEOPOROSIS WITH CURRENT PATHOLOGICAL FRACTURE, INITIAL ENCOUNTER: Primary | ICD-10-CM

## 2021-12-30 PROCEDURE — 97110 THERAPEUTIC EXERCISES: CPT

## 2021-12-30 PROCEDURE — 96372 THER/PROPH/DIAG INJ SC/IM: CPT | Performed by: INTERNAL MEDICINE

## 2021-12-30 PROCEDURE — 99213 OFFICE O/P EST LOW 20 MIN: CPT | Performed by: INTERNAL MEDICINE

## 2022-01-04 ENCOUNTER — OFFICE VISIT (OUTPATIENT)
Dept: PHYSICAL THERAPY | Facility: REHABILITATION | Age: 72
End: 2022-01-04
Payer: MEDICARE

## 2022-01-04 DIAGNOSIS — M17.11 PRIMARY OSTEOARTHRITIS OF RIGHT KNEE: Primary | ICD-10-CM

## 2022-01-04 PROCEDURE — 97110 THERAPEUTIC EXERCISES: CPT

## 2022-01-04 PROCEDURE — 97112 NEUROMUSCULAR REEDUCATION: CPT

## 2022-01-04 NOTE — PROGRESS NOTES
Daily Note     Today's date: 2022  Patient name: Dang Francois  :   MRN: [id number]  Referring provider: Rae Stearns DO  Dx:   Encounter Diagnosis     ICD-10-CM    1  Primary osteoarthritis of right knee  M17 11                   Subjective: Patient reports pain in posterior aspect of knee which she states is worse in the morning  Objective: See treatment diary below      Assessment: Tolerated treatment well  Slight difficulty maintaining knee ext during SLR was able to complete all reps with good form even with the challenge of additional weight  Patient demonstrates valgus with CKC exercises requiring cuing to correct and occasional tactile cues for form  Patient cont to be challenged with the leg press but is able to complete with therapeutic rest between sets  Patient would benefit from continued PT      Plan: Continue per plan of care        Precautions: HTN, Osteoporosis, CVA, RA     Daily Treatment Diary      Assessment 21   Eval/Reval x                 FOTO              **   Manuals    K-tape "I" strips post knee                                            Exercise Diary     TM 2 4 x10' 2 4 x10' 1 8mph x10' 2 0 x10 min 2 0 x10 min 2 0x10 min  2 0x6 min  2 0 mph x6'   2 0 mph x6'  nv    QS w/SLR 1# x20 nv 1# x20 1#x20  #1 5 x20  5"  2x10  5" 1# x15 b/l  nv  1# x20    SL Abd NV       x15  2x10  nv  nv    bridges 5" 2x10 btb 5" 2x10 btb 5" 2x10 5"x20 btb  5"x20 btb 5"x20  5"  2x10  5" OTB x15  gtb 5"2x10  gtb 5"2x10    LAQ 5# 2x10 5# 2x10 5# 2x10 5# 2x10 5#  2x10 5# 2x10   335"x20  3# 5" 2x10  3# 5" 2x10  3# 5" 2x10    mini squats    5"x15 /c cues gtb 2x10 2x10 2x10 2x10  x15  5"x15 c cues  5"x15 c cues  5"x15 c cues    side steps Pink TB 10 ft x 3 laps Pink TB 4 laps  Pink TB 4 laps Pink TB 4 laps Pink TB  4 laps Pink TB 6 laps  YTB x8laps  YTB  x8 laps  YTB  x8 laps  nv    ham curls 5# x10 5# x15  5"x15 5# x20 5# x20 5# x20 3#   5"x10  3#  5" x15  3#  5" x15  nv    clamshells gtb 2x10 btb 5"x20  btb5" 2x10 btb 5" 2x10 btb 5"  2x10 btb   5" 2x10   gtb 2x10  gtb 2x10  gtb 2x10  gtb 2x10    seated ham str 30"x3        30"x3  30"x3    30"x3    side step up  6" x20  6"x20  1R x20 2R x15 2R   x15 2R  2x10  6"x10  6"x15 b/l  6"x20 b/l  nv    leg press       44#2x10  44#  2x10 44#  2x10                                                                                                       Modalities   MH or CP prn

## 2022-01-06 ENCOUNTER — APPOINTMENT (OUTPATIENT)
Dept: PHYSICAL THERAPY | Facility: REHABILITATION | Age: 72
End: 2022-01-06
Payer: MEDICARE

## 2022-01-12 ENCOUNTER — HOSPITAL ENCOUNTER (OUTPATIENT)
Dept: MAMMOGRAPHY | Facility: CLINIC | Age: 72
Discharge: HOME | End: 2022-01-12
Payer: MEDICARE

## 2022-01-12 DIAGNOSIS — Z12.31 ENCOUNTER FOR SCREENING MAMMOGRAM FOR MALIGNANT NEOPLASM OF BREAST: ICD-10-CM

## 2022-01-12 PROCEDURE — 77067 SCR MAMMO BI INCL CAD: CPT

## 2022-01-12 PROCEDURE — 77063 BREAST TOMOSYNTHESIS BI: CPT

## 2022-01-14 DIAGNOSIS — E78.5 HYPERLIPIDEMIA, UNSPECIFIED HYPERLIPIDEMIA TYPE: ICD-10-CM

## 2022-01-14 RX ORDER — SIMVASTATIN 40 MG
40 TABLET ORAL DAILY
Qty: 90 TABLET | Refills: 1 | Status: SHIPPED | OUTPATIENT
Start: 2022-01-14 | End: 2022-04-20 | Stop reason: SDUPTHER

## 2022-01-31 ENCOUNTER — OFFICE VISIT (OUTPATIENT)
Dept: INTERNAL MEDICINE CLINIC | Age: 72
End: 2022-01-31
Payer: MEDICARE

## 2022-01-31 VITALS
HEIGHT: 65 IN | TEMPERATURE: 97.4 F | WEIGHT: 119.7 LBS | SYSTOLIC BLOOD PRESSURE: 118 MMHG | DIASTOLIC BLOOD PRESSURE: 76 MMHG | BODY MASS INDEX: 19.94 KG/M2 | HEART RATE: 64 BPM | OXYGEN SATURATION: 98 %

## 2022-01-31 DIAGNOSIS — G45.9 TIA DUE TO EMBOLISM (HCC): ICD-10-CM

## 2022-01-31 DIAGNOSIS — I63.81 CEREBROVASCULAR ACCIDENT (CVA) DUE TO OCCLUSION OF SMALL ARTERY (HCC): ICD-10-CM

## 2022-01-31 DIAGNOSIS — I74.9 TIA DUE TO EMBOLISM (HCC): ICD-10-CM

## 2022-01-31 DIAGNOSIS — M80.00XA AGE-RELATED OSTEOPOROSIS WITH CURRENT PATHOLOGICAL FRACTURE, INITIAL ENCOUNTER: Primary | ICD-10-CM

## 2022-01-31 DIAGNOSIS — Q85.01 VON RECKLINGHAUSENS DISEASE (HCC): ICD-10-CM

## 2022-01-31 DIAGNOSIS — M05.79 RHEUMATOID ARTHRITIS INVOLVING MULTIPLE SITES WITH POSITIVE RHEUMATOID FACTOR (HCC): ICD-10-CM

## 2022-01-31 DIAGNOSIS — E44.1 MILD PROTEIN-CALORIE MALNUTRITION (HCC): ICD-10-CM

## 2022-01-31 DIAGNOSIS — R35.0 URINARY FREQUENCY: ICD-10-CM

## 2022-01-31 PROCEDURE — 96372 THER/PROPH/DIAG INJ SC/IM: CPT | Performed by: INTERNAL MEDICINE

## 2022-01-31 PROCEDURE — 99214 OFFICE O/P EST MOD 30 MIN: CPT | Performed by: INTERNAL MEDICINE

## 2022-01-31 RX ORDER — CLOPIDOGREL BISULFATE 75 MG/1
75 TABLET ORAL DAILY
Qty: 90 TABLET | Refills: 1 | Status: SHIPPED | OUTPATIENT
Start: 2022-01-31 | End: 2022-06-23 | Stop reason: SDUPTHER

## 2022-01-31 RX ORDER — OXYBUTYNIN CHLORIDE 5 MG/1
5 TABLET ORAL 2 TIMES DAILY
Qty: 180 TABLET | Refills: 1 | Status: SHIPPED | OUTPATIENT
Start: 2022-01-31

## 2022-01-31 NOTE — PROGRESS NOTES
Assessment/Plan:     Diagnoses and all orders for this visit:    Age-related osteoporosis with current pathological fracture, initial encounter  -     romosozumab-aqqg (EVENITY) subcutaneous injection 210 mg  -     CBC and differential; Future  -     Comprehensive metabolic panel; Future  Recheck the CBC and complete metabolic profile her DEXA scan was done in August of 2021 with a T-score of -3 3  Cerebrovascular accident (CVA) due to occlusion of small artery (HCC)  -     clopidogrel (PLAVIX) 75 mg tablet; Take 1 tablet (75 mg total) by mouth daily  Previous history of TIA no recent cerebrovascular accident and no neurological deficit  Urinary frequency  -     oxybutynin (DITROPAN) 5 mg tablet; Take 1 tablet (5 mg total) by mouth 2 (two) times a day    Mild protein-calorie malnutrition (HCC)  -     CBC and differential; Future  -     Comprehensive metabolic panel; Future    Rheumatoid arthritis involving multiple sites with positive rheumatoid factor ([facility] [address] )  Followed up by the Rheumatology  [facility] to [facility]ism [facility] )    Von Reckling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 [facility])             M*Modal software was used to dictate this note  It may contain errors with dictating incorrect words or incorrect spelling  Please contact the provider directly with any questions  Subjective:   Chief Complaint   Patient presents with    Follow-up     pt  here for 7th evenity injection  Patient ID: Cleve Maguire is a 66 y o  female  HPI  This is a very pleasant 66 years young lady who is here today for the injections of her Tilman Cocking this is her 7th injection she is taking it for 1 year and then she will go on Prolia  Previous history of CVA and TIA no neurological deficit patient has a history of 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    Overall she is doing well I will ask her to get her CBC and complete metabolic profile today before she goes home she does not have any history of diabetes but I wanted to see her total protein and albumin and also I would like to see her calcium since she is getting the medication  The following portions of the patient's history were reviewed and updated as appropriate: allergies, current medications, past family history, past medical history, past social history, past surgical history and problem list     Review of Systems   Constitutional: Positive for fatigue  Negative for chills  HENT: Negative for congestion, ear pain, hearing loss, postnasal drip, sinus pressure, sore throat and voice change  Eyes: Negative for pain, discharge and visual disturbance  Respiratory: Negative for cough, chest tightness and shortness of breath  Cardiovascular: Negative for chest pain, palpitations and leg swelling  Gastrointestinal: Negative for abdominal pain, blood in stool, diarrhea, nausea and rectal pain  Genitourinary: Negative for difficulty urinating, dysuria and urgency  Musculoskeletal: Positive for back pain  Negative for arthralgias and joint swelling  Bilateral knee pain   Skin: Negative for rash  Allergic/Immunologic: Negative for environmental allergies and food allergies  Neurological: Negative for dizziness, tremors, weakness, numbness and headaches  Hematological: Negative for adenopathy  Psychiatric/Behavioral: Negative for behavioral problems and hallucinations           Past Medical History:   Diagnosis Date    Arthralgia     Memory loss 9/3/2019    [facility]ropenia ([facility]     Osteoporosis     Rheumatoid arthritis ([facility] [address] )     Sjogren's syndrome ([facility])     Stroke (cerebrum) ([facility])     Transient cerebral ischemia          Current Outpatient Medications:     aspirin (ASPIR-LOW) 81 mg EC tablet, Take 81 mg by mouth daily, Disp: , Rfl:     calcium citrate-Vitamin D (CITRACAL PETITES/VITAMIN D) 200 mg-250 units, Take 1 tablet by mouth 2 (two) times a day , Disp: , Rfl:     Cholecalciferol (VITAMIN D) 2000 units CAPS, Take 1 tablet by mouth 2 (two) times a day  , Disp: , Rfl:     clopidogrel (PLAVIX) 75 mg tablet, Take 1 tablet (75 mg total) by mouth daily, Disp: 90 tablet, Rfl: 1    cycloSPORINE (RESTASIS) 0 05 % ophthalmic emulsion, Apply 1 drop to eye 2 (two) times a day, Disp: , Rfl:     hydroxychloroquine (PLAQUENIL) 200 mg tablet, Take 200 mg by mouth daily , Disp: , Rfl:     oxybutynin (DITROPAN) 5 mg tablet, Take 1 tablet (5 mg total) by mouth 2 (two) times a day, Disp: 180 tablet, Rfl: 1    simvastatin (ZOCOR) 40 mg tablet, Take 1 tablet (40 mg total) by mouth daily, Disp: 90 tablet, Rfl: 1  No current facility-administered medications for this visit  No Known Allergies    Social History   Past Surgical History:   Procedure Laterality Date    BREAST EXCISIONAL BIOPSY Left 1983    benign    BREAST EXCISIONAL BIOPSY Left 1982    benign    BREAST EXCISIONAL BIOPSY Right 1982    benign    CATARACT EXTRACTION, BILATERAL      HYSTERECTOMY      age 47    [de-identified]      both     Family History   Problem Relation Age of Onset    Lymphoma Mother 61        ACUTE    Stroke Mother 68    Diabetes Father     No Known Problems Maternal Grandmother     No Known Problems Maternal Grandfather     No Known Problems Paternal Grandmother     No Known Problems Paternal Grandfather     Stroke Sister     No Known Problems Daughter     No Known Problems Sister     No Known Problems Maternal Aunt     No Known Problems Maternal Aunt     No Known Problems Paternal Aunt     No Known Problems Paternal Aunt        Objective:  /76 (BP Location: Left arm, Patient Position: Sitting, Cuff Size: Standard)   Pulse 64   Temp (!) 97 4 °F (36 3 °C) (Temporal)   Ht 5' 5" (1 651 m)   Wt 54 3 kg (119 lb 11 2 oz)   SpO2 98%   BMI 19 92 kg/m²        Physical Exam  Constitutional:       Appearance: She is well-developed  Eyes:      Pupils: Pupils are equal, round, and reactive to light     Cardiovascular:      Rate and Rhythm: Normal rate  Pulmonary:      Effort: Pulmonary effort is normal       Breath sounds: Normal breath sounds  Musculoskeletal:      Cervical back: Normal range of motion and neck supple

## 2022-02-10 ENCOUNTER — OFFICE VISIT (OUTPATIENT)
Dept: OBGYN CLINIC | Facility: CLINIC | Age: 72
End: 2022-02-10
Payer: MEDICARE

## 2022-02-10 VITALS
WEIGHT: 119 LBS | HEIGHT: 65 IN | BODY MASS INDEX: 19.83 KG/M2 | DIASTOLIC BLOOD PRESSURE: 72 MMHG | HEART RATE: 73 BPM | SYSTOLIC BLOOD PRESSURE: 111 MMHG

## 2022-02-10 DIAGNOSIS — M17.11 PRIMARY OSTEOARTHRITIS OF RIGHT KNEE: Primary | ICD-10-CM

## 2022-02-10 PROCEDURE — 20610 DRAIN/INJ JOINT/BURSA W/O US: CPT | Performed by: ORTHOPAEDIC SURGERY

## 2022-02-10 PROCEDURE — 99204 OFFICE O/P NEW MOD 45 MIN: CPT | Performed by: ORTHOPAEDIC SURGERY

## 2022-02-10 RX ORDER — BUPIVACAINE HYDROCHLORIDE 2.5 MG/ML
1 INJECTION, SOLUTION INFILTRATION; PERINEURAL
Status: COMPLETED | OUTPATIENT
Start: 2022-02-10 | End: 2022-02-10

## 2022-02-10 RX ORDER — LIDOCAINE HYDROCHLORIDE 10 MG/ML
1 INJECTION, SOLUTION INFILTRATION; PERINEURAL
Status: COMPLETED | OUTPATIENT
Start: 2022-02-10 | End: 2022-02-10

## 2022-02-10 RX ORDER — BETAMETHASONE SODIUM PHOSPHATE AND BETAMETHASONE ACETATE 3; 3 MG/ML; MG/ML
12 INJECTION, SUSPENSION INTRA-ARTICULAR; INTRALESIONAL; INTRAMUSCULAR; SOFT TISSUE
Status: COMPLETED | OUTPATIENT
Start: 2022-02-10 | End: 2022-02-10

## 2022-02-10 RX ADMIN — LIDOCAINE HYDROCHLORIDE 1 ML: 10 INJECTION, SOLUTION INFILTRATION; PERINEURAL at 11:25

## 2022-02-10 RX ADMIN — BUPIVACAINE HYDROCHLORIDE 1 ML: 2.5 INJECTION, SOLUTION INFILTRATION; PERINEURAL at 11:25

## 2022-02-10 RX ADMIN — BETAMETHASONE SODIUM PHOSPHATE AND BETAMETHASONE ACETATE 12 MG: 3; 3 INJECTION, SUSPENSION INTRA-ARTICULAR; INTRALESIONAL; INTRAMUSCULAR; SOFT TISSUE at 11:25

## 2022-02-10 NOTE — PROGRESS NOTES
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    Dry eye    Cataract of both eyes    Medication monitoring encounter    Mild protein-calorie malnutrition ([facility] )    T[facility] to embolism [facility]           Plan      71F with a PMH of rheumatoid arthritis and TIAs on plavix who is presenting today with R knee osteoarthritis  Treatment options discussed with the patient including observation, injections/bracing, and operative intervention  At this time patient is interested in trial of injections  A corticosteroid injection was administered in the office today, and prior authorization for visco supplementation was also requested  Will defer on lateral  brace at this time given that most of her pain is start up pain, but this may be an option for her in the future if she develops more activity related pain  She may continue her home exercise program  She should follow up once her visco injections are approved  Agree with assessment and plan by Dr Aki Haddad  Patient has some laxity with varus stress but no instability  We discussed all non operative interventions    We also discussed cortisone lubricant injections and bracing we declined bracing at this time because her pain is more associated with start up and go  Subjective:     Patient ID:    Chief Complaint:Caro Manzanares 66 y o  female      HPI    Patient comes in today with regards to right knee pain  The patient reports that the pain is in the posterior knee and has been going on for months  The pain is rated as mild to moderate in severity  The pain is described as aching  It is worsened when getting up in the AM or after rising from prolonged seating (start up pain), and is made better with rest   The patient has taken oral anti-inflammatory medications for treatment, as well as PT  The following portions of the patient's history were reviewed and updated as appropriate: allergies, current medications, past family history, past social history, past surgical history and problem list         Social History     Socioeconomic History    Marital status: /Civil Union     Spouse name: Not on file    Number of children: Not on file    Years of education: Not on file    Highest education level: Not on file   Occupational History    Occupation: Privately   Tobacco Use    Smoking status: Never Smoker    Smokeless tobacco: Never Used   Vaping Use    Vaping Use: Never used   Substance and Sexual Activity    Alcohol use:  Yes     Alcohol/week: 7 0 standard drinks     Types: 7 Glasses of wine per week    Drug use: No    Sexual activity: Not on file   Other Topics Concern    Not on file   Social History Narrative    Exercise habits: the gym; frequency is 2 days per week    Travel history: patient denies being  Out of the country during 1/2014-11/12/2014     Social Determinants of Health     Financial Resource Strain: Not on file   Food Insecurity: Not on file   Transportation Needs: Not on file   Physical Activity: Not on file   Stress: Not on file   Social Connections: Not on file   Intimate Partner Violence: Not on file   Housing Stability: Not on file     Past Medical History:   Diagnosis Date    Arthralgia     Memory loss 9/3/2019    Neutropenia (HCC)     Osteoporosis     [facility]     Sjogren's syndrome ([facility] )     [facility]     Transient cerebral ischemia      Past Surgical History:   Procedure Laterality Date    BREAST EXCISIONAL BIOPSY Left 1983    benign    BREAST EXCISIONAL BIOPSY Left 1982    benign    BREAST EXCISIONAL BIOPSY Right 1982    benign    CATARACT EXTRACTION, BILATERAL      HYSTERECTOMY      age 47    OOPHORECTOMY      both     No Known Allergies  Current Outpatient Medications on File Prior to Visit   Medication Sig Dispense Refill    aspirin (ASPIR-LOW) 81 mg EC tablet Take 81 mg by mouth daily      calcium citrate-Vitamin D (CITRACAL PETITES/VITAMIN D) 200 mg-250 units Take 1 tablet by mouth 2 (two) times a day       Cholecalciferol (VITAMIN D) 2000 units CAPS Take 1 tablet by mouth 2 (two) times a day        clopidogrel (PLAVIX) 75 mg tablet Take 1 tablet (75 mg total) by mouth daily 90 tablet 1    cycloSPORINE (RESTASIS) 0 05 % ophthalmic emulsion Apply 1 drop to eye 2 (two) times a day      hydroxychloroquine (PLAQUENIL) 200 mg tablet Take 200 mg by mouth daily       oxybutynin (DITROPAN) 5 mg tablet Take 1 tablet (5 mg total) by mouth 2 (two) times a day 180 tablet 1    simvastatin (ZOCOR) 40 mg tablet Take 1 tablet (40 mg total) by mouth daily 90 tablet 1     No current facility-administered medications on file prior to visit  Objective:    Review of Systems   Constitutional: Negative  HENT: Negative  Eyes: Negative  Respiratory: Negative  Cardiovascular: Negative  Gastrointestinal: Negative  Genitourinary: Negative  Musculoskeletal: Positive for arthralgias  Skin: Negative  Neurological: Negative  Psychiatric/Behavioral: Negative  All other systems reviewed and are negative  Right Knee Exam     Muscle Strength   The patient has normal right knee strength      Tenderness   Right knee tenderness location: posterior     Range of Motion   The patient has normal right knee ROM  Extension: 0   Flexion: 120     Tests   Jacky:  Medial - negative Lateral - negative  Varus: positive (slight laxity with firm endpoint) Valgus: negative  Lachman:  Anterior - negative      Drawer:  Posterior - negative  Patellar apprehension: negative    Other   Erythema: absent  Scars: absent  Sensation: normal  Pulse: present  Swelling: none  Effusion: no effusion present            Physical Exam  Constitutional:       General: She is not in acute distress  Appearance: Normal appearance  She is normal weight  HENT:      Head: Normocephalic and atraumatic  Nose: No congestion  Mouth/Throat:      Mouth: Mucous membranes are moist    Eyes:      General:         Right eye: No discharge  Left eye: No discharge  Extraocular Movements: Extraocular movements intact  Conjunctiva/sclera: Conjunctivae normal    Cardiovascular:      Pulses: Normal pulses  Heart sounds: No murmur heard  Pulmonary:      Effort: Pulmonary effort is normal       Breath sounds: No stridor  No wheezing  Musculoskeletal:      Cervical back: Normal range of motion and neck supple  Right knee: No effusion  Instability Tests: Medial Jacky test negative and lateral Jacky test negative  Skin:     General: Skin is warm and dry  Capillary Refill: Capillary refill takes less than 2 seconds  Findings: No erythema  Neurological:      General: No focal deficit present  Mental Status: She is alert and oriented to person, place, and time  Mental status is at baseline  Motor: No weakness  Gait: Gait normal    Psychiatric:         Mood and Affect: Mood normal          Behavior: Behavior normal          Thought Content: Thought content normal          Large joint arthrocentesis: R knee  Universal Protocol:  Consent: Verbal consent obtained    Risks and benefits: risks, benefits and alternatives were discussed  Consent given by: patient  Time out: Immediately prior to procedure a "time out" was called to verify the correct patient, procedure, equipment, support staff and site/side marked as required  Timeout called at: 2/10/2022 11:22 AM   Patient understanding: patient states understanding of the procedure being performed  Patient consent: the patient's understanding of the procedure matches consent given  Radiology Images displayed and confirmed  If images not available, report reviewed: imaging studies available  Required items: required blood products, implants, devices, and special equipment available  Patient identity confirmed: verbally with patient    Supporting Documentation  Indications: pain   Procedure Details  Location: knee - R knee  Preparation: Patient was prepped and draped in the usual sterile fashion  Needle size: 22 G  Approach: anteromedial  Medications administered: 1 mL bupivacaine 0 25 %; 1 mL lidocaine 1 %; 12 mg betamethasone acetate-betamethasone sodium phosphate 6 (3-3) mg/mL    Patient tolerance: patient tolerated the procedure well with no immediate complications  Dressing:  Sterile dressing applied             I have personally reviewed pertinent films in PACS and my interpretation is moderate right knee osteoarthritis with lateral joint space naroowing and marginal osteophyte formation         Portions of the record may have been created with voice recognition software   Occasional wrong word or "sound a like" substitutions may have occurred due to the inherent limitations of voice recognition software   Read the chart carefully and recognize, using context, where substitutions have occurred

## 2022-02-16 ENCOUNTER — EVALUATION (OUTPATIENT)
Dept: PHYSICAL THERAPY | Facility: REHABILITATION | Age: 72
End: 2022-02-16
Payer: MEDICARE

## 2022-02-16 DIAGNOSIS — M17.11 PRIMARY OSTEOARTHRITIS OF RIGHT KNEE: ICD-10-CM

## 2022-02-16 DIAGNOSIS — G89.29 CHRONIC PAIN OF RIGHT KNEE: Primary | ICD-10-CM

## 2022-02-16 DIAGNOSIS — M25.561 CHRONIC PAIN OF RIGHT KNEE: Primary | ICD-10-CM

## 2022-02-16 PROCEDURE — 97112 NEUROMUSCULAR REEDUCATION: CPT | Performed by: PHYSICAL THERAPIST

## 2022-02-16 PROCEDURE — 97164 PT RE-EVAL EST PLAN CARE: CPT | Performed by: PHYSICAL THERAPIST

## 2022-02-16 NOTE — PROGRESS NOTES
Daily Note     Today's date: 2022  Patient name: Lawyer Moore  :   MRN: [id number]  Referring provider: Linda Mccurdy MD  Dx:   Encounter Diagnosis     ICD-10-CM    1  Chronic pain of right knee  M25 561     G89 29    2  Primary osteoarthritis of right knee  M17 11 Ambulatory Referral to Physical Therapy                  Subjective: Patient reports pain in posterior aspect of knee which she states is worse in the morning  Objective: See treatment diary below  Tenderness     Right Knee   Tenderness in the lateral joint line  Active Range of Motion   Left Knee   Flexion: 140 degrees     Right Knee   Flexion: 135 degrees   Extension: -5 degrees     Strength/Myotome Testing     Left Hip   Planes of Motion   Flexion: 4  Extension: 4  Abduction: 4  External rotation: 4+  Internal rotation: 4+    Right Hip   Planes of Motion   Flexion: 5  Extension: 4  Abduction: 4  External rotation: 4+  Internal rotation: 4+    Left Knee   Flexion: 4+  Extension: 4+    Right Knee   Flexion: 4+  Extension: 4+    Swelling     Left Knee Girth Measurement (cm)   Joint line: 36 5 cm    Right Knee Girth Measurement (cm)   Joint line: 36 5 cm      Assessment: Pt returns from the doctor after having a Cortisone injection that completely relieved her pain  She will be going for visco injection as well coming up in the next few months  She has maintained and increased her strength a bit doing her HEP  She was able to demonstrate good understanding of her HEP and at this time I believe discharge to a HEP is appropriate  She was provided with our business card in case she feels she has questions or needs to f/u with us         Plan: Discharge to HEP     Precautions: HTN, Osteoporosis, CVA, RA     Daily Treatment Diary      Assessment 21   Eval/Reval x                 FOTO              **   Manuals    K-tape "I" strips post knee Exercise Diary     TM 2 4 x10' 2 4 x10' 1 8mph x10' 2 0 x10 min 2 0 x10 min 2 0x10 min  2 0x6 min  2 0 mph x6'   2 0 mph x6'  nv    QS w/SLR 1# x20 nv 1# x20 1#x20  #1 5 x20  5"  2x10  5" 1# x15 b/l  nv  1# x20    SL Abd NV       x15  2x10  nv  nv    bridges 5" 2x10 btb 5" 2x10 btb 5" 2x10 5"x20 btb  5"x20 btb 5"x20  5"  2x10  5" OTB x15  gtb 5"2x10  gtb 5"2x10    LAQ 5# 2x10 5# 2x10 5# 2x10 5# 2x10 5#  2x10 5# 2x10   335"x20  3# 5" 2x10  3# 5" 2x10  3# 5" 2x10    mini squats    5"x15 /c cues gtb 2x10 2x10 2x10 2x10  x15  5"x15 c cues  5"x15 c cues  5"x15 c cues    side steps Pink TB 10 ft x 3 laps Pink TB 4 laps  Pink TB 4 laps Pink TB 4 laps Pink TB  4 laps Pink TB 6 laps  YTB x8laps  YTB  x8 laps  YTB  x8 laps  nv    ham curls 5# x10 5# x15  5"x15 5# x20 5# x20 5# x20   3#   5"x10  3#  5" x15  3#  5" x15  nv    clamshells gtb 2x10 btb 5"x20  btb5" 2x10 btb 5" 2x10 btb 5"  2x10 btb   5" 2x10   gtb 2x10  gtb 2x10  gtb 2x10  gtb 2x10    seated ham str 30"x3        30"x3  30"x3    30"x3    side step up  6" x20  6"x20  1R x20 2R x15 2R   x15 2R  2x10  6"x10  6"x15 b/l  6"x20 b/l  nv    leg press       44#2x10  44#  2x10 44#  2x10                                                                                                       Modalities   MH or CP prn

## 2022-03-01 ENCOUNTER — OFFICE VISIT (OUTPATIENT)
Dept: INTERNAL MEDICINE CLINIC | Age: 72
End: 2022-03-01
Payer: MEDICARE

## 2022-03-01 ENCOUNTER — TELEPHONE (OUTPATIENT)
Dept: INTERNAL MEDICINE CLINIC | Age: 72
End: 2022-03-01

## 2022-03-01 VITALS
BODY MASS INDEX: 19.99 KG/M2 | DIASTOLIC BLOOD PRESSURE: 68 MMHG | OXYGEN SATURATION: 100 % | WEIGHT: 120 LBS | HEIGHT: 65 IN | SYSTOLIC BLOOD PRESSURE: 108 MMHG | TEMPERATURE: 97.8 F | HEART RATE: 68 BPM

## 2022-03-01 DIAGNOSIS — E67.3 HYPERVITAMINOSIS D: ICD-10-CM

## 2022-03-01 DIAGNOSIS — M80.00XA AGE-RELATED OSTEOPOROSIS WITH CURRENT PATHOLOGICAL FRACTURE, INITIAL ENCOUNTER: Primary | ICD-10-CM

## 2022-03-01 DIAGNOSIS — E78.2 MIXED HYPERLIPIDEMIA: ICD-10-CM

## 2022-03-01 PROBLEM — E44.1 MILD PROTEIN-CALORIE MALNUTRITION (HCC): Status: RESOLVED | Noted: 2021-09-22 | Resolved: 2022-03-01

## 2022-03-01 PROCEDURE — 96372 THER/PROPH/DIAG INJ SC/IM: CPT | Performed by: FAMILY MEDICINE

## 2022-03-01 PROCEDURE — 99214 OFFICE O/P EST MOD 30 MIN: CPT | Performed by: FAMILY MEDICINE

## 2022-03-01 NOTE — TELEPHONE ENCOUNTER
Patient rescheduled to 08/26/2022 with [facility] office with Dr Benitez Jung on my calendar

## 2022-03-01 NOTE — PROGRESS NOTES
Assessment/Plan:    1  Age-related osteoporosis with current pathological fracture, initial encounter  -     romosozumab-aqqg (EVENITY) subcutaneous injection 210 mg    2  Mixed hyperlipidemia  -     Lipid panel; Future  -     Comprehensive metabolic panel; Future    3  Hypervitaminosis D  -     Vitamin D 25 hydroxy; Future    evenity #8 given today in arms today, labs reviewed, stop extra vit D for now, repat labs in 6 months, will need PROLIA after completion  of evenity               There are no Patient Instructions on file for this visit  Return for KNA and schedule for end aug/begin sept  Subjective:      Patient ID: Shell Monreal is a 66 y o  female  Chief Complaint   Patient presents with    Follow-up      pt is here for 6m-f/u,8th Evenity, Review labs 2/22, no refills needed at this time     Hm     depression screening completed        HPI    Hyperlipidemia, on Zocor and doing well   Laboratory data reviewed and is all is well controlled   Tolerating well July 2020, excellent controlled lipid levels  March 2022, doing very well with lipid levels  No issues or concerns     Vitamin-D deficiency   Has been on over-the-counter calcium and vitamin-D   Doing well   On Prolia and has bone density March 2022, vitamin-D levels in range     Osteoporosis (Follow-Up): The patient's osteoporosis has been stable since the last visit  Most recent DXA results: T-score <-2 5  Comorbid Illnesses: -3 3 is the worst- on chronic prednisone  She has no comorbid illnesses  She has no complications  She has no significant interval events  Symptoms: The patient is currently asymptomatic  denies back pain-- and-- denies   Associated symptoms include no decrease in height   Home precautions: Osteoporosis counseling and education was reviewed with patient and caregivers, including prevention of falls   Medications: the patient is adherent with her medication regimen   Medication(s): vitamin D,-- a calcium supplement-- and-- prolia   Disease Management: the patient is doing well with her osteoporosis goals  The patient is due for DEXA  2019-appointment scheduled in , here for Prolia   Tolerating well without any issues  2021: Prolia due today, doing well  , due for DEXA in , doing well, no issues or concerns or complaints  Here for even a D injections which she has been tolerating well  Today's injection 8       Urinary frequency, on Ditropan and sees minimal results but would like to continue   No issues   2020, has dry eye and was placed on dry eye diet which is bland and morning according to her however it has helped with her urinary issues  2021 stable,   it to once a day due to dry  Still helping with urine but she is drinking a lot more water, wakes up every 2 hours to urinate    2022, well controlled     chronic lunar infarct on last CT scan   Continue with Plavix and Neurology follow-ups   No residual affects but feels that she is slower in her daily activities and speaking          The following portions of the patient's history were reviewed and updated as appropriate: allergies, current medications, past family history, past medical history, past social history, past surgical history and problem list     Review of Systems      Constitutional:  Denies fever or chills   Eyes:  Denies double , blurry vision or eye pain  HENT:  Denies nasal congestion or sore throat   Respiratory:  Denies cough or shortness of breath or wheezing  Cardiovascular:  Denies palpitations or chest pain  GI:  Denies abdominal pain, nausea, or vomiting, no loose stools, no reflux  Integument:  Denies rash , no open areas  Neurologic:  Denies headache or focal weakness, no dizziness  : no dysuria, or hematuria      Current Outpatient Medications   Medication Sig Dispense Refill    aspirin (ASPIR-LOW) 81 mg EC tablet Take 81 mg by mouth daily      calcium citrate-Vitamin D (CITRACAL PETITES/VITAMIN D) 200 mg-250 units Take 1 tablet by mouth 2 (two) times a day       clopidogrel (PLAVIX) 75 mg tablet Take 1 tablet (75 mg total) by mouth daily 90 tablet 1    cycloSPORINE (RESTASIS) 0 05 % ophthalmic emulsion Apply 1 drop to eye 2 (two) times a day      hydroxychloroquine (PLAQUENIL) 200 mg tablet Take 200 mg by mouth daily       oxybutynin (DITROPAN) 5 mg tablet Take 1 tablet (5 mg total) by mouth 2 (two) times a day 180 tablet 1    simvastatin (ZOCOR) 40 mg tablet Take 1 tablet (40 mg total) by mouth daily 90 tablet 1     No current facility-administered medications for this visit         Objective:    /68 (BP Location: Left arm, Patient Position: Sitting, Cuff Size: Standard)   Pulse 68   Temp 97 8 °F (36 6 °C) (Temporal)   Ht 5' 5" (1 651 m)   Wt 54 4 kg (120 lb)   SpO2 100%   BMI 19 97 kg/m²        Physical Exam       Constitutional:  Well developed, well nourished, no acute distress, non-toxic appearance   Eyes:  PERRL, conjunctiva normal , non icteric sclera  HENT:  Atraumatic, oropharynx moist  Neck-  supple   Respiratory:  CTA b/l, normal breath sounds, no rales, no wheezing   Cardiovascular:  RRR, no murmurs, no LE edema b/l  GI:  Soft, nondistended, normal bowel sounds x 4, nontender, no organomegaly, no mass, no rebound, no guarding   Neurologic:  no focal deficits noted   Psychiatric:  Speech and behavior appropriate , AAO x 3        Chayo Huff DO

## 2022-03-07 ENCOUNTER — TELEPHONE (OUTPATIENT)
Dept: INTERNAL MEDICINE CLINIC | Age: 72
End: 2022-03-07

## 2022-03-07 ENCOUNTER — OFFICE VISIT (OUTPATIENT)
Dept: INTERNAL MEDICINE CLINIC | Facility: OTHER | Age: 72
End: 2022-03-07
Payer: MEDICARE

## 2022-03-07 VITALS
TEMPERATURE: 98.4 F | WEIGHT: 119 LBS | SYSTOLIC BLOOD PRESSURE: 110 MMHG | HEART RATE: 78 BPM | HEIGHT: 65 IN | DIASTOLIC BLOOD PRESSURE: 78 MMHG | BODY MASS INDEX: 19.83 KG/M2 | OXYGEN SATURATION: 94 %

## 2022-03-07 DIAGNOSIS — J06.9 VIRAL URI: Primary | ICD-10-CM

## 2022-03-07 PROCEDURE — 99213 OFFICE O/P EST LOW 20 MIN: CPT | Performed by: FAMILY MEDICINE

## 2022-03-07 NOTE — PROGRESS NOTES
Assessment/Plan:    1  Viral URI    Supportive care, call if any issues or changes in symptoms  There are no Patient Instructions on file for this visit  No follow-ups on file  Subjective:      Patient ID: Augustine Benjamin is a 66 y o  female  Chief Complaint   Patient presents with    Cold Like Symptoms     Pt c/o headache pain in throat, and runny nose  Sxs since Saturday  HPI     Patient with 2 day history of upper respiratory symptoms of runny nose congestion postnasal drip  Had an isolated low-grade temp of 100 4° which resolved with 1 dose of Tylenol  She is not taking anything else over-the-counter  No sick contacts  Is fully vaccinated against COVID  Took 2 home test 1 of which was earlier today and both were negative    Denies loss of taste or smell shortness of breath wheezing nausea vomiting or diarrhea headaches or dizziness      The following portions of the patient's history were reviewed and updated as appropriate: allergies, current medications, past family history, past medical history, past social history, past surgical history and problem list     Review of Systems      Constitutional:  Denies fever or chills currently , t max 100 4, resolved now  Eyes:  Denies double , blurry vision or eye pain  HENT:  See HPI  Respiratory:  Denies cough or shortness of breath or wheezing  Cardiovascular:  Denies palpitations or chest pain  GI:  Denies abdominal pain, nausea, or vomiting, no loose stools, no reflux  Integument:  Denies rash , no open areas  Neurologic:  Denies headache or focal weakness, no dizziness  : no dysuria, or hematuria        Current Outpatient Medications   Medication Sig Dispense Refill    aspirin (Aspir-Low) 81 mg EC tablet Take 81 mg by mouth daily        calcium citrate-Vitamin D (CITRACAL PETITES/VITAMIN D) 200 mg-250 units Take 1 tablet by mouth 2 (two) times a day       clopidogrel (PLAVIX) 75 mg tablet Take 1 tablet (75 mg total) by mouth daily Procedure:  SURGICAL FIXATION FIBULA FRACTURE POSSIBLE SYNDESMOTIC REPAIR  (RIGHT) (Right Ankle)    Relevant Problems   No relevant active problems        Physical Exam    Airway    Mallampati score: II  TM Distance: >3 FB  Neck ROM: full     Dental   No notable dental hx     Cardiovascular  Rhythm: regular, Rate: normal,     Pulmonary  Pulmonary exam normal Breath sounds clear to auscultation,     Other Findings        Anesthesia Plan  ASA Score- 2     Anesthesia Type- general and regional with ASA Monitors  Additional Monitors:   Airway Plan: LMA  Plan Factors-Exercise tolerance (METS): >4 METS  Chart reviewed  Existing labs reviewed  Patient summary reviewed  Patient is not a current smoker  Induction- intravenous  Postoperative Plan- Plan for postoperative opioid use  Informed Consent- Anesthetic plan and risks discussed with patient  I personally reviewed this patient with the CRNA  Discussed and agreed on the Anesthesia Plan with the CRNA  Diamond Juarez 90 tablet 1    cycloSPORINE (RESTASIS) 0 05 % ophthalmic emulsion Apply 1 drop to eye 2 (two) times a day      hydroxychloroquine (PLAQUENIL) 200 mg tablet Take 200 mg by mouth daily       oxybutynin (DITROPAN) 5 mg tablet Take 1 tablet (5 mg total) by mouth 2 (two) times a day 180 tablet 1    simvastatin (ZOCOR) 40 mg tablet Take 1 tablet (40 mg total) by mouth daily 90 tablet 1     No current facility-administered medications for this visit  Objective:    /78 (BP Location: Left arm, Patient Position: Sitting, Cuff Size: Adult)   Pulse 78   Temp 98 4 °F (36 9 °C) (Tympanic)   Ht 5' 5" (1 651 m)   Wt 54 kg (119 lb)   SpO2 94% Comment: ra  BMI 19 80 kg/m²        Physical Exam      Constitutional:  Well developed, well nourished, no acute distress, non-toxic appearance   Eyes:  PERRL, conjunctiva normal , non icteric sclera  HENT:  Atraumatic, oropharynx moist with postnasal drip  No exudates  Tympanic membranes clear mild cerumen impaction    Neck-  supple   Respiratory:  CTA b/l, normal breath sounds, no rales, no wheezing   Cardiovascular:  RRR, no murmurs, no LE edema b/l  GI:  Soft, nondistended, normal bowel sounds x 4, nontender, no organomegaly, no mass, no rebound, no guarding   Neurologic:  no focal deficits noted   Psychiatric:  Speech and behavior appropriate , AAO x 3  Lymph, no cervical lymphadenopathy palpated         Yarelis Ibarra DO

## 2022-03-17 ENCOUNTER — PROCEDURE VISIT (OUTPATIENT)
Dept: OBGYN CLINIC | Facility: CLINIC | Age: 72
End: 2022-03-17
Payer: MEDICARE

## 2022-03-17 VITALS
BODY MASS INDEX: 19.83 KG/M2 | DIASTOLIC BLOOD PRESSURE: 74 MMHG | HEART RATE: 64 BPM | WEIGHT: 119 LBS | HEIGHT: 65 IN | SYSTOLIC BLOOD PRESSURE: 111 MMHG

## 2022-03-17 DIAGNOSIS — M17.11 ARTHRITIS OF RIGHT KNEE: Primary | ICD-10-CM

## 2022-03-17 PROCEDURE — 20610 DRAIN/INJ JOINT/BURSA W/O US: CPT | Performed by: ORTHOPAEDIC SURGERY

## 2022-03-17 RX ORDER — HYALURONATE SODIUM 10 MG/ML
20 SYRINGE (ML) INTRAARTICULAR
Status: COMPLETED | OUTPATIENT
Start: 2022-03-17 | End: 2022-03-17

## 2022-03-17 RX ADMIN — Medication 20 MG: at 11:07

## 2022-03-17 NOTE — PROGRESS NOTES
1  Arthritis of right knee       Patient is here for her 1st injection of Euflexxa into the right knee  Patient reports pain  All organ systems normal  Physical exam of the knee shows no effusion no ecchymosis        Large joint arthrocentesis: R knee  Universal Protocol:  Consent given by: patient    Supporting Documentation  Indications: pain   Procedure Details  Location: knee - R knee  Needle size: 22 G  Ultrasound guidance: no  Approach: anterolateral  Medications administered: 20 mg Sodium Hyaluronate 20 MG/2ML    Patient tolerance: patient tolerated the procedure well with no immediate complications          Patient tolerated procedure follow up 1 week

## 2022-03-21 ENCOUNTER — TELEPHONE (OUTPATIENT)
Dept: OBGYN CLINIC | Facility: CLINIC | Age: 72
End: 2022-03-21

## 2022-03-21 NOTE — TELEPHONE ENCOUNTER
Spoke to patient  Advised that she be prepared with OTC medication as able, NSAID or tylenol to take as recommended on the bottle for pain because standing for long periods can be painful after injection  Understanding verbalized

## 2022-03-21 NOTE — TELEPHONE ENCOUNTER
Dr Schmid   RE: Gel Injections  #: [phone]      Patient has her 2nd appointment for her right knee on 3/24/22  She is to do volunteer work with her [employer] on Friday 3/25/22 which would require her to be standing from 9am-1pm  She wants to make sure that this is something that is ok to do after having her gel injection the day prior  Please call patient back

## 2022-03-24 ENCOUNTER — PROCEDURE VISIT (OUTPATIENT)
Dept: OBGYN CLINIC | Facility: CLINIC | Age: 72
End: 2022-03-24
Payer: MEDICARE

## 2022-03-24 VITALS — WEIGHT: 119 LBS | HEIGHT: 65 IN | BODY MASS INDEX: 19.83 KG/M2

## 2022-03-24 DIAGNOSIS — M17.11 PRIMARY OSTEOARTHRITIS OF RIGHT KNEE: Primary | ICD-10-CM

## 2022-03-24 PROCEDURE — 20610 DRAIN/INJ JOINT/BURSA W/O US: CPT | Performed by: ORTHOPAEDIC SURGERY

## 2022-03-24 RX ORDER — HYALURONATE SODIUM 10 MG/ML
20 SYRINGE (ML) INTRAARTICULAR
Status: COMPLETED | OUTPATIENT
Start: 2022-03-24 | End: 2022-03-24

## 2022-03-24 RX ADMIN — Medication 20 MG: at 11:30

## 2022-03-24 NOTE — PROGRESS NOTES
1  Primary osteoarthritis of right knee       Patient is here for her 2 injection of Euflexxa into the right knee  Patient reports pain  All organ systems normal  Physical exam of the knee shows no effusion no ecchymosis        Large joint arthrocentesis: R knee  Universal Protocol:  Consent given by: patient  Patient understanding: patient states understanding of the procedure being performed  Site marked: the operative site was marked  Patient identity confirmed: verbally with patient    Supporting Documentation  Indications: pain   Procedure Details  Location: knee - R knee  Needle size: 22 G  Ultrasound guidance: no  Approach: lateral  Medications administered: 20 mg Sodium Hyaluronate 20 MG/2ML    Patient tolerance: patient tolerated the procedure well with no immediate complications  Dressing:  Sterile dressing applied          Patient tolerated procedure follow up as scheduled for injection #3

## 2022-03-31 ENCOUNTER — PROCEDURE VISIT (OUTPATIENT)
Dept: OBGYN CLINIC | Facility: CLINIC | Age: 72
End: 2022-03-31
Payer: MEDICARE

## 2022-03-31 VITALS
HEART RATE: 62 BPM | WEIGHT: 119 LBS | BODY MASS INDEX: 19.83 KG/M2 | DIASTOLIC BLOOD PRESSURE: 75 MMHG | SYSTOLIC BLOOD PRESSURE: 112 MMHG | HEIGHT: 65 IN

## 2022-03-31 DIAGNOSIS — M17.11 PRIMARY OSTEOARTHRITIS OF RIGHT KNEE: Primary | ICD-10-CM

## 2022-03-31 PROCEDURE — 20610 DRAIN/INJ JOINT/BURSA W/O US: CPT | Performed by: ORTHOPAEDIC SURGERY

## 2022-03-31 RX ORDER — HYALURONATE SODIUM 10 MG/ML
20 SYRINGE (ML) INTRAARTICULAR
Status: COMPLETED | OUTPATIENT
Start: 2022-03-31 | End: 2022-03-31

## 2022-03-31 RX ADMIN — Medication 20 MG: at 10:56

## 2022-03-31 NOTE — PROGRESS NOTES
1  Primary osteoarthritis of right knee  Large joint arthrocentesis     Patient is here for her 3rd injection of Euflexxa into the right knee  Patient reports improvements  Physical exam of the knee shows no effusion no ecchymosis  All other organ systems normal    Large joint arthrocentesis: R knee  Universal Protocol:  Consent given by: patient  Time out: Immediately prior to procedure a "time out" was called to verify the correct patient, procedure, equipment, support staff and site/side marked as required  Site marked: the operative site was marked  Supporting Documentation  Indications: pain   Procedure Details  Location: knee - R knee  Preparation: Patient was prepped and draped in the usual sterile fashion  Needle size: 22 G  Ultrasound guidance: no  Approach: anterolateral  Medications administered: 20 mg Sodium Hyaluronate 20 MG/2ML    Patient tolerance: patient tolerated the procedure well with no immediate complications  Dressing:  Sterile dressing applied          Patient tolerated procedure follow up p r n

## 2022-04-04 ENCOUNTER — OFFICE VISIT (OUTPATIENT)
Dept: INTERNAL MEDICINE CLINIC | Age: 72
End: 2022-04-04
Payer: MEDICARE

## 2022-04-04 VITALS
HEIGHT: 65 IN | SYSTOLIC BLOOD PRESSURE: 100 MMHG | WEIGHT: 121.3 LBS | HEART RATE: 76 BPM | DIASTOLIC BLOOD PRESSURE: 60 MMHG | OXYGEN SATURATION: 97 % | TEMPERATURE: 98.7 F | BODY MASS INDEX: 20.21 KG/M2

## 2022-04-04 DIAGNOSIS — I67.850 CADASIL (CEREBRAL AUTOSOMAL DOMINANT ARTERIOPATHY WITH SUBCORTICAL INFARCTS AND LEUKOENCEPHALOPATHY): ICD-10-CM

## 2022-04-04 DIAGNOSIS — M80.00XA AGE-RELATED OSTEOPOROSIS WITH CURRENT PATHOLOGICAL FRACTURE, INITIAL ENCOUNTER: Primary | ICD-10-CM

## 2022-04-04 PROCEDURE — 96372 THER/PROPH/DIAG INJ SC/IM: CPT | Performed by: INTERNAL MEDICINE

## 2022-04-04 PROCEDURE — 99213 OFFICE O/P EST LOW 20 MIN: CPT | Performed by: INTERNAL MEDICINE

## 2022-04-04 NOTE — PROGRESS NOTES
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)    stable followed up by the Neurology         M*Modal software was used to dictate this note  It may contain errors with dictating incorrect words or incorrect spelling  Please contact the provider directly with any questions  Subjective:   Chief Complaint   Patient presents with    Follow-up     age related osteoporosis  Patient ID: Glendy Mosley is a 66 y o  female  HPI  Patient is here for her injections of evenity, complaining of some daytime sleepiness she takes about 2 hours of nap after she eats lunch she just wanted to know is it the normal or abnormal I asked her about snoring and sleep apnea she does not have it she does not have any medication which can cause this problem  About osteoporosis plan is to continue with the present medication and follow-up DEXA scan she does not have any recent problem with weight loss or any fall or fractures  The following portions of the patient's history were reviewed and updated as appropriate: allergies, current medications, past family history, past medical history, past social history, past surgical history and problem list     Review of Systems   HENT: Negative  Eyes: Negative  Respiratory: Negative  Cardiovascular: Negative  Gastrointestinal: Negative  Endocrine: Negative            Past Medical History:   Diagnosis Date    Arthralgia     Memory loss 9/3/2019    [facility]rop[facility] [address] [facility]     Osteoporosis     [facility]oid arthritis [facility] [address] [facility]     Sjogren's syndrome ([facility] [address] [facility]     Stroke (cerebrum) (HCC)     Transient cerebral ischemia          Current Outpatient Medications:     aspirin (Aspir-Low) 81 mg EC tablet, Take 81 mg by mouth daily  , Disp: , Rfl:     calcium citrate-Vitamin D (CITRACAL PETITES/VITAMIN D) 200 mg-250 units, Take 1 tablet by mouth 2 (two) times a day , Disp: , Rfl:     clopidogrel (PLAVIX) 75 mg tablet, Take 1 tablet (75 mg total) by mouth daily, Disp: 90 tablet, Rfl: 1    cycloSPORINE (RESTASIS) 0 05 % ophthalmic emulsion, Apply 1 drop to eye 2 (two) times a day, Disp: , Rfl:     hydroxychloroquine (PLAQUENIL) 200 mg tablet, Take 200 mg by mouth daily , Disp: , Rfl:     oxybutynin (DITROPAN) 5 mg tablet, Take 1 tablet (5 mg total) by mouth 2 (two) times a day, Disp: 180 tablet, Rfl: 1    simvastatin (ZOCOR) 40 mg tablet, Take 1 tablet (40 mg total) by mouth daily, Disp: 90 tablet, Rfl: 1    Current Facility-Administered Medications:     romosozumab-aqqg (EVENITY) subcutaneous injection 210 mg, 210 mg, Subcutaneous (multi inj), Once, Marcelina Solano MD    No Known Allergies    Social History   Past Surgical History:   Procedure Laterality Date    BREAST EXCISIONAL BIOPSY Left 1983    benign    BREAST EXCISIONAL BIOPSY Left 1982    benign    BREAST EXCISIONAL BIOPSY Right 1982    benign    CATARACT EXTRACTION, BILATERAL      HYSTERECTOMY      age 47   Oma Frankel OOPHORECTOMY      both     Family History   Problem Relation Age of Onset    Lymphoma Mother 61        ACUTE    Stroke Mother 68    Diabetes Father     No Known Problems Maternal Grandmother     No Known Problems Maternal Grandfather     No Known Problems Paternal Grandmother     No Known Problems Paternal Grandfather     Stroke Sister     No Known Problems Daughter     No Known Problems Sister     No Known Problems Maternal Aunt     No Known Problems Maternal Aunt     No Known Problems Paternal Aunt     No Known Problems Paternal Aunt        Objective:  /60 (BP Location: Left arm, Patient Position: Sitting, Cuff Size: Standard)   Pulse 76   Temp 98 7 °F (37 1 °C) (Temporal)   Ht 5' 5" (1 651 m)   Wt 55 kg (121 lb 4 8 oz)   SpO2 97% BMI 20 19 kg/m²        Physical Exam  Constitutional:       Appearance: She is well-developed  Eyes:      Pupils: Pupils are equal, round, and reactive to light  Cardiovascular:      Rate and Rhythm: Normal rate  Pulmonary:      Effort: Pulmonary effort is normal       Breath sounds: Normal breath sounds  Musculoskeletal:      Cervical back: Normal range of motion and neck supple

## 2022-04-20 DIAGNOSIS — E78.5 HYPERLIPIDEMIA, UNSPECIFIED HYPERLIPIDEMIA TYPE: ICD-10-CM

## 2022-04-20 RX ORDER — SIMVASTATIN 40 MG
40 TABLET ORAL DAILY
Qty: 90 TABLET | Refills: 1 | Status: SHIPPED | OUTPATIENT
Start: 2022-04-20

## 2022-05-02 ENCOUNTER — OFFICE VISIT (OUTPATIENT)
Dept: INTERNAL MEDICINE CLINIC | Facility: OTHER | Age: 72
End: 2022-05-02
Payer: MEDICARE

## 2022-05-02 ENCOUNTER — HOSPITAL ENCOUNTER (OUTPATIENT)
Dept: NON INVASIVE DIAGNOSTICS | Facility: HOSPITAL | Age: 72
Discharge: HOME | End: 2022-05-02
Attending: INTERNAL MEDICINE
Payer: MEDICARE

## 2022-05-02 VITALS
DIASTOLIC BLOOD PRESSURE: 68 MMHG | HEIGHT: 65 IN | OXYGEN SATURATION: 99 % | RESPIRATION RATE: 18 BRPM | TEMPERATURE: 98.1 F | HEART RATE: 66 BPM | BODY MASS INDEX: 20.29 KG/M2 | SYSTOLIC BLOOD PRESSURE: 96 MMHG | WEIGHT: 121.8 LBS

## 2022-05-02 DIAGNOSIS — M79.89 PAIN AND SWELLING OF LOWER LEG, RIGHT: ICD-10-CM

## 2022-05-02 DIAGNOSIS — M79.661 PAIN AND SWELLING OF LOWER LEG, RIGHT: ICD-10-CM

## 2022-05-02 DIAGNOSIS — M79.661 PAIN AND SWELLING OF LOWER LEG, RIGHT: Primary | ICD-10-CM

## 2022-05-02 DIAGNOSIS — M79.89 PAIN AND SWELLING OF LOWER LEG, RIGHT: Primary | ICD-10-CM

## 2022-05-02 PROBLEM — J06.9 VIRAL URI: Status: RESOLVED | Noted: 2022-03-07 | Resolved: 2022-05-02

## 2022-05-02 PROCEDURE — 99213 OFFICE O/P EST LOW 20 MIN: CPT | Performed by: INTERNAL MEDICINE

## 2022-05-02 PROCEDURE — 93971 EXTREMITY STUDY: CPT | Performed by: SURGERY

## 2022-05-02 PROCEDURE — 93971 EXTREMITY STUDY: CPT

## 2022-05-02 NOTE — PROGRESS NOTES
Assessment/Plan:    Pain and swelling of lower leg, right  Will order stat venous duplex of the right leg for further evaluation, rule out DVT / SVT  Diagnoses and all orders for this visit:    Pain and swelling of lower leg, right  -     VAS lower limb venous duplex study, unilateral/limited; Future                Subjective:      Patient ID: Yuriy Tang is a 67 y o  female  Chief Complaint   Patient presents with    Leg Pain     for past few weeks, slide down a tread mill onto her belly the inner leg is very painful and swollen  Prior to this she saw Dr Satish Beck and he gave her 3 injections in her right knee and she was feeling great   health maintenance     AWV due in Aug     FYI     does have appt on Thursday for 10th evenity shot       67year old male is seen today with concern for worsening right knee pain  She has seen orthopedic surgery for right knee pain, received steroid injections in the past for posterior knee pain  She had an incident a few weeks ago where she tripped while using a treadmill  She suffered bruising of her right thigh, hip, and buttocks  Bruising is improving however she developed right medial and posterior knee pain and medial distal thigh pain  Leg Pain   The incident occurred at the gym  There was no injury mechanism  The pain is present in the right knee and right thigh  Pertinent negatives include no numbness  She has tried acetaminophen for the symptoms  The treatment provided mild relief  The following portions of the patient's history were reviewed and updated as appropriate: allergies, current medications, past family history, past medical history, past social history, past surgical history and problem list     Review of Systems   Constitutional: Negative for activity change, appetite change, chills, diaphoresis, fatigue and fever  HENT: Negative for congestion, postnasal drip, rhinorrhea, sinus pressure, sinus pain, sneezing and sore throat  Eyes: Negative for visual disturbance  Respiratory: Negative for apnea, cough, choking, chest tightness, shortness of breath and wheezing  Cardiovascular: Negative for chest pain, palpitations and leg swelling  Gastrointestinal: Negative for abdominal distention, abdominal pain, anal bleeding, blood in stool, constipation, diarrhea, nausea and vomiting  Endocrine: Negative for cold intolerance and heat intolerance  Genitourinary: Negative for difficulty urinating, dysuria and hematuria  Musculoskeletal: Negative  Skin: Negative  Neurological: Negative for dizziness, weakness, light-headedness, numbness and headaches  Hematological: Negative for adenopathy  Psychiatric/Behavioral: Negative for agitation, sleep disturbance and suicidal ideas  All other systems reviewed and are negative          Past Medical History:   Diagnosis Date    Arthralgia     Memory loss 9/3/2019    [facility] [address] [facility]     Osteoporosis     Rheumatoid arthritis ([facility] [address] [facility]     Sjogren's syndrome ([facility] [address] [facility]     Stroke [facility]cerebrum) ([facility])     Transient cerebral ischemia          Current Outpatient Medications:     aspirin (Aspir-Low) 81 mg EC tablet, Take 81 mg by mouth daily  , Disp: , Rfl:     calcium citrate-Vitamin D (CITRACAL PETITES/VITAMIN D) 200 mg-250 units, Take 1 tablet by mouth 2 (two) times a day , Disp: , Rfl:     clopidogrel (PLAVIX) 75 mg tablet, Take 1 tablet (75 mg total) by mouth daily, Disp: 90 tablet, Rfl: 1    cycloSPORINE (RESTASIS) 0 05 % ophthalmic emulsion, Apply 1 drop to eye 2 (two) times a day, Disp: , Rfl:     hydroxychloroquine (PLAQUENIL) 200 mg tablet, Take 200 mg by mouth daily , Disp: , Rfl:     oxybutynin (DITROPAN) 5 mg tablet, Take 1 tablet (5 mg total) by mouth 2 (two) times a day, Disp: 180 tablet, Rfl: 1    simvastatin (ZOCOR) 40 mg tablet, Take 1 tablet (40 mg total) by mouth daily, Disp: 90 tablet, Rfl: 1    No Known Allergies    Social History   Past Surgical History: Procedure Laterality Date    BREAST EXCISIONAL BIOPSY Left 1983    benign    BREAST EXCISIONAL BIOPSY Left 1982    benign    BREAST EXCISIONAL BIOPSY Right 1982    benign    CATARACT EXTRACTION, BILATERAL      HYSTERECTOMY      age 47    [de-identified]      both     Family History   Problem Relation Age of Onset    Lymphoma Mother 61        ACUTE    Stroke Mother 68    Diabetes Father     No Known Problems Maternal Grandmother     No Known Problems Maternal Grandfather     No Known Problems Paternal Grandmother     No Known Problems Paternal Grandfather     Stroke Sister     No Known Problems Daughter     No Known Problems Sister     No Known Problems Maternal Aunt     No Known Problems Maternal Aunt     No Known Problems Paternal Aunt     No Known Problems Paternal Aunt        Objective:  BP 96/68 (BP Location: Left arm, Patient Position: Sitting, Cuff Size: Standard)   Pulse 66   Temp 98 1 °F (36 7 °C) (Temporal)   Resp 18   Ht 5' 5" (1 651 m)   Wt 55 2 kg (121 lb 12 8 oz)   SpO2 99%   BMI 20 27 kg/m²     No results found for this or any previous visit (from the past 1344 hour(s))  Physical Exam  Vitals and nursing note reviewed  Constitutional:       General: She is not in acute distress  Appearance: She is well-developed  She is not diaphoretic  HENT:      Head: Normocephalic and atraumatic  Eyes:      General:         Right eye: No discharge  Left eye: No discharge  Conjunctiva/sclera: Conjunctivae normal       Pupils: Pupils are equal, round, and reactive to light  Neck:      Thyroid: No thyromegaly  Vascular: No JVD  Cardiovascular:      Rate and Rhythm: Normal rate and regular rhythm  Heart sounds: Normal heart sounds  No murmur heard  No friction rub  No gallop  Pulmonary:      Effort: Pulmonary effort is normal  No respiratory distress  Breath sounds: Normal breath sounds  No wheezing or rales     Chest:      Chest wall: No tenderness  Abdominal:      General: There is no distension  Palpations: Abdomen is soft  Tenderness: There is no abdominal tenderness  Musculoskeletal:         General: No tenderness or deformity  Normal range of motion  Cervical back: Normal range of motion and neck supple  Lymphadenopathy:      Cervical: No cervical adenopathy  Skin:     General: Skin is warm and dry  Coloration: Skin is not pale  Findings: No erythema or rash  Neurological:      Mental Status: She is alert and oriented to person, place, and time  Cranial Nerves: No cranial nerve deficit  Coordination: Coordination normal    Psychiatric:         Behavior: Behavior normal          Thought Content:  Thought content normal          Judgment: Judgment normal

## 2022-05-05 ENCOUNTER — OFFICE VISIT (OUTPATIENT)
Dept: INTERNAL MEDICINE CLINIC | Facility: OTHER | Age: 72
End: 2022-05-05
Payer: MEDICARE

## 2022-05-05 VITALS
TEMPERATURE: 99 F | OXYGEN SATURATION: 99 % | HEART RATE: 81 BPM | SYSTOLIC BLOOD PRESSURE: 100 MMHG | WEIGHT: 122.4 LBS | BODY MASS INDEX: 20.39 KG/M2 | RESPIRATION RATE: 18 BRPM | DIASTOLIC BLOOD PRESSURE: 62 MMHG | HEIGHT: 65 IN

## 2022-05-05 DIAGNOSIS — M80.00XA AGE-RELATED OSTEOPOROSIS WITH CURRENT PATHOLOGICAL FRACTURE, INITIAL ENCOUNTER: Primary | ICD-10-CM

## 2022-05-05 DIAGNOSIS — M05.79 RHEUMATOID ARTHRITIS INVOLVING MULTIPLE SITES WITH POSITIVE RHEUMATOID FACTOR (HCC): ICD-10-CM

## 2022-05-05 DIAGNOSIS — E78.2 MIXED HYPERLIPIDEMIA: ICD-10-CM

## 2022-05-05 DIAGNOSIS — I63.81 MULTIPLE LACUNAR INFARCTS (HCC): ICD-10-CM

## 2022-05-05 PROCEDURE — 96372 THER/PROPH/DIAG INJ SC/IM: CPT | Performed by: INTERNAL MEDICINE

## 2022-05-05 PROCEDURE — 1160F RVW MEDS BY RX/DR IN RCRD: CPT | Performed by: INTERNAL MEDICINE

## 2022-05-05 PROCEDURE — 99213 OFFICE O/P EST LOW 20 MIN: CPT | Performed by: INTERNAL MEDICINE

## 2022-05-05 NOTE — PROGRESS NOTES
Assessment/Plan:    1  Osteoporosis  Injections Evenity given in each thigh  Tolerated very well  Continue with weight-bearing exercises along with calcium and vitamin-D supplementation    2  Hyperlipidemia  Continue with present dose of simvastatin 40 mg daily    3  Multiple lacunar infarct  Continue with present regimen    Follow-up  Keep next scheduled appointment with PCP     Diagnoses and all orders for this visit:    Age-related osteoporosis with current pathological fracture, initial encounter  -     romosozumab-aqqg (EVENITY) subcutaneous injection 210 mg    Rheumatoid arthritis involving multiple sites with positive rheumatoid factor ([facility] )    Mixed hyperlipidemia    Multiple lacunar infarcts ([facility] )               Subjective:          Patient ID: Rivka Zurita is a 67 y o  female  Patient is here for Evenity  injection for osteoporosis  The following portions of the patient's history were reviewed and updated as appropriate: allergies, current medications, past family history, past medical history, past social history, past surgical history and problem list     Review of Systems   Constitutional: Negative for fatigue and fever  HENT: Negative for congestion, ear discharge, ear pain, postnasal drip, sinus pressure, sore throat, tinnitus and trouble swallowing  Eyes: Negative for discharge, itching and visual disturbance  Respiratory: Negative for cough and shortness of breath  Cardiovascular: Negative for chest pain and palpitations  Gastrointestinal: Negative for abdominal pain, diarrhea, nausea and vomiting  Endocrine: Negative for cold intolerance and polyuria  Genitourinary: Negative for difficulty urinating, dysuria and urgency  Musculoskeletal: Positive for arthralgias  Negative for neck pain  Skin: Negative for rash  Allergic/Immunologic: Negative for environmental allergies  Neurological: Negative for dizziness, weakness and headaches     Psychiatric/Behavioral: Negative for agitation and behavioral problems  The patient is not nervous/anxious            Past Medical History:   Diagnosis Date    Arthralgia     Memory loss 9/3/2019    [facility]rop[facility]     Osteoporosis     Rheumatoid arthritis ([facility] )     Sjogren's syndrome ([facility] )     Stroke (cerebrum) (HCC)     Transient cerebral ischemia          Current Outpatient Medications:     aspirin (Aspir-Low) 81 mg EC tablet, Take 81 mg by mouth daily  , Disp: , Rfl:     calcium citrate-Vitamin D (CITRACAL PETITES/VITAMIN D) 200 mg-250 units, Take 1 tablet by mouth 2 (two) times a day , Disp: , Rfl:     clopidogrel (PLAVIX) 75 mg tablet, Take 1 tablet (75 mg total) by mouth daily, Disp: 90 tablet, Rfl: 1    cycloSPORINE (RESTASIS) 0 05 % ophthalmic emulsion, Apply 1 drop to eye 2 (two) times a day, Disp: , Rfl:     hydroxychloroquine (PLAQUENIL) 200 mg tablet, Take 200 mg by mouth daily , Disp: , Rfl:     oxybutynin (DITROPAN) 5 mg tablet, Take 1 tablet (5 mg total) by mouth 2 (two) times a day, Disp: 180 tablet, Rfl: 1    simvastatin (ZOCOR) 40 mg tablet, Take 1 tablet (40 mg total) by mouth daily, Disp: 90 tablet, Rfl: 1    Current Facility-Administered Medications:     romosozumab-aqqg (EVENITY) subcutaneous injection 210 mg, 210 mg, Subcutaneous (multi inj), Once, Matilde Pak MD    No Known Allergies    Social History   Past Surgical History:   Procedure Laterality Date    BREAST EXCISIONAL BIOPSY Left 1983    benign    BREAST EXCISIONAL BIOPSY Left 1982    benign    BREAST EXCISIONAL BIOPSY Right 1982    benign    CATARACT EXTRACTION, BILATERAL      HYSTERECTOMY      age 47   Nasima Crystal OOPHORECTOMY      both     Family History   Problem Relation Age of Onset    Lymphoma Mother 61        ACUTE    Stroke Mother 68    Diabetes Father     No Known Problems Maternal Grandmother     No Known Problems Maternal Grandfather     No Known Problems Paternal Grandmother     No Known Problems Paternal Grandfather    Nasima Pruett Stroke Sister     No Known Problems Daughter     No Known Problems Sister     No Known Problems Maternal Aunt     No Known Problems Maternal Aunt     No Known Problems Paternal Aunt     No Known Problems Paternal Aunt        Objective:  /62 (BP Location: Left arm, Patient Position: Sitting, Cuff Size: Standard)   Pulse 81   Temp 99 °F (37 2 °C) (Temporal)   Resp 18   Ht 5' 5" (1 651 m)   Wt 55 5 kg (122 lb 6 4 oz)   SpO2 99%   BMI 20 37 kg/m²   Body mass index is 20 37 kg/m²  Physical Exam  Constitutional:       Appearance: She is well-developed  HENT:      Head: Normocephalic  Right Ear: External ear normal       Left Ear: External ear normal    Eyes:      General: No scleral icterus  Pupils: Pupils are equal, round, and reactive to light  Neck:      Thyroid: No thyromegaly  Trachea: No tracheal deviation  Cardiovascular:      Rate and Rhythm: Normal rate and regular rhythm  Heart sounds: Normal heart sounds  No murmur heard  Pulmonary:      Effort: Pulmonary effort is normal  No respiratory distress  Breath sounds: Normal breath sounds  Chest:      Chest wall: No tenderness  Abdominal:      General: Bowel sounds are normal       Palpations: Abdomen is soft  There is no mass  Tenderness: There is no abdominal tenderness  Musculoskeletal:         General: Normal range of motion  Cervical back: Normal range of motion and neck supple  Right lower leg: No edema  Left lower leg: No edema  Lymphadenopathy:      Cervical: No cervical adenopathy  Skin:     General: Skin is warm  Neurological:      Mental Status: She is alert and oriented to person, place, and time  Cranial Nerves: No cranial nerve deficit     Psychiatric:         Mood and Affect: Mood normal          Behavior: Behavior normal

## 2022-06-09 ENCOUNTER — OFFICE VISIT (OUTPATIENT)
Dept: INTERNAL MEDICINE CLINIC | Facility: OTHER | Age: 72
End: 2022-06-09
Payer: MEDICARE

## 2022-06-09 ENCOUNTER — TELEPHONE (OUTPATIENT)
Dept: INTERNAL MEDICINE CLINIC | Facility: OTHER | Age: 72
End: 2022-06-09

## 2022-06-09 VITALS
TEMPERATURE: 97.8 F | SYSTOLIC BLOOD PRESSURE: 100 MMHG | OXYGEN SATURATION: 100 % | WEIGHT: 120 LBS | DIASTOLIC BLOOD PRESSURE: 68 MMHG | BODY MASS INDEX: 19.99 KG/M2 | HEART RATE: 65 BPM | HEIGHT: 65 IN

## 2022-06-09 DIAGNOSIS — M81.0 AGE-RELATED OSTEOPOROSIS WITHOUT CURRENT PATHOLOGICAL FRACTURE: Primary | ICD-10-CM

## 2022-06-09 PROCEDURE — 99213 OFFICE O/P EST LOW 20 MIN: CPT | Performed by: NURSE PRACTITIONER

## 2022-06-09 PROCEDURE — 96372 THER/PROPH/DIAG INJ SC/IM: CPT | Performed by: NURSE PRACTITIONER

## 2022-06-09 NOTE — PROGRESS NOTES
Assessment/Plan:    Problem List Items Addressed This Visit        Musculoskeletal and Integument    Osteoporosis - Primary     - Evenity #11 injections given in bilateral upper arms  - follow up in 1 month  - continue calcium and vitamin D, continue weight bearing exercise            Relevant Medications    romosozumab-aqqg (EVENITY) subcutaneous injection 210 mg (Completed) (Start on 6/9/2022 10:00 AM)             [employer] software was used to dictate this note  It may contain errors with dictating incorrect words or incorrect spelling  Please contact the provider directly with any questions  Subjective:      Patient ID: Emerald Duong is a 67 y o  female  HPI     Patient presents today for osteoporosis  Last DEXA scan was 08/02/2021  T-score -3 0 in the left total hip  T-score -3 3 in the left femoral neck    She is currently on Affinity injections monthly  Her last injection was on 05/05 when her injections were given in both thighs  She is currently on calcium and vitamin-D      The following portions of the patient's history were reviewed and updated as appropriate: allergies, current medications, past family history, past medical history, past social history, past surgical history and problem list     Review of Systems   Constitutional: Negative for chills and fever  Respiratory: Negative for chest tightness  Cardiovascular: Negative for chest pain           Past Medical History:   Diagnosis Date    Arthralgia     Memory loss 9/3/2019    [facility])     Osteoporosis     Rheumat[facility] )     Sjogren's syndrome ([facility])     Stroke (cerebrum) ([facility])     Transient cerebral ischemia          Current Outpatient Medications:     aspirin (Aspir-Low) 81 mg EC tablet, Take 81 mg by mouth daily  , Disp: , Rfl:     calcium citrate-Vitamin D 200 mg-250 units, Take 1 tablet by mouth 2 (two) times a day , Disp: , Rfl:     clopidogrel (PLAVIX) 75 mg tablet, Take 1 tablet (75 mg total) by mouth daily, Disp: 90 tablet, Rfl: 1    cycloSPORINE (RESTASIS) 0 05 % ophthalmic emulsion, Apply 1 drop to eye 2 (two) times a day, Disp: , Rfl:     hydroxychloroquine (PLAQUENIL) 200 mg tablet, Take 200 mg by mouth daily , Disp: , Rfl:     oxybutynin (DITROPAN) 5 mg tablet, Take 1 tablet (5 mg total) by mouth 2 (two) times a day, Disp: 180 tablet, Rfl: 1    simvastatin (ZOCOR) 40 mg tablet, Take 1 tablet (40 mg total) by mouth daily, Disp: 90 tablet, Rfl: 1  No current facility-administered medications for this visit  No Known Allergies    Social History   Past Surgical History:   Procedure Laterality Date    BREAST EXCISIONAL BIOPSY Left 1983    benign    BREAST EXCISIONAL BIOPSY Left 1982    benign    BREAST EXCISIONAL BIOPSY Right 1982    benign    CATARACT EXTRACTION, BILATERAL      HYSTERECTOMY      age 47   Valaria Reil [de-identified]      both     Family History   Problem Relation Age of Onset    Lymphoma Mother 61        ACUTE    Stroke Mother 68    Diabetes Father     No Known Problems Maternal Grandmother     No Known Problems Maternal Grandfather     No Known Problems Paternal Grandmother     No Known Problems Paternal Grandfather     Stroke Sister     No Known Problems Daughter     No Known Problems Sister     No Known Problems Maternal Aunt     No Known Problems Maternal Aunt     No Known Problems Paternal Aunt     No Known Problems Paternal Aunt        Objective:  /68 (BP Location: Left arm, Patient Position: Sitting, Cuff Size: Adult)   Pulse 65   Temp 97 8 °F (36 6 °C) (Temporal)   Ht 5' 5" (1 651 m)   Wt 54 4 kg (120 lb)   SpO2 100% Comment: ra  BMI 19 97 kg/m²      Physical Exam  Vitals reviewed  Constitutional:       General: She is not in acute distress  Appearance: Normal appearance  She is not diaphoretic  HENT:      Head: Normocephalic and atraumatic  Eyes:      Extraocular Movements: Extraocular movements intact        Conjunctiva/sclera: Conjunctivae normal       Pupils: Pupils are equal, round, and reactive to light  Cardiovascular:      Rate and Rhythm: Normal rate and regular rhythm  Heart sounds: Normal heart sounds  Pulmonary:      Effort: Pulmonary effort is normal  No respiratory distress  Breath sounds: Normal breath sounds  No wheezing, rhonchi or rales  Neurological:      Mental Status: She is alert and oriented to person, place, and time  Mental status is at baseline     Psychiatric:         Mood and Affect: Mood normal          Behavior: Behavior normal

## 2022-06-09 NOTE — TELEPHONE ENCOUNTER
Patient called states she was in earlier today and mentioned to someone that she would like a note or letter to try to get a refund for a trip that was planned to [facility] 3 years ago  But it was rescheduled a few times due to Covid  She now states she is not as healthy as she was then and does not want to take the trip  To get the refund she needs a note from her dr stating she does have medical conditions  Please call her back

## 2022-06-09 NOTE — TELEPHONE ENCOUNTER
I recommended that she discuss this with you at her follow-up in July her trip is not until September  I only saw her for Evenity injections and that was all that was discussed during her visit    I am not familiar with her other chronic conditions that would prevent her from going on her trip so I recommended following up with her PCP to discuss any possible letter

## 2022-06-09 NOTE — ASSESSMENT & PLAN NOTE
- Evenity #11 injections given in bilateral upper arms  - follow up in 1 month  - continue calcium and vitamin D, continue weight bearing exercise

## 2022-06-20 ENCOUNTER — HOSPITAL ENCOUNTER (OUTPATIENT)
Dept: RADIOLOGY | Facility: IMAGING CENTER | Age: 72
Discharge: HOME | End: 2022-06-20
Payer: MEDICARE

## 2022-06-20 DIAGNOSIS — I67.850 CADASIL (CEREBRAL AD ARTERIOPATHY W INFARCTS AND LEUKOENCEPHALOPATHY): ICD-10-CM

## 2022-06-20 PROCEDURE — 70551 MRI BRAIN STEM W/O DYE: CPT

## 2022-06-20 PROCEDURE — G1004 CDSM NDSC: HCPCS

## 2022-06-23 ENCOUNTER — OFFICE VISIT (OUTPATIENT)
Dept: NEUROLOGY | Facility: CLINIC | Age: 72
End: 2022-06-23
Payer: MEDICARE

## 2022-06-23 VITALS
WEIGHT: 120 LBS | HEIGHT: 65 IN | BODY MASS INDEX: 19.99 KG/M2 | DIASTOLIC BLOOD PRESSURE: 70 MMHG | SYSTOLIC BLOOD PRESSURE: 110 MMHG | HEART RATE: 64 BPM

## 2022-06-23 DIAGNOSIS — I63.81 CEREBROVASCULAR ACCIDENT (CVA) DUE TO OCCLUSION OF SMALL ARTERY (HCC): ICD-10-CM

## 2022-06-23 PROCEDURE — 3008F BODY MASS INDEX DOCD: CPT | Performed by: INTERNAL MEDICINE

## 2022-06-23 PROCEDURE — 99213 OFFICE O/P EST LOW 20 MIN: CPT | Performed by: PSYCHIATRY & NEUROLOGY

## 2022-06-23 RX ORDER — CLOPIDOGREL BISULFATE 75 MG/1
75 TABLET ORAL DAILY
Qty: 90 TABLET | Refills: 1 | Status: SHIPPED | OUTPATIENT
Start: 2022-06-23

## 2022-06-23 NOTE — PROGRESS NOTES
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)  66 yo female presenting for follow up of CADASIL due to [address] s/p several previous strokes on ASA/Plavix stable for the last 3 years  Last MRI from 6/22 was unremarkable, no new acute changes, consistent with CADASIL    Continues on ASA and Plavix because she continued to have strokes on monotherapy  No stroke like symptoms in the last year  No reported headaches    No change in memory issues at this time, last [address] was from 2020 and was 25/30  She does not want repeat at this time  Plan:  - Continue ASA/Plavix (refilled today)  - MRIs at 2 year intervals  - MOCA next visit  - Follow up in a year           Diagnoses and all orders for this visit:    Cerebrovascular accident (CVA) due to occlusion of small artery (HCC)  -     clopidogrel (PLAVIX) 75 mg tablet; Take 1 tablet (75 mg total) by mouth daily         Subjective:    Brenda Bernstein is presenting for follow up on CADASIL with NOTCH3 mutation on ASA and plavix    LOV with Dr Doreen Seo in 6/22    To review, she has a history of genetically demonstrated CADASIL and typical imaging findings  She has had several strokes in her past and has been stable on ASA and plavix for 3 years now with no new stroke like symptoms in that time  Current medications:  - ASA  - Plavix (refilled today)    Interval history as of 06/23/22, she reports everything has been stable with no difficulties or changes  She does report 1 episode of lightheadedness about 2 weeks ago that occurred when she got up from a nap  We discussed BP management and hydration and standing up slowly  Most recent MRI was unchanged from previous  She has questions about her children and their genetic predisposition to the disease process   Her daughter had previously been worked up but did not get tested because of the cost of genetics  Her son and his 3 children are now in the process of getting worked up genetically  The following portions of the patient's history were reviewed and updated as appropriate: allergies, current medications, past family history, past medical history, past social history, past surgical history and problem list          Objective:    Blood pressure 110/70, pulse 64, height 5' 5" (1 651 m), weight 54 4 kg (120 lb)  Physical Exam  Vitals reviewed  Constitutional:       General: She is not in acute distress  HENT:      Head: Normocephalic and atraumatic  Eyes:      General: Lids are normal       Extraocular Movements: Extraocular movements intact  Pupils: Pupils are equal, round, and reactive to light  Pulmonary:      Effort: Pulmonary effort is normal  No respiratory distress  Neurological:      Mental Status: She is alert  Coordination: Coordination is intact  Deep Tendon Reflexes: Strength normal    Psychiatric:         Mood and Affect: Mood normal          Speech: Speech normal          Behavior: Behavior normal          Neurological Exam  Mental Status  Alert  Recent and remote memory are intact  Speech is normal  Language is fluent with no aphasia  Attention and concentration are normal  Fund of knowledge is appropriate for level of education  Cranial Nerves  CN II: Visual acuity is normal  Visual fields full to confrontation  CN III, IV, VI: Extraocular movements intact bilaterally  Normal lids and orbits bilaterally  Pupils equal round and reactive to light bilaterally  CN V: Facial sensation is normal   CN VII:  Left: There is central facial weakness  Chronic  CN VIII: Hearing is normal   CN IX, X: Palate elevates symmetrically  Normal gag reflex  CN XI: Shoulder shrug strength is normal   CN XII: Tongue midline without atrophy or fasciculations  Motor  Normal muscle bulk throughout  No fasciculations present  Increased in left upper > lower extremity   No abnormal involuntary movements  Strength is 5/5 throughout all four extremities  Sensory  Sensation is intact to light touch, pinprick, vibration and proprioception in all four extremities  Reflexes    Right pathological reflexes: Aixa's absent  Left pathological reflexes: Aixa's absent  2+ in right side, brisker in left side  Coordination    Finger-to-nose, rapid alternating movements and heel-to-shin normal bilaterally without dysmetria  Gait  Normal casual, toe, heel and tandem gait  ROS:    Review of Systems   Constitutional: Negative  Negative for appetite change and fever  HENT: Negative  Negative for hearing loss, tinnitus, trouble swallowing and voice change  Eyes: Negative  Negative for photophobia and pain  Respiratory: Negative  Negative for shortness of breath  Cardiovascular: Negative  Negative for palpitations  Gastrointestinal: Negative  Negative for nausea and vomiting  Endocrine: Negative  Negative for cold intolerance  Genitourinary: Negative  Negative for dysuria, frequency and urgency  Musculoskeletal: Negative  Negative for myalgias and neck pain  Skin: Negative  Negative for rash  Neurological: Negative  Negative for dizziness, tremors, seizures, syncope, facial asymmetry, speech difficulty, weakness, light-headedness, numbness and headaches  Hematological: Negative  Does not bruise/bleed easily  Psychiatric/Behavioral: Negative  Negative for confusion, hallucinations and sleep disturbance  Review of systems as documented by the MA was reviewed in full by myself, Toño Kumar MD      More than 50% of this time spent with the patient was devoted to counseling and coordination of care  Issues addressed during this clinic visit included overall management, medication counseling or monitoring (including adverse effects, side effects and risks of medications)

## 2022-06-23 NOTE — ASSESSMENT & PLAN NOTE
68 yo female presenting for follow up of CADASIL due to [address] s/p several previous strokes on ASA/Plavix stable for the last 3 years  Last MRI from 6/22 was unremarkable, no new acute changes, consistent with CADASIL    Continues on ASA and Plavix because she continued to have strokes on monotherapy  No stroke like symptoms in the last year  No reported headaches    No change in memory issues at this time, last [address] [facility] [address] was from 2020 and was 25/30  She does not want repeat at this time      Plan:  - Continue ASA/Plavix (refilled today)  - MRIs at 2 year intervals  - MOCA next visit  - Follow up in a year

## 2022-07-12 ENCOUNTER — TELEPHONE (OUTPATIENT)
Dept: INTERNAL MEDICINE CLINIC | Facility: OTHER | Age: 72
End: 2022-07-12

## 2022-07-12 ENCOUNTER — OFFICE VISIT (OUTPATIENT)
Dept: INTERNAL MEDICINE CLINIC | Facility: OTHER | Age: 72
End: 2022-07-12
Payer: MEDICARE

## 2022-07-12 VITALS
WEIGHT: 119 LBS | HEART RATE: 71 BPM | OXYGEN SATURATION: 99 % | BODY MASS INDEX: 19.83 KG/M2 | DIASTOLIC BLOOD PRESSURE: 60 MMHG | TEMPERATURE: 97.5 F | HEIGHT: 65 IN | SYSTOLIC BLOOD PRESSURE: 112 MMHG

## 2022-07-12 DIAGNOSIS — M81.0 AGE-RELATED OSTEOPOROSIS WITHOUT CURRENT PATHOLOGICAL FRACTURE: Primary | ICD-10-CM

## 2022-07-12 PROCEDURE — 99213 OFFICE O/P EST LOW 20 MIN: CPT | Performed by: FAMILY MEDICINE

## 2022-07-12 PROCEDURE — 96372 THER/PROPH/DIAG INJ SC/IM: CPT | Performed by: FAMILY MEDICINE

## 2022-07-12 NOTE — TELEPHONE ENCOUNTER
Patient had appointment with Dr Rodriguez Olguin today and was given a script for a DXA to be completed after 8/4/22  She is concerned because she just had one a year ago and is afraid that it will not be covered by insurance  She wanted me to send you a message since she has been getting evenity, she wasn't sure how it works with getting a DXA every year? She would like a call at your earliest convenience

## 2022-07-12 NOTE — TELEPHONE ENCOUNTER
She needs to contact the insurance and find out if they will cover the Dexa scan for this year yet or if she has to wait  I can't tell her if it will or not    Medicare will not tell me anything unless the patient calls them

## 2022-07-12 NOTE — PROGRESS NOTES
Assessment/Plan:    1  Age-related osteoporosis without current pathological fracture  -     DXA bone density spine hip and pelvis; Future; Expected date: 07/12/2022    Evenity 12th injection given in arms bilaterally  Will order DEXA scan due to completion of treatment and severe osteoporosis  Will need to resume Prolia  Continue with calcium and vitamin-D  There are no Patient Instructions on file for this visit  Return for Next scheduled follow up  Subjective:      Patient ID: Pako Aaron is a 67 y o  female  Chief Complaint   Patient presents with    Osteoporosis     Follow up visit   12 th Evenity injection   Last DEXA - 8/2/2021       HPI      Osteoporosis (Follow-Up): The patient's osteoporosis has been stable since the last visit  Most recent DXA results: T-score <-2 5  Comorbid Illnesses: -3 3 is the worst- on chronic prednisone  She has no comorbid illnesses  She has no complications  She has no significant interval events  Symptoms: The patient is currently asymptomatic  denies back pain-- and-- denies   Associated symptoms include no decrease in height   Home precautions: Osteoporosis counseling and education was reviewed with patient and caregivers, including prevention of falls   Medications: the patient is adherent with her medication regimen  Medication(s): vitamin D,-- a calcium supplement-- and-- prolia   Disease Management: the patient is doing well with her osteoporosis goals  The patient is due for DEXA  April 2019-appointment scheduled in July July 2020, here for Prolia   Tolerating well without any issues  Jan 2021: Prolia due today, doing well  , due for DEXA in July March 2022, doing well, no issues or concerns or complaints  Here for even a D injections which she has been tolerating well  Today's injection 8 July 2022, patient here for her last evenity injection  Prefers to get them in her arms due to slight increase in pain in her thighs    Tolerating well   Taking calcium and vitamin-D  Was on Prolia in the past and will need to resume that      The following portions of the patient's history were reviewed and updated as appropriate: allergies, current medications, past family history, past medical history, past social history, past surgical history and problem list     Review of Systems      Constitutional:  Denies fever or chills   Eyes:  Denies double , blurry vision or eye pain  HENT:  Denies nasal congestion or sore throat   Respiratory:  Denies cough or shortness of breath or wheezing  Cardiovascular:  Denies palpitations or chest pain  GI:  Denies abdominal pain, nausea, or vomiting, no loose stools, no reflux  Integument:  Denies rash , no open areas  Neurologic:  Denies headache or focal weakness, no dizziness  : no dysuria, or hematuria        Current Outpatient Medications   Medication Sig Dispense Refill    aspirin (Aspir-Low) 81 mg EC tablet Take 81 mg by mouth daily        clopidogrel (PLAVIX) 75 mg tablet Take 1 tablet (75 mg total) by mouth daily 90 tablet 1    cycloSPORINE (RESTASIS) 0 05 % ophthalmic emulsion Apply 1 drop to eye 2 (two) times a day      hydroxychloroquine (PLAQUENIL) 200 mg tablet Take 200 mg by mouth daily       oxybutynin (DITROPAN) 5 mg tablet Take 1 tablet (5 mg total) by mouth 2 (two) times a day 180 tablet 1    simvastatin (ZOCOR) 40 mg tablet Take 1 tablet (40 mg total) by mouth daily 90 tablet 1    calcium citrate-Vitamin D 200 mg-250 units Take 1 tablet by mouth 2 (two) times a day  (Patient not taking: Reported on 7/12/2022)       No current facility-administered medications for this visit         Objective:    /60 (BP Location: Left arm, Patient Position: Sitting, Cuff Size: Adult)   Pulse 71   Temp 97 5 °F (36 4 °C) (Temporal)   Ht 5' 5" (1 651 m)   Wt 54 kg (119 lb)   SpO2 99%   BMI 19 80 kg/m²        Physical Exam       Constitutional:  Well developed, well nourished, no acute distress, non-toxic appearance   Eyes:  PERRL, conjunctiva normal , non icteric sclera  HENT:  Atraumatic, oropharynx moist  Neck-  supple   Respiratory:  CTA b/l, normal breath sounds, no rales, no wheezing   Cardiovascular:  RRR, no murmurs, no LE edema b/l  GI:  Soft, nondistended, normal bowel sounds x 4, nontender, no organomegaly, no mass, no rebound, no guarding   Neurologic:  no focal deficits noted   Psychiatric:  Speech and behavior appropriate , AAO x 3        Yue Lynn, DO

## 2022-08-18 ENCOUNTER — TELEPHONE (OUTPATIENT)
Dept: INTERNAL MEDICINE CLINIC | Facility: OTHER | Age: 72
End: 2022-08-18

## 2022-08-18 NOTE — TELEPHONE ENCOUNTER
Patient calling because she has a hemmorage in her eye and was seen by her eye doctor who was not concerned  She is calling because she is on plavix and aspirin and she wants to make sure she is ok to continue taking those meds for now and if there is anything else she should be doing to help it until she comes in to see you on 8/26

## 2022-08-19 ENCOUNTER — TELEPHONE (OUTPATIENT)
Dept: INTERNAL MEDICINE CLINIC | Facility: OTHER | Age: 72
End: 2022-08-19

## 2022-08-19 NOTE — TELEPHONE ENCOUNTER
EVELYN   Pt was in [address] [place] [address] 321 ED on 8/17   Pt has pending appt with you on 8/26          Reason for Visit    Reason for Visit -   Reason Comments   Eye Problem Patient states she was cleaning and thinks dust got in her eye   Eye visibly swollen and appears to have blood in eye

## 2022-08-24 ENCOUNTER — OFFICE VISIT (OUTPATIENT)
Dept: INTERNAL MEDICINE CLINIC | Facility: OTHER | Age: 72
End: 2022-08-24
Payer: MEDICARE

## 2022-08-24 ENCOUNTER — TELEPHONE (OUTPATIENT)
Dept: INTERNAL MEDICINE CLINIC | Facility: OTHER | Age: 72
End: 2022-08-24

## 2022-08-24 VITALS
BODY MASS INDEX: 20.29 KG/M2 | WEIGHT: 121.8 LBS | OXYGEN SATURATION: 97 % | DIASTOLIC BLOOD PRESSURE: 70 MMHG | HEART RATE: 69 BPM | TEMPERATURE: 97 F | HEIGHT: 65 IN | SYSTOLIC BLOOD PRESSURE: 110 MMHG

## 2022-08-24 DIAGNOSIS — I74.9 TIA DUE TO EMBOLISM (HCC): ICD-10-CM

## 2022-08-24 DIAGNOSIS — H44.811 EYE HEMORRHAGE, RIGHT: ICD-10-CM

## 2022-08-24 DIAGNOSIS — M81.0 AGE-RELATED OSTEOPOROSIS WITHOUT CURRENT PATHOLOGICAL FRACTURE: Primary | ICD-10-CM

## 2022-08-24 DIAGNOSIS — E78.2 MIXED HYPERLIPIDEMIA: ICD-10-CM

## 2022-08-24 DIAGNOSIS — Z00.00 MEDICARE ANNUAL WELLNESS VISIT, SUBSEQUENT: ICD-10-CM

## 2022-08-24 DIAGNOSIS — I63.81 MULTIPLE LACUNAR INFARCTS (HCC): ICD-10-CM

## 2022-08-24 DIAGNOSIS — G45.9 TIA DUE TO EMBOLISM (HCC): ICD-10-CM

## 2022-08-24 PROBLEM — M79.661 PAIN AND SWELLING OF LOWER LEG, RIGHT: Status: RESOLVED | Noted: 2022-05-02 | Resolved: 2022-08-24

## 2022-08-24 PROBLEM — M79.89 PAIN AND SWELLING OF LOWER LEG, RIGHT: Status: RESOLVED | Noted: 2022-05-02 | Resolved: 2022-08-24

## 2022-08-24 PROCEDURE — 96372 THER/PROPH/DIAG INJ SC/IM: CPT | Performed by: FAMILY MEDICINE

## 2022-08-24 PROCEDURE — 99214 OFFICE O/P EST MOD 30 MIN: CPT | Performed by: FAMILY MEDICINE

## 2022-08-24 PROCEDURE — G0439 PPPS, SUBSEQ VISIT: HCPCS | Performed by: FAMILY MEDICINE

## 2022-08-24 NOTE — PROGRESS NOTES
Assessment/Plan:    1  Age-related osteoporosis without current pathological fracture  -     denosumab (PROLIA) subcutaneous injection 60 mg    2  Medicare annual wellness visit, subsequent            There are no Patient Instructions on file for this visit  No follow-ups on file  Subjective:      Patient ID: Manav Tripp is a 67 y o  female  Chief Complaint   Patient presents with    Osteoporosis     Pt is here for a prolia injection      Follow-up     Pt is here for a 6M follow up  Review labs   Medicare Wellness Visit     Pt is here for AWV  Last AWV 8/12/2022       HPI    Osteoporosis (Follow-Up): The patient's osteoporosis has been stable since the last visit  Most recent DXA results: T-score <-2 5  Comorbid Illnesses: -3 3 is the worst- on chronic prednisone  She has no comorbid illnesses  She has no complications  She has no significant interval events  Symptoms: The patient is currently asymptomatic  denies back pain-- and-- denies   Associated symptoms include no decrease in height   Home precautions: Osteoporosis counseling and education was reviewed with patient and caregivers, including prevention of falls   Medications: the patient is adherent with her medication regimen  Medication(s): vitamin D,-- a calcium supplement-- and-- prolia   Disease Management: the patient is doing well with her osteoporosis goals  The patient is due for DEXA  April 2019-appointment scheduled in July July 2020, here for Prolia   Tolerating well without any issues  Jan 2021: Prolia due today, doing well  , due for DEXA in July March 2022, doing well, no issues or concerns or complaints   Here for even a D injections which she has been tolerating well   Today's injection 8 July 2022, patient here for her last evenity injection  Prefers to get them in her arms due to slight increase in pain in her thighs  Tolerating well  Taking calcium and vitamin-D    Was on Prolia in the past and will need to resume that       Hyperlipidemia, on Zocor and doing well   Laboratory data reviewed and is all is well controlled   Tolerating well July 2020, excellent controlled lipid levels  March 2022, doing very well with lipid levels    No issues or concerns     Vitamin-D deficiency   Has been on over-the-counter calcium and vitamin-D   Doing well   On Prolia and has bone density March 2022, vitamin-D levels in range           The following portions of the patient's history were reviewed and updated as appropriate: allergies, current medications, past family history, past medical history, past social history, past surgical history and problem list     Review of Systems      Current Outpatient Medications   Medication Sig Dispense Refill    aspirin (Aspir-Low) 81 mg EC tablet Take 81 mg by mouth daily        calcium citrate-Vitamin D 200 mg-250 units Take 1 tablet by mouth 2 (two) times a day      clopidogrel (PLAVIX) 75 mg tablet Take 1 tablet (75 mg total) by mouth daily 90 tablet 1    cycloSPORINE (RESTASIS) 0 05 % ophthalmic emulsion Apply 1 drop to eye 2 (two) times a day      hydroxychloroquine (PLAQUENIL) 200 mg tablet Take 200 mg by mouth daily       oxybutynin (DITROPAN) 5 mg tablet Take 1 tablet (5 mg total) by mouth 2 (two) times a day 180 tablet 1    simvastatin (ZOCOR) 40 mg tablet Take 1 tablet (40 mg total) by mouth daily 90 tablet 1     Current Facility-Administered Medications   Medication Dose Route Frequency Provider Last Rate Last Admin    denosumab (PROLIA) subcutaneous injection 60 mg  60 mg Subcutaneous Once Carlos Leonard DO           Objective:    /70 (BP Location: Left arm, Patient Position: Sitting, Cuff Size: Adult)   Pulse 69   Temp (!) 97 °F (36 1 °C) (Tympanic)   Ht 5' 5" (1 651 m)   Wt 55 2 kg (121 lb 12 8 oz)   SpO2 97% Comment: RA  BMI 20 27 kg/m²        Physical Exam           Carlos Leonard DO

## 2022-08-24 NOTE — PROGRESS NOTES
Assessment and Plan:     Problem List Items Addressed This Visit        Musculoskeletal and Integument    Osteoporosis - Primary    Relevant Medications    denosumab (PROLIA) subcutaneous injection 60 mg       Other    Medicare annual wellness visit, subsequent          Depression Screening and Follow-up Plan: Patient was screened for depression during today's encounter  They screened negative with a PHQ-2 score of 0  Preventive health issues were discussed with patient, and age appropriate screening tests were ordered as noted in patient's After Visit Summary  Personalized health advice and appropriate referrals for health education or preventive services given if needed, as noted in patient's After Visit Summary       History of Present Illness:     Patient presents for a Medicare Wellness Visit    HPI   Patient Care Team:  Romeo Reyes DO as PCP - General (Family Medicine)  Mikael Conrad MD (Rheumatology)     Review of Systems:     Review of Systems     Problem List:     Patient Active Problem List   Diagnosis    Abnormal mammogram of right breast    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)    Cerebrovascular accident (CVA) due to vascular occlusion ([address] )    Chronic venous insufficiency    Hyperlipidemia    Osteoporosis    Rheumatoid arthritis with positive rheumatoid factor ([address] )    Sjogren's syndrome ([address] )    Von Reckling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 ([address] )    Screening mammogram, encounter for    High serum high density lipoprotein (HDL)    History of stroke    Medicare annual wellness visit, subsequent    Multiple lacunar infarcts ([address] )    Dry eye    Cataract of both eyes    Medication monitoring encounter    TIA due to embolism ([address] )    Arthritis of right knee    Pain and swelling of lower leg, right      Past Medical and Surgical History:     Past Medical History:   Diagnosis Date    Arthralgia     Memory loss 9/3/2019    [facility]     Osteoporosis     Rheumatoid arthritis [facility]     Sjogren's syndrome ([facility]     Stroke (cerebrum) (HCC)     Transient cerebral ischemia      Past Surgical History:   Procedure Laterality Date    BREAST EXCISIONAL BIOPSY Left 1983    benign    BREAST EXCISIONAL BIOPSY Left 1982    benign    BREAST EXCISIONAL BIOPSY Right 1982    benign    CATARACT EXTRACTION, BILATERAL      HYSTERECTOMY      age 47   Suzy Lama OOPHORECTOMY      both      Family History:     Family History   Problem Relation Age of Onset    Lymphoma Mother 61        ACUTE    Stroke Mother 68    Diabetes Father     No Known Problems Maternal Grandmother     No Known Problems Maternal Grandfather     No Known Problems Paternal Grandmother     No Known Problems Paternal Grandfather     Stroke Sister     No Known Problems Daughter     No Known Problems Sister     No Known Problems Maternal Aunt     No Known Problems Maternal Aunt     No Known Problems Paternal Aunt     No Known Problems Paternal Aunt       Social History:     Social History     Socioeconomic History    Marital status: /Civil Union     Spouse name: None    Number of children: None    Years of education: None    Highest education level: None   Occupational History    Occupation: Privately   Tobacco Use    Smoking status: Never Smoker    Smokeless tobacco: Never Used   Vaping Use    Vaping Use: Never used   Substance and Sexual Activity    Alcohol use:  Yes     Alcohol/week: 7 0 standard drinks     Types: 7 Glasses of wine per week    Drug use: No    Sexual activity: Not Currently   Other Topics Concern    None   Social History Narrative    Exercise habits: the gym; frequency is 2 days per week    Travel history: patient denies being  Out of the country during 1/2014-11/12/2014     Social Determinants of Health     Financial Resource Strain: Low Risk     Difficulty of Paying Living Expenses: Not hard at all   Food Insecurity: Not on file Transportation Needs: No Transportation Needs    Lack of Transportation (Medical): No    Lack of Transportation (Non-Medical):  No   Physical Activity: Not on file   Stress: Not on file   Social Connections: Not on file   Intimate Partner Violence: Not on file   Housing Stability: Not on file      Medications and Allergies:     Current Outpatient Medications   Medication Sig Dispense Refill    aspirin (Aspir-Low) 81 mg EC tablet Take 81 mg by mouth daily        calcium citrate-Vitamin D 200 mg-250 units Take 1 tablet by mouth 2 (two) times a day      clopidogrel (PLAVIX) 75 mg tablet Take 1 tablet (75 mg total) by mouth daily 90 tablet 1    cycloSPORINE (RESTASIS) 0 05 % ophthalmic emulsion Apply 1 drop to eye 2 (two) times a day      hydroxychloroquine (PLAQUENIL) 200 mg tablet Take 200 mg by mouth daily       oxybutynin (DITROPAN) 5 mg tablet Take 1 tablet (5 mg total) by mouth 2 (two) times a day 180 tablet 1    simvastatin (ZOCOR) 40 mg tablet Take 1 tablet (40 mg total) by mouth daily 90 tablet 1     Current Facility-Administered Medications   Medication Dose Route Frequency Provider Last Rate Last Admin    denosumab (PROLIA) subcutaneous injection 60 mg  60 mg Subcutaneous Once Gaynel Snellen, DO         No Known Allergies   Immunizations:     Immunization History   Administered Date(s) Administered    COVID-19 PFIZER VACCINE 0 3 ML IM 03/03/2021, 03/24/2021, 09/29/2021    COVID-19 Pfizer vac (Vazquez-sucrose, gray cap) 12 yr+ IM 04/25/2022    INFLUENZA 12/18/2013, 10/01/2014, 11/18/2014, 09/30/2015, 09/14/2016, 10/03/2017, 10/20/2020    Influenza Split High Dose Preservative Free IM 10/14/2014, 09/30/2015, 09/14/2016, 10/03/2017    Influenza, high dose seasonal 0 7 mL 10/10/2018, 10/08/2019, 10/15/2021    Influenza, seasonal, injectable 10/18/2010, 10/25/2011, 10/17/2012, 10/15/2013    Pneumococcal Conjugate 13-Valent 12/11/2018    Pneumococcal Polysaccharide PPV23 10/15/2009, 11/12/2014, 12/28/2016    Td (adult), adsorbed 07/25/2012    Tdap 01/14/2016, 12/28/2016    Zoster 02/20/2010    influenza, trivalent, adjuvanted 10/05/2020      Health Maintenance:         Topic Date Due    DXA SCAN  08/02/2023    Colorectal Cancer Screening  12/24/2024    Hepatitis C Screening  Completed         Topic Date Due    Influenza Vaccine (1) 09/01/2022      Medicare Screening Tests and Risk Assessments:     Edwardo Calvo is here for her Subsequent Wellness visit  Health Risk Assessment:   Patient rates overall health as very good  Patient feels that their physical health rating is same  Patient is satisfied with their life  Eyesight was rated as same  Hearing was rated as same  Patient feels that their emotional and mental health rating is same  Patients states they are never, rarely angry  Patient states they are often unusually tired/fatigued  Pain experienced in the last 7 days has been none  Patient states that she has experienced no weight loss or gain in last 6 months  Depression Screening:   PHQ-2 Score: 0      Fall Risk Screening: In the past year, patient has experienced: no history of falling in past year      Urinary Incontinence Screening:   Patient has not leaked urine accidently in the last six months  Home Safety:  Patient does not have trouble with stairs inside or outside of their home  Patient has working smoke alarms and has no working carbon monoxide detector  Home safety hazards include: none  Nutrition:   Current diet is Regular  Medications:   Patient is currently taking over-the-counter supplements  OTC medications include: see medication list  Patient is able to manage medications  Activities of Daily Living (ADLs)/Instrumental Activities of Daily Living (IADLs):   Walk and transfer into and out of bed and chair?: Yes  Dress and groom yourself?: Yes    Bathe or shower yourself?: Yes    Feed yourself?  Yes  Do your laundry/housekeeping?: Yes  Manage your money, pay your bills and track your expenses?: Yes  Make your own meals?: Yes    Do your own shopping?: Yes    Previous Hospitalizations:   Any hospitalizations or ED visits within the last 12 months?: No      Advance Care Planning:   Living will: Yes    Durable POA for healthcare: Yes    Advanced directive: Yes      Comments: Her     Cognitive Screening:   Provider or family/friend/caregiver concerned regarding cognition?: No    PREVENTIVE SCREENINGS      Cardiovascular Screening:    General: Screening Not Indicated and History Lipid Disorder      Colorectal Cancer Screening:     General: Screening Current      Breast Cancer Screening:     General: Screening Current      Cervical Cancer Screening:    General: Screening Not Indicated      Osteoporosis Screening:    General: Screening Not Indicated and History Osteoporosis      Lung Cancer Screening:     General: Screening Not Indicated      Hepatitis C Screening:    General: Screening Current    Screening, Brief Intervention, and Referral to Treatment (SBIRT)    Screening  Typical number of drinks in a day: 0  Typical number of drinks in a week: 0  Interpretation: Low risk drinking behavior  Single Item Drug Screening:  How often have you used an illegal drug (including marijuana) or a prescription medication for non-medical reasons in the past year? never    Single Item Drug Screen Score: 0  Interpretation: Negative screen for possible drug use disorder    Brief Intervention  Alcohol & drug use screenings were reviewed  No concerns regarding substance use disorder identified  Other Counseling Topics:   Car/seat belt/driving safety, skin self-exam, sunscreen and regular weightbearing exercise and calcium and vitamin D intake       No exam data present     Physical Exam:     /70 (BP Location: Left arm, Patient Position: Sitting, Cuff Size: Adult)   Pulse 69   Temp (!) 97 °F (36 1 °C) (Tympanic)   Ht 5' 5" (1 651 m)   Wt 55 2 kg (121 lb 12 8 oz)   SpO2 97% Comment: RA  BMI 20 27 kg/m²     Physical Exam     Sang Lyon DO

## 2022-08-24 NOTE — TELEPHONE ENCOUNTER
Patient had prolia today, 8/24/22 and is scheduled for her follow up on 2/28/23 and will need her next prolia at this time

## 2022-09-02 DIAGNOSIS — M05.79 RHEUMATOID ARTHRITIS INVOLVING MULTIPLE SITES WITH POSITIVE RHEUMATOID FACTOR (HCC): Primary | ICD-10-CM

## 2022-09-02 RX ORDER — HYDROXYCHLOROQUINE SULFATE 200 MG/1
200 TABLET, FILM COATED ORAL DAILY
Qty: 90 TABLET | Refills: 0 | Status: SHIPPED | OUTPATIENT
Start: 2022-09-02 | End: 2022-12-01

## 2022-09-02 NOTE — TELEPHONE ENCOUNTER
Pt called saying her dr that prescribes her hydroxychloroquine is retiring and that she needs refills on it  Says she spoke to dr Stephen Taylor about taking over and being the one to refill it       LA:8/24/22  NA:2/28/23    90 day supply

## 2022-10-04 DIAGNOSIS — E78.5 HYPERLIPIDEMIA, UNSPECIFIED HYPERLIPIDEMIA TYPE: ICD-10-CM

## 2022-10-04 RX ORDER — SIMVASTATIN 40 MG
40 TABLET ORAL DAILY
Qty: 90 TABLET | Refills: 1 | Status: SHIPPED | OUTPATIENT
Start: 2022-10-04

## 2022-11-08 DIAGNOSIS — I63.81 CEREBROVASCULAR ACCIDENT (CVA) DUE TO OCCLUSION OF SMALL ARTERY (HCC): ICD-10-CM

## 2022-11-08 RX ORDER — CLOPIDOGREL BISULFATE 75 MG/1
75 TABLET ORAL DAILY
Qty: 90 TABLET | Refills: 1 | Status: SHIPPED | OUTPATIENT
Start: 2022-11-08

## 2022-11-08 RX ORDER — CLOPIDOGREL BISULFATE 75 MG/1
75 TABLET ORAL DAILY
Qty: 90 TABLET | Refills: 3 | OUTPATIENT
Start: 2022-11-08

## 2022-12-09 DIAGNOSIS — M05.79 RHEUMATOID ARTHRITIS INVOLVING MULTIPLE SITES WITH POSITIVE RHEUMATOID FACTOR (HCC): ICD-10-CM

## 2022-12-09 RX ORDER — HYDROXYCHLOROQUINE SULFATE 200 MG/1
200 TABLET, FILM COATED ORAL DAILY
Qty: 90 TABLET | Refills: 0 | Status: SHIPPED | OUTPATIENT
Start: 2022-12-09 | End: 2023-03-09

## 2022-12-21 DIAGNOSIS — R35.0 URINARY FREQUENCY: ICD-10-CM

## 2022-12-21 RX ORDER — OXYBUTYNIN CHLORIDE 5 MG/1
5 TABLET ORAL 2 TIMES DAILY
Qty: 180 TABLET | Refills: 1 | Status: SHIPPED | OUTPATIENT
Start: 2022-12-21

## 2023-01-20 ENCOUNTER — OFFICE VISIT (OUTPATIENT)
Dept: OBGYN CLINIC | Facility: CLINIC | Age: 73
End: 2023-01-20

## 2023-01-20 VITALS — BODY MASS INDEX: 20.16 KG/M2 | WEIGHT: 121 LBS | HEIGHT: 65 IN

## 2023-01-20 DIAGNOSIS — M17.11 PRIMARY OSTEOARTHRITIS OF RIGHT KNEE: Primary | ICD-10-CM

## 2023-01-20 RX ORDER — ROPIVACAINE HYDROCHLORIDE 5 MG/ML
10 INJECTION, SOLUTION EPIDURAL; INFILTRATION; PERINEURAL
Status: COMPLETED | OUTPATIENT
Start: 2023-01-20 | End: 2023-01-20

## 2023-01-20 RX ORDER — TRIAMCINOLONE ACETONIDE 40 MG/ML
80 INJECTION, SUSPENSION INTRA-ARTICULAR; INTRAMUSCULAR
Status: COMPLETED | OUTPATIENT
Start: 2023-01-20 | End: 2023-01-20

## 2023-01-20 RX ADMIN — ROPIVACAINE HYDROCHLORIDE 10 ML: 5 INJECTION, SOLUTION EPIDURAL; INFILTRATION; PERINEURAL at 08:47

## 2023-01-20 RX ADMIN — TRIAMCINOLONE ACETONIDE 80 MG: 40 INJECTION, SUSPENSION INTRA-ARTICULAR; INTRAMUSCULAR at 08:47

## 2023-01-20 NOTE — PATIENT INSTRUCTIONS
Intra-articular Hyaluronic Acid Injection  Intra-articular Hyaluronic Acid Injection Brands  There are several types of Intra-articular Hyaluronic Acid Injections which vary in brand, dosage, and frequency  There are single dose injections as well as a series of injections  Your provider will choose the injection brand and series based on clinical factors as well as what your insurance company prefers or covers  Buy and Bill vs  Specialty Pharmacy  Injections may be obtained in two ways:  Buy and Bill: The insurance is requiring the office to buy the injection medication and bill the patients insurance for both the injection medication and the office visit  Specialty Pharmacy: The insurance is requiring the office to order the medication from the insurances Specialty Pharmacy and the specialty pharmacy will bill the patients insurance directly for the injection medication  When a specialty pharmacy is used, the office cannot bill for the injection medication, the office can only bill for the office visit  (Examples of a specialty pharmacy include, but not limited to, [facility] , [address] [facility], [address] [place] [address])  Time Line Expectations  Your care team will work to ensure the injection(s) being ordered for you are authorized by your insurance and is obtained by the insurance plans preferred pharmacy  Your insurance plan may dictate the brand and dosage of the series  Due to the nature of obtaining an insurance authorization as well as working with the pharmacy, the authorization process may take up to 14 business days, sometimes longer if your insurance plan denies the injection authorization or if the pharmacy has delays  If your insurance plan denies the authorization our team will submit an appeal which may lengthen the wait time for the approval of your injection      Our injection authorization team will be in contact with you throughout the injection authorization process, however, please note there may be times of silence while we wait for insurance and pharmacy updates  Your injection appointment(s) will be scheduled once our injection authorization team has received approval from your insurance plan and/or from the pharmacy  Please note: Specialty pharmacies are often delayed when obtaining authorizations and verifying benefits for injection medications  You may receive a phone call from the specialty pharmacy to authorize the medication; so it is important to accept calls from the specialty pharmacy to ensure your injections are not delayed

## 2023-01-20 NOTE — PROGRESS NOTES
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)   • Cerebrovascular accident (CVA) due to vascular occlusion (HCC)   • Chronic venous insufficiency   • Hyperlipidemia   • Osteoporosis   • Rheumatoid arthritis with positive rheumatoid factor (HCC)   • Sjogren's syndrome (HCC)   • Von Reckling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 ([facility] )   • Screening mammogram, encounter for   • High serum high density lipoprotein (HDL)   • History of stroke   • Medicare annual wellness visit, subsequent   • Multiple lacunar infarcts ([facility] )   • Dry eye   • Cataract of both eyes   • Medication monitoring encounter   • TIA due to embolism ([facility] )   • Arthritis of right knee   • Eye hemorrhage, right       Plan:    67 y o  female  with right knee pain secondary to osteoarthritis, small Baker's cyst, history of rheumatoid arthritis and Sjogren's    Patient had a great response to cortisone injection Visco injections last year and her most recent pain is very similar to pain she had last year which suggest that her pain was mostly from her osteoarthritis  Intra-articular cortisone injection given into the right knee today patient tolerated this well  Prior authorization for Visco injections ordered  Follow-up when Visco is ready        The assessment and plan were formulated by Dr Jennifer Cintron and I assisted in carrying it out  Subjective:   Patient ID: Dustin Tsang is a 67 y o  female   HPI    Patient presents to the office for follow up of right knee pain  Since the last visit, the patient reports increase in pain in the right knee over the medial aspect this past Wednesday  She described that the pain was sharp  It was self-limited    She had no pain yesterday was going to cancel this appointment but wanted to discuss treatment options possibly repeating treatment for the right knee that she had last year  The following portions of the patient's history were reviewed and updated as appropriate: allergies, current medications, past family history, past social history, past surgical history and problem list     Social History     Socioeconomic History   • Marital status: /Civil Union     Spouse name: Not on file   • Number of children: Not on file   • Years of education: Not on file   • Highest education level: Not on file   Occupational History   • Occupation: Privately   Tobacco Use   • Smoking status: Never   • Smokeless tobacco: Never   Vaping Use   • Vaping Use: Never used   Substance and Sexual Activity   • Alcohol use: Yes     Alcohol/week: 7 0 standard drinks     Types: 7 Glasses of wine per week   • Drug use: No   • Sexual activity: Not Currently   Other Topics Concern   • Not on file   Social History Narrative    Exercise habits: the gym; frequency is 2 days per week    Travel history: patient denies being  Out of the country during 1/2014-11/12/2014     Social Determinants of Health     Financial Resource Strain: Low Risk    • Difficulty of Paying Living Expenses: Not hard at all   Food Insecurity: Not on file   Transportation Needs: No Transportation Needs   • Lack of Transportation (Medical): No   • Lack of Transportation (Non-Medical):  No   Physical Activity: Not on file   Stress: Not on file   Social Connections: Not on file   Intimate Partner Violence: Not on file   Housing Stability: Not on file     Past Medical History:   Diagnosis Date   • Arthralgia    • Memory loss 9/3/2019   • Neutropenia ([facility] )    • Osteoporosis    • Rheumatoid arthritis ([facility] )    • Sjogren's syndrome ([facility] )    • St[facility]ce[facility]brum) ([facility])    • Transient cerebral ischemia      Past Surgical History:   Procedure Laterality Date   • BREAST EXCISIONAL BIOPSY Left 1983    benign   • BREAST EXCISIONAL BIOPSY Left 1982    benign   • BREAST EXCISIONAL BIOPSY Right 1982    benign   • CATARACT EXTRACTION, BILATERAL     • HYSTERECTOMY      age 47   • OOPHORECTOMY      both     No Known Allergies  Current Outpatient Medications on File Prior to Visit   Medication Sig Dispense Refill   • clopidogrel (PLAVIX) 75 mg tablet Take 1 tablet (75 mg total) by mouth daily 90 tablet 1   • aspirin (Aspir-Low) 81 mg EC tablet Take 81 mg by mouth daily       • calcium citrate-Vitamin D 200 mg-250 units Take 1 tablet by mouth 2 (two) times a day     • cycloSPORINE (RESTASIS) 0 05 % ophthalmic emulsion Apply 1 drop to eye 2 (two) times a day     • hydroxychloroquine (PLAQUENIL) 200 mg tablet Take 1 tablet (200 mg total) by mouth daily for 90 doses 90 tablet 0   • oxybutynin (DITROPAN) 5 mg tablet Take 1 tablet (5 mg total) by mouth 2 (two) times a day 180 tablet 1   • simvastatin (ZOCOR) 40 mg tablet Take 1 tablet (40 mg total) by mouth daily 90 tablet 1     No current facility-administered medications on file prior to visit  Review of Systems  See HPi    Objective: There were no vitals filed for this visit  Physical Exam  Musculoskeletal:      Right knee: No effusion  Right Knee Exam     Tenderness   The patient is experiencing tenderness in the medial joint line  Range of Motion   Extension:  -5 abnormal     Tests   Varus: negative Valgus: negative    Other   Erythema: absent  Scars: present  Sensation: normal  Pulse: present  Swelling: none  Effusion: no effusion present    Comments:  Palpable popliteal cyst            I have personally reviewed pertinent films in PACS  Large joint arthrocentesis: R knee  Universal Protocol:  Consent given by: patient  Time out: Immediately prior to procedure a "time out" was called to verify the correct patient, procedure, equipment, support staff and site/side marked as required    Site marked: the operative site was marked  Supporting Documentation  Indications: pain Procedure Details  Location: knee - R knee  Preparation: Patient was prepped and draped in the usual sterile fashion  Needle size: 22 G  Approach: anterolateral  Medications administered: 80 mg triamcinolone acetonide 40 mg/mL; 10 mL ropivacaine 0 5 %    Patient tolerance: patient tolerated the procedure well with no immediate complications  Dressing:  Sterile dressing applied              Portions of the record may have been created with voice recognition software  Occasional wrong word or "sound a like" substitutions may have occurred due to the inherent limitations of voice recognition software  Read the chart carefully and recognize, using context, where substitutions have occurred

## 2023-01-27 ENCOUNTER — PROCEDURE VISIT (OUTPATIENT)
Dept: OBGYN CLINIC | Facility: CLINIC | Age: 73
End: 2023-01-27

## 2023-01-27 VITALS — WEIGHT: 121 LBS | BODY MASS INDEX: 20.16 KG/M2 | HEIGHT: 65 IN

## 2023-01-27 DIAGNOSIS — M17.11 PRIMARY OSTEOARTHRITIS OF RIGHT KNEE: Primary | ICD-10-CM

## 2023-01-27 DIAGNOSIS — M17.12 PRIMARY OSTEOARTHRITIS OF LEFT KNEE: ICD-10-CM

## 2023-01-27 RX ORDER — HYALURONATE SODIUM 10 MG/ML
20 SYRINGE (ML) INTRAARTICULAR
Status: DISCONTINUED | OUTPATIENT
Start: 2023-01-27 | End: 2023-01-27

## 2023-01-27 NOTE — PROGRESS NOTES
1  Primary osteoarthritis of right knee        2  Primary osteoarthritis of left knee          Patient is here for her 1st injection of Euflexxa into the bilateral knee  Patient reports pain after the cortisone injection that resolved after a couple days  All organ systems normal  Physical exam of the knee shows no effusion no ecchymosis  Large joint arthrocentesis: R knee  Universal Protocol:  Consent: Verbal consent obtained    Risks and benefits: risks, benefits and alternatives were discussed  Consent given by: patient  Timeout called at: 1/27/2023 1:21 PM   Patient understanding: patient states understanding of the procedure being performed  Patient identity confirmed: verbally with patient    Supporting Documentation  Indications: pain and diagnostic evaluation   Procedure Details  Location: knee - R knee  Needle size: 22 G  Ultrasound guidance: no    Patient tolerance: patient tolerated the procedure well with no immediate complications  Dressing:  Sterile dressing applied          Patient tolerated procedure follow up in one week

## 2023-02-03 ENCOUNTER — PROCEDURE VISIT (OUTPATIENT)
Dept: OBGYN CLINIC | Facility: CLINIC | Age: 73
End: 2023-02-03

## 2023-02-03 ENCOUNTER — OFFICE VISIT (OUTPATIENT)
Dept: INTERNAL MEDICINE CLINIC | Age: 73
End: 2023-02-03

## 2023-02-03 VITALS
SYSTOLIC BLOOD PRESSURE: 102 MMHG | WEIGHT: 125.9 LBS | HEIGHT: 65 IN | HEART RATE: 54 BPM | OXYGEN SATURATION: 99 % | TEMPERATURE: 98.3 F | DIASTOLIC BLOOD PRESSURE: 78 MMHG | BODY MASS INDEX: 20.98 KG/M2

## 2023-02-03 VITALS — WEIGHT: 121 LBS | BODY MASS INDEX: 20.16 KG/M2 | HEIGHT: 65 IN

## 2023-02-03 DIAGNOSIS — S60.459A FOREIGN BODY IN SKIN OF FINGER, INITIAL ENCOUNTER: Primary | ICD-10-CM

## 2023-02-03 DIAGNOSIS — M17.11 PRIMARY OSTEOARTHRITIS OF RIGHT KNEE: Primary | ICD-10-CM

## 2023-02-03 DIAGNOSIS — T14.8XXA SPLINTER IN SKIN: ICD-10-CM

## 2023-02-03 RX ORDER — HYALURONATE SODIUM 10 MG/ML
20 SYRINGE (ML) INTRAARTICULAR
Status: COMPLETED | OUTPATIENT
Start: 2023-02-03 | End: 2023-02-03

## 2023-02-03 RX ADMIN — Medication 20 MG: at 09:57

## 2023-02-03 NOTE — PROGRESS NOTES
Assessment/Plan:    Foreign body in skin of finger  -Splinter was removed using a scalpel and tweezers  -Wound was cleaned, antibiotic ointment was applied and wound was dressed  -Patient was counseled to keep the area clean and dry and she may use over-the-counter Neosporin to dress it   -She has no pain was counseled to use Tylenol as needed  -She was counseled that she should call the office if she develops any redness, pain, swelling or increased warmth   -Of note, she is up-to-date with her Tdap which was given on 12/28/2016    Universal Protocol:  Consent: Verbal consent obtained  Patient identity confirmed: verbally with patient    Foreign body removal    Date/Time: 2/3/2023 2:10 PM  Performed by: Kalen Gardiner DO  Authorized by: Kalen Gardiner DO   Body area: skin  General location: upper extremity  Location details: right thumb    Sedation:  Patient sedated: no  Patient restrained: no  Patient cooperative: yes  Localization method: visualized  Removal mechanism: scalpel  Dressing: antibiotic ointment and dressing applied  Tendon involvement: none  Depth: subcutaneous  Complexity: simple  1 objects recovered  Objects recovered: yes  Post-procedure assessment: foreign body removed  Patient tolerance: patient tolerated the procedure well with no immediate complications         Diagnoses and all orders for this visit:    Foreign body in skin of finger, initial encounter  -     Foreign body removal    Splinter in skin  -     Foreign body removal          Subjective:      Patient ID: Jefferson Egan is a 67 y o  female  HPI  Pt presents with complaints that [place] wooden rail and got a splinter in her r thumb, it went through her glove, and she tried to get it out last night along with her  and it started hurting last night  She states that she used a needle that she did not sterilize it but ran hot water over it      She states that she still sees something in her thumb and thinks that they may have got out a bit of it but not all  She does not know when her last tetanus shot was   Review of her records shows that her last tdap was in   2016  She denies any pain in the thumb  She admits to knee pain but denies fever, chills, night sweats, headache, dizziness, chest pain, shortness of breath, palpitations, nausea, vomiting, abdominal pain, diarrhea, constipation, myalgias  The following portions of the patient's history were reviewed and updated as appropriate:   She  has a past medical history of Arthralgia, Memory loss (9/3/2019), Neutropenia ([facility] ), Osteoporosis, Rheumatoid arthritis ([facility] ), Sjogren's syndrome ([facility] ), Stroke (cerebrum) ([facility] ), and Transient cerebral ischemia  She   Patient Active Problem List    Diagnosis Date Noted   • Foreign body in skin of finger 02/03/2023   • Eye hemorrhage, right 08/24/2022   • Arthritis of right knee 03/17/2022   • TIA due to embolism ([facility] ) 09/22/2021   • Medication monitoring encounter 01/25/2021   • Cataract of both eyes 08/13/2020   • Dry eye 07/20/2020   • Multiple lac[facility]ar infarcts [facility] ) 01/02/2020   • History of stroke 06/11/2019   • Medicare annual wellness visit, subsequent 06/11/2019   • High serum high density lipoprotein (HDL) 12/11/2018   • Screening mammogram, encounter for 06/08/2018   • Abnormal mammogram of right breast 02/18/2017   • Chronic venous insufficiency 02/08/2017   • Rheumatoid arthritis with positive rheumatoid factor ([facility] ) 11/24/2015   • 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) 09/10/2014   • Hyperlipidemia 10/29/2013   • Osteoporosis 10/29/2013   • Sjogren's syndrome ([facility] ) 10/29/2013   • Butler Grammes Reckling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 ([facility] ) 10/29/2013   • Cerebrovascular accident (CVA) due to vascular occlusion ([facility] ) 02/01/2008     She  has a past surgical history that includes Breast excisional biopsy (Left, 1983);  Breast excisional biopsy (Left, 1982); Breast excisional biopsy (Right, 1982); Hysterectomy; Oophorectomy; and Cataract extraction, bilateral   Her family history includes Diabetes in her father; Lymphoma (age of onset: 61) in her mother; No Known Problems in her daughter, maternal aunt, maternal aunt, maternal grandfather, maternal grandmother, paternal aunt, paternal aunt, paternal grandfather, paternal grandmother, and sister; Stroke in her sister; Stroke (age of onset: 68) in her mother  She  reports that she has never smoked  She has never used smokeless tobacco  She reports current alcohol use of about 7 0 standard drinks per week  She reports that she does not use drugs  Current Outpatient Medications   Medication Sig Dispense Refill   • aspirin (Aspir-Low) 81 mg EC tablet Take 81 mg by mouth daily       • calcium citrate-Vitamin D 200 mg-250 units Take 1 tablet by mouth in the morning     • clopidogrel (PLAVIX) 75 mg tablet Take 1 tablet (75 mg total) by mouth daily 90 tablet 1   • cycloSPORINE (RESTASIS) 0 05 % ophthalmic emulsion Apply 1 drop to eye 2 (two) times a day     • hydroxychloroquine (PLAQUENIL) 200 mg tablet Take 1 tablet (200 mg total) by mouth daily for 90 doses 90 tablet 0   • oxybutynin (DITROPAN) 5 mg tablet Take 1 tablet (5 mg total) by mouth 2 (two) times a day 180 tablet 1   • simvastatin (ZOCOR) 40 mg tablet Take 1 tablet (40 mg total) by mouth daily 90 tablet 1     No current facility-administered medications for this visit       Current Outpatient Medications on File Prior to Visit   Medication Sig   • aspirin (Aspir-Low) 81 mg EC tablet Take 81 mg by mouth daily     • calcium citrate-Vitamin D 200 mg-250 units Take 1 tablet by mouth in the morning   • clopidogrel (PLAVIX) 75 mg tablet Take 1 tablet (75 mg total) by mouth daily   • cycloSPORINE (RESTASIS) 0 05 % ophthalmic emulsion Apply 1 drop to eye 2 (two) times a day   • hydroxychloroquine (PLAQUENIL) 200 mg tablet Take 1 tablet (200 mg total) by mouth daily for 90 doses   • oxybutynin (DITROPAN) 5 mg tablet Take 1 tablet (5 mg total) by mouth 2 (two) times a day   • simvastatin (ZOCOR) 40 mg tablet Take 1 tablet (40 mg total) by mouth daily     Current Facility-Administered Medications on File Prior to Visit   Medication   • [COMPLETED] Sodium Hyaluronate 20 mg     She has No Known Allergies       Review of Systems   Constitutional: Negative for activity change, chills, fatigue, fever and unexpected weight change  HENT: Negative for ear pain, postnasal drip, rhinorrhea, sinus pressure and sore throat  Eyes: Negative for pain  Respiratory: Negative for cough, choking, chest tightness, shortness of breath and wheezing  Cardiovascular: Negative for chest pain, palpitations and leg swelling  Gastrointestinal: Negative for abdominal pain, constipation, diarrhea, nausea and vomiting  Genitourinary: Negative for dysuria and hematuria  Musculoskeletal: Positive for arthralgias (knee pain with a hx of RA and OA)  Negative for back pain, gait problem, joint swelling, myalgias and neck stiffness  Skin: Positive for wound (wound on R thumb)  Negative for pallor and rash  Neurological: Negative for dizziness, tremors, seizures, syncope, light-headedness and headaches  Hematological: Negative for adenopathy  Psychiatric/Behavioral: Negative for behavioral problems  Objective:      /78 (BP Location: Left arm, Patient Position: Sitting, Cuff Size: Standard)   Pulse (!) 54   Temp 98 3 °F (36 8 °C) (Temporal)   Ht 5' 5" (1 651 m)   Wt 57 1 kg (125 lb 14 4 oz)   SpO2 99%   BMI 20 95 kg/m²          Physical Exam  Constitutional:       General: She is not in acute distress  Appearance: She is well-developed  She is not diaphoretic  HENT:      Head: Normocephalic and atraumatic  Right Ear: External ear normal       Left Ear: External ear normal       Nose: Nose normal       Mouth/Throat:      Mouth: Mucous membranes are dry  Pharynx: Posterior oropharyngeal erythema present  No oropharyngeal exudate  Eyes:      General: No scleral icterus  Right eye: No discharge  Left eye: No discharge  Conjunctiva/sclera: Conjunctivae normal       Pupils: Pupils are equal, round, and reactive to light  Neck:      Thyroid: No thyromegaly  Vascular: No JVD  Trachea: No tracheal deviation  Cardiovascular:      Rate and Rhythm: Normal rate and regular rhythm  Heart sounds: Normal heart sounds  No murmur heard  No friction rub  No gallop  Pulmonary:      Effort: Pulmonary effort is normal  No respiratory distress  Breath sounds: Normal breath sounds  No wheezing or rales  Chest:      Chest wall: No tenderness  Abdominal:      General: Bowel sounds are normal  There is no distension  Palpations: Abdomen is soft  There is no mass  Tenderness: There is no abdominal tenderness  There is no guarding or rebound  Musculoskeletal:         General: No tenderness or deformity  Normal range of motion  Right hand: Laceration (laceration of the thumb with a splinter ) present  Cervical back: Normal range of motion and neck supple  Lymphadenopathy:      Cervical: No cervical adenopathy  Skin:     General: Skin is warm and dry  Coloration: Skin is not pale  Findings: Laceration (Patient has a small laceration  on the plantar aspect of the right thumb measuring about 3-4 mm near a dark-colored splinter) present  No erythema or rash  Neurological:      Mental Status: She is alert and oriented to person, place, and time  Cranial Nerves: No cranial nerve deficit  Motor: No abnormal muscle tone  Coordination: Coordination normal       Deep Tendon Reflexes: Reflexes are normal and symmetric  Psychiatric:         Behavior: Behavior normal              No visits with results within 12 Month(s) from this visit     Latest known visit with results is:   Office Visit on 10/25/2021   Component Date Value Ref Range Status   • Color, UA 10/26/2021 Yellow   Final   • Clarity, UA 10/26/2021 Turbid   Final   • Specific Gravity, UA 10/26/2021 1 025  1 003 - 1 030 Final   • pH, UA 10/26/2021 5 5  4 5, 5 0, 5 5, 6 0, 6 5, 7 0, 7 5, 8 0 Final   • Leukocytes, UA 10/26/2021 Negative  Negative Final   • Nitrite, UA 10/26/2021 Negative  Negative Final   • Protein, UA 10/26/2021 30 (1+) (A)  Negative mg/dl Final   • Glucose, UA 10/26/2021 Negative  Negative mg/dl Final   • Ketones, UA 10/26/2021 Negative  Negative mg/dl Final   • Urobilinogen, UA 10/26/2021 0 2  0 2, 1 0 E U /dl E U /dl Final   • Bilirubin, UA 10/26/2021 Negative  Negative Final   • Occult Blood, UA 10/26/2021 Large (A)  Negative Final   • RBC, UA 10/26/2021 None Seen  None Seen, 2-4 /hpf Final   • WBC, UA 10/26/2021 2-4  None Seen, 2-4, 5-60 /hpf Final   • Epithelial Cells 10/26/2021 Occasional  None Seen, Occasional /hpf Final   • Bacteria, UA 10/26/2021 Innumerable (A)  None Seen, Occasional /hpf Final   • Ca Oxalate [place], UA 10/26/2021 Innumerable (A)  None Seen /hpf Final   • MUCUS THREADS 10/26/2021 Moderate (A)  None Seen Final

## 2023-02-03 NOTE — PROGRESS NOTES
1  Primary osteoarthritis of right knee  Large joint arthrocentesis: R knee        Patient is here for her 2nd injection of euflexxa into the right knee  Patient reports improvements  Physical exam of the knee shows no effusion no ecchymosis  All other organ systems normal    Large joint arthrocentesis: R knee  Universal Protocol:  Consent given by: patient  Time out: Immediately prior to procedure a "time out" was called to verify the correct patient, procedure, equipment, support staff and site/side marked as required    Site marked: the operative site was marked  Supporting Documentation  Indications: pain   Procedure Details  Location: knee - R knee  Preparation: Patient was prepped and draped in the usual sterile fashion  Needle size: 22 G  Ultrasound guidance: no  Approach: anterolateral  Medications administered: 20 mg Sodium Hyaluronate 20 MG/2ML    Patient tolerance: patient tolerated the procedure well with no immediate complications  Dressing:  Sterile dressing applied          Patient tolerated procedure follow up 1 week

## 2023-02-07 ENCOUNTER — VBI (OUTPATIENT)
Dept: ADMINISTRATIVE | Facility: OTHER | Age: 73
End: 2023-02-07

## 2023-02-10 ENCOUNTER — PROCEDURE VISIT (OUTPATIENT)
Dept: OBGYN CLINIC | Facility: CLINIC | Age: 73
End: 2023-02-10

## 2023-02-10 VITALS — WEIGHT: 125 LBS | HEIGHT: 65 IN | BODY MASS INDEX: 20.83 KG/M2

## 2023-02-10 DIAGNOSIS — M17.11 PRIMARY OSTEOARTHRITIS OF RIGHT KNEE: Primary | ICD-10-CM

## 2023-02-10 RX ORDER — HYALURONATE SODIUM 10 MG/ML
20 SYRINGE (ML) INTRAARTICULAR
Status: COMPLETED | OUTPATIENT
Start: 2023-02-10 | End: 2023-02-10

## 2023-02-10 RX ADMIN — Medication 20 MG: at 10:23

## 2023-02-10 NOTE — PROGRESS NOTES
1  Primary osteoarthritis of right knee          Patient is here for her 3rd injection of euflexxa into the right knee  Patient reports improvement in the back and knee she still has some pain in the kneecap  Physical exam of the knee shows no effusion no ecchymosis  All other organ systems normal    Large joint arthrocentesis  Universal Protocol:  Consent: Verbal consent obtained  Risks and benefits: risks, benefits and alternatives were discussed  Consent given by: patient  Timeout called at: 2/10/2023 10:23 AM   Patient understanding: patient states understanding of the procedure being performed  Patient identity confirmed: verbally with patient    Supporting Documentation  Indications: pain and diagnostic evaluation   Procedure Details  Needle size: 22 G  Ultrasound guidance: no  Medications administered: 20 mg Sodium Hyaluronate 20 MG/2ML    Patient tolerance: patient tolerated the procedure well with no immediate complications  Dressing:  Sterile dressing applied          Patient tolerated procedure follow up as needed  We did mention that she can always come back for cortisone injection in 4 weeks or more    We also talked about a Medrol Dosepak for the knee If she wants to hold off on that she can call us and ask us to order that if she feels a change of mind

## 2023-02-14 ENCOUNTER — OFFICE VISIT (OUTPATIENT)
Dept: INTERNAL MEDICINE CLINIC | Facility: OTHER | Age: 73
End: 2023-02-14

## 2023-02-14 VITALS
OXYGEN SATURATION: 99 % | HEART RATE: 77 BPM | WEIGHT: 124 LBS | HEIGHT: 65 IN | BODY MASS INDEX: 20.66 KG/M2 | DIASTOLIC BLOOD PRESSURE: 78 MMHG | TEMPERATURE: 98.3 F | SYSTOLIC BLOOD PRESSURE: 108 MMHG

## 2023-02-14 DIAGNOSIS — N90.7 LABIAL CYST: ICD-10-CM

## 2023-02-14 DIAGNOSIS — G45.9 TIA DUE TO EMBOLISM (HCC): ICD-10-CM

## 2023-02-14 DIAGNOSIS — Q85.01 VON RECKLINGHAUSENS DISEASE (HCC): ICD-10-CM

## 2023-02-14 DIAGNOSIS — I74.9 TIA DUE TO EMBOLISM (HCC): ICD-10-CM

## 2023-02-14 DIAGNOSIS — N90.7 LABIAL CYST: Primary | ICD-10-CM

## 2023-02-14 DIAGNOSIS — M05.79 RHEUMATOID ARTHRITIS INVOLVING MULTIPLE SITES WITH POSITIVE RHEUMATOID FACTOR (HCC): ICD-10-CM

## 2023-02-14 RX ORDER — CEPHALEXIN 500 MG/1
500 CAPSULE ORAL
Qty: 21 CAPSULE | Refills: 0 | Status: SHIPPED | OUTPATIENT
Start: 2023-02-14 | End: 2023-02-14 | Stop reason: SDUPTHER

## 2023-02-14 RX ORDER — CEPHALEXIN 500 MG/1
500 CAPSULE ORAL
Qty: 21 CAPSULE | Refills: 0 | Status: SHIPPED | OUTPATIENT
Start: 2023-02-14 | End: 2023-02-21

## 2023-02-14 NOTE — PROGRESS NOTES
Incision and Drainage    Date/Time: 2/14/2023 6:27 PM  Performed by: Joselyn Shone, DO  Authorized by: Joselyn Shone, DO   Universal Protocol:  Consent: Verbal consent obtained  Risks and benefits: risks, benefits and alternatives were discussed  Consent given by: patient  Patient understanding: patient states understanding of the procedure being performed  Patient consent: the patient's understanding of the procedure matches consent given      Patient location:  Clinic  Location:     Type:  Cyst    Size:  1 cm    Location:  Anogenital    Anogenital location:  [place]  Pre-procedure details:     Skin preparation:  Antiseptic wash  Anesthesia (see MAR for exact dosages): Anesthesia method:  Local infiltration    Local anesthetic:  Lidocaine 1% w/o epi  Procedure details:     Complexity:  Simple    Incision types: Other (comment)    Approach:  Open    Wound management:  Probed and deloculated    Drainage:  Purulent    Drainage amount:  Scant    Wound treatment:  Wound left open    Packing materials:  None  Post-procedure details:     Patient tolerance of procedure:   Tolerated well, no immediate complications

## 2023-02-16 ENCOUNTER — TELEPHONE (OUTPATIENT)
Dept: OBGYN CLINIC | Facility: HOSPITAL | Age: 73
End: 2023-02-16

## 2023-02-16 DIAGNOSIS — M17.11 PRIMARY OSTEOARTHRITIS OF RIGHT KNEE: Primary | ICD-10-CM

## 2023-02-16 DIAGNOSIS — M05.79 RHEUMATOID ARTHRITIS INVOLVING MULTIPLE SITES WITH POSITIVE RHEUMATOID FACTOR (HCC): ICD-10-CM

## 2023-02-16 DIAGNOSIS — M17.12 PRIMARY OSTEOARTHRITIS OF LEFT KNEE: ICD-10-CM

## 2023-02-16 RX ORDER — METHYLPREDNISOLONE 32 MG/1
32 TABLET ORAL DAILY
Qty: 1 TABLET | Refills: 0 | Status: SHIPPED | OUTPATIENT
Start: 2023-02-16

## 2023-02-16 RX ORDER — HYDROXYCHLOROQUINE SULFATE 200 MG/1
200 TABLET, FILM COATED ORAL DAILY
Qty: 90 TABLET | Refills: 1 | Status: SHIPPED | OUTPATIENT
Start: 2023-02-16 | End: 2023-05-17

## 2023-02-16 NOTE — TELEPHONE ENCOUNTER
Caller: Patient    Doctor: Amol Pederson    Reason for call: Patient states that the knee is ok but could be better  She said she was offered a "steroid pack" but declined at the time  She would like to try this now      [address] [place] [address]    Call back#: [phone]

## 2023-02-17 ENCOUNTER — HOSPITAL ENCOUNTER (OUTPATIENT)
Dept: RADIOLOGY | Facility: IMAGING CENTER | Age: 73
End: 2023-02-17

## 2023-02-17 ENCOUNTER — VBI (OUTPATIENT)
Dept: ADMINISTRATIVE | Facility: OTHER | Age: 73
End: 2023-02-17

## 2023-02-17 VITALS — WEIGHT: 122 LBS | HEIGHT: 65 IN | BODY MASS INDEX: 20.33 KG/M2

## 2023-02-17 DIAGNOSIS — Z12.31 ENCOUNTER FOR SCREENING MAMMOGRAM FOR MALIGNANT NEOPLASM OF BREAST: ICD-10-CM

## 2023-02-28 ENCOUNTER — OFFICE VISIT (OUTPATIENT)
Dept: INTERNAL MEDICINE CLINIC | Facility: CLINIC | Age: 73
End: 2023-02-28

## 2023-02-28 VITALS
BODY MASS INDEX: 20.62 KG/M2 | OXYGEN SATURATION: 99 % | DIASTOLIC BLOOD PRESSURE: 72 MMHG | WEIGHT: 120.8 LBS | HEART RATE: 67 BPM | SYSTOLIC BLOOD PRESSURE: 118 MMHG | HEIGHT: 64 IN | TEMPERATURE: 98.4 F

## 2023-02-28 DIAGNOSIS — I63.81 CEREBROVASCULAR ACCIDENT (CVA) DUE TO OCCLUSION OF SMALL ARTERY (HCC): ICD-10-CM

## 2023-02-28 DIAGNOSIS — G45.9 TIA DUE TO EMBOLISM (HCC): ICD-10-CM

## 2023-02-28 DIAGNOSIS — M05.79 RHEUMATOID ARTHRITIS INVOLVING MULTIPLE SITES WITH POSITIVE RHEUMATOID FACTOR (HCC): ICD-10-CM

## 2023-02-28 DIAGNOSIS — I74.9 TIA DUE TO EMBOLISM (HCC): ICD-10-CM

## 2023-02-28 DIAGNOSIS — Z71.84 COUNSELING ABOUT TRAVEL: ICD-10-CM

## 2023-02-28 DIAGNOSIS — E78.5 HYPERLIPIDEMIA, UNSPECIFIED HYPERLIPIDEMIA TYPE: ICD-10-CM

## 2023-02-28 DIAGNOSIS — M81.0 AGE-RELATED OSTEOPOROSIS WITHOUT CURRENT PATHOLOGICAL FRACTURE: Primary | ICD-10-CM

## 2023-02-28 DIAGNOSIS — Q85.01 VON RECKLINGHAUSENS DISEASE (HCC): ICD-10-CM

## 2023-02-28 DIAGNOSIS — R79.89 HIGH SERUM HIGH DENSITY LIPOPROTEIN (HDL): ICD-10-CM

## 2023-02-28 RX ORDER — ONDANSETRON 4 MG/1
4 TABLET, FILM COATED ORAL 3 TIMES DAILY PRN
Qty: 30 TABLET | Refills: 0 | Status: SHIPPED | OUTPATIENT
Start: 2023-02-28

## 2023-02-28 RX ORDER — CLOPIDOGREL BISULFATE 75 MG/1
75 TABLET ORAL DAILY
Qty: 90 TABLET | Refills: 1 | Status: SHIPPED | OUTPATIENT
Start: 2023-02-28

## 2023-02-28 RX ORDER — SCOLOPAMINE TRANSDERMAL SYSTEM 1 MG/1
1 PATCH, EXTENDED RELEASE TRANSDERMAL
Qty: 3 PATCH | Refills: 1 | Status: SHIPPED | OUTPATIENT
Start: 2023-02-28

## 2023-02-28 RX ORDER — SIMVASTATIN 40 MG
40 TABLET ORAL DAILY
Qty: 90 TABLET | Refills: 1 | Status: SHIPPED | OUTPATIENT
Start: 2023-02-28

## 2023-02-28 NOTE — PROGRESS NOTES
Assessment/Plan:    1  Age-related osteoporosis without current pathological fracture  -     DXA bone density spine hip and pelvis; Future; Expected date: 08/02/2023  -     Vitamin D 25 hydroxy; Future  -     denosumab (PROLIA) subcutaneous injection 60 mg    2  Cerebrovascular accident (CVA) due to occlusion of small artery (HCC)  -     clopidogrel (PLAVIX) 75 mg tablet; Take 1 tablet (75 mg total) by mouth daily    3  Hyperlipidemia, unspecified hyperlipidemia type  -     simvastatin (ZOCOR) 40 mg tablet; Take 1 tablet (40 mg total) by mouth daily  -     Lipid Panel with Direct LDL reflex; Future  -     Comprehensive metabolic panel; Future  -     CBC and differential; Future    4  High serum high density lipoprotein (HDL)    5  Rheumatoid arthritis involving multiple sites with positive rheumatoid factor (HCC)  -     Vitamin D 25 hydroxy; Future  -     Comprehensive metabolic panel; Future  -     CBC and differential; Future    6  Von Reckling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 ([facility] )    7  [facility] due [facility] embolism ([facility] )    8  Counseling about travel  -     scopolamine (TRANSDERM-SCOP) 1 mg/3 days TD 72 hr patch; Place 1 patch on the skin over 72 hours every third day  -     ondansetron (ZOFRAN) 4 mg tablet; Take 1 tablet (4 mg total) by mouth 3 (three) times a day as needed for nausea or vomiting         PROLIA today in left arm, next due in 6months  Labs in 6months, no change in meds          There are no Patient Instructions on file for this visit  Return in about 6 months (around 8/28/2023) for Recheck  Subjective:      Patient ID: Bassam Fenton is a 67 y o  female  Chief Complaint   Patient presents with   • Follow-up     6 m f/u visit and Prolia injection  Review labs from 2/20/23  DEXA due 8/2/23  She would like a paper RX for patches for a cruise  HPI    Osteoporosis (Follow-Up): The patient's osteoporosis has been stable since the last visit  Most recent DXA results: T-score <-2 5   Comorbid Illnesses: -3 3 is the worst- on chronic prednisone  She has no comorbid illnesses  She has no complications  She has no significant interval events  Symptoms: The patient is currently asymptomatic  denies back pain-- and-- denies   Associated symptoms include no decrease in height   Home precautions: Osteoporosis counseling and education was reviewed with patient and caregivers, including prevention of falls   Medications: the patient is adherent with her medication regimen  Medication(s): vitamin D,-- a calcium supplement-- and-- prolia   Disease Management: the patient is doing well with her osteoporosis goals  The patient is due for DEXA  April 2019-appointment scheduled in July July 2020, here for Prolia   Tolerating well without any issues  Jan 2021: Prolia due today, doing well  , due for DEXA in July March 2022, doing well, no issues or concerns or complaints   Here for even a D injections which she has been tolerating well   Today's injection 8    July 2022, patient here for her last evenity injection   Prefers to get them in her arms due to slight increase in pain in her thighs   Tolerating well   Taking calcium and vitamin-D   Was on Prolia in the past and will need to resume that  February 2023, here for Prolia injection, doing well  Due in her right side today  Has bone density study for this year scheduled     Hyperlipidemia, on Zocor and doing well   Laboratory data reviewed and is all is well controlled   Tolerating well July 2020, excellent controlled lipid levels  March 2022, doing very well with lipid levels   No issues or concerns  February 2023, lipid levels well controlled  Compliant with medications and no side effects     Vitamin-D deficiency   Has been on over-the-counter calcium and vitamin-D   Doing well   On Prolia and has bone density March 2022, vitamin-D levels in range  February 2023, doing well, on supplementation    No issues         The following portions of the patient's history were reviewed and updated as appropriate: allergies, current medications, past family history, past medical history, past social history, past surgical history and problem list     Review of Systems      Constitutional:  Denies fever or chills   Eyes:  Denies double , blurry vision or eye pain  HENT:  Denies nasal congestion, sore throat or new hearing issues  Respiratory:  Denies cough or shortness of breath or wheezing  Cardiovascular:  Denies palpitations or chest pain  GI:  Denies abdominal pain, nausea, or vomiting, no loose stools, no reflux  Integument:  Denies rash , no open areas  Neurologic:  Denies headache or focal weakness, no dizziness  : no dysuria, or hematuria        Current Outpatient Medications   Medication Sig Dispense Refill   • aspirin (Aspir-Low) 81 mg EC tablet Take 81 mg by mouth daily       • calcium citrate-Vitamin D 200 mg-250 units Take 1 tablet by mouth in the morning     • clopidogrel (PLAVIX) 75 mg tablet Take 1 tablet (75 mg total) by mouth daily 90 tablet 1   • cycloSPORINE (RESTASIS) 0 05 % ophthalmic emulsion Apply 1 drop to eye 2 (two) times a day     • hydroxychloroquine (PLAQUENIL) 200 mg tablet Take 1 tablet (200 mg total) by mouth daily for 90 doses 90 tablet 1   • ondansetron (ZOFRAN) 4 mg tablet Take 1 tablet (4 mg total) by mouth 3 (three) times a day as needed for nausea or vomiting 30 tablet 0   • oxybutynin (DITROPAN) 5 mg tablet Take 1 tablet (5 mg total) by mouth 2 (two) times a day 180 tablet 1   • scopolamine (TRANSDERM-SCOP) 1 mg/3 days TD 72 hr patch Place 1 patch on the skin over 72 hours every third day 3 patch 1   • simvastatin (ZOCOR) 40 mg tablet Take 1 tablet (40 mg total) by mouth daily 90 tablet 1     No current facility-administered medications for this visit         Objective:    /72 (BP Location: Left arm, Patient Position: Sitting, Cuff Size: Standard)   Pulse 67   Temp 98 4 °F (36 9 °C) (Tympanic)   Ht 5' 4 41" (1 636 m)   Wt 54 8 kg (120 lb 12 8 oz) SpO2 99%   BMI 20 47 kg/m²        Physical Exam       Constitutional:  Well developed, well nourished, no acute distress, non-toxic appearance   Eyes:  PERRL, conjunctiva normal , non icteric sclera  HENT:  Atraumatic, oropharynx moist  Neck-  supple   Respiratory:  CTA b/l, normal breath sounds, no rales, no wheezing   Cardiovascular:  RRR, no murmurs, no LE edema b/l  GI:  Soft, nondistended, normal bowel sounds x 4, nontender, no organomegaly, no mass, no rebound, no guarding   Neurologic:  no focal deficits noted   Psychiatric:  Speech and behavior appropriate , AAO x 3          Leslie Hwang,

## 2023-03-01 ENCOUNTER — TELEPHONE (OUTPATIENT)
Dept: INTERNAL MEDICINE CLINIC | Age: 73
End: 2023-03-01

## 2023-03-01 NOTE — TELEPHONE ENCOUNTER
Patient is scheduled for her next Prolia on 09/11/2023 at the [place] office      Marked on both Calendars

## 2023-04-13 PROBLEM — M17.11 PRIMARY OSTEOARTHRITIS OF RIGHT KNEE: Status: ACTIVE | Noted: 2023-04-13

## 2023-04-13 PROBLEM — M71.21 SYNOVIAL CYST OF POPLITEAL SPACE (BAKER), RIGHT KNEE: Status: ACTIVE | Noted: 2023-04-13

## 2023-05-16 ENCOUNTER — OFFICE VISIT (OUTPATIENT)
Dept: OBGYN CLINIC | Facility: CLINIC | Age: 73
End: 2023-05-16

## 2023-05-16 ENCOUNTER — TELEPHONE (OUTPATIENT)
Dept: OBGYN CLINIC | Facility: HOSPITAL | Age: 73
End: 2023-05-16

## 2023-05-16 VITALS — SYSTOLIC BLOOD PRESSURE: 125 MMHG | HEART RATE: 72 BPM | DIASTOLIC BLOOD PRESSURE: 80 MMHG

## 2023-05-16 DIAGNOSIS — M71.21 SYNOVIAL CYST OF POPLITEAL SPACE (BAKER), RIGHT KNEE: Primary | ICD-10-CM

## 2023-05-16 NOTE — PROGRESS NOTES
1  Synovial cyst of popliteal space (Baker), right knee  Ambulatory Referral to Sports Medicine    Large joint arthrocentesis: R knee    Ambulatory referral to Physical Therapy        Orders Placed This Encounter   Procedures   • Large joint arthrocentesis: R knee   • Ambulatory referral to Physical Therapy     Impression: This is a patient referred by Dr Jv Longoria with right knee pain secondary to osteoarthritis  We used ultrasound to evaluate her posterior knee  There was no cyst with communication to the knee joint  Additionally, the patient's knee symptoms have all resolved  Today, she reports pain along her lateral thigh which is most consistent with iliotibial band syndrome  We will have her start formal physical therapy  She can try Voltaren gel  She was also provided with a straight cane  I would like to see [employer] back before her cruise that is upcoming in 1 month  Imaging Studies (I personally reviewed images in PACS and report):  Right knee x-rays most recent to this encounter reviewed  These images show tricompartmental OA  Return in about 4 weeks (around 6/13/2023)  Patient is in agreement with the above plan  HPI:  Amalia Stuart is a 75 y o  female  who presents for evaluation of   Chief Complaint   Patient presents with   • Right Knee - Pain, Injections       History of present illness from referring clinician's notes reviewed  Chronic pain in the knee which she also feels in the back  She was found to have a cyst in the back of her knee when she had a Doppler study earlier this month      Following history reviewed and updated:  Past Medical History:   Diagnosis Date   • Arthralgia    • Memory loss 9/3/2019   • Neutropenia ([facility])    • Osteoporosis    • Rheumatoid arthritis ([facility] )    • Sjogren's syndrome ([facility] )    • Stroke (cerebrum) ([facility])    • Transient cerebral ischemia      Past Surgical History:   Procedure Laterality Date   • BREAST EXCISIONAL BIOPSY Left 1983    benign   • BREAST EXCISIONAL BIOPSY Left 1982    benign   • BREAST EXCISIONAL BIOPSY Right 1982    benign   • CATARACT EXTRACTION, BILATERAL     • HYSTERECTOMY      age 47   • OOPHORECTOMY      both     Social History   Social History     Substance and Sexual Activity   Alcohol Use Yes   • Alcohol/week: 1 0 standard drink   • Types: 1 Glasses of wine per week     Social History     Substance and Sexual Activity   Drug Use No     Social History     Tobacco Use   Smoking Status Never   Smokeless Tobacco Never     Family History   Problem Relation Age of Onset   • Lymphoma Mother 61        ACUTE   • Stroke Mother 68   • Diabetes Father    • Stroke Sister    • No Known Problems Sister    • No Known Problems Daughter    • No Known Problems Maternal Grandmother    • No Known Problems Maternal Grandfather    • No Known Problems Paternal Grandmother    • No Known Problems Paternal Grandfather    • No Known Problems Maternal Aunt    • No Known Problems Maternal Aunt    • No Known Problems Paternal Aunt    • No Known Problems Paternal Aunt      No Known Allergies     Constitutional:  /80   Pulse 72    General: NAD  Eyes: Anicteric sclerae  Neck: Supple  Lungs: Unlabored breathing  Cardiovascular: No lower extremity edema  Skin: Intact without erythema  Neurologic: Sensation intact to light touch  Psychiatric: Mood and affect are appropriate  Right Knee Exam     Range of Motion   Extension: normal     Tests   Varus: negative Valgus: negative    Other   Erythema: absent  Scars: absent  Sensation: normal  Pulse: present  Swelling: none  Effusion: no effusion present      Right Hip Exam     Tenderness   The patient is experiencing tenderness in the greater trochanter               Procedures

## 2023-05-16 NOTE — TELEPHONE ENCOUNTER
Caller: Patient     Doctor: Clem Santoyo     Reason for call: Patient called to make sure the PT order is in, I did let her know it is in, patient will make PT appointment

## 2023-05-24 ENCOUNTER — EVALUATION (OUTPATIENT)
Dept: PHYSICAL THERAPY | Facility: REHABILITATION | Age: 73
End: 2023-05-24

## 2023-05-24 DIAGNOSIS — M71.21 SYNOVIAL CYST OF POPLITEAL SPACE (BAKER), RIGHT KNEE: ICD-10-CM

## 2023-05-24 NOTE — PROGRESS NOTES
PT Evaluation     Today's date: 2023  Patient name: Vazquez Burgess  :   MRN: [id number]  Referring provider: Tellis Habermann, DO  Dx:   Encounter Diagnosis     ICD-10-CM    1  Synovial cyst of popliteal space (Baker), right knee  M71 21 Ambulatory referral to Physical Therapy                     Assessment  Assessment details: Pt is a 74 yo active female with history of knee pain which reduced after an injection in the knee  She developed right lateral thigh pain a little over a week ago that has been constant  Symptoms limit her ability to exercise and her sitting tolerance  Today she woke up and had left lateral thigh pain as well  Symptoms increase with sitting and stair climbing  She presents with weak hip and core musculature, tight hip flexors and slouched posture in sitting  Repeated lumbar extension in standing abolished lateral thigh pain  It appears that this pain if from her lumbar spine  She would benefit from posture and body mechanics instruction, extension exercise and hip and core strengthening  Impairments: abnormal or restricted ROM, impaired physical strength, lacks appropriate home exercise program, pain with function and poor body mechanics    Symptom irritability: moderateUnderstanding of Dx/Px/POC: excellent  Goals  STG: in 3 weeks  Pt performing HEP consistently  Increase strength 1/2 grade  Pt able to prevent pain in sitting     LTG: by D/C  Strength WNL  Pt able to go to the gym and do strengthening without pain  Pt able to walk up stairs without pain    Plan  Patient would benefit from: skilled physical therapy  Referral necessary: No  Planned therapy interventions: joint mobilization, neuromuscular re-education, patient education, postural training, stretching, strengthening, therapeutic exercise and home exercise program  Frequency: 2x week  Duration in weeks: 8  Treatment plan discussed with: patient        Subjective Evaluation    History of Present Illness  Mechanism of injury: Pt reports that she began to have lateral thigh pain a little over a week ago  Pt was prescribed a cane to use when the pain is significant  She is going on a cruise  and she would like to feel better by then  Pain  Current pain rating: 3  At best pain ratin  At worst pain ratin  Location: lateral hips and thigh    Social Support  Stairs in house: yes     Treatments  Previous treatment: physical therapy  Patient Goals  Patient goals for therapy: decreased pain  Patient goal: ADL's without pain, go to the gym and do the bike, elliptical and rowing machine and walk        Objective     Palpation   Left   No palpable tenderness to the TFL  Right   No palpable tenderness to the TFL  Tenderness     Lumbar Spine  No tenderness in the spinous process  Left Hip   No tenderness in the greater trochanter and sacroiliac joint  Right Hip   No tenderness in the greater trochanter and sacroiliac joint       Active Range of Motion     Lumbar   Flexion:  WFL and with pain  Extension:  Restriction level: minimal  Left lateral flexion:  with pain Restriction level: maximal  Right lateral flexion:  Restriction level: moderate  Left Hip   Normal active range of motion    Right Hip   Normal active range of motion    Passive Range of Motion   Left Hip   Normal passive range of motion    Right Hip   Normal passive range of motion    Strength/Myotome Testing     Left Hip   Planes of Motion   Flexion: 3+  Extension: 3+  External rotation: 3+  Internal rotation: 4+    Right Hip   Planes of Motion   Flexion: 3+  External rotation: 3+  Internal rotation: 4+    Left Knee   Flexion: 4+  Extension: 4    Right Knee   Flexion: 4+  Extension: 4    Left Ankle/Foot   Dorsiflexion: 5    Right Ankle/Foot   Dorsiflexion: 5    Tests     Lumbar     Left   Negative crossed SLR, femoral stretch, passive SLR and slump test      Right   Negative crossed SLR, femoral stretch, passive SLR and slump test      Left Pelvic Girdle/Sacrum   Negative: thigh thrust and active SLR test      Right Pelvic Girdle/Sacrum   Negative: thigh thrust and active SLR test      Left Hip   Positive Ely's  Negative BUTCH OLMEDO Ober and kalie  Modified Miguel: Positive  Right Hip   Positive Ely's  Negative SHARABUTCH WILKINS Ober and kalie  Modified Miguel: Positive  Precautions: TIA, osteoporosis      Manuals 5/24                                                                Neuro Re-Ed             SLR w/TA x10            bridges x10            SL hip abd x10            clams x10            Standing ext x10                                      Ther Ex                                                                                                                     Ther Activity                                       Gait Training                                       Modalities                                          Access Code: [email or web address]  URL: [email or web address]  Date: 05/24/2023  Prepared by: Rosamaria Griffin    Exercises  - Supine Active Straight Leg Raise  - 1 x daily - 7 x weekly - 2 sets - 10 reps  - Supine Bridge  - 1 x daily - 7 x weekly - 2 sets - 10 reps - 5 sec hold  - Clamshell  - 1 x daily - 7 x weekly - 2 sets - 10 reps  - Sidelying Hip Abduction  - 1 x daily - 7 x weekly - 2 sets - 10 reps

## 2023-05-31 ENCOUNTER — OFFICE VISIT (OUTPATIENT)
Dept: PHYSICAL THERAPY | Facility: REHABILITATION | Age: 73
End: 2023-05-31

## 2023-05-31 DIAGNOSIS — M71.21 SYNOVIAL CYST OF POPLITEAL SPACE (BAKER), RIGHT KNEE: Primary | ICD-10-CM

## 2023-05-31 NOTE — PROGRESS NOTES
"Daily Note     Today's date: 2023  Patient name: Jefferson Le  :   MRN: [id number]  Referring provider: Consuelo Damian DO  Dx:   Encounter Diagnosis     ICD-10-CM    1  Synovial cyst of popliteal space (Baker), right knee  M71 21                      Subjective:  Cielo Jean-Baptiste reports that her knee really isn't bothering her  She reports that her neck and B (R>L) shoulder/upper arm pain is bothering her more and she is unsure what she should do about it  Objective: See treatment diary below      Assessment: Patient tolerated treatment well  Patient performed ex as noted  Provided cues to ensure good ex technique and benefit  Patient reported some exercise soreness in her R LE post exercise  Reviewed HEP verbally and discussed fitness program routine suggesting the patient consider a personal training at her gym for guidance with a wellness workout to include light weight training  Patient reported understanding  Patient would benefit from continued PT intervention to address deficits and attain set goals  Plan: Continue per plan of care        Precautions: TIA, osteoporosis      Manuals                                                                Neuro Re-Ed             SLR w/TA x10 2x10           bridges x10 5\"x15           SL hip abd x10 2x10           clams x10 2x10           Standing ext x10 2x10           Add set with TrA  5\"x10                        Ther Ex             bike  x5'                                                                                                      Ther Activity                                       Gait Training                                       Modalities                                            "

## 2023-06-01 ENCOUNTER — OFFICE VISIT (OUTPATIENT)
Dept: NEUROLOGY | Facility: CLINIC | Age: 73
End: 2023-06-01

## 2023-06-01 VITALS
HEIGHT: 64 IN | BODY MASS INDEX: 20.49 KG/M2 | WEIGHT: 120 LBS | DIASTOLIC BLOOD PRESSURE: 78 MMHG | HEART RATE: 74 BPM | SYSTOLIC BLOOD PRESSURE: 108 MMHG | OXYGEN SATURATION: 98 %

## 2023-06-01 DIAGNOSIS — I63.9 CEREBROVASCULAR ACCIDENT (CVA) DUE TO VASCULAR OCCLUSION (HCC): ICD-10-CM

## 2023-06-01 DIAGNOSIS — I67.850 CADASIL (CEREBRAL AUTOSOMAL DOMINANT ARTERIOPATHY WITH SUBCORTICAL INFARCTS AND LEUKOENCEPHALOPATHY): Primary | ICD-10-CM

## 2023-06-01 NOTE — PROGRESS NOTES
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)  -continue with aspirin/plavix and simvastatin  -she has two kids - son is planning on testing and he has three kids  -     MRI brain without contrast; Future    Cerebrovascular accident (CVA) due to vascular occlusion (HCC)  -BP goal < 130/80, BP is at goal  -c/w aspirin/plavix and atorvastatin  -patient has been stable, and doing well  -no new TIA/CVA like symptoms  -MRI brain in 1 year, will do MRIs every two years now  -     MRI brain without contrast; Future         Follow up - 1 year  I would be happy to see the patient sooner if any new questions/concerns arise  Patient/Guardian was advised to the call the office if they have any questions and concerns in the meantime  Patient/Guardian does understand that if they have any new stroke like symptoms such as facial droop on one side, weakness/paralysis on either side, speech trouble, numbness on one side, balance issues, any vision changes, extreme dizziness or any new headache, to call 9-1-1 immediately or to proceed to the nearest ER immediately  Subjective:    HPI    76-year-old male  female who is here as a follow-up of history of CADASIL  Patient is doing well  Denies any new TIA or CVA-like symptoms patient compliant medications she is tolerating her aspirin and Plavix and Zocor well without any issues  The following portions of the patient's history were reviewed and updated as appropriate:   She  has a past medical history of Arthralgia, Memory loss (9/3/2019), Neutropenia ([address] ), Osteoporosis, Rheumatoid arthritis ([address] ), Sjogren's syndrome ([address] ), Stroke (cerebrum) ([address] ), and Transient cerebral ischemia    She Patient Active Problem List    Diagnosis Date Noted   • Primary osteoarthritis of right knee 04/13/2023   • Synovial cyst of popliteal space ([facility]), right knee 04/13/2023   • Foreign body in skin of finger 02/03/2023   • Eye hemorrhage, right 08/24/2022   • Arthritis of right knee 03/17/2022   • TIA due to embolism ([facility] ) 09/22/2021   • Medication monitoring encounter 01/25/2021   • Cataract of both eyes 08/13/2020   • Dry eye 07/20/2020   • Multiple lacunar infarcts (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 ) 10/29/2013   • Anise Graves Reckling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 ([facility] ) 10/29/2013   • Cerebrovascular accident (CVA) due to vascular occlusion ([facility] ) 02/01/2008     She  has a past surgical history that includes Breast excisional biopsy (Left, 1983); Breast excisional biopsy (Left, 1982); Breast excisional biopsy (Right, 1982); Hysterectomy; Oophorectomy; and Cataract extraction, bilateral   Her family history includes Diabetes in her father; Lymphoma (age of onset: 61) in her mother; No Known Problems in her daughter, maternal aunt, maternal aunt, maternal grandfather, maternal grandmother, paternal aunt, paternal aunt, paternal grandfather, paternal grandmother, and sister; Stroke in her sister; Stroke (age of onset: 68) in her mother  She  reports that she has never smoked  She has never used smokeless tobacco  She reports current alcohol use of about 1 0 standard drink of alcohol per week   She reports that she does not use drugs  Current Outpatient Medications   Medication Sig Dispense Refill   • aspirin (Aspir-Low) 81 mg EC tablet Take 81 mg by mouth daily       • calcium citrate-Vitamin D 200 mg-250 units Take 1 tablet by mouth in the morning     • clopidogrel (PLAVIX) 75 mg tablet Take 1 tablet (75 mg total) by mouth daily 90 tablet 1   • cycloSPORINE (RESTASIS) 0 05 % ophthalmic emulsion Apply 1 drop to eye 2 (two) times a day     • ondansetron (ZOFRAN) 4 mg tablet Take 1 tablet (4 mg total) by mouth 3 (three) times a day as needed for nausea or vomiting 30 tablet 0   • oxybutynin (DITROPAN) 5 mg tablet Take 1 tablet (5 mg total) by mouth 2 (two) times a day 180 tablet 1   • scopolamine (TRANSDERM-SCOP) 1 mg/3 days TD 72 hr patch Place 1 patch on the skin over 72 hours every third day 3 patch 1   • simvastatin (ZOCOR) 40 mg tablet Take 1 tablet (40 mg total) by mouth daily 90 tablet 1   • hydroxychloroquine (PLAQUENIL) 200 mg tablet Take 1 tablet (200 mg total) by mouth daily for 90 doses 90 tablet 1     No current facility-administered medications for this visit       Current Outpatient Medications on File Prior to Visit   Medication Sig   • aspirin (Aspir-Low) 81 mg EC tablet Take 81 mg by mouth daily     • calcium citrate-Vitamin D 200 mg-250 units Take 1 tablet by mouth in the morning   • clopidogrel (PLAVIX) 75 mg tablet Take 1 tablet (75 mg total) by mouth daily   • cycloSPORINE (RESTASIS) 0 05 % ophthalmic emulsion Apply 1 drop to eye 2 (two) times a day   • ondansetron (ZOFRAN) 4 mg tablet Take 1 tablet (4 mg total) by mouth 3 (three) times a day as needed for nausea or vomiting   • oxybutynin (DITROPAN) 5 mg tablet Take 1 tablet (5 mg total) by mouth 2 (two) times a day   • scopolamine (TRANSDERM-SCOP) 1 mg/3 days TD 72 hr patch Place 1 patch on the skin over 72 hours every third day   • simvastatin (ZOCOR) 40 mg tablet Take 1 tablet (40 mg total) by mouth daily   • hydroxychloroquine "(PLAQUENIL) 200 mg tablet Take 1 tablet (200 mg total) by mouth daily for 90 doses     No current facility-administered medications on file prior to visit  She has No Known Allergies            Objective:    Blood pressure 108/78, pulse 74, height 5' 4\" (1 626 m), weight 54 4 kg (120 lb), SpO2 98 %  Physical Exam  General - patient is alert   Speech - no dysarthria noted, no aphasia noted  Neuro:   Cranial nerves: PERRL, EOMI, facial sensation intact to soft touch in V1, V2 and V3, no facial asymmetry noted, uvula/palate midline, tongue midline  Motor: 5/5 throughout, normal tone, no pronator drift noted  Sensory - intact to soft touch throughout  Reflexes - 2+ throughout  Coordination - no ataxia/dysmetria noted  Gait - normal   Neurological Exam      ROS:  Reviewed ROS   Review of Systems   Constitutional: Negative  Negative for appetite change and fever  HENT: Negative  Negative for hearing loss, tinnitus, trouble swallowing and voice change  Eyes: Negative  Negative for photophobia, pain and visual disturbance  Respiratory: Negative  Negative for shortness of breath  Cardiovascular: Negative  Negative for palpitations  Gastrointestinal: Negative  Negative for nausea and vomiting  Endocrine: Negative  Negative for cold intolerance  Genitourinary: Positive for frequency  Negative for dysuria and urgency  Musculoskeletal: Negative  Negative for gait problem, myalgias and neck pain  Skin: Negative  Negative for rash  Allergic/Immunologic: Negative  Neurological: Negative  Negative for dizziness, tremors, seizures, syncope, facial asymmetry, speech difficulty, weakness, light-headedness, numbness and headaches  Hematological: Bruises/bleeds easily  Psychiatric/Behavioral: Negative  Negative for confusion, hallucinations and sleep disturbance                   "

## 2023-06-05 ENCOUNTER — OFFICE VISIT (OUTPATIENT)
Dept: INTERNAL MEDICINE CLINIC | Facility: OTHER | Age: 73
End: 2023-06-05
Payer: MEDICARE

## 2023-06-05 ENCOUNTER — APPOINTMENT (OUTPATIENT)
Dept: PHYSICAL THERAPY | Facility: REHABILITATION | Age: 73
End: 2023-06-05
Payer: MEDICARE

## 2023-06-05 VITALS
WEIGHT: 122.6 LBS | BODY MASS INDEX: 20.93 KG/M2 | HEART RATE: 74 BPM | DIASTOLIC BLOOD PRESSURE: 74 MMHG | OXYGEN SATURATION: 97 % | SYSTOLIC BLOOD PRESSURE: 100 MMHG | TEMPERATURE: 97.7 F | HEIGHT: 64 IN

## 2023-06-05 DIAGNOSIS — Q85.01 VON RECKLINGHAUSENS DISEASE (HCC): ICD-10-CM

## 2023-06-05 DIAGNOSIS — I63.81 MULTIPLE LACUNAR INFARCTS (HCC): ICD-10-CM

## 2023-06-05 DIAGNOSIS — M05.79 RHEUMATOID ARTHRITIS INVOLVING MULTIPLE SITES WITH POSITIVE RHEUMATOID FACTOR (HCC): ICD-10-CM

## 2023-06-05 DIAGNOSIS — G45.9 TIA DUE TO EMBOLISM (HCC): Primary | ICD-10-CM

## 2023-06-05 DIAGNOSIS — I63.81 CEREBROVASCULAR ACCIDENT (CVA) DUE TO OCCLUSION OF SMALL ARTERY (HCC): ICD-10-CM

## 2023-06-05 DIAGNOSIS — M35.09 SJOGREN'S SYNDROME WITH OTHER ORGAN INVOLVEMENT (HCC): ICD-10-CM

## 2023-06-05 DIAGNOSIS — J02.9 ACUTE PHARYNGITIS, UNSPECIFIED ETIOLOGY: ICD-10-CM

## 2023-06-05 DIAGNOSIS — I74.9 TIA DUE TO EMBOLISM (HCC): Primary | ICD-10-CM

## 2023-06-05 PROCEDURE — 99213 OFFICE O/P EST LOW 20 MIN: CPT | Performed by: INTERNAL MEDICINE

## 2023-06-05 RX ORDER — AMOXICILLIN 500 MG/1
500 CAPSULE ORAL EVERY 8 HOURS SCHEDULED
Qty: 15 CAPSULE | Refills: 0 | Status: SHIPPED | OUTPATIENT
Start: 2023-06-05 | End: 2023-06-10

## 2023-06-05 NOTE — PROGRESS NOTES
Assessment/Plan:    Acute pharyngitis  Will prescribe Amoxil 500 mg to 3 times daily for 5 days  Also advised gargle with warm salt water  Cerebrovascular accident (CVA) due to vascular occlusion (HCC)  Stable  No new symptoms  Also managed by neurology       Diagnoses and all orders for this visit:    TIA due to embolism [facility])    Cerebrovascular accident (CVA) due to occlusion of small artery ([email or web address] )    Multiple lacunar infarcts ([email or web address] )    Von Reckling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 ([email or web address] 75 )    Rheumatoid arthritis involving multiple sites with positive rheumatoid factor ([email or web address] )    Sjogren's syndrome with other organ involvement ([email or web address] 75 )    Acute pharyngitis, unspecified etiology  -     amoxicillin (AMOXIL) 500 mg capsule; Take 1 capsule (500 mg total) by mouth every 8 (eight) hours for 5 days               Subjective:          Patient ID: Jovany Bundy is a 75 y o  female  Patient came to office with a complaining of sore throat for the last 5 to 6 days  Is more scratchy  Denied any fever or chills  No nausea vomiting  No diarrhea  Sore Throat   Associated symptoms include congestion  Pertinent negatives include no abdominal pain, coughing, diarrhea, ear discharge, ear pain, headaches, neck pain, shortness of breath, trouble swallowing or vomiting  The following portions of the patient's history were reviewed and updated as appropriate: allergies, current medications, past family history, past medical history, past social history, past surgical history and problem list     Review of Systems   Constitutional: Negative for fatigue and fever  HENT: Positive for congestion and sore throat  Negative for ear discharge, ear pain, postnasal drip, sinus pressure, tinnitus and trouble swallowing  Eyes: Negative for discharge, itching and visual disturbance  Respiratory: Negative for cough and shortness of breath  Cardiovascular: Negative for chest pain and palpitations     Gastrointestinal: Negative for abdominal pain, diarrhea, nausea and vomiting  Endocrine: Negative for cold intolerance and polyuria  Genitourinary: Negative for difficulty urinating, dysuria and urgency  Musculoskeletal: Negative for arthralgias and neck pain  Skin: Negative for rash  Allergic/Immunologic: Negative for environmental allergies  Neurological: Negative for dizziness, weakness and headaches  Psychiatric/Behavioral: Negative for agitation  The patient is not nervous/anxious            Past Medical History:   Diagnosis Date   • Arthralgia    • Memory loss 9/3/2019   • Neutropenia ([facility])    • Osteoporosis    • Rheumatoid arthritis ([facility] )    • Sjogren's syndrome ([facility])    • Stroke (cerebrum) ([facility])    • Transient cerebral ischemia          Current Outpatient Medications:   •  amoxicillin (AMOXIL) 500 mg capsule, Take 1 capsule (500 mg total) by mouth every 8 (eight) hours for 5 days, Disp: 15 capsule, Rfl: 0  •  aspirin (Aspir-Low) 81 mg EC tablet, Take 81 mg by mouth daily  , Disp: , Rfl:   •  calcium citrate-Vitamin D 200 mg-250 units, Take 1 tablet by mouth in the morning, Disp: , Rfl:   •  clopidogrel (PLAVIX) 75 mg tablet, Take 1 tablet (75 mg total) by mouth daily, Disp: 90 tablet, Rfl: 1  •  cycloSPORINE (RESTASIS) 0 05 % ophthalmic emulsion, Apply 1 drop to eye 2 (two) times a day, Disp: , Rfl:   •  hydroxychloroquine (PLAQUENIL) 200 mg tablet, Take 1 tablet (200 mg total) by mouth daily for 90 doses, Disp: 90 tablet, Rfl: 1  •  ondansetron (ZOFRAN) 4 mg tablet, Take 1 tablet (4 mg total) by mouth 3 (three) times a day as needed for nausea or vomiting, Disp: 30 tablet, Rfl: 0  •  oxybutynin (DITROPAN) 5 mg tablet, Take 1 tablet (5 mg total) by mouth 2 (two) times a day, Disp: 180 tablet, Rfl: 1  •  scopolamine (TRANSDERM-SCOP) 1 mg/3 days TD 72 hr patch, Place 1 patch on the skin over 72 hours every third day, Disp: 3 patch, Rfl: 1  •  simvastatin (ZOCOR) 40 mg tablet, Take 1 tablet (40 mg total) by mouth daily, Disp: 90 tablet, "Rfl: 1    No Known Allergies    Social History   Past Surgical History:   Procedure Laterality Date   • BREAST EXCISIONAL BIOPSY Left 1983    benign   • BREAST EXCISIONAL BIOPSY Left 1982    benign   • BREAST EXCISIONAL BIOPSY Right 1982    benign   • CATARACT EXTRACTION, BILATERAL     • HYSTERECTOMY      age 47   • [de-identified]      both     Family History   Problem Relation Age of Onset   • Lymphoma Mother 61        ACUTE   • Stroke Mother 68   • Diabetes Father    • Stroke Sister    • No Known Problems Sister    • No Known Problems Daughter    • No Known Problems Maternal Grandmother    • No Known Problems Maternal Grandfather    • No Known Problems Paternal Grandmother    • No Known Problems Paternal Grandfather    • No Known Problems Maternal Aunt    • No Known Problems Maternal Aunt    • No Known Problems Paternal Aunt    • No Known Problems Paternal Aunt        Objective:  /74 (BP Location: Left arm, Patient Position: Sitting, Cuff Size: Adult)   Pulse 74   Temp 97 7 °F (36 5 °C) (Tympanic)   Ht 5' 4\" (1 626 m)   Wt 55 6 kg (122 lb 9 6 oz)   SpO2 97% Comment: ra  BMI 21 04 kg/m²   Body mass index is 21 04 kg/m²  Physical Exam  Constitutional:       Appearance: Normal appearance  She is well-developed  HENT:      Head: Normocephalic  Right Ear: External ear normal       Left Ear: External ear normal       Nose: No congestion or rhinorrhea  Mouth/Throat:      Pharynx: Posterior oropharyngeal erythema present  No oropharyngeal exudate  Eyes:      General: No scleral icterus  Pupils: Pupils are equal, round, and reactive to light  Neck:      Thyroid: No thyromegaly  Trachea: No tracheal deviation  Cardiovascular:      Rate and Rhythm: Normal rate and regular rhythm  Heart sounds: Normal heart sounds  Pulmonary:      Effort: Pulmonary effort is normal  No respiratory distress  Breath sounds: Normal breath sounds  Chest:      Chest wall: No tenderness   " Abdominal:      General: Bowel sounds are normal       Palpations: Abdomen is soft  There is no mass  Tenderness: There is no abdominal tenderness  Musculoskeletal:         General: Normal range of motion  Cervical back: Normal range of motion and neck supple  Right lower leg: No edema  Left lower leg: No edema  Lymphadenopathy:      Cervical: No cervical adenopathy  Skin:     General: Skin is warm  Neurological:      Mental Status: She is alert  Cranial Nerves: No cranial nerve deficit

## 2023-06-07 ENCOUNTER — OFFICE VISIT (OUTPATIENT)
Dept: PHYSICAL THERAPY | Facility: REHABILITATION | Age: 73
End: 2023-06-07
Payer: MEDICARE

## 2023-06-07 DIAGNOSIS — M71.21 SYNOVIAL CYST OF POPLITEAL SPACE (BAKER), RIGHT KNEE: Primary | ICD-10-CM

## 2023-06-07 PROCEDURE — 97110 THERAPEUTIC EXERCISES: CPT

## 2023-06-07 PROCEDURE — 97112 NEUROMUSCULAR REEDUCATION: CPT

## 2023-06-07 NOTE — PROGRESS NOTES
"Daily Note     Today's date: 2023  Patient name: Darya Hobson  : [birth year]  MRN: [id number]  Referring provider: Salvatore Rosario DO  Dx:   Encounter Diagnosis     ICD-10-CM    1  Synovial cyst of popliteal space (Baker), right knee  M71 21                      Subjective:  Patient reports that she had a fall at the pharmacy on Monday and her L hip is sore  She reports that she just lost her balance  Shara Cavazos notes that her R knee has been feeling really good  Objective: See treatment diary below      Assessment: Patient tolerated treatment well  Patient performed ex as noted  No exercise progressions today secondary to patient report of a recent fall and L hip pain  Gait antalgia was noted due to L hip discomfort  Encouraged the patient to consider assessment of her L hip pain as she had a recent fall, osteoporosis diagnosis and has a planned trip at the end of the week  Patient would benefit from continued PT intervention to address deficits and attains set goals  Plan: Continue per plan of care        Precautions: TIA, osteoporosis      Manuals                                                               Neuro Re-Ed             SLR w/TA x10 2x10 2x10 R          bridges x10 5\"x15 5\"  2x10           SL hip abd x10 2x10 2x10 R          clams x10 2x10 2x10 R          Standing ext x10 2x10 2x10          Add set with TrA  5\"x10 5\"x15                       Ther Ex             bike  x5' x6'          Mini squat   np                                                                                        Ther Activity                                       Gait Training                                       Modalities                                            "

## 2023-06-09 ENCOUNTER — OFFICE VISIT (OUTPATIENT)
Dept: INTERNAL MEDICINE CLINIC | Age: 73
End: 2023-06-09
Payer: MEDICARE

## 2023-06-09 VITALS
SYSTOLIC BLOOD PRESSURE: 122 MMHG | TEMPERATURE: 98 F | OXYGEN SATURATION: 99 % | HEIGHT: 64 IN | HEART RATE: 68 BPM | DIASTOLIC BLOOD PRESSURE: 80 MMHG | BODY MASS INDEX: 21 KG/M2 | WEIGHT: 123 LBS

## 2023-06-09 DIAGNOSIS — J02.9 ACUTE PHARYNGITIS, UNSPECIFIED ETIOLOGY: Primary | ICD-10-CM
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
## 2023-06-09 PROCEDURE — 99214 OFFICE O/P EST MOD 30 MIN: CPT | Performed by: FAMILY MEDICINE

## 2023-06-09 RX ORDER — METHYLPREDNISOLONE 4 MG/1
TABLET ORAL
Qty: 21 EACH | Refills: 0 | Status: SHIPPED | OUTPATIENT
Start: 2023-06-09

## 2023-06-09 NOTE — PROGRESS NOTES
Assessment/Plan:    1  Acute pharyngitis, unspecified etiology    2  Fall against object  -     methylPREDNISolone 4 MG tablet therapy pack; Use as directed on package         complete anbx, add tylenol 1000 mg TID and add antihistamine, if not better in 1-2 days, can stop advil and start medrol with food  Patient Instructions       Fall Prevention   AMBULATORY CARE:   Fall prevention  includes ways to make your home and other areas safer  It also includes ways you can move more carefully to prevent a fall  Health conditions that cause changes in your blood pressure, vision, or muscle strength and coordination may increase your risk for falls  Medicines may also increase your risk for falls if they make you dizzy, weak, or sleepy  Call your local emergency number (911 in the [address] [facility]) or have someone call if:   • You have fallen and are unconscious  • You have fallen and cannot move part of your body  Call your doctor if:   • You have fallen and have pain or a headache  • You have questions or concerns about your condition or care  Fall prevention tips:   • Stand or sit up slowly  This may help you keep your balance and prevent falls  • Use assistive devices as directed  Your healthcare provider may suggest that you use a cane or walker to help you keep your balance  You may need to have grab bars put in your bathroom near the toilet or in the shower  • Wear shoes that fit well and have soles that   Wear shoes both inside and outside  Use slippers with good   Do not wear shoes with high heels  • Wear a personal alarm  This is a device that allows you to call 911 if you fall and need help  Ask your healthcare provider for more information  • Stay active  Exercise can help strengthen your muscles and improve your balance  Your healthcare provider may recommend water aerobics or walking  He or she may also recommend physical therapy to improve your coordination   Never start an exercise program without talking to your healthcare provider first          • Manage your medical conditions  Keep all appointments with your healthcare providers  Visit your eye doctor as directed  Home safety tips:       • Add items to prevent falls in the bathroom  Put nonslip strips on your bath or shower floor to prevent you from slipping  Use a bath mat if you do not have carpet in the bathroom  This will prevent you from falling when you step out of the bath or shower  Use a shower seat so you do not need to stand while you shower  Sit on the toilet or a chair in your bathroom to dry yourself and put on clothing  This will prevent you from losing your balance from drying or dressing yourself while you are standing  • Keep paths clear  Remove books, shoes, and other objects from walkways and stairs  Place cords for telephones and lamps out of the way so that you do not need to walk over them  Tape them down if you cannot move them  Remove small rugs  If you cannot remove a rug, secure it with double-sided tape  This will prevent you from tripping  • Install bright lights in your home  Use night lights to help light paths to the bathroom or kitchen  Always turn on the light before you start walking  • Keep items you use often on shelves within reach  Do not use a step stool to help you reach an item  • Paint or place reflective tape on the edges of your stairs  This will help you see the stairs better  Follow up with your doctor as directed:  Write down your questions so you remember to ask them during your visits  © Copyright [employer] 2022 Information is for End User's use only and may not be sold, redistributed or otherwise used for commercial purposes  The above information is an  only  It is not intended as medical advice for individual conditions or treatments   Talk to your doctor, nurse or pharmacist before following any medical regimen to see if it is safe and effective for you  Return for Schedule AWV, Next scheduled follow up  Subjective:      Patient ID: Jovany Bundy is a 75 y o  female  Chief Complaint   Patient presents with   • Sore Throat     Patient stated she is not feeling any better  Was seen by Dr José Manuel Lopez this past Monday and was given and antibiotic  Patient stated her symptoms are getting worse sore throat is not any better  Patient stated she will be going on a [place] this coming Sunday         HPI         Sore Throat  Patient stated she is not feeling any better  Was seen by Dr José Manuel Lopez this past Monday and was given and antibiotic  Patient stated her symptoms are getting worse sore throat is not any better  Patient stated she will be going on a [place] this coming Sunday    Taking advil PM at night, no other meds,    Having arm pain R side since fall, able to do ADR, no parenthesis         The following portions of the patient's history were reviewed and updated as appropriate: allergies, current medications, past family history, past medical history, past social history, past surgical history and problem list     Review of Systems      Constitutional:  Denies fever or chills   Eyes:  Denies double , blurry vision or eye pain  HENT:   See HPIs  Respiratory:  Denies cough or shortness of breath or wheezing  Cardiovascular:  Denies palpitations or chest pain  GI:  Denies abdominal pain, nausea, or vomiting, no loose stools, no reflux  Integument:  Denies rash , no open areas  Neurologic:  Denies headache or focal weakness, no dizziness  : no dysuria, or hematuria        Current Outpatient Medications   Medication Sig Dispense Refill   • amoxicillin (AMOXIL) 500 mg capsule Take 1 capsule (500 mg total) by mouth every 8 (eight) hours for 5 days 15 capsule 0   • aspirin (Aspir-Low) 81 mg EC tablet Take 81 mg by mouth daily       • calcium citrate-Vitamin D 200 mg-250 units Take 1 tablet by mouth in the morning     • clopidogrel (PLAVIX) 75 mg "tablet Take 1 tablet (75 mg total) by mouth daily 90 tablet 1   • cycloSPORINE (RESTASIS) 0 05 % ophthalmic emulsion Apply 1 drop to eye 2 (two) times a day     • hydroxychloroquine (PLAQUENIL) 200 mg tablet Take 1 tablet (200 mg total) by mouth daily for 90 doses 90 tablet 1   • methylPREDNISolone 4 MG tablet therapy pack Use as directed on package 21 each 0   • ondansetron (ZOFRAN) 4 mg tablet Take 1 tablet (4 mg total) by mouth 3 (three) times a day as needed for nausea or vomiting 30 tablet 0   • oxybutynin (DITROPAN) 5 mg tablet Take 1 tablet (5 mg total) by mouth 2 (two) times a day 180 tablet 1   • scopolamine (TRANSDERM-SCOP) 1 mg/3 days TD 72 hr patch Place 1 patch on the skin over 72 hours every third day 3 patch 1   • simvastatin (ZOCOR) 40 mg tablet Take 1 tablet (40 mg total) by mouth daily 90 tablet 1     No current facility-administered medications for this visit         Objective:    /80 (BP Location: Left arm, Patient Position: Sitting, Cuff Size: Adult)   Pulse 68   Temp 98 °F (36 7 °C)   Ht 5' 4\" (1 626 m)   Wt 55 8 kg (123 lb)   SpO2 99%   BMI 21 11 kg/m²        Physical Exam       Constitutional:  Well developed, well nourished, no acute distress, non-toxic appearance   Eyes:  PERRL, conjunctiva normal , non icteric sclera  HENT:  Atraumatic, oropharynx moist  Neck-  supple , no frontal or maxillary sinus TTP, + PND noted with cobblestoning, mild erythema noted in posterior pharynx, no exudate, TM without erythema  Respiratory:  CTA b/l, normal breath sounds, no rales, no wheezing   Cardiovascular:  RRR, no murmurs, no LE edema b/l  GI:  Soft, nondistended, normal bowel sounds x 4, nontender, no organomegaly, no mass, no rebound, no guarding   Neurologic:  no focal deficits noted   Psychiatric:  Speech and behavior appropriate , AAO x 3  L buttock with 3 inch circular ecchymosis  Lymph: no cervical lymphadenopathy    Harshil Montano DO  Falls Plan of Care: Balance, strength, and gait " training instructions were provided

## 2023-06-09 NOTE — PATIENT INSTRUCTIONS
Fall Prevention   AMBULATORY CARE:   Fall prevention  includes ways to make your home and other areas safer  It also includes ways you can move more carefully to prevent a fall  Health conditions that cause changes in your blood pressure, vision, or muscle strength and coordination may increase your risk for falls  Medicines may also increase your risk for falls if they make you dizzy, weak, or sleepy  Call your local emergency number (911 in the [address]) or have someone call if:   • You have fallen and are unconscious  • You have fallen and cannot move part of your body  Call your doctor if:   • You have fallen and have pain or a headache  • You have questions or concerns about your condition or care  Fall prevention tips:   • Stand or sit up slowly  This may help you keep your balance and prevent falls  • Use assistive devices as directed  Your healthcare provider may suggest that you use a cane or walker to help you keep your balance  You may need to have grab bars put in your bathroom near the toilet or in the shower  • Wear shoes that fit well and have soles that   Wear shoes both inside and outside  Use slippers with good   Do not wear shoes with high heels  • Wear a personal alarm  This is a device that allows you to call 911 if you fall and need help  Ask your healthcare provider for more information  • Stay active  Exercise can help strengthen your muscles and improve your balance  Your healthcare provider may recommend water aerobics or walking  He or she may also recommend physical therapy to improve your coordination  Never start an exercise program without talking to your healthcare provider first          • Manage your medical conditions  Keep all appointments with your healthcare providers  Visit your eye doctor as directed  Home safety tips:       • Add items to prevent falls in the bathroom    Put nonslip strips on your bath or shower floor to prevent you from slipping  Use a bath mat if you do not have carpet in the bathroom  This will prevent you from falling when you step out of the bath or shower  Use a shower seat so you do not need to stand while you shower  Sit on the toilet or a chair in your bathroom to dry yourself and put on clothing  This will prevent you from losing your balance from drying or dressing yourself while you are standing  • Keep paths clear  Remove books, shoes, and other objects from walkways and stairs  Place cords for telephones and lamps out of the way so that you do not need to walk over them  Tape them down if you cannot move them  Remove small rugs  If you cannot remove a rug, secure it with double-sided tape  This will prevent you from tripping  • Install bright lights in your home  Use night lights to help light paths to the bathroom or kitchen  Always turn on the light before you start walking  • Keep items you use often on shelves within reach  Do not use a step stool to help you reach an item  • Paint or place reflective tape on the edges of your stairs  This will help you see the stairs better  Follow up with your doctor as directed:  Write down your questions so you remember to ask them during your visits  © Copyright Janine Ramos 2022 Information is for End User's use only and may not be sold, redistributed or otherwise used for commercial purposes  The above information is an  only  It is not intended as medical advice for individual conditions or treatments  Talk to your doctor, nurse or pharmacist before following any medical regimen to see if it is safe and effective for you

## 2023-06-12 ENCOUNTER — APPOINTMENT (OUTPATIENT)
Dept: PHYSICAL THERAPY | Facility: REHABILITATION | Age: 73
End: 2023-06-12
Payer: MEDICARE

## 2023-06-19 ENCOUNTER — APPOINTMENT (OUTPATIENT)
Dept: PHYSICAL THERAPY | Facility: REHABILITATION | Age: 73
End: 2023-06-19
Payer: MEDICARE

## 2023-06-21 ENCOUNTER — OFFICE VISIT (OUTPATIENT)
Dept: PHYSICAL THERAPY | Facility: REHABILITATION | Age: 73
End: 2023-06-21
Payer: MEDICARE

## 2023-06-21 DIAGNOSIS — M71.21 SYNOVIAL CYST OF POPLITEAL SPACE (BAKER), RIGHT KNEE: Primary | ICD-10-CM

## 2023-06-21 PROCEDURE — 97110 THERAPEUTIC EXERCISES: CPT

## 2023-06-21 PROCEDURE — 97112 NEUROMUSCULAR REEDUCATION: CPT

## 2023-06-21 PROCEDURE — 97530 THERAPEUTIC ACTIVITIES: CPT

## 2023-06-21 NOTE — PROGRESS NOTES
"Daily Note     Today's date: 2023  Patient name: Shreya Keller  : [birth year]  MRN: [id number]  Referring provider: Mauricio Garcias DO  Dx:   Encounter Diagnosis     ICD-10-CM    1  Synovial cyst of popliteal space (Baker), right knee  M71 21                      Subjective:  Bartolome  reports that her knee did well during her trip and it continues to feel good  She notes that her L hip is still a little sore from the fall she had prior to her trip  She did see the Dr and her hip is only bruised  Objective: See treatment diary below      Assessment: Patient tolerated treatment well  Patient performed ex as noted  Progressed ex as indicated with good patient tolerance  Good pain free R knee ROM noted  Issued an updated written HEP  Patient would benefit from continued PT intervention to address deficits and attain set goals  Plan: Continue per plan of care  Precautions: TIA, osteoporosis      Manuals                                                              Neuro Re-Ed             SLR w/TA x10 2x10 2x10 R 1# 5\"  2x10         bridges x10 5\"x15 5\"  2x10  Pink  5\" x20         SL hip abd x10 2x10 2x10 R 1# 5\"  2x10         clams x10 2x10 2x10 R Pink  5\" 2x10         Standing ext x10 2x10 2x10          Add set with TrA  5\"x10 5\"x15 5\" 2x10                      Ther Ex             bike  x5' x6' Red bike L7  x6'         Mini squat   np 5\" x10         Leg extension    11# x10         SA ham    22# x10                                                             Ther Activity                                       Gait Training                                       Modalities                                           Access Code: [email or web address]  URL: [email or web address]  Date: 2023  Prepared by: [facility] with Counter Support  - 1 x daily - 7 x weekly - 1 sets - 10 reps - 5 hold  - Standing Hip Abduction with Counter Support  - 1 x " daily - 7 x weekly - 2 sets - 10 reps - 5 hold  - Supine Bridge with Mini Swiss Ball Between Knees  - 1 x daily - 7 x weekly - 2 sets - 10 reps - 5 hold  - Clamshell with Resistance  - 1 x daily - 7 x weekly - 2 sets - 10 reps - 5 hold

## 2023-06-22 ENCOUNTER — OFFICE VISIT (OUTPATIENT)
Dept: PHYSICAL THERAPY | Facility: REHABILITATION | Age: 73
End: 2023-06-22
Payer: MEDICARE

## 2023-06-22 DIAGNOSIS — M71.21 SYNOVIAL CYST OF POPLITEAL SPACE (BAKER), RIGHT KNEE: Primary | ICD-10-CM

## 2023-06-22 PROCEDURE — 97112 NEUROMUSCULAR REEDUCATION: CPT | Performed by: PHYSICAL THERAPIST

## 2023-06-22 PROCEDURE — 97530 THERAPEUTIC ACTIVITIES: CPT | Performed by: PHYSICAL THERAPIST

## 2023-06-22 PROCEDURE — 97110 THERAPEUTIC EXERCISES: CPT | Performed by: PHYSICAL THERAPIST

## 2023-06-22 NOTE — PROGRESS NOTES
"Daily Note     Today's date: 2023  Patient name: Nickie Schirmer  :   MRN: [id number]  Referring provider: Amie Singh DO  Dx:   Encounter Diagnosis     ICD-10-CM    1  Synovial cyst of popliteal space (Baker), right knee  M71 21                      Subjective:  Rosaura Salcedo reports that her knee did well during her trip and it continues to feel good  She notes that her L hip is still a little sore from the fall she had prior to her trip  She did see the Dr and her hip is only bruised  Objective: See treatment diary below      Assessment: Patient tolerated treatment well  Pt exceeded her FOTO goals  She is not sure yet how to transition to a HEP and use the gym equipment  She would benefit from 1-2 more visits to transition  Patient would benefit from continued PT intervention to address deficits and attain set goals  Plan: Continue per plan of care  Precautions: TIA, osteoporosis      Manuals                                                             Neuro Re-Ed             SLR w/TA x10 2x10 2x10 R 1# 5\"  2x10 1# 5\" 2x10        bridges x10 5\"x15 5\"  2x10  Pink  5\" x20 Pink 5\"x20        SL hip abd x10 2x10 2x10 R 1# 5\"  2x10 1# 5\" 2x10        clams x10 2x10 2x10 R Pink  5\" 2x10 Pink 5\" x20        Standing ext x10 2x10 2x10          Add set with TrA  5\"x10 5\"x15 5\" 2x10 4# 10\"x10                     Ther Ex             bike  x5' x6' Red bike L7  x6' Red bike x6'        Mini squat   np 5\" x10         Leg extension    11# x10 22# 2x7        SA ham    22# x10 22#2x10        Leg press     44# 2x10                                               Ther Activity                                       Gait Training                                       Modalities                                           Access Code: [email or web address]  URL: [email or web address]  Date: 2023  Prepared by: Eugenio Ernandez  - Mini Squat with Counter Support  - 1 x " daily - 7 x weekly - 1 sets - 10 reps - 5 hold  - Standing Hip Abduction with Counter Support  - 1 x daily - 7 x weekly - 2 sets - 10 reps - 5 hold  - Supine Bridge with Mini Swiss Ball Between Knees  - 1 x daily - 7 x weekly - 2 sets - 10 reps - 5 hold  - Clamshell with Resistance  - 1 x daily - 7 x weekly - 2 sets - 10 reps - 5 hold

## 2023-06-26 ENCOUNTER — OFFICE VISIT (OUTPATIENT)
Dept: PHYSICAL THERAPY | Facility: REHABILITATION | Age: 73
End: 2023-06-26
Payer: MEDICARE

## 2023-06-26 DIAGNOSIS — M71.21 SYNOVIAL CYST OF POPLITEAL SPACE (BAKER), RIGHT KNEE: Primary | ICD-10-CM

## 2023-06-26 PROCEDURE — 97530 THERAPEUTIC ACTIVITIES: CPT

## 2023-06-26 PROCEDURE — 97110 THERAPEUTIC EXERCISES: CPT

## 2023-06-26 PROCEDURE — 97112 NEUROMUSCULAR REEDUCATION: CPT

## 2023-06-26 NOTE — PROGRESS NOTES
"Daily Note     Today's date: 2023  Patient name: Lee Gagnon  :   MRN: [id number]  Referring provider: Ady Butterfield DO  Dx:   Encounter Diagnosis     ICD-10-CM    1  Synovial cyst of popliteal space (Baker), right knee  M71 21                      Subjective:  Patient reports that her R knee is feeling great  She still notes tenderness over her L hip with pressure  She notes that the issues she started therapy for is good  Objective: See treatment diary below      Assessment: Patient tolerated treatment well  Patient performed ex as noted with supervision  Provided cues as needed to ensure good ex technique and benefit  Amanuel Yan did well with all exercises performed  Plan: Continue per plan of care  Anticipate DC next visit with a complete written HEP         Precautions: TIA, osteoporosis      Manuals                                                            Neuro Re-Ed             SLR w/TA x10 2x10 2x10 R 1# 5\"  2x10 1# 5\" 2x10 1 5#   2x10       bridges x10 5\"x15 5\"  2x10  Pink  5\" x20 Pink 5\"x20 Pink  5\"       SL hip abd x10 2x10 2x10 R 1# 5\"  2x10 1# 5\" 2x10 1 5#   2x10       clams x10 2x10 2x10 R Pink  5\" 2x10 Pink 5\" x20 Pink 5\"  x20       Standing ext x10 2x10 2x10          Add set with TrA  5\"x10 5\"x15 5\" 2x10 4# 10\"x10 10\"x10                    Ther Ex             bike  x5' x6' Red bike L7  x6' Red bike x6' x8' L1       Mini squat   np 5\" x10  5\"x10       Leg extension    11# x10 22# 2x7 22#  2x10       SA ham    22# x10 22#2x10 22#  2x10       Leg press     44# 2x10 22#  2x10                                              Ther Activity                                       Gait Training                                       Modalities                                            "

## 2023-06-28 ENCOUNTER — OFFICE VISIT (OUTPATIENT)
Dept: PHYSICAL THERAPY | Facility: REHABILITATION | Age: 73
End: 2023-06-28
Payer: MEDICARE

## 2023-06-28 DIAGNOSIS — M71.21 SYNOVIAL CYST OF POPLITEAL SPACE (BAKER), RIGHT KNEE: Primary | ICD-10-CM

## 2023-06-28 PROCEDURE — 97112 NEUROMUSCULAR REEDUCATION: CPT | Performed by: PHYSICAL THERAPIST

## 2023-06-28 PROCEDURE — 97530 THERAPEUTIC ACTIVITIES: CPT | Performed by: PHYSICAL THERAPIST

## 2023-06-28 PROCEDURE — 97110 THERAPEUTIC EXERCISES: CPT | Performed by: PHYSICAL THERAPIST

## 2023-06-28 NOTE — PROGRESS NOTES
"Daily Note     Today's date: 2023  Patient name: Olga Lidia Blanco  : [birth year]89  MRN: [id number]  Referring provider: Pineda Warren DO  Dx:   Encounter Diagnosis     ICD-10-CM    1  Synovial cyst of popliteal space (Baker), right knee  M71 21                      Subjective:  Patient reports that her R knee is feeling great  She still notes tenderness over her L hip with pressure  She notes that the issues she started therapy for is good  Objective: See treatment diary below      Assessment: Olga Lidia Blanco has been compliant with attending PT and home exercise program since initial eval   Sandra Gutiérrezla  has made improvements in objective data since initial evalulation and has achieved all goals  Patient reports having returned to their prior level or function  Patient provided with updated Home Exercise Program, all questions answered, verbalized understanding and agreement to plan of care   Thus it was mutually decided to discontinue this episode of care and transition to Home Exercise Program         Plan:Discharge      Precautions: TIA, osteoporosis      Manuals                                                           Neuro Re-Ed             SLR w/TA x10 2x10 2x10 R 1# 5\"  2x10 1# 5\" 2x10 1 5#   2x10 1 5# 2x10      bridges x10 5\"x15 5\"  2x10  Pink  5\" x20 Pink 5\"x20 Pink  5\" PTB 5\"x20      SL hip abd x10 2x10 2x10 R 1# 5\"  2x10 1# 5\" 2x10 1 5#   2x10 1 5# 2x10      clams x10 2x10 2x10 R Pink  5\" 2x10 Pink 5\" x20 Pink 5\"  x20 Pink 5\" x20      Standing ext x10 2x10 2x10          Add set with TrA  5\"x10 5\"x15 5\" 2x10 4# 10\"x10 10\"x10                    Ther Ex             bike  x5' x6' Red bike L7  x6' Red bike x6' x8' L1 x8'      Mini squat   np 5\" x10  5\"x10       Leg extension    11# x10 22# 2x7 22#  2x10 22# 2x10      SA ham    22# x10 22#2x10 22#  2x10 22# 2x10      Leg press     44# 2x10 22#  2x10 44# 2x10      Bicep curls       10#x10      tricep ext       10# " "x10      rows       20# 2x10      Piriformis stretch       10\"x5                                Gait Training                                       Modalities                                            "

## 2023-08-04 DIAGNOSIS — M05.79 RHEUMATOID ARTHRITIS INVOLVING MULTIPLE SITES WITH POSITIVE RHEUMATOID FACTOR (HCC): ICD-10-CM

## 2023-08-04 PROBLEM — J02.9 ACUTE PHARYNGITIS: Status: RESOLVED | Noted: 2023-06-05 | Resolved: 2023-08-04

## 2023-08-04 RX ORDER — HYDROXYCHLOROQUINE SULFATE 200 MG/1
200 TABLET, FILM COATED ORAL DAILY
Qty: 90 TABLET | Refills: 1 | OUTPATIENT
Start: 2023-08-04

## 2023-08-11 ENCOUNTER — TELEPHONE (OUTPATIENT)
Age: 73
End: 2023-08-11

## 2023-08-11 DIAGNOSIS — M05.79 RHEUMATOID ARTHRITIS INVOLVING MULTIPLE SITES WITH POSITIVE RHEUMATOID FACTOR (HCC): ICD-10-CM

## 2023-08-11 RX ORDER — HYDROXYCHLOROQUINE SULFATE 200 MG/1
200 TABLET, FILM COATED ORAL DAILY
Qty: 90 TABLET | Refills: 1 | Status: SHIPPED | OUTPATIENT
Start: 2023-08-11 | End: 2024-02-07

## 2023-08-22 ENCOUNTER — HOSPITAL ENCOUNTER (OUTPATIENT)
Dept: RADIOLOGY | Facility: IMAGING CENTER | Age: 73
Discharge: HOME | End: 2023-08-22
Payer: MEDICARE

## 2023-08-22 ENCOUNTER — OFFICE VISIT (OUTPATIENT)
Dept: INTERNAL MEDICINE CLINIC | Facility: OTHER | Age: 73
End: 2023-08-22
Payer: MEDICARE

## 2023-08-22 VITALS
SYSTOLIC BLOOD PRESSURE: 98 MMHG | HEIGHT: 64 IN | HEART RATE: 71 BPM | DIASTOLIC BLOOD PRESSURE: 64 MMHG | WEIGHT: 125.2 LBS | OXYGEN SATURATION: 97 % | BODY MASS INDEX: 21.37 KG/M2 | TEMPERATURE: 98.3 F

## 2023-08-22 DIAGNOSIS — M25.552 PAIN OF LEFT HIP: Primary | ICD-10-CM

## 2023-08-22 DIAGNOSIS — M25.552 PAIN OF LEFT HIP: ICD-10-CM

## 2023-08-22 DIAGNOSIS — R26.9 GAIT DISORDER: ICD-10-CM

## 2023-08-22 PROCEDURE — 99214 OFFICE O/P EST MOD 30 MIN: CPT | Performed by: INTERNAL MEDICINE

## 2023-08-22 PROCEDURE — 73502 X-RAY EXAM HIP UNI 2-3 VIEWS: CPT

## 2023-08-22 NOTE — PROGRESS NOTES
Name: Reny Álvarez      :       MRN: [id number]  Encounter Provider: Cb Quijano MD  Encounter Date: 2023   Encounter department: [address]     1. Pain of left hip  Assessment & Plan:  Follow-up for left hip few weeks ago. Gradually improving however still has some pain over lateral side of hip possible residual bursitis. We will get x-ray of left hip to evaluate for any other pathology. Orders:  -     XR hip/pelv 2-3 vws left if performed; Future; Expected date: 2023    2. Gait disorder  Assessment & Plan:  Refer to physical therapy for gait disorder to improve balance    Orders:  -     Ambulatory Referral to Physical Therapy; Future           Subjective       Chief Complaint   Patient presents with   • Hip Pain     L hip / glute pain  First week of  pt fell    • HM     Pt due for:  DXA - on 2023  AWV - on 2023     HPI     About 3 months back was at the pharmacy when her name was called. When she was called to go up to the pharmacy she felt herself slumped. She then subsequently fell onto the carpet and was to get up on her own. Following this she had a pretty fair sized bruise on her left hip. The pain is improving but not completely gone. Feels it more when she rolls over in bed at night and has pressure on her left hip. Following up with physical therapy for synovial cyst of her right knee however the left hip pain continues to bother her. Review of Systems   Constitutional: Positive for activity change. Musculoskeletal: Positive for arthralgias, back pain and gait problem. Neurological: Positive for weakness. All other systems reviewed and are negative.       Past Medical History:   Diagnosis Date   • Arthralgia    • Memory loss 9/3/2019   • Neutropenia ([facility])    • Osteoporosis    • Rheumatoid arthritis ([address])    • Sjogren's syndrome ([address])    • Stroke (cerebrum) ([facility])    • Transient cerebral ischemia      Past Surgical History:   Procedure Laterality Date   • BREAST EXCISIONAL BIOPSY Left 1983    benign   • BREAST EXCISIONAL BIOPSY Left 1982    benign   • BREAST EXCISIONAL BIOPSY Right 1982    benign   • CATARACT EXTRACTION, BILATERAL     • HYSTERECTOMY      age 47   • OOPHORECTOMY      both     Family History   Problem Relation Age of Onset   • Lymphoma Mother 61        ACUTE   • Stroke Mother 68   • Diabetes Father    • Stroke Sister    • No Known Problems Sister    • No Known Problems Daughter    • No Known Problems Maternal Grandmother    • No Known Problems Maternal Grandfather    • No Known Problems Paternal Grandmother    • No Known Problems Paternal Grandfather    • No Known Problems Maternal Aunt    • No Known Problems Maternal Aunt    • No Known Problems Paternal Aunt    • No Known Problems Paternal Aunt      Social History     Socioeconomic History   • Marital status: /Civil Union     Spouse name: None   • Number of children: None   • Years of education: None   • Highest education level: None   Occupational History   • Occupation: Privately   Tobacco Use   • Smoking status: Never   • Smokeless tobacco: Never   Vaping Use   • Vaping Use: Never used   Substance and Sexual Activity   • Alcohol use:  Yes     Alcohol/week: 1.0 standard drink of alcohol     Types: 1 Glasses of wine per week   • Drug use: No   • Sexual activity: Not Currently   Other Topics Concern   • None   Social History Narrative    Exercise habits: the gym; frequency is 2 days per week    Travel history: patient denies being  Out of the country during 1/2014-11/12/2014     Social Determinants of Health     Financial Resource Strain: Low Risk  (8/24/2022)    Overall Financial Resource Strain (CARDIA)    • Difficulty of Paying Living Expenses: Not hard at all   Food Insecurity: Not on file   Transportation Needs: No Transportation Needs (8/24/2022)    PRAPARE - Transportation    • Lack of Transportation (Medical): No    • Lack of Transportation (Non-Medical):  No   Physical Activity: Not on file   Stress: Not on file   Social Connections: Not on file   Intimate Partner Violence: Not on file   Housing Stability: Not on file     Current Outpatient Medications on File Prior to Visit   Medication Sig   • aspirin (Aspir-Low) 81 mg EC tablet Take 81 mg by mouth daily     • calcium citrate-Vitamin D 200 mg-250 units Take 1 tablet by mouth in the morning   • clopidogrel (PLAVIX) 75 mg tablet Take 1 tablet (75 mg total) by mouth daily   • cycloSPORINE (RESTASIS) 0.05 % ophthalmic emulsion Apply 1 drop to eye 2 (two) times a day   • hydroxychloroquine (PLAQUENIL) 200 mg tablet Take 1 tablet (200 mg total) by mouth daily for 180 doses   • oxybutynin (DITROPAN) 5 mg tablet Take 1 tablet (5 mg total) by mouth 2 (two) times a day   • simvastatin (ZOCOR) 40 mg tablet Take 1 tablet (40 mg total) by mouth daily     No Known Allergies  Immunization History   Administered Date(s) Administered   • COVID-19 PFIZER VACCINE 0.3 ML IM 03/03/2021, 03/24/2021, 09/29/2021   • COVID-19 Pfizer Vac BIVALENT Vazquez-sucrose 12 Yr+ IM (BOOSTER ONLY) 09/26/2022   • COVID-19 Pfizer vac (Vazquez-sucrose, gray cap) 12 yr+ IM 04/25/2022   • INFLUENZA 12/18/2013, 10/01/2014, 11/18/2014, 09/30/2015, 09/14/2016, 10/03/2017, 10/20/2020   • Influenza Split High Dose Preservative Free IM 10/14/2014, 09/30/2015, 09/14/2016, 10/03/2017   • Influenza, high dose seasonal 0.7 mL 10/10/2018, 10/08/2019, 10/15/2021, 10/14/2022   • Influenza, seasonal, injectable 10/18/2010, 10/25/2011, 10/17/2012, 10/15/2013   • Pneumococcal Conjugate 13-Valent 12/11/2018   • Pneumococcal Polysaccharide PPV23 10/15/2009, 11/12/2014, 12/28/2016   • Td (adult), adsorbed 07/25/2012   • Tdap 01/14/2016, 12/28/2016   • Zoster 02/20/2010   • influenza, trivalent, adjuvanted 10/05/2020       Objective     BP 98/64 (BP Location: Left arm, Patient Position: Sitting, Cuff Size: Standard)   Pulse 71   Temp 98.3 °F (36.8 °C) (Temporal)   Ht 5' 4" (1.626 m)   Wt 56.8 kg (125 lb 3.2 oz)   SpO2 97%   BMI 21.49 kg/m²     Physical Exam     Gen: NAD  Heent: atraumatic, normocephalic  Mouth: is moist  Neck: is supple, no JVD, no carotid bruits.    Heart: is regular Normal s1, s2,  Lungs: are clear to auscultation  Abd: soft, non tender, NABS  Ext: no edema, distal pulses normal  Skin: no rashes, ulcers  Mood: normal affect  Neuro: AAOx3, mild gait ataxia  Corby Chiu MD

## 2023-08-22 NOTE — ASSESSMENT & PLAN NOTE
Follow-up for left hip few weeks ago. Gradually improving however still has some pain over lateral side of hip possible residual bursitis. We will get x-ray of left hip to evaluate for any other pathology.

## 2023-09-07 ENCOUNTER — HOSPITAL ENCOUNTER (OUTPATIENT)
Dept: RADIOLOGY | Facility: IMAGING CENTER | Age: 73
End: 2023-09-07
Payer: MEDICARE

## 2023-09-07 DIAGNOSIS — M81.0 AGE-RELATED OSTEOPOROSIS WITHOUT CURRENT PATHOLOGICAL FRACTURE: ICD-10-CM

## 2023-09-07 PROCEDURE — 77080 DXA BONE DENSITY AXIAL: CPT

## 2023-09-08 ENCOUNTER — OFFICE VISIT (OUTPATIENT)
Dept: OBGYN CLINIC | Facility: CLINIC | Age: 73
End: 2023-09-08
Payer: MEDICARE

## 2023-09-08 VITALS — BODY MASS INDEX: 21.34 KG/M2 | WEIGHT: 125 LBS | HEIGHT: 64 IN

## 2023-09-08 DIAGNOSIS — M70.62 GREATER TROCHANTERIC BURSITIS OF LEFT HIP: ICD-10-CM

## 2023-09-08 DIAGNOSIS — M76.32 ILIOTIBIAL BAND SYNDROME OF LEFT SIDE: ICD-10-CM

## 2023-09-08 DIAGNOSIS — S70.02XA CONTUSION OF LEFT HIP, INITIAL ENCOUNTER: Primary | ICD-10-CM

## 2023-09-08 PROCEDURE — 20610 DRAIN/INJ JOINT/BURSA W/O US: CPT | Performed by: ORTHOPAEDIC SURGERY

## 2023-09-08 PROCEDURE — 99213 OFFICE O/P EST LOW 20 MIN: CPT | Performed by: ORTHOPAEDIC SURGERY

## 2023-09-08 RX ORDER — BETAMETHASONE SODIUM PHOSPHATE AND BETAMETHASONE ACETATE 3; 3 MG/ML; MG/ML
12 INJECTION, SUSPENSION INTRA-ARTICULAR; INTRALESIONAL; INTRAMUSCULAR; SOFT TISSUE
Status: COMPLETED | OUTPATIENT
Start: 2023-09-08 | End: 2023-09-08

## 2023-09-08 RX ORDER — METHYLPREDNISOLONE 4 MG/1
TABLET ORAL
COMMUNITY
Start: 2023-08-29 | End: 2023-09-14 | Stop reason: ALTCHOICE

## 2023-09-08 RX ORDER — BUPIVACAINE HYDROCHLORIDE 2.5 MG/ML
1 INJECTION, SOLUTION EPIDURAL; INFILTRATION; INTRACAUDAL
Status: COMPLETED | OUTPATIENT
Start: 2023-09-08 | End: 2023-09-08

## 2023-09-08 RX ORDER — LIDOCAINE HYDROCHLORIDE 10 MG/ML
1 INJECTION, SOLUTION INFILTRATION; PERINEURAL
Status: COMPLETED | OUTPATIENT
Start: 2023-09-08 | End: 2023-09-08

## 2023-09-08 RX ADMIN — BUPIVACAINE HYDROCHLORIDE 1 ML: 2.5 INJECTION, SOLUTION EPIDURAL; INFILTRATION; INTRACAUDAL at 14:00

## 2023-09-08 RX ADMIN — LIDOCAINE HYDROCHLORIDE 1 ML: 10 INJECTION, SOLUTION INFILTRATION; PERINEURAL at 14:00

## 2023-09-08 RX ADMIN — BETAMETHASONE SODIUM PHOSPHATE AND BETAMETHASONE ACETATE 12 MG: 3; 3 INJECTION, SUSPENSION INTRA-ARTICULAR; INTRALESIONAL; INTRAMUSCULAR; SOFT TISSUE at 14:00

## 2023-09-08 NOTE — PROGRESS NOTES
Assessment:  1. Contusion of left hip, initial encounter        2. Greater trochanteric bursitis of left hip        3. 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)   • Cerebrovascular accident (CVA) due to vascular occlusion (HCC)   • Chronic venous insufficiency   • Hyperlipidemia   • Osteoporosis   • Rheumatoid arthritis with positive rheumatoid factor (HCC)   • Sjogren's syndrome ([address])   • Von Reckling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 ([address])   • Screening mammogram, encounter for   • High serum high density lipoprotein (HDL)   • History of stroke   • Medicare annual wellness visit, subsequent   • Multiple lacunar infarcts ([address])   • Dry eye   • Cataract of both eyes   • Medication monitoring encounter   • TIA due to embolism ([address])   • Arthritis of right knee   • Eye hemorrhage, right   • Foreign body in skin of finger   • Primary osteoarthritis of right knee   • Synovial cyst of popliteal space ([facility]), right knee   • Fall against object   • Pain of left hip   • Gait disorder           Plan      75 y.o. female with left hip greater trochanteric bursitis  · A left greater trochanteric bursa corticosteroid injection was administered in clinic today. Procedure tolerated well, post injection protocol advised  · Patient will call if she does not get symptomatic relief from today's injection we will refer to physical therapy          Subjective:     Patient ID:    Chief Complaint:Caro Manzanares 75 y.o. female      HPI    Patient presents to the office for initial evaluation of left hip. Patient fell approximately 3 months ago landing on her left side. Patient has had pain ever since that has not improved. Pain was not immediate at the time of fall, she notes ecchymosis 24 hours later.  Pain is localized to the lateral hip it is aggravated with weight bearing through the left extremity or laying on that side. Alleviated with rest. At initial time of injury patient took tylenol and use ice for symptom management. Patient denies any groin pain. The following portions of the patient's history were reviewed and updated as appropriate: allergies, current medications, past family history, past social history, past surgical history and problem list.        Social History     Socioeconomic History   • Marital status: /Civil Union     Spouse name: Not on file   • Number of children: Not on file   • Years of education: Not on file   • Highest education level: Not on file   Occupational History   • Occupation: Privately   Tobacco Use   • Smoking status: Never   • Smokeless tobacco: Never   Vaping Use   • Vaping Use: Never used   Substance and Sexual Activity   • Alcohol use: Yes     Alcohol/week: 1.0 standard drink of alcohol     Types: 1 Glasses of wine per week   • Drug use: No   • Sexual activity: Not Currently   Other Topics Concern   • Not on file   Social History Narrative    Exercise habits: the gym; frequency is 2 days per week    Travel history: patient denies being  Out of the country during 1/2014-11/12/2014     Social Determinants of Health     Financial Resource Strain: Low Risk  (8/24/2022)    Overall Financial Resource Strain (CARDIA)    • Difficulty of Paying Living Expenses: Not hard at all   Food Insecurity: Not on file   Transportation Needs: No Transportation Needs (8/24/2022)    PRAPARE - Transportation    • Lack of Transportation (Medical): No    • Lack of Transportation (Non-Medical):  No   Physical Activity: Not on file   Stress: Not on file   Social Connections: Not on file   Intimate Partner Violence: Not on file   Housing Stability: Not on file     Past Medical History:   Diagnosis Date   • Arthralgia    • Memory loss 9/3/2019   • Neutropenia ([address])    • Osteoporosis    • Rheumatoid arthritis ([address])    • Sjogren's syndrome ([address])    • Stroke (cerebrum) ([address])    • Transient cerebral ischemia      Past Surgical History:   Procedure Laterality Date   • BREAST EXCISIONAL BIOPSY Left 1983    benign   • BREAST EXCISIONAL BIOPSY Left 1982    benign   • BREAST EXCISIONAL BIOPSY Right 1982    benign   • CATARACT EXTRACTION, BILATERAL     • HYSTERECTOMY      age 47   • OOPHORECTOMY      both     No Known Allergies  Current Outpatient Medications on File Prior to Visit   Medication Sig Dispense Refill   • aspirin (Aspir-Low) 81 mg EC tablet Take 81 mg by mouth daily       • calcium citrate-Vitamin D 200 mg-250 units Take 1 tablet by mouth in the morning     • clopidogrel (PLAVIX) 75 mg tablet Take 1 tablet (75 mg total) by mouth daily 90 tablet 1   • cycloSPORINE (RESTASIS) 0.05 % ophthalmic emulsion Apply 1 drop to eye 2 (two) times a day     • hydroxychloroquine (PLAQUENIL) 200 mg tablet Take 1 tablet (200 mg total) by mouth daily for 180 doses 90 tablet 1   • methylPREDNISolone 4 MG tablet therapy pack      • oxybutynin (DITROPAN) 5 mg tablet Take 1 tablet (5 mg total) by mouth 2 (two) times a day 180 tablet 1   • simvastatin (ZOCOR) 40 mg tablet Take 1 tablet (40 mg total) by mouth daily 90 tablet 1     No current facility-administered medications on file prior to visit. Objective:    Review of Systems   Constitutional: Negative for chills and fever. HENT: Negative for ear pain and sore throat. Eyes: Negative for pain and visual disturbance. Respiratory: Negative for cough and shortness of breath. Cardiovascular: Negative for chest pain and palpitations. Gastrointestinal: Negative for abdominal pain and vomiting. Genitourinary: Negative for dysuria and hematuria. Musculoskeletal: Negative for arthralgias and back pain. Skin: Negative for color change and rash. Neurological: Negative for seizures and syncope. All other systems reviewed and are negative.       Left Hip Exam     Tenderness   The patient is experiencing tenderness in the greater trochanter. Range of Motion   External rotation: normal   Internal rotation: normal     Muscle Strength   Abduction: 5/5   Adduction: 5/5   Flexion: 5/5     Tests   Joshua: positive    Other   Erythema: absent  Sensation: normal  Pulse: present    Comments:  TTP posterior to greater trochanter   No pain with internal or external rotation   Pain with resisted abduction   (-) piriformis   (-) SLR              Physical Exam  Vitals and nursing note reviewed. HENT:      Head: Normocephalic. Eyes:      Extraocular Movements: Extraocular movements intact. Cardiovascular:      Rate and Rhythm: Normal rate. Pulses: Normal pulses. Pulmonary:      Effort: Pulmonary effort is normal.   Musculoskeletal:         General: Normal range of motion. Cervical back: Normal range of motion. Comments: As noted in HPI   Skin:     General: Skin is warm and dry. Neurological:      General: No focal deficit present. Mental Status: She is alert. Psychiatric:         Behavior: Behavior normal.         Large joint arthrocentesis: L greater trochanteric bursa  Universal Protocol:  Consent: Verbal consent obtained.   Risks and benefits: risks, benefits and alternatives were discussed  Consent given by: patient  Timeout called at: 9/8/2023 2:28 PM.  Patient understanding: patient states understanding of the procedure being performed  Patient identity confirmed: verbally with patient    Supporting Documentation  Indications: pain and diagnostic evaluation   Procedure Details  Location: hip - L greater trochanteric bursa  Needle size: 22 G  Ultrasound guidance: no  Medications administered: 1 mL bupivacaine (PF) 0.25 %; 1 mL lidocaine 1 %; 12 mg betamethasone acetate-betamethasone sodium phosphate 6 (3-3) mg/mL    Patient tolerance: patient tolerated the procedure well with no immediate complications  Dressing:  Sterile dressing applied             I have personally reviewed pertinent films in PACS. X-ray's of the pelvis and left hip obtained 8/22/2023 demonstrate mild osteoarthritis, calcific tendonitis adjacent to the greater trochanter. Degenerative changes in lumbar spine. Scribe Attestation    I,:  Sandie Underwood am acting as a scribe while in the presence of the attending physician.:       I,:  Franchesca Atkinson, DO personally performed the services described in this documentation    as scribed in my presence.:           Portions of the record may have been created with voice recognition software.  Occasional wrong word or "sound a like" substitutions may have occurred due to the inherent limitations of voice recognition software.  Read the chart carefully and recognize, using context, where substitutions have occurred.

## 2023-09-12 ENCOUNTER — EVALUATION (OUTPATIENT)
Dept: PHYSICAL THERAPY | Facility: REHABILITATION | Age: 73
End: 2023-09-12
Payer: MEDICARE

## 2023-09-12 DIAGNOSIS — R26.9 GAIT DISORDER: ICD-10-CM

## 2023-09-12 PROCEDURE — 97162 PT EVAL MOD COMPLEX 30 MIN: CPT | Performed by: PHYSICAL THERAPIST

## 2023-09-12 PROCEDURE — 97110 THERAPEUTIC EXERCISES: CPT | Performed by: PHYSICAL THERAPIST

## 2023-09-12 NOTE — PROGRESS NOTES
PT Evaluation     Today's date: 2023  Patient name: Katy Buck  :   MRN: [id number]  Referring provider: Stuart Argueta MD  Dx:   Encounter Diagnosis     ICD-10-CM    1. Gait disorder  R26.9 Ambulatory Referral to Physical Therapy                     Assessment  Assessment details: Pt is a 74 yo female with left hip pain which began as the result of a fall in . She also notes that she has been unsteady with walking and is afraid of falling again. She presents with LE weakness, hip flexor tightness and impaired balance. She would benefit from therapeutic exercises directed to improve balance and increase strength. Her prognosis is good with adherence to program.    Impairments: abnormal or restricted ROM, activity intolerance, impaired physical strength, lacks appropriate home exercise program and pain with function    Symptom irritability: moderateUnderstanding of Dx/Px/POC: excellent  Goals  STG: in 4 weeks  Improve DGI score 3 points  Increase strength 1/2 grade  Performing Hep  Consistently  LTG by d/c  Improved safety during ambulation  Decreased hip pain to 4/10 at most  Pt able to walk for fitness safely    Plan  Patient would benefit from: skilled physical therapy  Referral necessary: No  Planned modality interventions: cryotherapy and thermotherapy: hydrocollator packs  Planned therapy interventions: manual therapy, neuromuscular re-education, patient education, strengthening, stretching, therapeutic exercise and home exercise program  Frequency: 2x week  Duration in weeks: 8  Treatment plan discussed with: patient        Subjective Evaluation    History of Present Illness  Mechanism of injury: Pt had fallen in early . She had left hip pain that worsened. She had a significant bruise. When pain did not resolve she f/u with the doctor again. She tried a steroid dose pack which did not reduce pain. Last Friday she saw Dr. Mati Ramsay and received a cortisone injection.   She does not yet feel significant relief. She notes that her balance is off. Patient Goals  Patient goal: Play safely with her grandchildren, improve quality of life  Pain  Current pain ratin  At best pain rating: 3  Location: Left hip pain. Exacerbated by: steps, sleeping on it. Objective     Passive Range of Motion   Left Hip   Flexion: WFL  Abduction: WFL    Right Hip   Flexion: WFL  Abduction: WFL    Additional Passive Range of Motion Details  IR and ER moderately limited    Strength/Myotome Testing     Left Hip   Planes of Motion   Flexion: 4-  Extension: 3  Abduction: 4-  External rotation: 4  Internal rotation: 4    Right Hip   Planes of Motion   Flexion: 4-  Extension: 3  Abduction: 4-  External rotation: 4  Internal rotation: 4    Left Knee   Flexion: 4  Extension: 4+    Right Knee   Flexion: 4  Extension: 4+    Left Ankle/Foot   Normal strength    Right Ankle/Foot   Normal strength    Tests     Lumbar     Left   Negative passive SLR. Right   Negative passive SLR. Left Hip   Positive Ely's. Right Hip   Positive Ely's. Precautions: NA      Manuals                                                                 Exercise             Tandem stand 20"            Tandem walk 10 ft x5            Squats to chair at counter x10            Standing hip ext, abd x10 ea            Side step with TB 10ft x2            biodex balance             Step up             Romberg on foam                                                    Miguel stretch                                                                 Ther Activity                                       Gait Training                                       Modalities                                         Access Code: [email or web address]  URL: [email or web address]  Date: 2023  Prepared by: Stevie Vogel    Exercises  - Supine Active Straight Leg Raise  - 1 x daily - 7 x weekly - 2 sets - 10 reps  - Supine Bridge  - 1 x daily - 7 x weekly - 2 sets - 10 reps - 5 sec hold  - Sidelying Hip Abduction  - 1 x daily - 7 x weekly - 2 sets - 10 reps  - Standing Lumbar Extension  - 6 x daily - 7 x weekly - 1 sets - 10 reps  - Side Stepping with Resistance at Ankles  - 1 x daily - 7 x weekly - 1 sets - 10 reps  - Clamshell with Resistance  - 1 x daily - 7 x weekly - 2 sets - 10 reps  - Standing Hip Extension with Counter Support  - 1 x daily - 7 x weekly - 2 sets - 10 reps  - Standing Hip Abduction with Counter Support  - 1 x daily - 7 x weekly - 2 sets - 10 reps  - Squat with Chair Touch  - 1 x daily - 7 x weekly - 1 sets - 10 reps

## 2023-09-14 ENCOUNTER — OFFICE VISIT (OUTPATIENT)
Dept: INTERNAL MEDICINE CLINIC | Facility: OTHER | Age: 73
End: 2023-09-14
Payer: MEDICARE

## 2023-09-14 VITALS
HEART RATE: 67 BPM | TEMPERATURE: 97.8 F | WEIGHT: 119.6 LBS | DIASTOLIC BLOOD PRESSURE: 60 MMHG | SYSTOLIC BLOOD PRESSURE: 90 MMHG | OXYGEN SATURATION: 98 % | BODY MASS INDEX: 21.19 KG/M2 | HEIGHT: 63 IN

## 2023-09-14 DIAGNOSIS — M05.79 RHEUMATOID ARTHRITIS INVOLVING MULTIPLE SITES WITH POSITIVE RHEUMATOID FACTOR (HCC): ICD-10-CM

## 2023-09-14 DIAGNOSIS — M81.0 AGE-RELATED OSTEOPOROSIS WITHOUT CURRENT PATHOLOGICAL FRACTURE: ICD-10-CM

## 2023-09-14 DIAGNOSIS — R35.0 URINARY FREQUENCY: ICD-10-CM

## 2023-09-14 DIAGNOSIS — I74.9 TIA DUE TO EMBOLISM (HCC): ICD-10-CM

## 2023-09-14 DIAGNOSIS — E78.5 HYPERLIPIDEMIA, UNSPECIFIED HYPERLIPIDEMIA TYPE: ICD-10-CM

## 2023-09-14 DIAGNOSIS — E78.2 MIXED HYPERLIPIDEMIA: Primary | ICD-10-CM

## 2023-09-14 DIAGNOSIS — G45.9 TIA DUE TO EMBOLISM (HCC): ICD-10-CM

## 2023-09-14 DIAGNOSIS — Z00.00 MEDICARE ANNUAL WELLNESS VISIT, SUBSEQUENT: ICD-10-CM

## 2023-09-14 DIAGNOSIS — M35.09 SJOGREN'S SYNDROME WITH OTHER ORGAN INVOLVEMENT (HCC): ICD-10-CM

## 2023-09-14 DIAGNOSIS — I63.81 CEREBROVASCULAR ACCIDENT (CVA) DUE TO OCCLUSION OF SMALL ARTERY (HCC): ICD-10-CM

## 2023-09-14 PROBLEM — M76.892 TENDINITIS OF LEFT HIP: Status: ACTIVE | Noted: 2023-09-14

## 2023-09-14 PROBLEM — M25.552 PAIN OF LEFT HIP: Status: RESOLVED | Noted: 2023-08-22 | Resolved: 2023-09-14

## 2023-09-14 PROCEDURE — G0439 PPPS, SUBSEQ VISIT: HCPCS | Performed by: FAMILY MEDICINE

## 2023-09-14 PROCEDURE — 96372 THER/PROPH/DIAG INJ SC/IM: CPT | Performed by: FAMILY MEDICINE

## 2023-09-14 PROCEDURE — 99214 OFFICE O/P EST MOD 30 MIN: CPT | Performed by: FAMILY MEDICINE

## 2023-09-14 RX ORDER — CLOPIDOGREL BISULFATE 75 MG/1
75 TABLET ORAL DAILY
Qty: 90 TABLET | Refills: 1 | Status: SHIPPED | OUTPATIENT
Start: 2023-09-14

## 2023-09-14 RX ORDER — SIMVASTATIN 40 MG
40 TABLET ORAL DAILY
Qty: 90 TABLET | Refills: 1 | Status: SHIPPED | OUTPATIENT
Start: 2023-09-14

## 2023-09-14 RX ORDER — OXYBUTYNIN CHLORIDE 5 MG/1
5 TABLET ORAL 2 TIMES DAILY
Qty: 180 TABLET | Refills: 1 | Status: SHIPPED | OUTPATIENT
Start: 2023-09-14

## 2023-09-14 NOTE — PROGRESS NOTES
Assessment/Plan:    1. Medicare annual wellness visit, subsequent    2. Mixed hyperlipidemia  -     Lipid Panel with Direct LDL reflex; Future  -     Comprehensive metabolic panel; Future  -     CBC and differential; Future    3. Age-related osteoporosis without current pathological fracture  -     denosumab (PROLIA) subcutaneous injection 60 mg    4. Sjogren's syndrome with other organ involvement ([address])    5. Rheumatoid arthritis involving multiple sites with positive rheumatoid factor (HCC)    6. TIA due to embolism ([address])    7. Hyperlipidemia, unspecified hyperlipidemia type  -     simvastatin (ZOCOR) 40 mg tablet; Take 1 tablet (40 mg total) by mouth daily    8. Cerebrovascular accident (CVA) due to occlusion of small artery (HCC)  -     clopidogrel (PLAVIX) 75 mg tablet; Take 1 tablet (75 mg total) by mouth daily    9. Urinary frequency  -     oxybutynin (DITROPAN) 5 mg tablet; Take 1 tablet (5 mg total) by mouth 2 (two) times a day        Depression Screening and Follow-up Plan: Patient was screened for depression during today's encounter. They screened negative with a PHQ-2 score of 0. Patient Instructions     Medicare Preventive Visit Patient Instructions  Thank you for completing your Welcome to Medicare Visit or Medicare Annual Wellness Visit today. Your next wellness visit will be due in one year (9/14/2024). The screening/preventive services that you may require over the next 5-10 years are detailed below. Some tests may not apply to you based off risk factors and/or age. Screening tests ordered at today's visit but not completed yet may show as past due. Also, please note that scanned in results may not display below.   Preventive Screenings:  Service Recommendations Previous Testing/Comments   Colorectal Cancer Screening  * Colonoscopy    * Fecal Occult Blood Test (FOBT)/Fecal Immunochemical Test (FIT)  * Fecal DNA/Cologuard Test  * Flexible Sigmoidoscopy Age: 43-73 years old   Colonoscopy: every 10 years (may be performed more frequently if at higher risk)  OR  FOBT/FIT: every 1 year  OR  Cologuard: every 3 years  OR  Sigmoidoscopy: every 5 years  Screening may be recommended earlier than age 39 if at higher risk for colorectal cancer. Also, an individualized decision between you and your healthcare provider will decide whether screening between the ages of 77-80 would be appropriate. Colonoscopy: 12/24/2014  FOBT/FIT: Not on file  Cologuard: Not on file  Sigmoidoscopy: Not on file    Screening Current     Breast Cancer Screening Age: 36 years old  Frequency: every 1-2 years  Not required if history of left and right mastectomy Mammogram: 02/17/2023    Screening Current   Cervical Cancer Screening Between the ages of 21-29, pap smear recommended once every 3 years. Between the ages of 32-69, can perform pap smear with HPV co-testing every 5 years. Recommendations may differ for women with a history of total hysterectomy, cervical cancer, or abnormal pap smears in past. Pap Smear: Not on file    Screening Not Indicated   Hepatitis C Screening Once for adults born between 1945 and 1965  More frequently in patients at high risk for Hepatitis C Hep C Antibody: 04/10/2018    Screening Current   Diabetes Screening 1-2 times per year if you're at risk for diabetes or have pre-diabetes Fasting glucose: 93 mg/dL (5/28/2019)  A1C: 5.7 % (8/13/2019)      Cholesterol Screening Once every 5 years if you don't have a lipid disorder. May order more often based on risk factors. Lipid panel: 08/19/2022    Screening Not Indicated  History Lipid Disorder     Other Preventive Screenings Covered by Medicare:  Abdominal Aortic Aneurysm (AAA) Screening: covered once if your at risk. You're considered to be at risk if you have a family history of AAA.   Lung Cancer Screening: covers low dose CT scan once per year if you meet all of the following conditions: (1) Age 48-67; (2) No signs or symptoms of lung cancer; (3) Current smoker or have quit smoking within the last 15 years; (4) You have a tobacco smoking history of at least 20 pack years (packs per day multiplied by number of years you smoked); (5) You get a written order from a healthcare provider. Glaucoma Screening: covered annually if you're considered high risk: (1) You have diabetes OR (2) Family history of glaucoma OR (3)  aged 48 and older OR (3)  American aged 72 and older  Osteoporosis Screening: covered every 2 years if you meet one of the following conditions: (1) You're estrogen deficient and at risk for osteoporosis based off medical history and other findings; (2) Have a vertebral abnormality; (3) On glucocorticoid therapy for more than 3 months; (4) Have primary hyperparathyroidism; (5) On osteoporosis medications and need to assess response to drug therapy. Last bone density test (DXA Scan): 09/07/2023. HIV Screening: covered annually if you're between the age of 14-79. Also covered annually if you are younger than 13 and older than 72 with risk factors for HIV infection. For pregnant patients, it is covered up to 3 times per pregnancy.     Immunizations:  Immunization Recommendations   Influenza Vaccine Annual influenza vaccination during flu season is recommended for all persons aged >= 6 months who do not have contraindications   Pneumococcal Vaccine   * Pneumococcal conjugate vaccine = PCV13 (Prevnar 13), PCV15 (Vaxneuvance), PCV20 (Prevnar 20)  * Pneumococcal polysaccharide vaccine = PPSV23 (Pneumovax) Adults 20-63 years old: 1-3 doses may be recommended based on certain risk factors  Adults 72 years old: 1-2 doses may be recommended based off what pneumonia vaccine you previously received   Hepatitis B Vaccine 3 dose series if at intermediate or high risk (ex: diabetes, end stage renal disease, liver disease)   Tetanus (Td) Vaccine - COST NOT COVERED BY MEDICARE PART B Following completion of primary series, a booster dose should be given every 10 years to maintain immunity against tetanus. Td may also be given as tetanus wound prophylaxis. Tdap Vaccine - COST NOT COVERED BY MEDICARE PART B Recommended at least once for all adults. For pregnant patients, recommended with each pregnancy. Shingles Vaccine (Shingrix) - COST NOT COVERED BY MEDICARE PART B  2 shot series recommended in those aged 48 and above     Health Maintenance Due:      Topic Date Due    Breast Cancer Screening: Mammogram  02/17/2024    Colorectal Cancer Screening  12/24/2024    DXA SCAN  09/07/2025    Hepatitis C Screening  Completed     Immunizations Due:      Topic Date Due    COVID-19 Vaccine (6 - Pfizer series) 01/26/2023    Influenza Vaccine (1) 09/01/2023     Advance Directives   What are advance directives? Advance directives are legal documents that state your wishes and plans for medical care. These plans are made ahead of time in case you lose your ability to make decisions for yourself. Advance directives can apply to any medical decision, such as the treatments you want, and if you want to donate organs. What are the types of advance directives? There are many types of advance directives, and each state has rules about how to use them. You may choose a combination of any of the following:  Living will: This is a written record of the treatment you want. You can also choose which treatments you do not want, which to limit, and which to stop at a certain time. This includes surgery, medicine, IV fluid, and tube feedings. Durable power of  for healthcare [facility]): This is a written record that states who you want to make healthcare choices for you when you are unable to make them for yourself. This person, called a proxy, is usually a family member or a friend. You may choose more than 1 proxy. Do not resuscitate (DNR) order:  A DNR order is used in case your heart stops beating or you stop breathing.  It is a request not to have certain forms of treatment, such as CPR. A DNR order may be included in other types of advance directives. Medical directive: This covers the care that you want if you are in a coma, near death, or unable to make decisions for yourself. You can list the treatments you want for each condition. Treatment may include pain medicine, surgery, blood transfusions, dialysis, IV or tube feedings, and a ventilator (breathing machine). Values history: This document has questions about your views, beliefs, and how you feel and think about life. This information can help others choose the care that you would choose. Why are advance directives important? An advance directive helps you control your care. Although spoken wishes may be used, it is better to have your wishes written down. Spoken wishes can be misunderstood, or not followed. Treatments may be given even if you do not want them. An advance directive may make it easier for your family to make difficult choices about your care. Fall Prevention    Fall prevention  includes ways to make your home and other areas safer. It also includes ways you can move more carefully to prevent a fall. Health conditions that cause changes in your blood pressure, vision, or muscle strength and coordination may increase your risk for falls. Medicines may also increase your risk for falls if they make you dizzy, weak, or sleepy. Fall prevention tips:   Stand or sit up slowly. Use assistive devices as directed. Wear shoes that fit well and have soles that . Wear a personal alarm. Stay active. Manage your medical conditions. Home Safety Tips:  Add items to prevent falls in the bathroom. Keep paths clear. Install bright lights in your home. Keep items you use often on shelves within reach. Paint or place reflective tape on the edges of your stairs. Urinary Incontinence   Urinary incontinence (UI)  is when you lose control of your bladder.  UI develops because your bladder cannot store or empty urine properly. The 3 most common types of UI are stress incontinence, urge incontinence, or both. Medicines:   May be given to help strengthen your bladder control. Report any side effects of medication to your healthcare provider. Do pelvic muscle exercises often:  Your pelvic muscles help you stop urinating. Squeeze these muscles tight for 5 seconds, then relax for 5 seconds. Gradually work up to squeezing for 10 seconds. Do 3 sets of 15 repetitions a day, or as directed. This will help strengthen your pelvic muscles and improve bladder control. Train your bladder:  Go to the bathroom at set times, such as every 2 hours, even if you do not feel the urge to go. You can also try to hold your urine when you feel the urge to go. For example, hold your urine for 5 minutes when you feel the urge to go. As that becomes easier, hold your urine for 10 minutes. Self-care:   Keep a UI record. Write down how often you leak urine and how much you leak. Make a note of what you were doing when you leaked urine. Drink liquids as directed. You may need to limit the amount of liquid you drink to help control your urine leakage. Do not drink any liquid right before you go to bed. Limit or do not have drinks that contain caffeine or alcohol. Prevent constipation. Eat a variety of high-fiber foods. Good examples are high-fiber cereals, beans, vegetables, and whole-grain breads. Walking is the best way to trigger your intestines to have a bowel movement. Exercise regularly and maintain a healthy weight. Weight loss and exercise will decrease pressure on your bladder and help you control your leakage. Use a catheter as directed  to help empty your bladder. A catheter is a tiny, plastic tube that is put into your bladder to drain your urine. Go to behavior therapy as directed. Behavior therapy may be used to help you learn to control your urge to urinate.        © Copyright [employer] 2018 Information is for [employer] use only and may not be sold, redistributed or otherwise used for commercial purposes. All illustrations and images included in CareNotes® are the copyrighted property of [employer].A[employer]M., Inc. or [address]      Return in about 6 months (around 3/14/2024) for prolia. Subjective:      Patient ID: Jefe Paul is a 75 y.o. female. Chief Complaint   Patient presents with    Follow-up     Prolia review labs done and dexa scan done 9/7    Medicare Wellness Visit     Medicare Wellness Visit sub         HPI      Osteoporosis (Follow-Up): The patient's osteoporosis has been stable since the last visit. Most recent DXA results: T-score <-2.5. Comorbid Illnesses: -3.3 is the worst- on chronic prednisone. She has no comorbid illnesses. She has no complications. She has no significant interval events. Symptoms: The patient is currently asymptomatic. denies back pain-- and-- denies . Associated symptoms include no decrease in height. Home precautions: Osteoporosis counseling and education was reviewed with patient and caregivers, including prevention of falls. Medications: the patient is adherent with her medication regimen. Medication(s): vitamin D,-- a calcium supplement-- and-- prolia. Disease Management: the patient is doing well with her osteoporosis goals. The patient is due for DEXA  April 2019-appointment scheduled in July July 2020, here for Prolia. Tolerating well without any issues  Jan 2021: Prolia due today, doing well. , due for DEXA in July March 2022, doing well, no issues or concerns or complaints. Here for even a D injections which she has been tolerating well. Today's injection 8. July 2022, patient here for her last evenity injection. Prefers to get them in her arms due to slight increase in pain in her thighs. Tolerating well. Taking calcium and vitamin-D.   Was on Prolia in the past and will need to resume that  February 2023, here for Prolia injection, doing well. Due in her right side today. Has bone density study for this year scheduled  September 2023, on Prolia and tolerating well, bone density study significantly improved     Hyperlipidemia, on Zocor and doing well. Laboratory data reviewed and is all is well controlled. Tolerating well July 2020, excellent controlled lipid levels  March 2022, doing very well with lipid levels. No issues or concerns  February 2023, lipid levels well controlled. Compliant with medications and no side effects  September 2023, hyperlipidemia, well controlled, no issues  Vitamin-D deficiency. Has been on over-the-counter calcium and vitamin-D. Doing well. On Prolia and has bone density March 2022, vitamin-D levels in range  February 2023, doing well, on supplementation.   No issues  September 2023, well controlled, no issues, compliant with medications       The following portions of the patient's history were reviewed and updated as appropriate: allergies, current medications, past family history, past medical history, past social history, past surgical history and problem list.    Review of Systems      Constitutional:  Denies fever or chills   Eyes:  Denies double , blurry vision or eye pain  HENT:  Denies nasal congestion, sore throat or new hearing issues  Respiratory:  Denies cough or shortness of breath or wheezing  Cardiovascular:  Denies palpitations or chest pain  GI:  Denies abdominal pain, nausea, or vomiting, no loose stools, no reflux  Integument:  Denies rash , no open areas  Neurologic:  Denies headache or focal weakness, no dizziness  : no dysuria, or hematuria        Current Outpatient Medications   Medication Sig Dispense Refill    aspirin (Aspir-Low) 81 mg EC tablet Take 81 mg by mouth daily        calcium citrate-Vitamin D 200 mg-250 units Take 1 tablet by mouth in the morning      clopidogrel (PLAVIX) 75 mg tablet Take 1 tablet (75 mg total) by mouth daily 90 tablet 1 cycloSPORINE (RESTASIS) 0.05 % ophthalmic emulsion Apply 1 drop to eye 2 (two) times a day      hydroxychloroquine (PLAQUENIL) 200 mg tablet Take 1 tablet (200 mg total) by mouth daily for 180 doses 90 tablet 1    oxybutynin (DITROPAN) 5 mg tablet Take 1 tablet (5 mg total) by mouth 2 (two) times a day 180 tablet 1    simvastatin (ZOCOR) 40 mg tablet Take 1 tablet (40 mg total) by mouth daily 90 tablet 1     No current facility-administered medications for this visit.        Objective:    BP 90/60 (BP Location: Left arm, Patient Position: Sitting, Cuff Size: Standard)   Pulse 67   Temp 97.8 °F (36.6 °C) (Temporal)   Ht 5' 3.19" (1.605 m) Comment: without shoes  Wt 54.3 kg (119 lb 9.6 oz)   SpO2 98%   BMI 21.06 kg/m²        Physical Exam         Constitutional:  Well developed, well nourished, no acute distress, non-toxic appearance   Eyes:  PERRL, conjunctiva normal , non icteric sclera  HENT:  Atraumatic, oropharynx moist. Neck-  supple   Respiratory:  CTA b/l, normal breath sounds, no rales, no wheezing   Cardiovascular:  RRR, no murmurs, no LE edema b/l  GI:  Soft, nondistended, normal bowel sounds x 4, nontender, no organomegaly, no mass, no rebound, no guarding   Neurologic:  no focal deficits noted   Psychiatric:  Speech and behavior appropriate , AAO x 3        Joyce Treadwell DO

## 2023-09-14 NOTE — PATIENT INSTRUCTIONS
Medicare Preventive Visit Patient Instructions  Thank you for completing your Welcome to Medicare Visit or Medicare Annual Wellness Visit today. Your next wellness visit will be due in one year (9/14/2024). The screening/preventive services that you may require over the next 5-10 years are detailed below. Some tests may not apply to you based off risk factors and/or age. Screening tests ordered at today's visit but not completed yet may show as past due. Also, please note that scanned in results may not display below. Preventive Screenings:  Service Recommendations Previous Testing/Comments   Colorectal Cancer Screening  * Colonoscopy    * Fecal Occult Blood Test (FOBT)/Fecal Immunochemical Test (FIT)  * Fecal DNA/Cologuard Test  * Flexible Sigmoidoscopy Age: 43-73 years old   Colonoscopy: every 10 years (may be performed more frequently if at higher risk)  OR  FOBT/FIT: every 1 year  OR  Cologuard: every 3 years  OR  Sigmoidoscopy: every 5 years  Screening may be recommended earlier than age 39 if at higher risk for colorectal cancer. Also, an individualized decision between you and your healthcare provider will decide whether screening between the ages of 77-80 would be appropriate. Colonoscopy: 12/24/2014  FOBT/FIT: Not on file  Cologuard: Not on file  Sigmoidoscopy: Not on file    Screening Current     Breast Cancer Screening Age: 36 years old  Frequency: every 1-2 years  Not required if history of left and right mastectomy Mammogram: 02/17/2023    Screening Current   Cervical Cancer Screening Between the ages of 21-29, pap smear recommended once every 3 years. Between the ages of 32-69, can perform pap smear with HPV co-testing every 5 years.    Recommendations may differ for women with a history of total hysterectomy, cervical cancer, or abnormal pap smears in past. Pap Smear: Not on file    Screening Not Indicated   Hepatitis C Screening Once for adults born between 1945 and 1965  More frequently in patients at high risk for Hepatitis C Hep C Antibody: 04/10/2018    Screening Current   Diabetes Screening 1-2 times per year if you're at risk for diabetes or have pre-diabetes Fasting glucose: 93 mg/dL (5/28/2019)  A1C: 5.7 % (8/13/2019)      Cholesterol Screening Once every 5 years if you don't have a lipid disorder. May order more often based on risk factors. Lipid panel: 08/19/2022    Screening Not Indicated  History Lipid Disorder     Other Preventive Screenings Covered by Medicare:  1. Abdominal Aortic Aneurysm (AAA) Screening: covered once if your at risk. You're considered to be at risk if you have a family history of AAA. 2. Lung Cancer Screening: covers low dose CT scan once per year if you meet all of the following conditions: (1) Age 48-67; (2) No signs or symptoms of lung cancer; (3) Current smoker or have quit smoking within the last 15 years; (4) You have a tobacco smoking history of at least 20 pack years (packs per day multiplied by number of years you smoked); (5) You get a written order from a healthcare provider. 3. Glaucoma Screening: covered annually if you're considered high risk: (1) You have diabetes OR (2) Family history of glaucoma OR (3)  aged 48 and older OR (3)  American aged 72 and older  3. Osteoporosis Screening: covered every 2 years if you meet one of the following conditions: (1) You're estrogen deficient and at risk for osteoporosis based off medical history and other findings; (2) Have a vertebral abnormality; (3) On glucocorticoid therapy for more than 3 months; (4) Have primary hyperparathyroidism; (5) On osteoporosis medications and need to assess response to drug therapy. · Last bone density test (DXA Scan): 09/07/2023.  5. HIV Screening: covered annually if you're between the age of 15-65. Also covered annually if you are younger than 13 and older than 72 with risk factors for HIV infection.  For pregnant patients, it is covered up to 3 times per pregnancy. Immunizations:  Immunization Recommendations   Influenza Vaccine Annual influenza vaccination during flu season is recommended for all persons aged >= 6 months who do not have contraindications   Pneumococcal Vaccine   * Pneumococcal conjugate vaccine = PCV13 (Prevnar 13), PCV15 (Vaxneuvance), PCV20 (Prevnar 20)  * Pneumococcal polysaccharide vaccine = PPSV23 (Pneumovax) Adults 20-63 years old: 1-3 doses may be recommended based on certain risk factors  Adults 72 years old: 1-2 doses may be recommended based off what pneumonia vaccine you previously received   Hepatitis B Vaccine 3 dose series if at intermediate or high risk (ex: diabetes, end stage renal disease, liver disease)   Tetanus (Td) Vaccine - COST NOT COVERED BY MEDICARE PART B Following completion of primary series, a booster dose should be given every 10 years to maintain immunity against tetanus. Td may also be given as tetanus wound prophylaxis. Tdap Vaccine - COST NOT COVERED BY MEDICARE PART B Recommended at least once for all adults. For pregnant patients, recommended with each pregnancy. Shingles Vaccine (Shingrix) - COST NOT COVERED BY MEDICARE PART B  2 shot series recommended in those aged 48 and above     Health Maintenance Due:      Topic Date Due   • Breast Cancer Screening: Mammogram  02/17/2024   • Colorectal Cancer Screening  12/24/2024   • DXA SCAN  09/07/2025   • Hepatitis C Screening  Completed     Immunizations Due:      Topic Date Due   • COVID-19 Vaccine (6 - Pfizer series) 01/26/2023   • Influenza Vaccine (1) 09/01/2023     Advance Directives   What are advance directives? Advance directives are legal documents that state your wishes and plans for medical care. These plans are made ahead of time in case you lose your ability to make decisions for yourself. Advance directives can apply to any medical decision, such as the treatments you want, and if you want to donate organs.    What are the types of advance directives? There are many types of advance directives, and each state has rules about how to use them. You may choose a combination of any of the following:  · Living will: This is a written record of the treatment you want. You can also choose which treatments you do not want, which to limit, and which to stop at a certain time. This includes surgery, medicine, IV fluid, and tube feedings. · Durable power of  for healthcare [facility]): This is a written record that states who you want to make healthcare choices for you when you are unable to make them for yourself. This person, called a proxy, is usually a family member or a friend. You may choose more than 1 proxy. · Do not resuscitate (DNR) order:  A DNR order is used in case your heart stops beating or you stop breathing. It is a request not to have certain forms of treatment, such as CPR. A DNR order may be included in other types of advance directives. · Medical directive: This covers the care that you want if you are in a coma, near death, or unable to make decisions for yourself. You can list the treatments you want for each condition. Treatment may include pain medicine, surgery, blood transfusions, dialysis, IV or tube feedings, and a ventilator (breathing machine). · Values history: This document has questions about your views, beliefs, and how you feel and think about life. This information can help others choose the care that you would choose. Why are advance directives important? An advance directive helps you control your care. Although spoken wishes may be used, it is better to have your wishes written down. Spoken wishes can be misunderstood, or not followed. Treatments may be given even if you do not want them. An advance directive may make it easier for your family to make difficult choices about your care. Fall Prevention    Fall prevention  includes ways to make your home and other areas safer.  It also includes ways you can move more carefully to prevent a fall. Health conditions that cause changes in your blood pressure, vision, or muscle strength and coordination may increase your risk for falls. Medicines may also increase your risk for falls if they make you dizzy, weak, or sleepy. Fall prevention tips:   · Stand or sit up slowly. · Use assistive devices as directed. · Wear shoes that fit well and have soles that . · Wear a personal alarm. · Stay active. · Manage your medical conditions. Home Safety Tips:  · Add items to prevent falls in the bathroom. · Keep paths clear. · Install bright lights in your home. · Keep items you use often on shelves within reach. · Paint or place reflective tape on the edges of your stairs. Urinary Incontinence   Urinary incontinence (UI)  is when you lose control of your bladder. UI develops because your bladder cannot store or empty urine properly. The 3 most common types of UI are stress incontinence, urge incontinence, or both. Medicines:   · May be given to help strengthen your bladder control. Report any side effects of medication to your healthcare provider. Do pelvic muscle exercises often:  Your pelvic muscles help you stop urinating. Squeeze these muscles tight for 5 seconds, then relax for 5 seconds. Gradually work up to squeezing for 10 seconds. Do 3 sets of 15 repetitions a day, or as directed. This will help strengthen your pelvic muscles and improve bladder control. Train your bladder:  Go to the bathroom at set times, such as every 2 hours, even if you do not feel the urge to go. You can also try to hold your urine when you feel the urge to go. For example, hold your urine for 5 minutes when you feel the urge to go. As that becomes easier, hold your urine for 10 minutes. Self-care:   · Keep a UI record. Write down how often you leak urine and how much you leak. Make a note of what you were doing when you leaked urine.   · Drink liquids as directed. You may need to limit the amount of liquid you drink to help control your urine leakage. Do not drink any liquid right before you go to bed. Limit or do not have drinks that contain caffeine or alcohol. · Prevent constipation. Eat a variety of high-fiber foods. Good examples are high-fiber cereals, beans, vegetables, and whole-grain breads. Walking is the best way to trigger your intestines to have a bowel movement. · Exercise regularly and maintain a healthy weight. Weight loss and exercise will decrease pressure on your bladder and help you control your leakage. · Use a catheter as directed  to help empty your bladder. A catheter is a tiny, plastic tube that is put into your bladder to drain your urine. · Go to behavior therapy as directed. Behavior therapy may be used to help you learn to control your urge to urinate. © Copyright [employer] 2018 Information is for End User's use only and may not be sold, redistributed or otherwise used for commercial purposes.  All illustrations and images included in CareNotes® are the copyrighted property of [employer] Inc[employer] or [address] [place], [place] [address]ols

## 2023-09-14 NOTE — PROGRESS NOTES
Assessment and Plan:     Problem List Items Addressed This Visit        Musculoskeletal and Integument    Osteoporosis       Other    Hyperlipidemia    Relevant Orders    Lipid Panel with Direct LDL reflex    Comprehensive metabolic panel    CBC and differential    Medicare annual wellness visit, subsequent - Primary        Preventive health issues were discussed with patient, and age appropriate screening tests were ordered as noted in patient's After Visit Summary. Personalized health advice and appropriate referrals for health education or preventive services given if needed, as noted in patient's After Visit Summary.      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)   • Cerebrovascular accident (CVA) due to vascular occlusion ([address])   • Chronic venous insufficiency   • Hyperlipidemia   • Osteoporosis   • Rheumatoid arthritis with positive rheumatoid factor ([address])   • Sjogren's syndrome ([address])   • 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[address])   • Screening mammogram, encounter for   • High serum high density lipoprotein (HDL)   • History of stroke   • Medicare annual wellness visit, subsequent   • Multiple lacunar infarcts ([address])   • Dry eye   • Cataract of both eyes   • Medication monitoring encounter   • TIA due to embolism ([address])   • Arthritis of right knee   • Eye hemorrhage, right   • Foreign body in skin of finger   • Primary osteoarthritis of right knee   • Synovial cyst of popliteal space (Jeraline Mariner), right knee   • Fall against object   • Gait disorder   • Tendinitis of left hip      Past Medical and Surgical History:     Past Medical History:   Diagnosis Date   • Arthralgia    • Memory loss 9/3/2019   • Neutropenia ([address])    • Osteoporosis    • Pain of left hip 8/22/2023   • Rheumatoid arthritis ([address])    • Sjogren's syndrome (HCC)    • Stroke (cerebrum) (HCC)    • Transient cerebral ischemia      Past Surgical History:   Procedure Laterality Date   • BREAST EXCISIONAL BIOPSY Left 1983    benign   • BREAST EXCISIONAL BIOPSY Left 1982    benign   • BREAST EXCISIONAL BIOPSY Right 1982    benign   • CATARACT EXTRACTION, BILATERAL     • HYSTERECTOMY      age 47   • OOPHORECTOMY      both      Family History:     Family History   Problem Relation Age of Onset   • Lymphoma Mother 61        ACUTE   • Stroke Mother 68   • Diabetes Father    • Stroke Sister    • No Known Problems Sister    • No Known Problems Daughter    • No Known Problems Maternal Grandmother    • No Known Problems Maternal Grandfather    • No Known Problems Paternal Grandmother    • No Known Problems Paternal Grandfather    • No Known Problems Maternal Aunt    • No Known Problems Maternal Aunt    • No Known Problems Paternal Aunt    • No Known Problems Paternal Aunt       Social History:     Social History     Socioeconomic History   • Marital status: /Civil Union     Spouse name: None   • Number of children: None   • Years of education: None   • Highest education level: None   Occupational History   • Occupation: Privately   Tobacco Use   • Smoking status: Never   • Smokeless tobacco: Never   Vaping Use   • Vaping Use: Never used   Substance and Sexual Activity   • Alcohol use:  Yes     Alcohol/week: 1.0 standard drink of alcohol     Types: 1 Glasses of wine per week   • Drug use: No   • Sexual activity: Not Currently   Other Topics Concern   • None   Social History Narrative    Exercise habits: the gym; frequency is 2 days per week    Travel history: patient denies being  Out of the country during 1/2014-11/12/2014     Social Determinants of Health     Financial Resource Strain: Low Risk  (9/14/2023)    Overall Financial Resource Strain (CARDIA)    • Difficulty of Paying Living Expenses: Not very hard   Food Insecurity: Not on file   Transportation Needs: No Transportation Needs (9/14/2023)    PRAPARE - Transportation    • Lack of Transportation (Medical): No    • Lack of Transportation (Non-Medical): No   Physical Activity: Not on file   Stress: Not on file   Social Connections: Not on file   Intimate Partner Violence: Not on file   Housing Stability: Not on file      Medications and Allergies:     Current Outpatient Medications   Medication Sig Dispense Refill   • aspirin (Aspir-Low) 81 mg EC tablet Take 81 mg by mouth daily       • calcium citrate-Vitamin D 200 mg-250 units Take 1 tablet by mouth in the morning     • clopidogrel (PLAVIX) 75 mg tablet Take 1 tablet (75 mg total) by mouth daily 90 tablet 1   • cycloSPORINE (RESTASIS) 0.05 % ophthalmic emulsion Apply 1 drop to eye 2 (two) times a day     • hydroxychloroquine (PLAQUENIL) 200 mg tablet Take 1 tablet (200 mg total) by mouth daily for 180 doses 90 tablet 1   • oxybutynin (DITROPAN) 5 mg tablet Take 1 tablet (5 mg total) by mouth 2 (two) times a day 180 tablet 1   • simvastatin (ZOCOR) 40 mg tablet Take 1 tablet (40 mg total) by mouth daily 90 tablet 1     No current facility-administered medications for this visit.      No Known Allergies   Immunizations:     Immunization History   Administered Date(s) Administered   • COVID-19 PFIZER VACCINE 0.3 ML IM 03/03/2021, 03/24/2021, 09/29/2021   • COVID-19 Pfizer Vac BIVALENT Vazquez-sucrose 12 Yr+ IM 09/26/2022   • COVID-19 Pfizer vac (Vazquez-sucrose, gray cap) 12 yr+ IM 04/25/2022   • INFLUENZA 12/18/2013, 10/01/2014, 11/18/2014, 09/30/2015, 09/14/2016, 10/03/2017, 10/20/2020, 10/15/2021, 10/14/2022   • Influenza Split High Dose Preservative Free IM 10/14/2014, 09/30/2015, 09/14/2016, 10/03/2017   • Influenza, high dose seasonal 0.7 mL 10/10/2018, 10/08/2019, 10/15/2021, 10/14/2022   • Influenza, seasonal, injectable 10/18/2010, 10/25/2011, 10/17/2012, 10/15/2013   • Pneumococcal Conjugate 13-Valent 12/11/2018   • Pneumococcal Polysaccharide PPV23 10/15/2009, 11/12/2014, 12/28/2016   • Td (adult), adsorbed 07/25/2012   • Tdap 01/14/2016, 12/28/2016   • Zoster 02/20/2010   • influenza, trivalent, adjuvanted 10/05/2020      Health Maintenance:         Topic Date Due   • Breast Cancer Screening: Mammogram  02/17/2024   • Colorectal Cancer Screening  12/24/2024   • DXA SCAN  09/07/2025   • Hepatitis C Screening  Completed         Topic Date Due   • COVID-19 Vaccine (6 - Pfizer series) 01/26/2023   • Influenza Vaccine (1) 09/01/2023      Medicare Screening Tests and Risk Assessments:     Katia Knowles is here for her Subsequent Wellness visit. Health Risk Assessment:   Patient rates overall health as excellent. Patient feels that their physical health rating is same. Patient is satisfied with their life. Eyesight was rated as same. Hearing was rated as same. Patient feels that their emotional and mental health rating is same. Patients states they are sometimes angry. Patient states they are sometimes unusually tired/fatigued. Pain experienced in the last 7 days has been none. Patient states that she has experienced no weight loss or gain in last 6 months. Depression Screening:   PHQ-2 Score: 0      Fall Risk Screening: In the past year, patient has experienced: history of falling in past year    Number of falls: 1  Injured during fall?: No    Feels unsteady when standing or walking?: No    Worried about falling?: No      Urinary Incontinence Screening:   Patient has not leaked urine accidently in the last six months. Home Safety:  Patient does not have trouble with stairs inside or outside of their home. Patient has working smoke alarms and has no working carbon monoxide detector. Home safety hazards include: none. Nutrition:   Current diet is Regular and Limited junk food. Medications:   Patient is currently taking over-the-counter supplements. OTC medications include: see medication list. Patient is able to manage medications. Activities of Daily Living (ADLs)/Instrumental Activities of Daily Living (IADLs):   Walk and transfer into and out of bed and chair?: Yes  Dress and groom yourself?: Yes    Bathe or shower yourself?: Yes    Feed yourself? Yes  Do your laundry/housekeeping?: Yes  Manage your money, pay your bills and track your expenses?: Yes  Make your own meals?: Yes    Do your own shopping?: Yes    Previous Hospitalizations:   Any hospitalizations or ED visits within the last 12 months?: No      Advance Care Planning:   Living will: Yes    Advanced directive: Yes    Five wishes given: Yes      Comments: Her     Cognitive Screening:   Provider or family/friend/caregiver concerned regarding cognition?: No    PREVENTIVE SCREENINGS      Cardiovascular Screening:    General: Screening Not Indicated and History Lipid Disorder      Colorectal Cancer Screening:     General: Screening Current      Breast Cancer Screening:     General: Screening Current      Cervical Cancer Screening:    General: Screening Not Indicated      Osteoporosis Screening:    General: Screening Not Indicated and History Osteoporosis      Lung Cancer Screening:     General: Screening Not Indicated      Hepatitis C Screening:    General: Screening Current    Screening, Brief Intervention, and Referral to Treatment (SBIRT)      Brief Intervention  Alcohol & drug use screenings were reviewed. No concerns regarding substance use disorder identified. Other Counseling Topics:   Car/seat belt/driving safety, skin self-exam, sunscreen and regular weightbearing exercise and calcium and vitamin D intake. No results found.      Physical Exam:     BP 90/60 (BP Location: Left arm, Patient Position: Sitting, Cuff Size: Standard)   Pulse 67   Temp 97.8 °F (36.6 °C) (Temporal)   Ht 5' 3.19" (1.605 m) Comment: without shoes  Wt 54.3 kg (119 lb 9.6 oz)   SpO2 98%   BMI 21.06 kg/m²     Physical Exam     Donyn Mc DO

## 2023-09-19 ENCOUNTER — OFFICE VISIT (OUTPATIENT)
Dept: PHYSICAL THERAPY | Facility: REHABILITATION | Age: 73
End: 2023-09-19
Payer: MEDICARE

## 2023-09-19 DIAGNOSIS — R26.9 GAIT DISORDER: Primary | ICD-10-CM

## 2023-09-19 PROCEDURE — 97110 THERAPEUTIC EXERCISES: CPT

## 2023-09-19 PROCEDURE — 97530 THERAPEUTIC ACTIVITIES: CPT

## 2023-09-19 PROCEDURE — 97112 NEUROMUSCULAR REEDUCATION: CPT

## 2023-09-19 NOTE — PROGRESS NOTES
Daily Note     Today's date: 2023  Patient name: Norbert Steel  : [birth year]  MRN: [id number]  Referring provider: Lynden Bosworth, MD  Dx:   Encounter Diagnosis     ICD-10-CM    1. Gait disorder  R26.9                      Subjective:  Patient reports that her L hip is bothering her. She notes it's been since her fall 3 months ago. Diana Lindsaya reports that the exercise she has been doing at home lying on her side and lifting her leg seems to aggravate her hip. Objective: See treatment diary below      Assessment: Patient tolerated treatment well. Patient was seen for her first follow up visit. Patient performed ex as noted below with direct supervision throughout. Diana Lindsaya was appropriate challenge with balance challenge activity. Advised the patient to stop performing S/L hip abd. Patient would benefit from continued PT intervention to address deficits and attain set goals. Plan: Continue per plan of care.       Precautions: NA      Manuals                                                                Exercise             Tandem stand 20" 20"x2 B           Tandem walk 10 ft x5 10ft x5 laps           Squats to chair at counter x10            Standing hip ext, abd x10 ea x12 ea           Side step with TB 10ft x2 Pink  10ft x3 laps           biodex balance             Step up             Romberg on foam  20"x3                        bike  x5' L1                        Miguel stretch                                                                 Ther Activity                                       Gait Training                                       Modalities

## 2023-09-21 ENCOUNTER — OFFICE VISIT (OUTPATIENT)
Dept: PHYSICAL THERAPY | Facility: REHABILITATION | Age: 73
End: 2023-09-21
Payer: MEDICARE

## 2023-09-21 DIAGNOSIS — R26.9 GAIT DISORDER: Primary | ICD-10-CM

## 2023-09-21 PROCEDURE — 97110 THERAPEUTIC EXERCISES: CPT | Performed by: PHYSICAL THERAPIST

## 2023-09-21 PROCEDURE — 97112 NEUROMUSCULAR REEDUCATION: CPT | Performed by: PHYSICAL THERAPIST

## 2023-09-21 NOTE — PROGRESS NOTES
Daily Note     Today's date: 2023  Patient name: Shahriar Hughes  :   MRN: [id number]  Referring provider: Jazlyn Bobo MD  Dx:   Encounter Diagnosis     ICD-10-CM    1. Gait disorder  R26.9                      Subjective:  Patient continues to c/o hip pain which is variable in intensity. Objective: See treatment diary below      Assessment: Patient tolerated treatment well. Balance exercises continue to be very challenging. The stretches felt good to the patient. Patient would benefit from continued PT intervention to address deficits and attain set goals. Plan: Continue per plan of care.       Precautions: NA      Manuals           Leg pull MFR   NT                                                 Exercise             Tandem stand 20" 20"x2 B 60" ea          Tandem walk 10 ft x5 10ft x5 laps 10 ft 5 laps          Squats to chair at counter x10  2x10          Standing hip ext, abd x10 ea x12 ea 2x10          Side step with TB 10ft x2 Pink  10ft x3 laps Pink 10 ft 3 laps          biodex balance             Step up   1R x10          Romberg on foam  20"x3 60"                       bike  x5' L1 L1 x5'                       Miguel stretch   60"                                                              Ther Activity                                       Gait Training                                       Modalities

## 2023-09-26 ENCOUNTER — OFFICE VISIT (OUTPATIENT)
Dept: PHYSICAL THERAPY | Facility: REHABILITATION | Age: 73
End: 2023-09-26
Payer: MEDICARE

## 2023-09-26 DIAGNOSIS — R26.9 GAIT DISORDER: Primary | ICD-10-CM

## 2023-09-26 PROCEDURE — 97112 NEUROMUSCULAR REEDUCATION: CPT | Performed by: PHYSICAL THERAPIST

## 2023-09-26 PROCEDURE — 97110 THERAPEUTIC EXERCISES: CPT | Performed by: PHYSICAL THERAPIST

## 2023-09-26 PROCEDURE — 97140 MANUAL THERAPY 1/> REGIONS: CPT | Performed by: PHYSICAL THERAPIST

## 2023-09-26 NOTE — PROGRESS NOTES
Daily Note     Today's date: 2023  Patient name: Galo Rust  : [birth year]  MRN: [id number]  Referring provider: Eulalia Ramirez MD  Dx:   Encounter Diagnosis     ICD-10-CM    1. Gait disorder  R26.9                      Subjective:  Patient reports that she drove home from [place] yesterday and her hip was painful from riding in the car. Pt c/o struggling with balance in a play ground and stepping up and down from curbs. Objective: See treatment diary below      Assessment: Patient tolerated treatment well. Pt is tender over the SIJ but compression, distraction and thigh thrust are all negative. No pain with hip scour. Progressed exercises to address her deficits. Patient would benefit from continued PT intervention to address deficits and attain set goals. Plan: Continue per plan of care.       Precautions: NA      Manuals          Leg pull MFR   NT NT                                                Exercise             Tandem stand 20" 20"x2 B 60" ea          Tandem walk 10 ft x5 10ft x5 laps 10 ft 5 laps 20ft 4 laps         Squats to chair at counter x10  2x10          Standing hip ext, abd x10 ea x12 ea 2x10 1# 2x10         Side step with TB 10ft x2 Pink  10ft x3 laps Pink 10 ft 3 laps Pink 7 f x4 laps         biodex balance   LOS  LOS x2 23%         Step up   1R x10 Up and over 1R x10         Lateral step up    1R x10 ea         Romberg on foam  20"x3 60" Step on and offx10         Bridge with TB    P 5"x10         bike  x5' L1 L1 x5' L1 5'                      Miguel stretch   60" 60"x2                                                             Ther Activity                                       Gait Training             Over hurdles    6 passes                      Modalities

## 2023-09-28 ENCOUNTER — OFFICE VISIT (OUTPATIENT)
Dept: PHYSICAL THERAPY | Facility: REHABILITATION | Age: 73
End: 2023-09-28
Payer: MEDICARE

## 2023-09-28 DIAGNOSIS — R26.9 GAIT DISORDER: Primary | ICD-10-CM

## 2023-09-28 PROCEDURE — 97112 NEUROMUSCULAR REEDUCATION: CPT | Performed by: PHYSICAL THERAPIST

## 2023-09-28 PROCEDURE — 97110 THERAPEUTIC EXERCISES: CPT | Performed by: PHYSICAL THERAPIST

## 2023-09-28 NOTE — PROGRESS NOTES
Daily Note     Today's date: 2023  Patient name: Army Noonan  :   MRN: [id number]  Referring provider: Colleen Chávez MD  Dx:   Encounter Diagnosis     ICD-10-CM    1. Gait disorder  R26.9                      Subjective:  Patient reports that she is exercising at the gym and it is going well. She does the bike primarily. Objective: See treatment diary below      Assessment: Patient tolerated treatment well. Pt demonstrated improved balance stepping over the hurdles. Noted hip IR when stepping up. External rotators need continued strengthening. Patient would benefit from continued PT intervention to address deficits and attain set goals. Plan: Continue per plan of care.       Precautions: NA      Manuals         Leg pull MFR   NT NT                                                Exercise             Tandem stand 20" 20"x2 B 60" ea          Tandem walk 10 ft x5 10ft x5 laps 10 ft 5 laps 20ft 4 laps         Squats to chair at counter x10  2x10          Standing hip ext, abd x10 ea x12 ea 2x10 1# 2x10 2# 2x10        Side step with TB 10ft x2 Pink  10ft x3 laps Pink 10 ft 3 laps Pink 7 f x4 laps         biodex balance   LOS  LOS x2 23% Wt shift all dir 5'total and LOSx1        Step up   1R x10 Up and over 1R x10 Up and over 1Rx10        Lateral step up    1R x10 ea         Romberg on foam  20"x3 60" Step on and offx10 Fwd and to side x12        Bridge with TB    P 5"x10 R 5"x20        bike  x5' L1 L1 x5' L1 5' TM 1.7 5'        Side plank on knees     5"x5        Miguel stretch   60" 60"x2 60"x2        Supine clams     GTB5" x20                                               Ther Activity                                       Gait Training             Over hurdles    6 passes 6 passes        On foam beam: fwd/side     4 laps ea        Modalities

## 2023-10-03 ENCOUNTER — OFFICE VISIT (OUTPATIENT)
Dept: PHYSICAL THERAPY | Facility: REHABILITATION | Age: 73
End: 2023-10-03
Payer: MEDICARE

## 2023-10-03 DIAGNOSIS — R26.9 GAIT DISORDER: Primary | ICD-10-CM

## 2023-10-03 DIAGNOSIS — M25.552 LEFT HIP PAIN: ICD-10-CM

## 2023-10-03 PROCEDURE — 97110 THERAPEUTIC EXERCISES: CPT | Performed by: PHYSICAL THERAPIST

## 2023-10-03 PROCEDURE — 97112 NEUROMUSCULAR REEDUCATION: CPT | Performed by: PHYSICAL THERAPIST

## 2023-10-03 NOTE — PROGRESS NOTES
Daily Note     Today's date: 10/3/2023  Patient name: Reny Álvarez  : [id number]  MRN: [id number]  Referring provider: Cb Quijano MD  Dx:   Encounter Diagnosis     ICD-10-CM    1. Gait disorder  R26.9       2. Left hip pain  M25.552                      Subjective:  Patient reports that her hip is just tender. It does not really hurt when she does things. Objective: See treatment diary below      Assessment: Patient tolerated treatment well. Pt completely lost her balance doing the hurdles today requiring max assist to regain her balance. She appeared to be doing well and then suddenly lost her balance. Mild tenderness over the ischial tuberosity, glut med and piriformis. Patient would benefit from continued PT intervention to address deficits and attain set goals. Plan: Continue per plan of care.       Precautions: NA      Manuals 9/12 9/19 9/21 9/26 9/28 10/3       Leg pull MFR   NT NT         STM piriformis and glut med      NT                                 Exercise             Tandem stand 20" 20"x2 B 60" ea   On foam 30"x2       Tandem walk 10 ft x5 10ft x5 laps 10 ft 5 laps 20ft 4 laps         Squats to chair at counter x10  2x10          Standing hip ext, abd x10 ea x12 ea 2x10 1# 2x10 2# 2x10 2# 2x10       Side step with TB 10ft x2 Pink  10ft x3 laps Pink 10 ft 3 laps Pink 7 f x4 laps         biodex balance   LOS  LOS x2 23% Wt shift all dir 5'total and LOSx1 Wt shift all dir 5' and LOS x1       Step up   1R x10 Up and over 1R x10 Up and over 1Rx10 2R x10 ea       Lateral step up    1R x10 ea         Romberg on foam  20"x3 60" Step on and offx10 Fwd and to side x12        Bridge with TB    P 5"x10 R 5"x20 BTB 5"x20       bike  x5' L1 L1 x5' L1 5' TM 1.7 5' TM 2.0 x8'       Side plank on knees     5"x5 10"x5       Miguel stretch   60" 60"x2 60"x2        Supine clams     GTB5" x20 GTB 5"x20                                              Ther Activity Gait Training             Over hurdles    6 passes 6 passes hurdles and foam 3 y 1O x8 passes       On foam beam: fwd/side     4 laps ea 4 laps ea       Modalities

## 2023-10-05 ENCOUNTER — OFFICE VISIT (OUTPATIENT)
Dept: PHYSICAL THERAPY | Facility: REHABILITATION | Age: 73
End: 2023-10-05
Payer: MEDICARE

## 2023-10-05 DIAGNOSIS — M25.552 LEFT HIP PAIN: ICD-10-CM

## 2023-10-05 DIAGNOSIS — R26.9 GAIT DISORDER: Primary | ICD-10-CM

## 2023-10-05 PROCEDURE — 97110 THERAPEUTIC EXERCISES: CPT | Performed by: PHYSICAL THERAPIST

## 2023-10-05 PROCEDURE — 97112 NEUROMUSCULAR REEDUCATION: CPT | Performed by: PHYSICAL THERAPIST

## 2023-10-05 NOTE — PROGRESS NOTES
Daily Note     Today's date: 10/5/2023  Patient name: Karolina Marinelli  :   MRN: [id number]  Referring provider: Chris Conde MD  Dx:   Encounter Diagnosis     ICD-10-CM    1. Gait disorder  R26.9       2. Left hip pain  M25.552                      Subjective:  Patient c/o discomfort over the hip with some exercises in therapy      Objective: See treatment diary below      Assessment: Patient tolerated treatment well. Pt did not have any significant balance loss today. She did have difficulty with the foam beam but was able to step off and regain her balance independently. Patient would benefit from continued PT intervention to address deficits and attain set goals. Plan: Continue per plan of care.       Precautions: NA      Manuals 9/12 9/19 9/21 9/26 9/28 10/3 10/4      Leg pull MFR   NT NT         STM piriformis and glut med      NT                                 Exercise             Tandem stand 20" 20"x2 B 60" ea   On foam 30"x2 On foam 30"x2      Tandem walk 10 ft x5 10ft x5 laps 10 ft 5 laps 20ft 4 laps         Squats to chair at counter x10  2x10          Standing hip ext, abd x10 ea x12 ea 2x10 1# 2x10 2# 2x10 2# 2x10 3# 2x10      Side step with TB 10ft x2 Pink  10ft x3 laps Pink 10 ft 3 laps Pink 7 f x4 laps         biodex balance   LOS  LOS x2 23% Wt shift all dir 5'total and LOSx1 Wt shift all dir 5' and LOS x1 Wt shift all dir 3' LOS x1      Step up   1R x10 Up and over 1R x10 Up and over 1Rx10 2R x10 ea       Lateral step up    1R x10 ea   1R up and over x10      Romberg on foam  20"x3 60" Step on and offx10 Fwd and to side x12  Romberg w/head turns      Bridge with TB    P 5"x10 R 5"x20 BTB 5"x20 BTB 5"x20      bike  x5' L1 L1 x5' L1 5' TM 1.7 5' TM 2.0 x8' TM 2.0x8'      Side plank on knees     5"x5 10"x5 10"x5      Miguel stretch   60" 60"x2 60"x2        Supine clams     GTB5" x20 GTB 5"x20 BTB 5" 2x10                                             Ther Activity Gait Training             Over hurdles    6 passes 6 passes hurdles and foam 3 y 1O x8 passes 3Y,1O hurdles, foam and 6" step x8 passes      On foam beam: fwd/side     4 laps ea 4 laps ea 12"3 laps      Modalities

## 2023-10-09 ENCOUNTER — OFFICE VISIT (OUTPATIENT)
Dept: PHYSICAL THERAPY | Facility: REHABILITATION | Age: 73
End: 2023-10-09
Payer: MEDICARE

## 2023-10-09 DIAGNOSIS — R26.9 GAIT DISORDER: Primary | ICD-10-CM

## 2023-10-09 DIAGNOSIS — M25.552 LEFT HIP PAIN: ICD-10-CM

## 2023-10-09 PROCEDURE — 97112 NEUROMUSCULAR REEDUCATION: CPT | Performed by: PHYSICAL THERAPIST

## 2023-10-09 PROCEDURE — 97110 THERAPEUTIC EXERCISES: CPT | Performed by: PHYSICAL THERAPIST

## 2023-10-09 NOTE — PROGRESS NOTES
Daily Note     Today's date: 10/9/2023  Patient name: Kary Lyosn  : [birth year]  MRN: [id number]  Referring provider: Debbie Liu MD  Dx:   Encounter Diagnosis     ICD-10-CM    1. Gait disorder  R26.9       2. Left hip pain  M25.552                      Subjective:  Pt states that her hip has not been bad. She did have some discomfort when she gets up from sitting for a while and in the morning. Objective: See treatment diary below      Assessment: Patient tolerated treatment well. Keesha Morning did lose her balance several times during obstacle course and stepping up and over but she was able to regain it independently. Her hip is becoming less painful and she can now lie on that side. Patient would benefit from continued PT intervention to address deficits and attain set goals. Plan: Continue per plan of care.       Precautions: NA      Manuals 9/12 9/19 9/21 9/26 9/28 10/3 10/4 10/9     Leg pull MFR   NT NT         STM piriformis and glut med      NT                                 Exercise             Tandem stand 20" 20"x2 B 60" ea   On foam 30"x2 On foam 30"x2      Tandem walk 10 ft x5 10ft x5 laps 10 ft 5 laps 20ft 4 laps         Squats to chair at counter x10  2x10          Standing hip ext, abd x10 ea x12 ea 2x10 1# 2x10 2# 2x10 2# 2x10 3# 2x10 3# 2x10 4#    Side step with TB 10ft x2 Pink  10ft x3 laps Pink 10 ft 3 laps Pink 7 f x4 laps         biodex balance   LOS  LOS x2 23% Wt shift all dir 5'total and LOSx1 Wt shift all dir 5' and LOS x1 Wt shift all dir 3' LOS x1 w/foam wt shift 3' and LOS x2     Step up   1R x10 Up and over 1R x10 Up and over 1Rx10 2R x10 ea       Lateral step up    1R x10 ea   1R up and over x10 1 R up and overx10     Romberg on foam  20"x3 60" Step on and offx10 Fwd and to side x12  Romberg w/head turns      Bridge with TB    P 5"x10 R 5"x20 BTB 5"x20 BTB 5"x20 BTB 5"x20     bike  x5' L1 L1 x5' L1 5' TM 1.7 5' TM 2.0 x8' TM 2.0x8' TM 2.0 x10'     Side plank on knees     5"x5 10"x5 10"x5 15"x5     Miguel stretch   60" 60"x2 60"x2        Supine clams     GTB5" x20 GTB 5"x20 BTB 5" 2x10      Bridge with march 2 sets of 10                               Ther Activity                                       Gait Training             Over hurdles    6 passes 6 passes hurdles and foam 3 y 1O x8 passes 3Y,1O hurdles, foam and 6" step x8 passes 2Y,2O hurdles foam and 6" step 6 passes     On foam beam: fwd/side     4 laps ea 4 laps ea 12"3 laps      Modalities

## 2023-10-11 ENCOUNTER — EVALUATION (OUTPATIENT)
Dept: PHYSICAL THERAPY | Facility: REHABILITATION | Age: 73
End: 2023-10-11
Payer: MEDICARE

## 2023-10-11 DIAGNOSIS — R26.9 GAIT DISORDER: Primary | ICD-10-CM

## 2023-10-11 DIAGNOSIS — M25.552 LEFT HIP PAIN: ICD-10-CM

## 2023-10-11 PROCEDURE — 97164 PT RE-EVAL EST PLAN CARE: CPT | Performed by: PHYSICAL THERAPIST

## 2023-10-11 NOTE — PROGRESS NOTES
Daily Note     Today's date: 10/9/2023  Patient name: Shania Card  :   MRN: [id number]  Referring provider: Janessa Jang MD  Dx:   Encounter Diagnosis     ICD-10-CM    1. Gait disorder  R26.9       2. Left hip pain  M25.552                      Subjective:  Pt reports left hip pain rated 1/10. This occurs with lying on that side and she feels it the most in the morning. Objective: See treatment diary below  Passive Range of Motion   Left Hip   Flexion: WFL  Abduction: WFL    Right Hip   Flexion: WFL  Abduction: WFL    Additional Passive Range of Motion Details  IR and ER moderately limited    Strength/Myotome Testing     Left Hip   Planes of Motion   Flexion: 4  Extension: 3+  Abduction: 4-  External rotation: 4+  Internal rotation: 5    Right Hip   Planes of Motion   Flexion: 4  Extension: 3+  Abduction: 4-  External rotation: 4+  Internal rotation: 5    Left Knee   Flexion: 5  Extension: 4+    Right Knee   Flexion: 5  Extension: 4+    Left Ankle/Foot   Normal strength    Right Ankle/Foot   Normal strength    Tests     Lumbar     Left   Negative passive SLR. Right   Negative passive SLR. Left Hip   Positive Ely's. Right Hip   Positive Ely's. Assessment: Patient tolerated treatment well. Her gait has improved and she in now much safer ambulating. She continues to present with hip weakness and mild pain. Patient would benefit from continued PT intervention to address deficits and attain set goals. Goals  STG: in 4 weeks  Improve DGI score 3 points met  Increase strength 1/2 grade partially met  Performing Hep  Consistently met  LTG by d/c  Improved safety during ambulation met  Decreased hip pain to 4/10 at most  met  Pt able to walk for fitness safely  met    Plan: Continue per plan of care.       Precautions: NA      Manuals 9/12 9/19 9/21 9/26 9/28 10/3 10/4 10/9     Leg pull MFR   NT NT         STM piriformis and glut med      NT Exercise             Tandem stand 20" 20"x2 B 60" ea   On foam 30"x2 On foam 30"x2      Tandem walk 10 ft x5 10ft x5 laps 10 ft 5 laps 20ft 4 laps         Squats to chair at counter x10  2x10          Standing hip ext, abd x10 ea x12 ea 2x10 1# 2x10 2# 2x10 2# 2x10 3# 2x10 3# 2x10 GTB    Side step with TB 10ft x2 Pink  10ft x3 laps Pink 10 ft 3 laps Pink 7 f x4 laps         biodex balance   LOS  LOS x2 23% Wt shift all dir 5'total and LOSx1 Wt shift all dir 5' and LOS x1 Wt shift all dir 3' LOS x1 w/foam wt shift 3' and LOS x2     Step up   1R x10 Up and over 1R x10 Up and over 1Rx10 2R x10 ea       Lateral step up    1R x10 ea   1R up and over x10 1 R up and overx10     Romberg on foam  20"x3 60" Step on and offx10 Fwd and to side x12  Romberg w/head turns      Bridge with TB    P 5"x10 R 5"x20 BTB 5"x20 BTB 5"x20 BTB 5"x20     bike  x5' L1 L1 x5' L1 5' TM 1.7 5' TM 2.0 x8' TM 2.0x8' TM 2.0 x10'     Side plank on knees     5"x5 10"x5 10"x5 15"x5 15"x5    Miguel stretch   60" 60"x2 60"x2        Supine clams     GTB5" x20 GTB 5"x20 BTB 5" 2x10      Bridge with march        2 sets of 10     SL hip abduction                          Ther Activity                                       Gait Training             Over hurdles    6 passes 6 passes hurdles and foam 3 y 1O x8 passes 3Y,1O hurdles, foam and 6" step x8 passes 2Y,2O hurdles foam and 6" step 6 passes     On foam beam: fwd/side     4 laps ea 4 laps ea 12"3 laps      Modalities

## 2023-10-16 ENCOUNTER — OFFICE VISIT (OUTPATIENT)
Dept: PHYSICAL THERAPY | Facility: REHABILITATION | Age: 73
End: 2023-10-16
Payer: MEDICARE

## 2023-10-16 DIAGNOSIS — M25.552 LEFT HIP PAIN: ICD-10-CM

## 2023-10-16 DIAGNOSIS — R26.9 GAIT DISORDER: Primary | ICD-10-CM

## 2023-10-16 PROCEDURE — 97112 NEUROMUSCULAR REEDUCATION: CPT | Performed by: PHYSICAL THERAPIST

## 2023-10-16 PROCEDURE — 97110 THERAPEUTIC EXERCISES: CPT | Performed by: PHYSICAL THERAPIST

## 2023-10-16 NOTE — PROGRESS NOTES
Daily Note     Today's date: 10/9/2023  Patient name: Montana Amador  :   MRN: [id number]  Referring provider: David Dimas MD  Dx:   Encounter Diagnosis     ICD-10-CM    1. Gait disorder  R26.9       2. Left hip pain  M25.552                      Subjective:  Pt reports left hip still bothers her at night. She did fall on Friday. She got off the rowing machine and tripped over the base. She hit her head on another piece of equipment. She reports no serious injury. Objective: See treatment diary below      Assessment: Patient tolerated treatment well. Pt did trip on Friday and was unable to regain her balance. Pt needs to continue to work on balance activities. Patient would benefit from continued PT intervention to address deficits and attain set goals. Goals  STG: in 4 weeks  Improve DGI score 3 points met  Increase strength 1/2 grade partially met  Performing Hep  Consistently met  LTG by d/c  Improved safety during ambulation met  Decreased hip pain to 4/10 at most  met  Pt able to walk for fitness safely  met    Plan: Continue per plan of care.       Precautions: NA      Manuals 9/12 9/19 9/21 9/26 9/28 10/3 10/4 10/9 10/11 10/16   Leg pull MFR   NT NT         STM piriformis and glut med      NT                                 Exercise             Tandem stand 20" 20"x2 B 60" ea   On foam 30"x2 On foam 30"x2      Tandem walk 10 ft x5 10ft x5 laps 10 ft 5 laps 20ft 4 laps         Squats to chair at counter x10  2x10          Standing hip ext, abd x10 ea x12 ea 2x10 1# 2x10 2# 2x10 2# 2x10 3# 2x10 3# 2x10 GTB flex,ext, abd, add GTB 2x10   Side step with TB 10ft x2 Pink  10ft x3 laps Pink 10 ft 3 laps Pink 7 f x4 laps         biodex balance   LOS  LOS x2 23% Wt shift all dir 5'total and LOSx1 Wt shift all dir 5' and LOS x1 Wt shift all dir 3' LOS x1 w/foam wt shift 3' and LOS x2  W/foma LOS x2   Step up   1R x10 Up and over 1R x10 Up and over 1Rx10 2R x10 ea       Lateral step up 1R x10 ea   1R up and over x10 1 R up and overx10 1 R up and over x10    Romberg on foam  20"x3 60" Step on and offx10 Fwd and to side x12  Romberg w/head turns      Bridge with TB    P 5"x10 R 5"x20 BTB 5"x20 BTB 5"x20 BTB 5"x20  BTB 5" x20   bike  x5' L1 L1 x5' L1 5' TM 1.7 5' TM 2.0 x8' TM 2.0x8' TM 2.0 x10' TM 2.0 x10' TM 2.0 x10'   Side plank on knees     5"x5 10"x5 10"x5 15"x5 15"x5 20"x5   Miguel stretch   60" 60"x2 60"x2        Supine clams     GTB5" x20 GTB 5"x20 BTB 5" 2x10      Bridge with march 2 sets of 10  2x10   SL hip abduction                          Ther Activity                                       Gait Training             Over hurdles    6 passes 6 passes hurdles and foam 3 y 1O x8 passes 3Y,1O hurdles, foam and 6" step x8 passes 2Y,2O hurdles foam and 6" step 6 passes Same course 6 passes Same course 6 passes   On foam beam: fwd/side     4 laps ea 4 laps ea 12"3 laps  12" 3 laps 12' 3 laps   Modalities

## 2023-10-18 ENCOUNTER — OFFICE VISIT (OUTPATIENT)
Dept: PHYSICAL THERAPY | Facility: REHABILITATION | Age: 73
End: 2023-10-18
Payer: MEDICARE

## 2023-10-18 DIAGNOSIS — R26.9 GAIT DISORDER: Primary | ICD-10-CM

## 2023-10-18 DIAGNOSIS — M25.552 LEFT HIP PAIN: ICD-10-CM

## 2023-10-18 PROCEDURE — 97110 THERAPEUTIC EXERCISES: CPT

## 2023-10-18 PROCEDURE — 97112 NEUROMUSCULAR REEDUCATION: CPT

## 2023-10-18 NOTE — PROGRESS NOTES
Daily Note     Today's date: 10/18/2023  Patient name: Jefe Paul  : [birth year]  MRN: [id number]  Referring provider: Eliza Rothman MD  Dx:   Encounter Diagnosis     ICD-10-CM    1. Gait disorder  R26.9       2. Left hip pain  M25.552                      Subjective:  Patient reports that she had a recent fall at the gym but it was due to tripping over equipment verses her balance. She notes no flare up of her L hip pain. Objective: See treatment diary below      Assessment: Patient tolerated treatment well. Patient performed ex and balance activity as noted. Reviewed HEP ex the patient had questions about with good patient understanding. Kelly Sanders has made progress with formal PT intervention and will continue with her HEP and independent gym program.        Plan: Patient is discharged from formal PT intervention. She will follow up with this office should she have questions.       Precautions: NA      Manuals 9/12 9/19 9/21 9/26 9/28 10/3 10/4 10/9 10/18    Leg pull MFR   NT NT         STM piriformis and glut med      NT                                 Exercise             Tandem stand 20" 20"x2 B 60" ea   On foam 30"x2 On foam 30"x2  On foam  30"x2 ea    Tandem walk 10 ft x5 10ft x5 laps 10 ft 5 laps 20ft 4 laps         Squats to chair at counter x10  2x10          Standing hip ext, abd x10 ea x12 ea 2x10 1# 2x10 2# 2x10 2# 2x10 3# 2x10 3# 2x10 4#  2x10    Side step with TB 10ft x2 Pink  10ft x3 laps Pink 10 ft 3 laps Pink 7 f x4 laps         biodex balance   LOS  LOS x2 23% Wt shift all dir 5'total and LOSx1 Wt shift all dir 5' and LOS x1 Wt shift all dir 3' LOS x1 w/foam wt shift 3' and LOS x2 W/foam  Wt shift  X1' x2 ea  LOS  x1    Step up   1R x10 Up and over 1R x10 Up and over 1Rx10 2R x10 ea       Lateral step up    1R x10 ea   1R up and over x10 1 R up and overx10 1R up and over  x10    Romberg on foam  20"x3 60" Step on and offx10 Fwd and to side x12  Romberg w/head turns      [employer] with TB    P 5"x10 R 5"x20 BTB 5"x20 BTB 5"x20 BTB 5"x20 BTB  5"  x20    bike  x5' L1 L1 x5' L1 5' TM 1.7 5' TM 2.0 x8' TM 2.0x8' TM 2.0 x10' TM  2.0  x10'    Side plank on knees     5"x5 10"x5 10"x5 15"x5     Miguel stretch   60" 60"x2 60"x2        Supine clams     GTB5" x20 GTB 5"x20 BTB 5" 2x10  reviewed    Bridge with march 2 sets of 10                               Ther Activity                                       Gait Training             Over hurdles    6 passes 6 passes hurdles and foam 3 y 1O x8 passes 3Y,1O hurdles, foam and 6" step x8 passes 2Y,2O hurdles foam and 6" step 6 passes     On foam beam: fwd/side     4 laps ea 4 laps ea 12"3 laps      Modalities

## 2023-11-28 ENCOUNTER — TELEPHONE (OUTPATIENT)
Dept: INTERNAL MEDICINE CLINIC | Facility: OTHER | Age: 73
End: 2023-11-28

## 2024-01-31 ENCOUNTER — TELEPHONE (OUTPATIENT)
Dept: NEUROLOGY | Facility: CLINIC | Age: 74
End: 2024-01-31

## 2024-01-31 NOTE — TELEPHONE ENCOUNTER
Patient called to scheduled 1yr f/u with Dr Mcgowan. She would like to be called when her new schedule comes out in [place].

## 2024-02-16 DIAGNOSIS — M05.79 RHEUMATOID ARTHRITIS INVOLVING MULTIPLE SITES WITH POSITIVE RHEUMATOID FACTOR (HCC): ICD-10-CM

## 2024-02-16 RX ORDER — HYDROXYCHLOROQUINE SULFATE 200 MG/1
200 TABLET, FILM COATED ORAL DAILY
Qty: 90 TABLET | Refills: 1 | Status: SHIPPED | OUTPATIENT
Start: 2024-02-16 | End: 2024-08-14

## 2024-02-19 ENCOUNTER — OFFICE VISIT (OUTPATIENT)
Dept: OBGYN CLINIC | Facility: CLINIC | Age: 74
End: 2024-02-19
Payer: MEDICARE

## 2024-02-19 VITALS
BODY MASS INDEX: 21.09 KG/M2 | DIASTOLIC BLOOD PRESSURE: 66 MMHG | HEIGHT: 63 IN | SYSTOLIC BLOOD PRESSURE: 105 MMHG | HEART RATE: 71 BPM | WEIGHT: 119 LBS

## 2024-02-19 DIAGNOSIS — M17.11 PRIMARY OSTEOARTHRITIS OF RIGHT KNEE: Primary | ICD-10-CM

## 2024-02-19 DIAGNOSIS — M05.79 RHEUMATOID ARTHRITIS INVOLVING MULTIPLE SITES WITH POSITIVE RHEUMATOID FACTOR (HCC): ICD-10-CM

## 2024-02-19 PROCEDURE — 20610 DRAIN/INJ JOINT/BURSA W/O US: CPT

## 2024-02-19 PROCEDURE — 99213 OFFICE O/P EST LOW 20 MIN: CPT

## 2024-02-19 RX ORDER — LIDOCAINE HYDROCHLORIDE 10 MG/ML
1 INJECTION, SOLUTION INFILTRATION; PERINEURAL
Status: COMPLETED | OUTPATIENT
Start: 2024-02-19 | End: 2024-02-19

## 2024-02-19 RX ORDER — BETAMETHASONE SODIUM PHOSPHATE AND BETAMETHASONE ACETATE 3; 3 MG/ML; MG/ML
6 INJECTION, SUSPENSION INTRA-ARTICULAR; INTRALESIONAL; INTRAMUSCULAR; SOFT TISSUE
Status: COMPLETED | OUTPATIENT
Start: 2024-02-19 | End: 2024-02-19

## 2024-02-19 RX ORDER — BUPIVACAINE HYDROCHLORIDE 2.5 MG/ML
1 INJECTION, SOLUTION INFILTRATION; PERINEURAL
Status: COMPLETED | OUTPATIENT
Start: 2024-02-19 | End: 2024-02-19

## 2024-02-19 RX ADMIN — LIDOCAINE HYDROCHLORIDE 1 ML: 10 INJECTION, SOLUTION INFILTRATION; PERINEURAL at 15:00

## 2024-02-19 RX ADMIN — BUPIVACAINE HYDROCHLORIDE 1 ML: 2.5 INJECTION, SOLUTION INFILTRATION; PERINEURAL at 15:00

## 2024-02-19 RX ADMIN — BETAMETHASONE SODIUM PHOSPHATE AND BETAMETHASONE ACETATE 6 MG: 3; 3 INJECTION, SUSPENSION INTRA-ARTICULAR; INTRALESIONAL; INTRAMUSCULAR; SOFT TISSUE at 15:00

## 2024-02-19 NOTE — PROGRESS NOTES
Assessment:  1. Primary osteoarthritis of right knee  Injection procedure prior authorization      2. 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)    Cerebrovascular accident (CVA) due to vascular occlusion (HCC)    Chronic venous insufficiency    Hyperlipidemia    Osteoporosis    Rheumatoid arthritis with positive rheumatoid factor (HCC)    Sjogren's syndrome (HCC)    Von Reckling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 (HCC)    Screening mammogram, encounter for    High serum high density lipoprotein (HDL)    History of stroke    Medicare annual wellness visit, subsequent    Multiple lacunar infarcts (HCC)    Dry eye    Cataract of both eyes    Medication monitoring encounter    TIA due to embolism (HCC)    Arthritis of right knee    Eye hemorrhage, right    Foreign body in skin of finger    Primary osteoarthritis of right knee    Synovial cyst of popliteal space (Baker), right knee    Fall against object    Gait disorder    Tendinitis of left hip       Plan:    73 y.o. female  with right knee osteoarthritis    Discussed steroid injection of right knee.  Discussed side effects and risks.  Patient expressed understanding.  Injection administered.  Discussed gel injections of right knee.  Patient had previous course year ago with great relief of pain.  Prior authorization submitted today.  Patient to follow-up for course of gel injections      The assessment and plan were formulated by Dr. Schmid and I assisted in carrying it out.      Subjective:   Patient ID: Caro Manzanares is a 73 y.o. female .    HPI    Patient presents to the office for follow up of right knee pain . Patient reports persistent pain in the posterior portion of right knee.  Patient went to Dr. Trejo for Maier's cyst aspiration but her cyst  was not visualized .Patient reports 5/10 right knee pain at visit today.     The following portions of the patient's history were reviewed and updated as appropriate: allergies, current medications, past family history, past social history, past surgical history and problem list.    Social History     Socioeconomic History    Marital status: /Civil Union     Spouse name: Not on file    Number of children: Not on file    Years of education: Not on file    Highest education level: Not on file   Occupational History    Occupation: Privately   Tobacco Use    Smoking status: Never    Smokeless tobacco: Never   Vaping Use    Vaping status: Never Used   Substance and Sexual Activity    Alcohol use: Yes     Alcohol/week: 1.0 standard drink of alcohol     Types: 1 Glasses of wine per week    Drug use: No    Sexual activity: Not Currently   Other Topics Concern    Not on file   Social History Narrative    Exercise habits: the gym; frequency is 2 days per week    Travel history: patient denies being  Out of the country during 1/2014-11/12/2014     Social Determinants of Health     Financial Resource Strain: Low Risk  (9/14/2023)    Overall Financial Resource Strain (CARDIA)     Difficulty of Paying Living Expenses: Not very hard   Food Insecurity: Not on file   Transportation Needs: No Transportation Needs (9/14/2023)    PRAPARE - Transportation     Lack of Transportation (Medical): No     Lack of Transportation (Non-Medical): No   Physical Activity: Not on file   Stress: Not on file   Social Connections: Not on file   Intimate Partner Violence: Not on file   Housing Stability: Not on file     Past Medical History:   Diagnosis Date    Arthralgia     Memory loss 9/3/2019    Neutropenia (HCC)     Osteoporosis     Pain of left hip 8/22/2023    Rheumatoid arthritis (HCC)     Sjogren's syndrome (HCC)     Stroke (cerebrum) (HCC)     Transient cerebral ischemia      Past Surgical History:   Procedure Laterality Date    BREAST  EXCISIONAL BIOPSY Left 1983    benign    BREAST EXCISIONAL BIOPSY Left 1982    benign    BREAST EXCISIONAL BIOPSY Right 1982    benign    CATARACT EXTRACTION, BILATERAL      HYSTERECTOMY      age 52    OOPHORECTOMY      both     No Known Allergies  Current Outpatient Medications on File Prior to Visit   Medication Sig Dispense Refill    aspirin (Aspir-Low) 81 mg EC tablet Take 81 mg by mouth daily        calcium citrate-Vitamin D 200 mg-250 units Take 1 tablet by mouth in the morning      clopidogrel (PLAVIX) 75 mg tablet Take 1 tablet (75 mg total) by mouth daily 90 tablet 1    cycloSPORINE (RESTASIS) 0.05 % ophthalmic emulsion Apply 1 drop to eye 2 (two) times a day      hydroxychloroquine (PLAQUENIL) 200 mg tablet Take 1 tablet (200 mg total) by mouth daily for 180 doses 90 tablet 1    oxybutynin (DITROPAN) 5 mg tablet Take 1 tablet (5 mg total) by mouth 2 (two) times a day 180 tablet 1    simvastatin (ZOCOR) 40 mg tablet Take 1 tablet (40 mg total) by mouth daily 90 tablet 1     No current facility-administered medications on file prior to visit.       Review of Systems   Constitutional:  Negative for chills and fever.   HENT:  Negative for ear pain and sore throat.    Eyes:  Negative for pain and visual disturbance.   Respiratory:  Negative for cough and shortness of breath.    Cardiovascular:  Negative for chest pain and palpitations.   Gastrointestinal:  Negative for abdominal pain and vomiting.   Genitourinary:  Negative for dysuria and hematuria.   Musculoskeletal:  Negative for arthralgias and back pain.   Skin:  Negative for color change and rash.   Neurological:  Negative for seizures and syncope.   All other systems reviewed and are negative.    See HPi    Objective:    Vitals:    02/19/24 1503   BP: 105/66   Pulse: 71       Physical Exam  Vitals and nursing note reviewed.   Constitutional:       General: She is not in acute distress.     Appearance: She is well-developed.   HENT:      Head:  "Normocephalic and atraumatic.   Eyes:      Conjunctiva/sclera: Conjunctivae normal.   Cardiovascular:      Rate and Rhythm: Normal rate and regular rhythm.      Heart sounds: No murmur heard.  Pulmonary:      Effort: Pulmonary effort is normal. No respiratory distress.      Breath sounds: Normal breath sounds.   Abdominal:      Palpations: Abdomen is soft.      Tenderness: There is no abdominal tenderness.   Musculoskeletal:         General: No swelling.      Cervical back: Neck supple.   Skin:     General: Skin is warm and dry.      Capillary Refill: Capillary refill takes less than 2 seconds.   Neurological:      Mental Status: She is alert.   Psychiatric:         Mood and Affect: Mood normal.         Right Knee Exam     Tenderness   The patient is experiencing tenderness in the medial joint line and lateral joint line.    Range of Motion   Extension:  normal   Flexion:  normal     Other   Erythema: absent  Scars: absent  Sensation: normal  Pulse: present  Swelling: none                Large joint arthrocentesis: R knee  Universal Protocol:  Consent: Verbal consent obtained.  Risks and benefits: risks, benefits and alternatives were discussed  Consent given by: patient  Patient understanding: patient states understanding of the procedure being performed  Patient identity confirmed: verbally with patient  Supporting Documentation  Indications: pain   Procedure Details  Location: knee - R knee  Needle size: 22 G  Ultrasound guidance: no  Approach: anterolateral  Medications administered: 1 mL bupivacaine 0.25 %; 1 mL lidocaine 1 %; 6 mg betamethasone acetate-betamethasone sodium phosphate 6 (3-3) mg/mL    Patient tolerance: patient tolerated the procedure well with no immediate complications  Dressing:  Sterile dressing applied              Portions of the record may have been created with voice recognition software.  Occasional wrong word or \"sound a like\" substitutions may have occurred due to the inherent " limitations of voice recognition software.  Read the chart carefully and recognize, using context, where substitutions have occurred.

## 2024-02-21 PROBLEM — Z00.00 MEDICARE ANNUAL WELLNESS VISIT, SUBSEQUENT: Status: RESOLVED | Noted: 2019-06-11 | Resolved: 2024-02-21

## 2024-02-23 ENCOUNTER — HOSPITAL ENCOUNTER (OUTPATIENT)
Dept: RADIOLOGY | Facility: IMAGING CENTER | Age: 74
End: 2024-02-23
Payer: MEDICARE

## 2024-02-23 VITALS — HEIGHT: 63 IN | WEIGHT: 120 LBS | BODY MASS INDEX: 21.26 KG/M2

## 2024-02-23 DIAGNOSIS — Z12.31 ENCOUNTER FOR SCREENING MAMMOGRAM FOR MALIGNANT NEOPLASM OF BREAST: ICD-10-CM

## 2024-02-23 PROCEDURE — 77063 BREAST TOMOSYNTHESIS BI: CPT

## 2024-02-23 PROCEDURE — 77067 SCR MAMMO BI INCL CAD: CPT

## 2024-03-01 ENCOUNTER — PROCEDURE VISIT (OUTPATIENT)
Dept: OBGYN CLINIC | Facility: CLINIC | Age: 74
End: 2024-03-01
Payer: MEDICARE

## 2024-03-01 VITALS
WEIGHT: 120 LBS | SYSTOLIC BLOOD PRESSURE: 132 MMHG | HEART RATE: 68 BPM | DIASTOLIC BLOOD PRESSURE: 82 MMHG | HEIGHT: 63 IN | BODY MASS INDEX: 21.26 KG/M2

## 2024-03-01 DIAGNOSIS — M17.11 PRIMARY OSTEOARTHRITIS OF RIGHT KNEE: Primary | ICD-10-CM

## 2024-03-01 PROCEDURE — 20610 DRAIN/INJ JOINT/BURSA W/O US: CPT | Performed by: ORTHOPAEDIC SURGERY

## 2024-03-01 RX ORDER — HYALURONATE SODIUM 10 MG/ML
20 SYRINGE (ML) INTRAARTICULAR
Status: COMPLETED | OUTPATIENT
Start: 2024-03-01 | End: 2024-03-01

## 2024-03-01 RX ADMIN — Medication 20 MG: at 09:00

## 2024-03-01 NOTE — PROGRESS NOTES
1. Primary osteoarthritis of right knee          Patient is here for her first injection of Euflexxa into the right knee.     Patient rates their pain as 1/10 today    Physical exam of the knee shows no effusion no ecchymosis.      Large joint arthrocentesis: R knee  Universal Protocol:  Consent: Verbal consent obtained.  Risks and benefits: risks, benefits and alternatives were discussed  Consent given by: patient  Timeout called at: 3/1/2024 9:03 AM.  Patient understanding: patient states understanding of the procedure being performed  Patient identity confirmed: verbally with patient  Supporting Documentation  Indications: pain and diagnostic evaluation   Procedure Details  Location: knee - R knee  Needle size: 22 G  Ultrasound guidance: no  Approach: anterolateral  Medications administered: 20 mg Sodium Hyaluronate (Viscosup) 20 MG/2ML    Patient tolerance: patient tolerated the procedure well with no immediate complications  Dressing:  Sterile dressing applied          Patient tolerated procedure follow up in one week

## 2024-03-08 ENCOUNTER — PROCEDURE VISIT (OUTPATIENT)
Dept: OBGYN CLINIC | Facility: CLINIC | Age: 74
End: 2024-03-08
Payer: MEDICARE

## 2024-03-08 VITALS — WEIGHT: 120 LBS | BODY MASS INDEX: 21.26 KG/M2

## 2024-03-08 DIAGNOSIS — M17.11 PRIMARY OSTEOARTHRITIS OF RIGHT KNEE: Primary | ICD-10-CM

## 2024-03-08 PROCEDURE — 20610 DRAIN/INJ JOINT/BURSA W/O US: CPT | Performed by: ORTHOPAEDIC SURGERY

## 2024-03-08 RX ORDER — HYALURONATE SODIUM 10 MG/ML
20 SYRINGE (ML) INTRAARTICULAR
Status: COMPLETED | OUTPATIENT
Start: 2024-03-08 | End: 2024-03-08

## 2024-03-08 RX ADMIN — Medication 20 MG: at 09:00

## 2024-03-08 NOTE — PROGRESS NOTES
"1. Primary osteoarthritis of right knee            Patient is here for her second injection of Euflexxa into the right knee.     Patient rates their pain as 0/10 today    Physical exam of the knee shows no effusion no ecchymosis.      Large joint arthrocentesis: bilateral knee  Universal Protocol:  Consent: Verbal consent obtained.  Risks and benefits: risks, benefits and alternatives were discussed  Consent given by: patient  Time out: Immediately prior to procedure a \"time out\" was called to verify the correct patient, procedure, equipment, support staff and site/side marked as required.  Patient understanding: patient states understanding of the procedure being performed  Site marked: the operative site was marked  Supporting Documentation  Indications: pain   Procedure Details  Location: knee - bilateral knee  Preparation: Patient was prepped and draped in the usual sterile fashion  Needle size: 22 G    Medications (Right): 20 mg Sodium Hyaluronate (Viscosup) 20 MG/2MLMedications (Left): 20 mg Sodium Hyaluronate (Viscosup) 20 MG/2ML   Patient tolerance: patient tolerated the procedure well with no immediate complications  Dressing:  Sterile dressing applied          Patient tolerated procedure follow up in one week    "

## 2024-03-11 LAB
ALBUMIN SERPL-MCNC: 4.2 G/DL (ref 3.5–5.7)
ALP SERPL-CCNC: 45 U/L (ref 35–120)
ALT SERPL-CCNC: 21 U/L
ANION GAP SERPL CALCULATED.3IONS-SCNC: 7 MMOL/L (ref 3–11)
AST SERPL-CCNC: 22 U/L
BASOPHILS # BLD AUTO: 0 THOU/CMM (ref 0–0.1)
BASOPHILS NFR BLD AUTO: 1 %
BILIRUB SERPL-MCNC: 1 MG/DL (ref 0.2–1)
BUN SERPL-MCNC: 21 MG/DL (ref 7–25)
CALCIUM SERPL-MCNC: 10.3 MG/DL (ref 8.5–10.1)
CHLORIDE SERPL-SCNC: 106 MMOL/L (ref 100–109)
CHOLEST SERPL-MCNC: 173 MG/DL
CHOLEST/HDLC SERPL: 2.1 {RATIO}
CO2 SERPL-SCNC: 32 MMOL/L (ref 21–31)
CREAT SERPL-MCNC: 0.66 MG/DL (ref 0.4–1.1)
CYTOLOGY CMNT CVX/VAG CYTO-IMP: ABNORMAL
DIFFERENTIAL METHOD BLD: NORMAL
EOSINOPHIL # BLD AUTO: 0.1 THOU/CMM (ref 0–0.5)
EOSINOPHIL NFR BLD AUTO: 4 %
ERYTHROCYTE [DISTWIDTH] IN BLOOD BY AUTOMATED COUNT: 13.7 % (ref 12–16)
GFR/BSA.PRED SERPLBLD CYS-BASED-ARV: 91 ML/MIN/{1.73_M2}
GLUCOSE SERPL-MCNC: 91 MG/DL (ref 65–99)
HCT VFR BLD AUTO: 41.9 % (ref 35–43)
HDLC SERPL-MCNC: 82 MG/DL (ref 23–92)
HGB BLD-MCNC: 13.9 G/DL (ref 11.5–14.5)
LDLC SERPL CALC-MCNC: 73 MG/DL
LYMPHOCYTES # BLD AUTO: 1.4 THOU/CMM (ref 1–3)
LYMPHOCYTES NFR BLD AUTO: 35 %
MCH RBC QN AUTO: 30.1 PG (ref 26–34)
MCHC RBC AUTO-ENTMCNC: 33.1 G/DL (ref 32–37)
MCV RBC AUTO: 91 FL (ref 80–100)
MONOCYTES # BLD AUTO: 0.4 THOU/CMM (ref 0.3–1)
MONOCYTES NFR BLD AUTO: 10 %
NEUTROPHILS # BLD AUTO: 2 THOU/CMM (ref 1.8–7.8)
NEUTROPHILS NFR BLD AUTO: 50 %
NONHDLC SERPL-MCNC: 91 MG/DL
PLATELET # BLD AUTO: 209 THOU/CMM (ref 140–350)
PMV BLD REES-ECKER: 8.1 FL (ref 7.5–11.3)
POTASSIUM SERPL-SCNC: 3.6 MMOL/L (ref 3.5–5.2)
PROT SERPL-MCNC: 6.2 G/DL (ref 6.3–8.3)
RBC # BLD AUTO: 4.61 MILL/CMM (ref 3.7–4.7)
SODIUM SERPL-SCNC: 145 MMOL/L (ref 135–145)
TRIGL SERPL-MCNC: 90 MG/DL
WBC # BLD AUTO: 4 THOU/CMM (ref 4–10)

## 2024-03-15 ENCOUNTER — PROCEDURE VISIT (OUTPATIENT)
Dept: OBGYN CLINIC | Facility: CLINIC | Age: 74
End: 2024-03-15
Payer: MEDICARE

## 2024-03-15 VITALS
BODY MASS INDEX: 21.44 KG/M2 | HEIGHT: 63 IN | HEART RATE: 66 BPM | SYSTOLIC BLOOD PRESSURE: 112 MMHG | DIASTOLIC BLOOD PRESSURE: 73 MMHG | WEIGHT: 121 LBS

## 2024-03-15 DIAGNOSIS — M17.11 PRIMARY OSTEOARTHRITIS OF RIGHT KNEE: Primary | ICD-10-CM

## 2024-03-15 PROCEDURE — 20610 DRAIN/INJ JOINT/BURSA W/O US: CPT

## 2024-03-15 RX ORDER — HYALURONATE SODIUM 10 MG/ML
20 SYRINGE (ML) INTRAARTICULAR
Status: COMPLETED | OUTPATIENT
Start: 2024-03-15 | End: 2024-03-15

## 2024-03-15 RX ADMIN — Medication 20 MG: at 09:00

## 2024-03-15 NOTE — PROGRESS NOTES
1. Primary osteoarthritis of right knee          Patient is here for her 3rd injection of Euflexxa into the right knee.     Patient rates their pain as 0/10 today    Physical exam of the knee shows no effusion no ecchymosis.      Large joint arthrocentesis: R knee  Universal Protocol:  Consent: Verbal consent obtained.  Risks and benefits: risks, benefits and alternatives were discussed  Consent given by: patient  Patient understanding: patient states understanding of the procedure being performed  Patient identity confirmed: verbally with patient  Supporting Documentation  Indications: pain   Procedure Details  Location: knee - R knee  Needle size: 22 G  Approach: anterolateral  Medications administered: 20 mg Sodium Hyaluronate (Viscosup) 20 MG/2ML    Patient tolerance: patient tolerated the procedure well with no immediate complications  Dressing:  Sterile dressing applied            Patient tolerated procedure follow up 1 month if needed for repeat steroid injection of the right knee

## 2024-03-18 ENCOUNTER — OFFICE VISIT (OUTPATIENT)
Dept: INTERNAL MEDICINE CLINIC | Facility: OTHER | Age: 74
End: 2024-03-18
Payer: MEDICARE

## 2024-03-18 ENCOUNTER — TELEPHONE (OUTPATIENT)
Dept: INTERNAL MEDICINE CLINIC | Facility: OTHER | Age: 74
End: 2024-03-18

## 2024-03-18 VITALS
WEIGHT: 121 LBS | TEMPERATURE: 98.4 F | DIASTOLIC BLOOD PRESSURE: 78 MMHG | HEIGHT: 64 IN | BODY MASS INDEX: 20.66 KG/M2 | SYSTOLIC BLOOD PRESSURE: 116 MMHG | HEART RATE: 72 BPM | OXYGEN SATURATION: 97 %

## 2024-03-18 DIAGNOSIS — I74.9 TIA DUE TO EMBOLISM (HCC): ICD-10-CM

## 2024-03-18 DIAGNOSIS — M81.0 AGE-RELATED OSTEOPOROSIS WITHOUT CURRENT PATHOLOGICAL FRACTURE: ICD-10-CM

## 2024-03-18 DIAGNOSIS — E83.52 HYPERCALCEMIA: ICD-10-CM

## 2024-03-18 DIAGNOSIS — Q85.01 VON RECKLINGHAUSENS DISEASE (HCC): ICD-10-CM

## 2024-03-18 DIAGNOSIS — M35.09 SJOGREN'S SYNDROME WITH OTHER ORGAN INVOLVEMENT (HCC): ICD-10-CM

## 2024-03-18 DIAGNOSIS — G45.9 TIA DUE TO EMBOLISM (HCC): ICD-10-CM

## 2024-03-18 DIAGNOSIS — M05.79 RHEUMATOID ARTHRITIS INVOLVING MULTIPLE SITES WITH POSITIVE RHEUMATOID FACTOR (HCC): ICD-10-CM

## 2024-03-18 DIAGNOSIS — R79.89 HIGH SERUM HIGH DENSITY LIPOPROTEIN (HDL): Primary | ICD-10-CM

## 2024-03-18 DIAGNOSIS — I63.81 CEREBROVASCULAR ACCIDENT (CVA) DUE TO OCCLUSION OF SMALL ARTERY (HCC): ICD-10-CM

## 2024-03-18 DIAGNOSIS — R35.0 URINARY FREQUENCY: ICD-10-CM

## 2024-03-18 DIAGNOSIS — E78.5 HYPERLIPIDEMIA, UNSPECIFIED HYPERLIPIDEMIA TYPE: ICD-10-CM

## 2024-03-18 PROBLEM — R26.9 GAIT DISORDER: Status: RESOLVED | Noted: 2023-08-22 | Resolved: 2024-03-18
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
## 2024-03-18 PROBLEM — S60.459A FOREIGN BODY IN SKIN OF FINGER: Status: RESOLVED | Noted: 2023-02-03 | Resolved: 2024-03-18

## 2024-03-18 PROCEDURE — 99214 OFFICE O/P EST MOD 30 MIN: CPT | Performed by: FAMILY MEDICINE

## 2024-03-18 PROCEDURE — 96372 THER/PROPH/DIAG INJ SC/IM: CPT | Performed by: FAMILY MEDICINE

## 2024-03-18 RX ORDER — CLOPIDOGREL BISULFATE 75 MG/1
75 TABLET ORAL DAILY
Qty: 90 TABLET | Refills: 1 | Status: SHIPPED | OUTPATIENT
Start: 2024-03-18

## 2024-03-18 RX ORDER — SIMVASTATIN 40 MG
40 TABLET ORAL DAILY
Qty: 90 TABLET | Refills: 1 | Status: SHIPPED | OUTPATIENT
Start: 2024-03-18

## 2024-03-18 RX ORDER — OXYBUTYNIN CHLORIDE 5 MG/1
5 TABLET ORAL 2 TIMES DAILY
Qty: 180 TABLET | Refills: 1 | Status: SHIPPED | OUTPATIENT
Start: 2024-03-18

## 2024-03-18 NOTE — PROGRESS NOTES
Assessment/Plan:    1. High serum high density lipoprotein (HDL)    2. Cerebrovascular accident (CVA) due to occlusion of small artery (HCC)  -     clopidogrel (PLAVIX) 75 mg tablet; Take 1 tablet (75 mg total) by mouth daily    3. Urinary frequency  -     oxybutynin (DITROPAN) 5 mg tablet; Take 1 tablet (5 mg total) by mouth 2 (two) times a day    4. Hyperlipidemia, unspecified hyperlipidemia type  -     simvastatin (ZOCOR) 40 mg tablet; Take 1 tablet (40 mg total) by mouth daily  -     Comprehensive metabolic panel; Future  -     CBC and differential; Future  -     Lipid Panel with Direct LDL reflex; Future  -     Comprehensive metabolic panel  -     CBC and differential  -     Lipid Panel with Direct LDL reflex    5. TIA due to embolism (HCC)    6. Von Reckling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 (HCC)    7. Sjogren's syndrome with other organ involvement (HCC)  -     TSH, 3rd generation with Free T4 reflex; Future  -     TSH, 3rd generation with Free T4 reflex    8. Rheumatoid arthritis involving multiple sites with positive rheumatoid factor (HCC)  -     TSH, 3rd generation with Free T4 reflex; Future  -     TSH, 3rd generation with Free T4 reflex    9. Age-related osteoporosis without current pathological fracture  -     TSH, 3rd generation with Free T4 reflex; Future  -     Vitamin D 25 hydroxy; Future  -     TSH, 3rd generation with Free T4 reflex  -     Vitamin D 25 hydroxy    10. Hypercalcemia  -     TSH, 3rd generation with Free T4 reflex; Future  -     Vitamin D 25 hydroxy; Future  -     PTH, intact; Future  -     TSH, 3rd generation with Free T4 reflex  -     Vitamin D 25 hydroxy  -     PTH, intact    PROLIA given today in left arm, next due in right arm in 6months, repeat labs in 6months         There are no Patient Instructions on file for this visit.    Return in about 6 months (around 9/18/2024).    Subjective:      Patient ID: Caro Manzanares is a 73 y.o. female.    Chief Complaint   Patient presents with    Follow-up      6 m f/u visit for hypercholesterolemia and Prolia injection, review labs from 3/12/24.        HPI    Osteoporosis (Follow-Up): The patient's osteoporosis has been stable since the last visit. Most recent DXA results: T-score <-2.5. Comorbid Illnesses: -3.3 is the worst- on chronic prednisone. She has no comorbid illnesses. She has no complications. She has no significant interval events.   Symptoms: The patient is currently asymptomatic. denies back pain-- and-- denies . Associated symptoms include no decrease in height.  Home precautions: Osteoporosis counseling and education was reviewed with patient and caregivers, including prevention of falls.  Medications: the patient is adherent with her medication regimen. Medication(s): vitamin D,-- a calcium supplement-- and-- prolia.  Disease Management: the patient is doing well with her osteoporosis goals. The patient is due for DEXA  April 2019-appointment scheduled in July July 2020, here for Prolia.  Tolerating well without any issues  Jan 2021: Prolia due today, doing well. , due for DEXA in July March 2022, doing well, no issues or concerns or complaints.  Here for even a D injections which she has been tolerating well.  Today's injection 8.   July 2022, patient here for her last evenity injection.  Prefers to get them in her arms due to slight increase in pain in her thighs.  Tolerating well.  Taking calcium and vitamin-D.  Was on Prolia in the past and will need to resume that  February 2023, here for Prolia injection, doing well.  Due in her right side today.  Has bone density study for this year scheduled  September 2023, on Prolia and tolerating well, bone density study significantly improved  March 2024, patient remains active, here for Prolia, on calcium and vitamin D     Hyperlipidemia, on Zocor and doing well.  Laboratory data reviewed and is all is well controlled.  Tolerating well July 2020, excellent controlled lipid levels  March 2022, doing  "very well with lipid levels.  No issues or concerns  February 2023, lipid levels well controlled.  Compliant with medications and no side effects  September 2023, hyperlipidemia, well controlled, no issues  Vitamin-D deficiency.  Has been on over-the-counter calcium and vitamin-D.  Doing well.  On Prolia and has bone density March 2022, vitamin-D levels in range  February 2023, doing well, on supplementation.  No issues  September 2023, well controlled, no issues, compliant with medications  March 2024, lipid levels in range with no issues, remains stable on simvastatin 40 mg      The following portions of the patient's history were reviewed and updated as appropriate: allergies, current medications, past family history, past medical history, past social history, past surgical history and problem list.    Review of Systems      Current Outpatient Medications   Medication Sig Dispense Refill    aspirin (Aspir-Low) 81 mg EC tablet Take 81 mg by mouth daily        calcium citrate-Vitamin D 200 mg-250 units Take 1 tablet by mouth in the morning      clopidogrel (PLAVIX) 75 mg tablet Take 1 tablet (75 mg total) by mouth daily 90 tablet 1    cycloSPORINE (RESTASIS) 0.05 % ophthalmic emulsion Apply 1 drop to eye 2 (two) times a day      hydroxychloroquine (PLAQUENIL) 200 mg tablet Take 1 tablet (200 mg total) by mouth daily for 180 doses 90 tablet 1    oxybutynin (DITROPAN) 5 mg tablet Take 1 tablet (5 mg total) by mouth 2 (two) times a day 180 tablet 1    simvastatin (ZOCOR) 40 mg tablet Take 1 tablet (40 mg total) by mouth daily 90 tablet 1     No current facility-administered medications for this visit.       Objective:    /78 (BP Location: Left arm, Patient Position: Sitting, Cuff Size: Standard)   Pulse 72   Temp 98.4 °F (36.9 °C) (Temporal)   Ht 5' 4\" (1.626 m)   Wt 54.9 kg (121 lb)   SpO2 97%   BMI 20.77 kg/m²        Physical Exam           Jaleel Walter DO"

## 2024-04-19 ENCOUNTER — TELEPHONE (OUTPATIENT)
Age: 74
End: 2024-04-19

## 2024-04-19 NOTE — TELEPHONE ENCOUNTER
"Reviewed OV note from 3/15/24 and \"follow up 1 month if needed for repeat steroid injection of the right knee\".  CLM on  for pt to return call to relay info and schedule appt if needed.   "

## 2024-04-19 NOTE — TELEPHONE ENCOUNTER
Called and spoke w/pt and relayed LISY Parker's msg to her.  She said she is trying to avoid another injection.  She will try the Aleve and activity modification-use pain as a guide, rest, ice and elevate.  She said it hurt this morning when she first got up but it is better now.  Will CB if needed.

## 2024-04-19 NOTE — TELEPHONE ENCOUNTER
Caller: Patient    Doctor: Sharmaine    Reason for call:     Patient recently had the Euflexxa injection in right knee on 03/15/24, the injection lasted until  4/15/24 and now the pain is back about 5-10 fluctuates.  She is asking what else the doctor could recommend at this time??    Call back#: [phone]

## 2024-04-22 ENCOUNTER — HOSPITAL ENCOUNTER (OUTPATIENT)
Dept: RADIOLOGY | Facility: IMAGING CENTER | Age: 74
Discharge: HOME | End: 2024-04-22
Payer: MEDICARE

## 2024-04-22 DIAGNOSIS — I67.850 CADASIL (CEREBRAL AUTOSOMAL DOMINANT ARTERIOPATHY WITH SUBCORTICAL INFARCTS AND LEUKOENCEPHALOPATHY): ICD-10-CM

## 2024-04-22 DIAGNOSIS — I63.9 CEREBROVASCULAR ACCIDENT (CVA) DUE TO VASCULAR OCCLUSION (HCC): ICD-10-CM

## 2024-04-22 PROCEDURE — G1004 CDSM NDSC: HCPCS

## 2024-04-22 PROCEDURE — 70551 MRI BRAIN STEM W/O DYE: CPT

## 2024-05-23 ENCOUNTER — OFFICE VISIT (OUTPATIENT)
Dept: OBGYN CLINIC | Facility: CLINIC | Age: 74
End: 2024-05-23
Payer: MEDICARE

## 2024-05-23 VITALS — BODY MASS INDEX: 22.84 KG/M2 | WEIGHT: 121 LBS | HEIGHT: 61 IN

## 2024-05-23 DIAGNOSIS — M17.11 PRIMARY OSTEOARTHRITIS OF RIGHT KNEE: Primary | ICD-10-CM

## 2024-05-23 DIAGNOSIS — M79.641 RIGHT HAND PAIN: ICD-10-CM

## 2024-05-23 PROCEDURE — 99213 OFFICE O/P EST LOW 20 MIN: CPT | Performed by: ORTHOPAEDIC SURGERY

## 2024-05-23 PROCEDURE — 20610 DRAIN/INJ JOINT/BURSA W/O US: CPT | Performed by: ORTHOPAEDIC SURGERY

## 2024-05-23 RX ORDER — BUPIVACAINE HYDROCHLORIDE 2.5 MG/ML
2 INJECTION, SOLUTION INFILTRATION; PERINEURAL
Status: COMPLETED | OUTPATIENT
Start: 2024-05-23 | End: 2024-05-23

## 2024-05-23 RX ORDER — BETAMETHASONE SODIUM PHOSPHATE AND BETAMETHASONE ACETATE 3; 3 MG/ML; MG/ML
12 INJECTION, SUSPENSION INTRA-ARTICULAR; INTRALESIONAL; INTRAMUSCULAR; SOFT TISSUE
Status: COMPLETED | OUTPATIENT
Start: 2024-05-23 | End: 2024-05-23

## 2024-05-23 RX ADMIN — BETAMETHASONE SODIUM PHOSPHATE AND BETAMETHASONE ACETATE 12 MG: 3; 3 INJECTION, SUSPENSION INTRA-ARTICULAR; INTRALESIONAL; INTRAMUSCULAR; SOFT TISSUE at 11:30

## 2024-05-23 RX ADMIN — BUPIVACAINE HYDROCHLORIDE 2 ML: 2.5 INJECTION, SOLUTION INFILTRATION; PERINEURAL at 11:30

## 2024-05-23 NOTE — PROGRESS NOTES
Assessment:  1. Primary osteoarthritis of right knee        2. 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)    Cerebrovascular accident (CVA) due to vascular occlusion (HCC)    Chronic venous insufficiency    Hyperlipidemia    Osteoporosis    Rheumatoid arthritis with positive rheumatoid factor (HCC)    Sjogren's syndrome (HCC)    Von Reckling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 (HCC)    Screening mammogram, encounter for    High serum high density lipoprotein (HDL)    History of stroke    Multiple lacunar infarcts (HCC)    Dry eye    Cataract of both eyes    Medication monitoring encounter    TIA due to embolism (HCC)    Arthritis of right knee    Eye hemorrhage, right    Primary osteoarthritis of right knee    Synovial cyst of popliteal space ([place]), right knee    Tendinitis of left hip    Hypercalcemia       Plan:    74 y.o. female  with right knee osteoarthritis    Discussed steroid injection of right knee.  Discussed side effects and risks.  Patient expressed understanding.  Injection administered.  Discussed gel injections of right knee.  Patient had previous course 6 weeks ago in March with great relief of pain.  She may call the office in August of this year to have this ordered again if needed.   Patient to follow-up as needed        Subjective:   Patient ID: Caro Manzanares is a 74 y.o. female .    HPI    Patient presents to the office for follow up of right knee pain . Patient reports intermittent sharp stabbing pain worsened with increased activity, rising from prolonged rest. She notes 100% relief from CSI and VISCO provided in the past three months, until a gradual return of symptoms over the past few weeks. Her most recent CSI was provided 2/17/2024.     The following portions of the patient's history were reviewed and  updated as appropriate: allergies, current medications, past family history, past social history, past surgical history and problem list.    Social History     Socioeconomic History    Marital status: /Civil Union     Spouse name: Not on file    Number of children: Not on file    Years of education: Not on file    Highest education level: Not on file   Occupational History    Occupation: Privately   Tobacco Use    Smoking status: Never    Smokeless tobacco: Never   Vaping Use    Vaping status: Never Used   Substance and Sexual Activity    Alcohol use: Yes     Alcohol/week: 1.0 standard drink of alcohol     Types: 1 Glasses of wine per week    Drug use: No    Sexual activity: Not Currently   Other Topics Concern    Not on file   Social History Narrative    Exercise habits: the gym; frequency is 2 days per week    Travel history: patient denies being  Out of the country during 1/2014-11/12/2014     Social Determinants of Health     Financial Resource Strain: Low Risk  (9/14/2023)    Overall Financial Resource Strain (CARDIA)     Difficulty of Paying Living Expenses: Not very hard   Food Insecurity: Not on file   Transportation Needs: No Transportation Needs (9/14/2023)    PRAPARE - Transportation     Lack of Transportation (Medical): No     Lack of Transportation (Non-Medical): No   Physical Activity: Not on file   Stress: Not on file   Social Connections: Not on file   Intimate Partner Violence: Not on file   Housing Stability: Not on file     Past Medical History:   Diagnosis Date    Arthralgia     Fall against object 06/09/2023    Foreign body in skin of finger 02/03/2023    Gait disorder 08/22/2023    Memory loss 09/03/2019    Neutropenia (HCC)     Osteoporosis     Pain of left hip 08/22/2023    Rheumatoid arthritis (HCC)     Sjogren's syndrome (HCC)     Stroke (cerebrum) (HCC)     Transient cerebral ischemia      Past Surgical History:   Procedure Laterality Date    BREAST EXCISIONAL BIOPSY Left 1983     benign    BREAST EXCISIONAL BIOPSY Left 1982    benign    BREAST EXCISIONAL BIOPSY Right 1982    benign    CATARACT EXTRACTION, BILATERAL      HYSTERECTOMY      age 52    OOPHORECTOMY      both     No Known Allergies  Current Outpatient Medications on File Prior to Visit   Medication Sig Dispense Refill    aspirin (Aspir-Low) 81 mg EC tablet Take 81 mg by mouth daily        calcium citrate-Vitamin D 200 mg-250 units Take 1 tablet by mouth in the morning      clopidogrel (PLAVIX) 75 mg tablet Take 1 tablet (75 mg total) by mouth daily 90 tablet 1    cycloSPORINE (RESTASIS) 0.05 % ophthalmic emulsion Apply 1 drop to eye 2 (two) times a day      hydroxychloroquine (PLAQUENIL) 200 mg tablet Take 1 tablet (200 mg total) by mouth daily for 180 doses 90 tablet 1    oxybutynin (DITROPAN) 5 mg tablet Take 1 tablet (5 mg total) by mouth 2 (two) times a day 180 tablet 1    simvastatin (ZOCOR) 40 mg tablet Take 1 tablet (40 mg total) by mouth daily 90 tablet 1     No current facility-administered medications on file prior to visit.       Review of Systems   Constitutional:  Negative for chills and fever.   HENT:  Negative for ear pain and sore throat.    Eyes:  Negative for pain and visual disturbance.   Respiratory:  Negative for cough and shortness of breath.    Cardiovascular:  Negative for chest pain and palpitations.   Gastrointestinal:  Negative for abdominal pain and vomiting.   Genitourinary:  Negative for dysuria and hematuria.   Musculoskeletal:  Negative for arthralgias and back pain.   Skin:  Negative for color change and rash.   Neurological:  Negative for seizures and syncope.   All other systems reviewed and are negative.    See HPi    Objective:    There were no vitals filed for this visit.      Physical Exam  Vitals and nursing note reviewed.   Constitutional:       General: She is not in acute distress.     Appearance: She is well-developed.   HENT:      Head: Normocephalic and atraumatic.   Eyes:       "Conjunctiva/sclera: Conjunctivae normal.   Cardiovascular:      Rate and Rhythm: Normal rate and regular rhythm.      Heart sounds: No murmur heard.  Pulmonary:      Effort: Pulmonary effort is normal. No respiratory distress.      Breath sounds: Normal breath sounds.   Abdominal:      Palpations: Abdomen is soft.      Tenderness: There is no abdominal tenderness.   Musculoskeletal:         General: No swelling.      Cervical back: Neck supple.   Skin:     General: Skin is warm and dry.      Capillary Refill: Capillary refill takes less than 2 seconds.   Neurological:      Mental Status: She is alert.   Psychiatric:         Mood and Affect: Mood normal.         Right Knee Exam     Tenderness   The patient is experiencing tenderness in the medial joint line and lateral joint line.    Range of Motion   Extension:  normal   Flexion:  normal     Other   Erythema: absent  Scars: absent  Sensation: normal  Pulse: present  Swelling: none                Large joint arthrocentesis: R knee  Universal Protocol:  Consent: Verbal consent obtained.  Risks and benefits: risks, benefits and alternatives were discussed  Consent given by: patient  Time out: Immediately prior to procedure a \"time out\" was called to verify the correct patient, procedure, equipment, support staff and site/side marked as required.  Patient understanding: patient states understanding of the procedure being performed  Site marked: the operative site was marked  Patient identity confirmed: verbally with patient  Supporting Documentation  Indications: pain and diagnostic evaluation   Procedure Details  Location: knee - R knee  Preparation: Patient was prepped and draped in the usual sterile fashion  Needle size: 22 G  Ultrasound guidance: no  Medications administered: 2 mL bupivacaine 0.25 %; 12 mg betamethasone acetate-betamethasone sodium phosphate 6 (3-3) mg/mL    Patient tolerance: patient tolerated the procedure well with no immediate " "complications  Dressing:  Sterile dressing applied            Scribe Attestation      I,:  Megan Barraza am acting as a scribe while in the presence of the attending physician.:       I,:  Deepak Schmid, DO personally performed the services described in this documentation    as scribed in my presence.:             Portions of the record may have been created with voice recognition software.  Occasional wrong word or \"sound a like\" substitutions may have occurred due to the inherent limitations of voice recognition software.  Read the chart carefully and recognize, using context, where substitutions have occurred.    "

## 2024-05-31 ENCOUNTER — OFFICE VISIT (OUTPATIENT)
Dept: NEUROLOGY | Facility: CLINIC | Age: 74
End: 2024-05-31
Payer: MEDICARE

## 2024-05-31 VITALS
SYSTOLIC BLOOD PRESSURE: 112 MMHG | DIASTOLIC BLOOD PRESSURE: 60 MMHG | TEMPERATURE: 97.1 F | HEIGHT: 61 IN | BODY MASS INDEX: 22.66 KG/M2 | OXYGEN SATURATION: 95 % | HEART RATE: 67 BPM | WEIGHT: 120 LBS

## 2024-05-31 DIAGNOSIS — I67.850 CADASIL: ICD-10-CM

## 2024-05-31 DIAGNOSIS — I63.81 MULTIPLE LACUNAR INFARCTS (HCC): Primary | ICD-10-CM

## 2024-05-31 DIAGNOSIS — E78.5 HYPERLIPIDEMIA, UNSPECIFIED HYPERLIPIDEMIA TYPE: ICD-10-CM

## 2024-05-31 DIAGNOSIS — I63.9 CEREBRAL INFARCTION (HCC): ICD-10-CM

## 2024-05-31 PROCEDURE — 99215 OFFICE O/P EST HI 40 MIN: CPT | Performed by: PSYCHIATRY & NEUROLOGY

## 2024-05-31 RX ORDER — SIMVASTATIN 20 MG
20 TABLET ORAL DAILY
Qty: 90 TABLET | Refills: 2 | Status: SHIPPED | OUTPATIENT
Start: 2024-05-31

## 2024-05-31 NOTE — PROGRESS NOTES
Patient ID: Caro Manzanares is a 74 y.o. female.    Assessment/Plan:    This is a 75 y/o  Female who is here as a follow up for CADASIL.  Patient is stable, and no headache, does mild cognitive changes but most likely are age related at this time.  Most recent MRI done 1 month ago showed stability of CADASIL, no new changes.    PLAN:      Diagnoses and all orders for this visit:    Multiple lacunar infarcts (HCC)    Hyperlipidemia, unspecified hyperlipidemia type  -     simvastatin (ZOCOR) 20 mg tablet; Take 1 tablet (20 mg total) by mouth daily    Cerebral infarction (HCC)  -for secondary stroke prevention, recommend continuation of combination of aspirin, plavix and zocor, but will reduce the dose to 20mg PO qdaily  -patient had her son tested which was negative  -daughter is not tested yet and wants   -Blood Pressure goal < 130/80, BP   -LDL goal <70  -snoring -   -needs further cardiac workup with zio patch/loop recorder    Counseling/stroke education -   -I advised patient to avoid using NSAIDs for headaches or other pain and to stick to tylenol if needed  -Recommend lifestyle modifications such as mediterranean diet & regular exercise regimen atleast 4-5 times a week for 20-30 minutes.   -I educated patient/family regarding medication compliance  -encourage smoking cessation, and control of diabetes and hypertension; defer management to primary   -     simvastatin (ZOCOR) 20 mg tablet; Take 1 tablet (20 mg total) by mouth daily    CADASIL  -     Cancel: MRI brain without contrast; Future  -     MRI brain without contrast; Future         Follow up in 1 year    I would be happy to see the patient sooner if any new questions/concerns arise.  Patient/Guardian was advised to the call the office if they have any questions and concerns in the meantime.     Patient/Guardian does understand that if they have any new stroke like symptoms such as facial droop on one side, weakness/paralysis on either side,  speech trouble, numbness on one side, balance issues, any vision changes, extreme dizziness or any new headache, to call 9-1-1 immediately or to proceed to the nearest ER immediately.      I have spent a total time of 40 minutes on 05/31/24 in caring for this patient including Risks and benefits of tx options, Instructions for management, Patient and family education, Counseling / Coordination of care, Documenting in the medical record, Reviewing / ordering tests, medicine, procedures  , and Obtaining or reviewing history  .     Subjective:    HPI    74-year-old female who is here as a follow-up of history of CADASIL.    Patient is overall doing well she does not have any UTIs or CVA-like symptoms she does not have any headaches.  She states that she is occasionally a little more forgetful but no concerns regarding driving has not gotten lost has not left the stove on is not doing any finances.  She is able to do most of her hygiene related things.  She has been maintained on aspirin Plavix and simvastatin and is tolerating them well.  She wanted to know the results of the MRI brain.    The following portions of the patient's history were reviewed and updated as appropriate: She  has a past medical history of Arthralgia, Fall against object (06/09/2023), Foreign body in skin of finger (02/03/2023), Gait disorder (08/22/2023), Memory loss (09/03/2019), Neutropenia ([facility]), Osteoporosis, Pain of left hip (08/22/2023), Rheumatoid arthritis ([facility]), Sjogren's syndrome ([facility]), Stroke (cerebrum) ([facility]), and Transient cerebral ischemia.  She   Patient Active Problem List    Diagnosis Date Noted    Hypercalcemia 03/18/2024    Tendinitis of left hip 09/14/2023    Primary osteoarthritis of right knee 04/13/2023    Synovial cyst of popliteal space ([place]), right knee 04/13/2023    Eye hemorrhage, right 08/24/2022    Arthritis of right knee 03/17/2022    TIA due to embolism ([facility]) 09/22/2021    Medication monitoring encounter  01/25/2021    Cataract of both eyes 08/13/2020    Dry eye 07/20/2020    Multiple lacunar infarcts (HCC) 01/02/2020    History of stroke 06/11/2019    High serum high density lipoprotein (HDL) 12/11/2018    Screening mammogram, encounter for 06/08/2018    Abnormal mammogram of right breast 02/18/2017    Chronic venous insufficiency 02/08/2017    Rheumatoid arthritis with positive rheumatoid factor (HCC) 11/24/2015    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  Her family history includes Diabetes in her father; Lymphoma (age of onset: 63) in her mother; No Known Problems in her daughter, maternal aunt, maternal aunt, maternal grandfather, maternal grandmother, paternal aunt, paternal aunt, paternal grandfather, paternal grandmother, and sister; Stroke in her sister; Stroke (age of onset: 73) in her mother.  She  reports that she has never smoked. She has never used smokeless tobacco. She reports current alcohol use of about 1.0 standard drink of alcohol per week. She reports that she does not use drugs.  Current Outpatient Medications   Medication Sig Dispense Refill    aspirin (Aspir-Low) 81 mg EC tablet Take 81 mg by mouth daily        calcium citrate-Vitamin D 200 mg-250 units Take 1 tablet by mouth in the morning      clopidogrel (PLAVIX) 75 mg tablet Take 1 tablet (75 mg total) by mouth daily 90 tablet 1    cycloSPORINE (RESTASIS) 0.05 % ophthalmic emulsion Apply 1 drop to eye 2 (two) times a day    "   hydroxychloroquine (PLAQUENIL) 200 mg tablet Take 1 tablet (200 mg total) by mouth daily for 180 doses 90 tablet 1    oxybutynin (DITROPAN) 5 mg tablet Take 1 tablet (5 mg total) by mouth 2 (two) times a day 180 tablet 1    simvastatin (ZOCOR) 20 mg tablet Take 1 tablet (20 mg total) by mouth daily 90 tablet 2     No current facility-administered medications for this visit.     Current Outpatient Medications on File Prior to Visit   Medication Sig    aspirin (Aspir-Low) 81 mg EC tablet Take 81 mg by mouth daily      calcium citrate-Vitamin D 200 mg-250 units Take 1 tablet by mouth in the morning    clopidogrel (PLAVIX) 75 mg tablet Take 1 tablet (75 mg total) by mouth daily    cycloSPORINE (RESTASIS) 0.05 % ophthalmic emulsion Apply 1 drop to eye 2 (two) times a day    hydroxychloroquine (PLAQUENIL) 200 mg tablet Take 1 tablet (200 mg total) by mouth daily for 180 doses    oxybutynin (DITROPAN) 5 mg tablet Take 1 tablet (5 mg total) by mouth 2 (two) times a day    [DISCONTINUED] simvastatin (ZOCOR) 40 mg tablet Take 1 tablet (40 mg total) by mouth daily     No current facility-administered medications on file prior to visit.     She has No Known Allergies..    Objective:    Blood pressure 112/60, pulse 67, temperature (!) 97.1 °F (36.2 °C), temperature source Temporal, height 5' 1\" (1.549 m), weight 54.4 kg (120 lb), SpO2 95%.    Physical Exam  General - patient is alert   Speech - no dysarthria noted, no aphasia noted.     Neuro:   Cranial nerves: PERRL, EOMI, facial sensation intact to soft touch in V1, V2 and V3, no facial asymmetry noted, uvula/palate midline, tongue midline.   Motor: 5/5 throughout, normal tone, no pronator drift noted.   Sensory - intact to soft touch throughout  Reflexes - 2+ throughout  Coordination - no ataxia/dysmetria noted  Gait - normal      ROS:  Reviewed ROS  Review of Systems   Constitutional: Negative.    HENT: Negative.     Eyes: Negative.    Respiratory: Negative.   "   Cardiovascular: Negative.    Gastrointestinal: Negative.    Endocrine: Negative.    Genitourinary: Negative.    Musculoskeletal: Negative.    Skin: Negative.    Allergic/Immunologic: Negative.    Neurological: Negative.    Hematological: Negative.    Psychiatric/Behavioral: Negative.

## 2024-06-27 ENCOUNTER — APPOINTMENT (OUTPATIENT)
Dept: RADIOLOGY | Facility: AMBULARY SURGERY CENTER | Age: 74
End: 2024-06-27
Attending: SURGERY
Payer: MEDICARE

## 2024-06-27 ENCOUNTER — OFFICE VISIT (OUTPATIENT)
Dept: OBGYN CLINIC | Facility: CLINIC | Age: 74
End: 2024-06-27
Payer: MEDICARE

## 2024-06-27 VITALS — HEIGHT: 64 IN | WEIGHT: 122 LBS | BODY MASS INDEX: 20.83 KG/M2

## 2024-06-27 DIAGNOSIS — M18.0 ARTHRITIS OF CARPOMETACARPAL (CMC) JOINT OF BOTH THUMBS: Primary | ICD-10-CM

## 2024-06-27 DIAGNOSIS — M79.641 RIGHT HAND PAIN: ICD-10-CM

## 2024-06-27 DIAGNOSIS — M79.642 LEFT HAND PAIN: ICD-10-CM

## 2024-06-27 PROCEDURE — 73130 X-RAY EXAM OF HAND: CPT

## 2024-06-27 PROCEDURE — 20600 DRAIN/INJ JOINT/BURSA W/O US: CPT | Performed by: SURGERY

## 2024-06-27 PROCEDURE — 99204 OFFICE O/P NEW MOD 45 MIN: CPT | Performed by: SURGERY

## 2024-06-27 RX ORDER — BUPIVACAINE HYDROCHLORIDE 2.5 MG/ML
0.5 INJECTION, SOLUTION INFILTRATION; PERINEURAL
Status: COMPLETED | OUTPATIENT
Start: 2024-06-27 | End: 2024-06-27

## 2024-06-27 RX ORDER — TRIAMCINOLONE ACETONIDE 40 MG/ML
20 INJECTION, SUSPENSION INTRA-ARTICULAR; INTRAMUSCULAR
Status: COMPLETED | OUTPATIENT
Start: 2024-06-27 | End: 2024-06-27

## 2024-06-27 RX ADMIN — BUPIVACAINE HYDROCHLORIDE 0.5 ML: 2.5 INJECTION, SOLUTION INFILTRATION; PERINEURAL at 09:45

## 2024-06-27 RX ADMIN — TRIAMCINOLONE ACETONIDE 20 MG: 40 INJECTION, SUSPENSION INTRA-ARTICULAR; INTRAMUSCULAR at 09:45

## 2024-06-27 NOTE — PATIENT INSTRUCTIONS
What is it CMC Arthritis?  In a normal joint, cartilage covers the end of the bones and serves as a shock absorber to allow smooth, pain-free movement. In osteoarthritis (OA, or “degenerative arthritis”) the cartilage layer wears out, resulting in direct contact between the bones and producing pain and deformity. One of the most common joints to develop OA in the hand is the base of the thumb. The thumb basal joint, also called the carpometacarpal (CMC) joint, is a specialized saddle shaped joint that is formed by a small bone of the wrist (trapezium) and the first bone of the thumb (metacarpal). The saddle shaped joint allows the thumb to have a wide range of motions, including up, down, across the palm, and the ability to pinch (see Figure 1).    Who gets it?  OA at the base of the thumb is more commonly seen in women over the age of 40. The exact cause is unknown, but genetics, previous injuries such as fractures or dislocations, and generalized joint laxity may predispose towards development of this type of arthritis.    What are the symptoms and signs?  The most common symptom is pain at the base of the thumb. The pain can be aggravated by activities that require pinching, such as opening jars, turning door knobs or keys, and writing. Severity can also progress to pain at rest and pain at night. In more severe cases, progressive destruction and mal-alignment of the joint occurs, and a bump develops at the base of the thumb as the metacarpal moves out of the saddle joint. This shift in the joint can cause limited motion and weakness, making pinch difficult (see Figure 2). The next joint above the CMC may compensate by loosening, causing it to bend further back (hyperextension).           How is the diagnosis made?  The diagnosis is made by history and physical evaluation. Pressure and movement such as twisting will produce pain at the joint. A grinding sensation may also be present at the joint (see Figure 3).  X-rays are used to confirm the diagnosis, although symptom severity often does not correlate with x-ray findings.     What are the treatment options?   Less severe thumb arthritis will usually respond to non-surgical care. Arthritis medication, splinting and limited cortisone injections may help alleviate pain. A hand therapist might provide a variety of rigid and non-rigid splints which can be used while sleeping or during activities. Patients with advanced disease or who fail non-surgical treatment may be candidates for surgical reconstruction. A variety of surgical techniques are available that can successfully reduce or eliminate pain. Surgical procedures include removal of arthritic bone and joint reconstruction (arthroplasty), joint fusion, bone realignment, and even arthroscopy in select cases. A consultation with your hand surgeon can help decide the best option for you (see Figure 4).           © 2012 [employer] the [employer]  www.handcare.org

## 2024-06-27 NOTE — PROGRESS NOTES
Bari Strauss M.D.  Attending, Orthopaedic Surgery  Hand, Wrist, and Elbow Surgery  [facility]      ORTHOPAEDIC HAND, WRIST, AND ELBOW OFFICE  VISIT       ASSESSMENT/PLAN:      73 yo female with bilateral first CMC joint arthritis, R>L    Diagnostic images of the bilateral hands were obtained and reviewed in office today demonstrating arthritic changes at the first CMC joint. We discussed anatomy, physiology, and multifactorial treatment options for CMC arthritis including heat, anti-inflammatories, steroid injections, and eventually surgery if needed. While injections do not stop the progression of arthritis they do help with the associated pain and can allow patients to function better on a daily basis. Risks, benefits, and alternatives were reviewed and patient elected to proceed with initial bilateral CMC steroid injections today. These were administered in the office without immediate complication and may be repeated on a 3 month basis as needed. Patient was advised on other adjunctive treatments including oral/topical anti-inflammatories, heat therapy, and bracing which may be used as needed for intermittent pain relief.       The patient verbalized understanding of exam findings and treatment plan. We engaged in the shared decision-making process and treatment options were discussed at length with the patient. Surgical and conservative management discussed today along with risks and benefits.    Diagnoses and all orders for this visit:    Arthritis of carpometacarpal (CMC) joint of both thumbs  -     Ambulatory Referral to Orthopedic Surgery  -     Cancel: XR hand 3+ vw left; Future  -     XR hand 3+ vw left; Future  -     XR hand 3+ vw right; Future  -     Small joint arthrocentesis    Other orders  -     Cancel: XR hand 3+ vw right; Future        Follow Up:  Return if symptoms worsen or fail to improve.    To Do Next Visit:  Re-evaluation of current issue      General  Discussions:  CMC Arthritis: The anatomy and physiology of carpometacarpal joint arthritis was discussed with the patient today in the office.  Deterioration of the articular cartilage eventually leads to hypermobility at the thumb CMC joint, resulting in joint subluxation, osteophyte formation, cystic changes within the trapezium and base of the first metacarpal, as well as subchondral sclerosis.  Eventually, pain, limited mobility, and compensatory hyperextension at the metacarpophalangeal joint may develop.  While normal activity and usage of the thumb joint may provide a painful experience to the patient, this typically does not result in damage to the thumb or hand.  Treatment options include resting thumb spica splints to decreased joint edema, pain, and inflammation.  Therapy exercises to strengthen the thenar musculature may relieve pain, but do not alter the overall continued development of osteoarthritis.  Oral medications, topical medications, corticosteroid injections may decrease pain and increase overall function.  Eventually, approximately 5% of patients may require surgical intervention.                                                                                                                                                                                                   ____________________________________________________________________________________________________________________________________________      CHIEF COMPLAINT:  Chief Complaint   Patient presents with    Right Hand - Pain     CMC pain      Left Hand - Pain     CMC        SUBJECTIVE:  Caro Manzanares is a 74 y.o. year old RHD female who presents for evaluation of bilateral hand pain. She notes pain is located over the radial hand and thumb area. She also gets some pain in the volar wrist which worsens at night, no associated with any paresthesias. She does use Voltaren gel for symptomatic relief of the hands and knees which  does help. Medical hx significant for RA (on plaquenil), generalized OA, and hx of CVA.      Pain/symptom timing:  Worse during the day when active  Pain/symptom context:  Worse with activites and work  Pain/symptom modifying factors:  Rest makes better, activities make worse  Pain/symptom associated signs/symptoms: none    Prior treatment   NSAIDsYes   Injections No   Bracing/Orthotics No    Physical Therapy No     I have personally reviewed all the relevant PMH, PSH, SH, FH, Medications and allergies      PAST MEDICAL HISTORY:  Past Medical History:   Diagnosis Date    Arthralgia     Fall against object 06/09/2023    Foreign body in skin of finger 02/03/2023    Gait disorder 08/22/2023    Memory loss 09/03/2019    Neutropenia (HCC)     Osteoporosis     Pain of left hip 08/22/2023    Rheumatoid arthritis (HCC)     Sjogren's syndrome (HCC)     Stroke (cerebrum) (HCC)     Transient cerebral ischemia        PAST SURGICAL HISTORY:  Past Surgical History:   Procedure Laterality Date    BREAST EXCISIONAL BIOPSY Left 1983    benign    BREAST EXCISIONAL BIOPSY Left 1982    benign    BREAST EXCISIONAL BIOPSY Right 1982    benign    CATARACT EXTRACTION, BILATERAL      HYSTERECTOMY      age 52    OOPHORECTOMY      both       FAMILY HISTORY:  Family History   Problem Relation Age of Onset    Lymphoma Mother 63        ACUTE    Stroke Mother 73    Diabetes Father     Stroke Sister     No Known Problems Sister     No Known Problems Daughter     No Known Problems Maternal Grandmother     No Known Problems Maternal Grandfather     No Known Problems Paternal Grandmother     No Known Problems Paternal Grandfather     No Known Problems Maternal Aunt     No Known Problems Maternal Aunt     No Known Problems Paternal Aunt     No Known Problems Paternal Aunt        SOCIAL HISTORY:  Social History     Tobacco Use    Smoking status: Never    Smokeless tobacco: Never   Vaping Use    Vaping status: Never Used   Substance Use Topics     Alcohol use: Yes     Alcohol/week: 1.0 standard drink of alcohol     Types: 1 Glasses of wine per week    Drug use: No       MEDICATIONS:    Current Outpatient Medications:     aspirin (Aspir-Low) 81 mg EC tablet, Take 81 mg by mouth daily  , Disp: , Rfl:     calcium citrate-Vitamin D 200 mg-250 units, Take 1 tablet by mouth in the morning, Disp: , Rfl:     clopidogrel (PLAVIX) 75 mg tablet, Take 1 tablet (75 mg total) by mouth daily, Disp: 90 tablet, Rfl: 1    cycloSPORINE (RESTASIS) 0.05 % ophthalmic emulsion, Apply 1 drop to eye 2 (two) times a day, Disp: , Rfl:     hydroxychloroquine (PLAQUENIL) 200 mg tablet, Take 1 tablet (200 mg total) by mouth daily for 180 doses, Disp: 90 tablet, Rfl: 1    oxybutynin (DITROPAN) 5 mg tablet, Take 1 tablet (5 mg total) by mouth 2 (two) times a day, Disp: 180 tablet, Rfl: 1    simvastatin (ZOCOR) 20 mg tablet, Take 1 tablet (20 mg total) by mouth daily, Disp: 90 tablet, Rfl: 2    ALLERGIES:  No Known Allergies        REVIEW OF SYSTEMS:  Review of Systems   Constitutional:  Negative for chills, fatigue and fever.   HENT:  Negative for hearing loss, nosebleeds and sore throat.    Eyes:  Negative for redness and visual disturbance.   Respiratory:  Negative for cough, shortness of breath and wheezing.    Cardiovascular:  Negative for chest pain, palpitations and leg swelling.   Gastrointestinal:  Negative for abdominal pain, nausea and vomiting.   Endocrine: Negative for polydipsia and polyuria.   Genitourinary:  Negative for difficulty urinating, dysuria and hematuria.   Musculoskeletal:  Positive for arthralgias. Negative for joint swelling and myalgias.   Skin:  Negative for rash and wound.   Neurological:  Negative for speech difficulty, weakness, numbness and headaches.   Psychiatric/Behavioral:  Negative for decreased concentration and suicidal ideas. The patient is not nervous/anxious.        VITALS:  There were no vitals filed for this  "visit.    LABS:      _____________________________________________________  PHYSICAL EXAMINATION:  General: well developed and well nourished, alert, oriented times 3, and appears comfortable  Psychiatric: Normal  HEENT: Normocephalic, Atraumatic Trachea Midline, No torticollis  Pulmonary: No audible wheezing or respiratory distress   Abdomen/GI: Non tender, non distended   Cardiovascular: No pitting edema, 2+ radial pulse   Skin: No masses, erythema, lacerations, fluctation, ulcerations  Neurovascular: Sensation Intact to the Median, Ulnar, Radial Nerve, Motor Intact to the Median, Ulnar, Radial Nerve, and Pulses Intact  Musculoskeletal: Normal, except as noted in detailed exam and in HPI.      MUSCULOSKELETAL EXAMINATION:  Bilateral CMC Exam:  No adduction contracture  No hyperextension deformity of MCP joint  Positive localized tenderness over radial and dorsal aspect of thumb (CMC joint)  Grind test is Positive for pain and Negative for crepitus  Metacarpal load shift test positive  No triggering or tenderness over the A1 pulley  no pain with Finkelstein’s maneuver       ___________________________________________________  STUDIES REVIEWED:  I have personally reviewed and interpreted  AP lateral and oblique radiographs of the bilateral ands which demonstrate moderate to severe first CMC arthritis      LABS REVIEWED:    HgA1c:   Lab Results   Component Value Date    HGBA1C 5.7 08/13/2019     BMP:   Lab Results   Component Value Date    GLUCOSE 91 03/10/2015    CALCIUM 10.3 (H) 03/11/2024     03/10/2015    K 3.6 03/11/2024    CO2 32 (H) 03/11/2024     03/11/2024    BUN 21 03/11/2024    CREATININE 0.66 03/11/2024               PROCEDURES PERFORMED:  Small joint arthrocentesis: bilateral thumb CMC  Universal Protocol:  Consent: Verbal consent obtained.  Risks and benefits: risks, benefits and alternatives were discussed  Consent given by: patient  Time out: Immediately prior to procedure a \"time out\" was " called to verify the correct patient, procedure, equipment, support staff and site/side marked as required.  Patient understanding: patient states understanding of the procedure being performed  Site marked: the operative site was marked  Required items: required blood products, implants, devices, and special equipment available  Patient identity confirmed: verbally with patient  Supporting Documentation  Indications: pain   Procedure Details  Location: thumb - bilateral thumb CMC  Needle size: 25 G  Ultrasound guidance: no  Approach: dorsal    Medications (Right): 0.5 mL bupivacaine 0.25 %; 20 mg triamcinolone acetonide 40 mg/mLMedications (Left): 0.5 mL bupivacaine 0.25 %; 20 mg triamcinolone acetonide 40 mg/mL   Patient tolerance: patient tolerated the procedure well with no immediate complications  Dressing:  Sterile dressing applied             _____________________________________________________      Scribe Attestation      I,:  Racquel Denny PA-C am acting as a scribe while in the presence of the attending physician.:       I,:  Bari Strauss MD personally performed the services described in this documentation    as scribed in my presence.:

## 2024-07-03 DIAGNOSIS — M18.0 ARTHRITIS OF CARPOMETACARPAL (CMC) JOINT OF BOTH THUMBS: Primary | ICD-10-CM

## 2024-07-08 ENCOUNTER — OFFICE VISIT (OUTPATIENT)
Dept: INTERNAL MEDICINE CLINIC | Facility: OTHER | Age: 74
End: 2024-07-08
Payer: MEDICARE

## 2024-07-08 VITALS
OXYGEN SATURATION: 96 % | DIASTOLIC BLOOD PRESSURE: 60 MMHG | BODY MASS INDEX: 20.77 KG/M2 | RESPIRATION RATE: 18 BRPM | TEMPERATURE: 98 F | HEART RATE: 76 BPM | WEIGHT: 121 LBS | SYSTOLIC BLOOD PRESSURE: 104 MMHG

## 2024-07-08 DIAGNOSIS — J02.9 SORE THROAT: Primary | ICD-10-CM

## 2024-07-08 DIAGNOSIS — E78.5 HYPERLIPIDEMIA, UNSPECIFIED HYPERLIPIDEMIA TYPE: ICD-10-CM

## 2024-07-08 DIAGNOSIS — H61.21 RIGHT EAR IMPACTED CERUMEN: ICD-10-CM

## 2024-07-08 DIAGNOSIS — I63.9 CEREBRAL INFARCTION (HCC): ICD-10-CM

## 2024-07-08 LAB — S PYO AG THROAT QL: NEGATIVE

## 2024-07-08 PROCEDURE — 87880 STREP A ASSAY W/OPTIC: CPT

## 2024-07-08 PROCEDURE — 99213 OFFICE O/P EST LOW 20 MIN: CPT

## 2024-07-08 PROCEDURE — 69210 REMOVE IMPACTED EAR WAX UNI: CPT

## 2024-07-08 RX ORDER — SIMVASTATIN 20 MG
20 TABLET ORAL DAILY
Qty: 90 TABLET | Refills: 2 | Status: SHIPPED | OUTPATIENT
Start: 2024-07-08

## 2024-07-08 RX ORDER — OMEGA-3S/DHA/EPA/FISH OIL/D3 300MG-1000
400 CAPSULE ORAL DAILY
COMMUNITY

## 2024-07-08 NOTE — ASSESSMENT & PLAN NOTE
Rapid strep negative  Minimal erythema, otherwise exam unremarkable  Possible allergy component, recommend starting Flonase and Zyrtec  Advised Tylenol/ibuprofen as needed for pain, continue Chloraseptic spray as needed  Recommend warm liquids, honey, lozenges  Recommend adequate hydration  Recommend warm salt water gargles  If any new or worsening symptoms, recommend reevaluation in office.  Also recommend patient follow-up in office in 1 week if symptoms do not improve

## 2024-07-08 NOTE — PROGRESS NOTES
Assessment/Plan:    1. Sore throat  Assessment & Plan:  Rapid strep negative  Minimal erythema, otherwise exam unremarkable  Possible allergy component, recommend starting Flonase and Zyrtec  Advised Tylenol/ibuprofen as needed for pain, continue Chloraseptic spray as needed  Recommend warm liquids, honey, lozenges  Recommend adequate hydration  Recommend warm salt water gargles  If any new or worsening symptoms, recommend reevaluation in office.  Also recommend patient follow-up in office in 1 week if symptoms do not improve  Orders:  -     POCT rapid ANTIGEN strepA  2. Hyperlipidemia, unspecified hyperlipidemia type  -     simvastatin (ZOCOR) 20 mg tablet; Take 1 tablet (20 mg total) by mouth daily  3. Cerebral infarction (HCC)  -     simvastatin (ZOCOR) 20 mg tablet; Take 1 tablet (20 mg total) by mouth daily            [employer] software was used to dictate this note.  It may contain errors with dictating incorrect words or incorrect spelling. Please contact the provider directly with any questions.    Subjective:      Patient ID: Caro Manzanares is a 74 y.o. female.    Patient is a 74-year-old female presenting to the office for sore throat 2-3 weeks  No illness exposure  Denies any other symptoms  Tx tried: chloseptic     Sore Throat   This is a new problem. The current episode started 1 to 4 weeks ago. The problem has been unchanged. There has been no fever. Pertinent negatives include no abdominal pain, congestion, coughing, diarrhea, ear discharge, ear pain, headaches, neck pain, shortness of breath, swollen glands, trouble swallowing or vomiting. She has had no exposure to strep or mono.       The following portions of the patient's history were reviewed and updated as appropriate: allergies, current medications, past family history, past medical history, past social history, past surgical history, and problem list.    Review of Systems   Constitutional:  Negative for chills, fatigue and fever.   HENT:   Positive for sore throat. Negative for congestion, ear discharge, ear pain, postnasal drip, rhinorrhea, sinus pressure, sinus pain and trouble swallowing.    Respiratory:  Negative for cough, chest tightness, shortness of breath and wheezing.    Cardiovascular:  Negative for chest pain and palpitations.   Gastrointestinal:  Negative for abdominal pain, diarrhea, nausea and vomiting.   Musculoskeletal:  Negative for neck pain.   Neurological:  Negative for dizziness, light-headedness and headaches.         Past Medical History:   Diagnosis Date    Arthralgia     Fall against object 06/09/2023    Foreign body in skin of finger 02/03/2023    Gait disorder 08/22/2023    Memory loss 09/03/2019    Neutropenia (HCC)     Osteoporosis     Pain of left hip 08/22/2023    Rheumatoid arthritis (HCC)     Sjogren's syndrome (HCC)     Stroke (cerebrum) ([facility])     Transient cerebral ischemia          Current Outpatient Medications:     aspirin (Aspir-Low) 81 mg EC tablet, Take 81 mg by mouth daily  , Disp: , Rfl:     calcium citrate-Vitamin D 200 mg-250 units, Take 1 tablet by mouth in the morning, Disp: , Rfl:     cholecalciferol (VITAMIN D3) 400 units tablet, Take 400 Units by mouth daily, Disp: , Rfl:     clopidogrel (PLAVIX) 75 mg tablet, Take 1 tablet (75 mg total) by mouth daily, Disp: 90 tablet, Rfl: 1    cyanocobalamin (VITAMIN B-12) 100 MCG tablet, Take 100 mcg by mouth daily, Disp: , Rfl:     cycloSPORINE (RESTASIS) 0.05 % ophthalmic emulsion, Apply 1 drop to eye 2 (two) times a day, Disp: , Rfl:     hydroxychloroquine (PLAQUENIL) 200 mg tablet, Take 1 tablet (200 mg total) by mouth daily for 180 doses, Disp: 90 tablet, Rfl: 1    oxybutynin (DITROPAN) 5 mg tablet, Take 1 tablet (5 mg total) by mouth 2 (two) times a day, Disp: 180 tablet, Rfl: 1    simvastatin (ZOCOR) 20 mg tablet, Take 1 tablet (20 mg total) by mouth daily, Disp: 90 tablet, Rfl: 2    No Known Allergies    Social History   Past Surgical History:   Procedure  Laterality Date    BREAST EXCISIONAL BIOPSY Left 1983    benign    BREAST EXCISIONAL BIOPSY Left 1982    benign    BREAST EXCISIONAL BIOPSY Right 1982    benign    CATARACT EXTRACTION, BILATERAL      HYSTERECTOMY      age 52    OOPHORECTOMY      both     Family History   Problem Relation Age of Onset    Lymphoma Mother 63        ACUTE    Stroke Mother 73    Diabetes Father     Stroke Sister     No Known Problems Sister     No Known Problems Daughter     No Known Problems Maternal Grandmother     No Known Problems Maternal Grandfather     No Known Problems Paternal Grandmother     No Known Problems Paternal Grandfather     No Known Problems Maternal Aunt     No Known Problems Maternal Aunt     No Known Problems Paternal Aunt     No Known Problems Paternal Aunt        Objective:  /60 (BP Location: Right arm, Patient Position: Sitting, Cuff Size: Standard)   Pulse 76   Temp 98 °F (36.7 °C) (Temporal)   Resp 18   Wt 54.9 kg (121 lb)   SpO2 96%   BMI 20.77 kg/m²      Physical Exam  Vitals and nursing note reviewed.   Constitutional:       General: She is not in acute distress.     Appearance: She is well-developed. She is not ill-appearing.   HENT:      Head: Normocephalic and atraumatic.      Right Ear: Ear canal normal. No middle ear effusion.      Left Ear: Tympanic membrane and ear canal normal.  No middle ear effusion.      Nose: No congestion.      Mouth/Throat:      Mouth: Mucous membranes are moist. No oral lesions.      Pharynx: Oropharynx is clear. Uvula midline. No pharyngeal swelling, oropharyngeal exudate or posterior oropharyngeal erythema.      Tonsils: No tonsillar exudate.   Eyes:      Conjunctiva/sclera: Conjunctivae normal.   Cardiovascular:      Rate and Rhythm: Normal rate and regular rhythm.      Heart sounds: Normal heart sounds. No murmur heard.     No friction rub. No gallop.   Pulmonary:      Effort: Pulmonary effort is normal. No respiratory distress.      Breath sounds: Normal  breath sounds. No wheezing, rhonchi or rales.   Chest:      Chest wall: No tenderness.   Skin:     General: Skin is warm and dry.   Neurological:      General: No focal deficit present.      Mental Status: She is alert and oriented to person, place, and time.   Psychiatric:         Mood and Affect: Mood normal.         Behavior: Behavior normal.         Ear cerumen removal    Date/Time: 7/8/2024 11:20 AM    Performed by: Patti Miles PA-C  Authorized by: Patti Miles PA-C  Universal Protocol:  Consent: Verbal consent obtained.  Risks and benefits: risks, benefits and alternatives were discussed  Consent given by: patient  Patient understanding: patient states understanding of the procedure being performed  Required items: required blood products, implants, devices, and special equipment available  Patient identity confirmed: verbally with patient    Patient location:  Clinic  Procedure details:     Local anesthetic:  None    Location:  R ear    Procedure type: curette      Approach:  External  Post-procedure details:     Complication:  None    Hearing quality:  Improved    Patient tolerance of procedure:  Tolerated well, no immediate complications        Disclaimer: This note was generated with voice recognition software.  Phonetic, grammatical, and spelling errors may be present as a result.  Please contact provider with any concerns or questions

## 2024-07-08 NOTE — PATIENT INSTRUCTIONS
- Flonase nasal spray and zyrtec daily   - honey and warm liquids  - Continue with lozenges and throat spray as needed  - Warm salt water gargles

## 2024-07-11 DIAGNOSIS — M05.79 RHEUMATOID ARTHRITIS INVOLVING MULTIPLE SITES WITH POSITIVE RHEUMATOID FACTOR (HCC): ICD-10-CM

## 2024-07-11 RX ORDER — HYDROXYCHLOROQUINE SULFATE 200 MG/1
200 TABLET, FILM COATED ORAL DAILY
Qty: 100 TABLET | Refills: 1 | Status: SHIPPED | OUTPATIENT
Start: 2024-07-11 | End: 2025-01-27

## 2024-07-15 ENCOUNTER — OFFICE VISIT (OUTPATIENT)
Dept: OBGYN CLINIC | Facility: CLINIC | Age: 74
End: 2024-07-15
Payer: MEDICARE

## 2024-07-15 ENCOUNTER — APPOINTMENT (OUTPATIENT)
Dept: RADIOLOGY | Facility: AMBULARY SURGERY CENTER | Age: 74
End: 2024-07-15
Attending: ORTHOPAEDIC SURGERY
Payer: MEDICARE

## 2024-07-15 VITALS
BODY MASS INDEX: 20.66 KG/M2 | SYSTOLIC BLOOD PRESSURE: 125 MMHG | HEART RATE: 82 BPM | DIASTOLIC BLOOD PRESSURE: 78 MMHG | HEIGHT: 64 IN | WEIGHT: 121 LBS

## 2024-07-15 DIAGNOSIS — M25.562 LEFT KNEE PAIN, UNSPECIFIED CHRONICITY: ICD-10-CM

## 2024-07-15 DIAGNOSIS — M25.562 LEFT KNEE PAIN, UNSPECIFIED CHRONICITY: Primary | ICD-10-CM

## 2024-07-15 DIAGNOSIS — M17.0 PRIMARY OSTEOARTHRITIS OF BOTH KNEES: ICD-10-CM

## 2024-07-15 PROCEDURE — 99213 OFFICE O/P EST LOW 20 MIN: CPT | Performed by: ORTHOPAEDIC SURGERY

## 2024-07-15 PROCEDURE — 73562 X-RAY EXAM OF KNEE 3: CPT

## 2024-07-15 PROCEDURE — 20610 DRAIN/INJ JOINT/BURSA W/O US: CPT | Performed by: ORTHOPAEDIC SURGERY

## 2024-07-15 RX ORDER — BUPIVACAINE HYDROCHLORIDE 2.5 MG/ML
2 INJECTION, SOLUTION INFILTRATION; PERINEURAL
Status: COMPLETED | OUTPATIENT
Start: 2024-07-15 | End: 2024-07-15

## 2024-07-15 RX ORDER — TRIAMCINOLONE ACETONIDE 40 MG/ML
80 INJECTION, SUSPENSION INTRA-ARTICULAR; INTRAMUSCULAR
Status: COMPLETED | OUTPATIENT
Start: 2024-07-15 | End: 2024-07-15

## 2024-07-15 RX ORDER — MELOXICAM 7.5 MG/1
7.5 TABLET ORAL 2 TIMES DAILY
Qty: 60 TABLET | Refills: 2 | Status: SHIPPED | OUTPATIENT
Start: 2024-07-15

## 2024-07-15 RX ADMIN — TRIAMCINOLONE ACETONIDE 80 MG: 40 INJECTION, SUSPENSION INTRA-ARTICULAR; INTRAMUSCULAR at 10:15

## 2024-07-15 RX ADMIN — BUPIVACAINE HYDROCHLORIDE 2 ML: 2.5 INJECTION, SOLUTION INFILTRATION; PERINEURAL at 10:15

## 2024-07-15 NOTE — PROGRESS NOTES
Assessment & Plan     1. Left knee pain, unspecified chronicity    2. Primary osteoarthritis of both knees      Orders Placed This Encounter   Procedures    Large joint arthrocentesis: L knee    Lateral  Knee Brace    XR knee 3 vw left non injury    Injection procedure prior authorization         Diagnostics reviewed and physical exam performed.  Diagnosis, treatment options and associated risks were discussed with the patient including no treatment, nonsurgical treatment and potential for surgical intervention.  The patient was given the opportunity to ask questions regarding each.  Patient has moderate bilateral knee osteoarthritis.   Patient is not a surgical candidate at this time. Recommend non-operative treatments as outlined below:  Injections: Patient received left knee steroid injection today. Tolerated the procedure well. Post injection instructions reviewed including information on glucose monitoring for diabetic patients. Patient aware that they may repeat steroid injection every 3 months if needed.   She received CSI of the right knee 5/23/2024 with some relief. She completed right knee VISCO series on 3/15/2024 and is interested in continuing this treatment bilaterally as this provided her significant relief.   Bilateral knee visco-supplement was ordered as this had provided significant decrease of pain, inflammation and crepitus with last application.  The patient has on-going knee pain and stiffness in which interferes with their daily activity and sleep.  Examination of knee includes crepitus with range of motion.  The patient rates their pain a 10/10 at times.  The patient has tried home exercise program learned from past physical therapy.  Bracing has not provided relief.  The patient has tried oral medications and steroid injections with limited benefit.  The patient has been counseled on weight loss.    Medications: Tylenol up to 3000 mg per day and Mobic 7.5mg twice daily, ordered  today  Bracing:  left knee lateral  ordered today to aid in pain relief and due to laxity  Activity: Continue activity as tolerated.   Plan for next appt:  VISCO  bilateral knees after 9/15/2024      Return for VISCO.bilateral knees after 9/15/2024.     I answered all of the patient's questions during the visit and provided education of the patient's condition during the visit.  The patient verbalized understanding of the information given and agrees with the plan.  This note was dictated using [employer] software.  It may contain errors including improperly dictated words.  Please contact physician directly for any questions.      Subjective:     Patient ID: Caro Manzanares 74 y.o. female    HPI    HPI: Caro Manzanares is a 74 y.o. female that c/o chronic bilateral knee pain, left greater than right. She has treated the right in the past with CSI, VISCO, Voltaren gel, Aleve. Pain in the left knee is rated 8-9/10, sharp stabbing in nature, and aggravated by rising from seated position of out of bed in the morning, bending her knee, prolonged standing or increased activity. Her upper body is becoming painful and weak because she is having to push off her arms to get up. She completed VISCO in the right knee 3/15/2024 and CSI in the right knee 5/23/2024.       The following portions of the patient's history were reviewed and updated as appropriate: allergies, current medications, past family history, past social history, past surgical history and problem list.      Objective:    Review of Systems  Pertinent items are noted in HPI.  All other systems were reviewed and are negative.    Past Medical History:   Diagnosis Date    Arthralgia     Fall against object 06/09/2023    Foreign body in skin of finger 02/03/2023    Gait disorder 08/22/2023    Memory loss 09/03/2019    Neutropenia (HCC)     Osteoporosis     Pain of left hip 08/22/2023    Rheumatoid arthritis (HCC)     Sjogren's syndrome (HCC)     Stroke  (cerebrum) (HCC)     Transient cerebral ischemia        Past Surgical History:   Procedure Laterality Date    BREAST EXCISIONAL BIOPSY Left 1983    benign    BREAST EXCISIONAL BIOPSY Left 1982    benign    BREAST EXCISIONAL BIOPSY Right 1982    benign    CATARACT EXTRACTION, BILATERAL      HYSTERECTOMY      age 52    OOPHORECTOMY      both       Family History   Problem Relation Age of Onset    Lymphoma Mother 63        ACUTE    Stroke Mother 73    Diabetes Father     Stroke Sister     No Known Problems Sister     No Known Problems Daughter     No Known Problems Maternal Grandmother     No Known Problems Maternal Grandfather     No Known Problems Paternal Grandmother     No Known Problems Paternal Grandfather     No Known Problems Maternal Aunt     No Known Problems Maternal Aunt     No Known Problems Paternal Aunt     No Known Problems Paternal Aunt        Social History     Occupational History    Occupation: Privately   Tobacco Use    Smoking status: Never    Smokeless tobacco: Never   Vaping Use    Vaping status: Never Used   Substance and Sexual Activity    Alcohol use: Yes     Alcohol/week: 1.0 standard drink of alcohol     Types: 1 Glasses of wine per week    Drug use: No    Sexual activity: Not Currently         Current Outpatient Medications:     aspirin (Aspir-Low) 81 mg EC tablet, Take 81 mg by mouth daily  , Disp: , Rfl:     calcium citrate-Vitamin D 200 mg-250 units, Take 1 tablet by mouth in the morning, Disp: , Rfl:     cholecalciferol (VITAMIN D3) 400 units tablet, Take 400 Units by mouth daily, Disp: , Rfl:     clopidogrel (PLAVIX) 75 mg tablet, Take 1 tablet (75 mg total) by mouth daily, Disp: 90 tablet, Rfl: 1    cyanocobalamin (VITAMIN B-12) 100 MCG tablet, Take 100 mcg by mouth daily, Disp: , Rfl:     cycloSPORINE (RESTASIS) 0.05 % ophthalmic emulsion, Apply 1 drop to eye 2 (two) times a day, Disp: , Rfl:     hydroxychloroquine (PLAQUENIL) 200 mg tablet, Take 1 tablet (200 mg total) by mouth  "daily, Disp: 100 tablet, Rfl: 1    oxybutynin (DITROPAN) 5 mg tablet, Take 1 tablet (5 mg total) by mouth 2 (two) times a day, Disp: 180 tablet, Rfl: 1    simvastatin (ZOCOR) 20 mg tablet, Take 1 tablet (20 mg total) by mouth daily, Disp: 90 tablet, Rfl: 2    No Known Allergies    Physical Exam  /78   Pulse 82   Ht 5' 4\" (1.626 m)   Wt 54.9 kg (121 lb)   BMI 20.77 kg/m²   Cons: Appears well.  No apparent distress.  Psych: Alert. Oriented x3.  Mood and affect normal.  Eyes: PERRLA, EOMI  Resp: Normal effort.  No audible wheezing or stridor.  CV: Palpable pulse.  No discernable arrhythmia.  No LE edema.  Lymph:  No palpable cervical, axillary, or inguinal lymphadenopathy.  Skin: Warm.  No palpable masses.  No visible lesions.  Neuro: Normal muscle tone.  Normal and symmetric DTR's.    Left Knee Exam     Tenderness   The patient is experiencing tenderness in the lateral joint line and medial joint line.    Range of Motion   Extension:  5   Flexion:  110     Tests   Jacky:  Medial - negative Lateral - negative    Other   Erythema: absent  Scars: absent  Sensation: normal  Pulse: present  Swelling: none  Effusion: no effusion present    Comments:    Valgus deformity  Mild varus laxity  Wide stance gait  Knee stable at 0, 30, 90 degrees  + Patellar grind  + peripatellar crepitation  Skin intact and well perfused  Sensation intact in DP/SP/Carpenter/Sa/T nerve distributions  2+ DP & PT pulses  Brisk capillary refill in all toes                Large joint arthrocentesis: L knee  Universal Protocol:  Consent: Verbal consent obtained.  Risks and benefits: risks, benefits and alternatives were discussed  Consent given by: patient  Time out: Immediately prior to procedure a \"time out\" was called to verify the correct patient, procedure, equipment, support staff and site/side marked as required.  Patient understanding: patient states understanding of the procedure being performed  Site marked: the operative site was " "marked  Patient identity confirmed: verbally with patient  Supporting Documentation  Indications: pain and diagnostic evaluation   Procedure Details  Location: knee - L knee  Preparation: Patient was prepped and draped in the usual sterile fashion  Needle size: 22 G  Ultrasound guidance: no  Medications administered: 2 mL bupivacaine 0.25 %; 80 mg triamcinolone acetonide 40 mg/mL    Patient tolerance: patient tolerated the procedure well with no immediate complications  Dressing:  Sterile dressing applied               I have personally reviewed pertinent films in PACS and my interpretation is XR of left knee obtained today reviewed and demonstrates: moderate osteoarthritis with loss of joint space, sclerotic changes, osteophyte formation.      Scribe Attestation      I,:  Megan Barraza am acting as a scribe while in the presence of the attending physician.:       I,:  Deepak Schmid DO personally performed the services described in this documentation    as scribed in my presence.:                Portions of the record may have been created with voice recognition software.  Occasional wrong word or \"sound a like\" substitutions may have occurred due to the inherent limitations of voice recognition software.  Read the chart carefully and recognize, using context, where substitutions have occurred.  "

## 2024-07-16 ENCOUNTER — TELEPHONE (OUTPATIENT)
Dept: NEUROLOGY | Facility: CLINIC | Age: 74
End: 2024-07-16

## 2024-07-16 ENCOUNTER — TELEPHONE (OUTPATIENT)
Dept: OBGYN CLINIC | Facility: HOSPITAL | Age: 74
End: 2024-07-16

## 2024-07-16 NOTE — TELEPHONE ENCOUNTER
Pt has a question to Dr Mcgowan. She saw ortho yesterday. She was prescribe meloxicam for pain in knee. She wants to know if it safe to take aspiring with it. She didn't take anything today.

## 2024-07-16 NOTE — TELEPHONE ENCOUNTER
Caller: Patient    Doctor: Sharmaine    Reason for call: Patient was prescribed meloxicam for pain. She also takes a blood thinner  & low dose aspirin prescribed by her neurologist and wants to know if that is a problem. Advised to contact her neurologist to check.    Call back#: [phone]

## 2024-07-29 ENCOUNTER — OFFICE VISIT (OUTPATIENT)
Dept: OBGYN CLINIC | Facility: CLINIC | Age: 74
End: 2024-07-29
Payer: MEDICARE

## 2024-07-29 VITALS
HEART RATE: 68 BPM | BODY MASS INDEX: 20.66 KG/M2 | SYSTOLIC BLOOD PRESSURE: 120 MMHG | HEIGHT: 64 IN | WEIGHT: 121 LBS | DIASTOLIC BLOOD PRESSURE: 70 MMHG

## 2024-07-29 DIAGNOSIS — M71.21 SYNOVIAL CYST OF POPLITEAL SPACE (BAKER), RIGHT KNEE: ICD-10-CM

## 2024-07-29 DIAGNOSIS — M17.11 PATELLOFEMORAL ARTHRITIS OF RIGHT KNEE: Primary | ICD-10-CM

## 2024-07-29 PROCEDURE — 99213 OFFICE O/P EST LOW 20 MIN: CPT

## 2024-07-29 PROCEDURE — 20610 DRAIN/INJ JOINT/BURSA W/O US: CPT

## 2024-07-29 RX ORDER — BUPIVACAINE HYDROCHLORIDE 2.5 MG/ML
2 INJECTION, SOLUTION INFILTRATION; PERINEURAL
Status: COMPLETED | OUTPATIENT
Start: 2024-07-29 | End: 2024-07-29

## 2024-07-29 RX ORDER — TRIAMCINOLONE ACETONIDE 40 MG/ML
80 INJECTION, SUSPENSION INTRA-ARTICULAR; INTRAMUSCULAR
Status: COMPLETED | OUTPATIENT
Start: 2024-07-29 | End: 2024-07-29

## 2024-07-29 RX ADMIN — BUPIVACAINE HYDROCHLORIDE 2 ML: 2.5 INJECTION, SOLUTION INFILTRATION; PERINEURAL at 10:30

## 2024-07-29 RX ADMIN — TRIAMCINOLONE ACETONIDE 80 MG: 40 INJECTION, SUSPENSION INTRA-ARTICULAR; INTRAMUSCULAR at 10:30

## 2024-07-29 NOTE — PROGRESS NOTES
Assessment:  1. Patellofemoral arthritis of right knee        2. 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)    Cerebrovascular accident (CVA) due to vascular occlusion (HCC)    Chronic venous insufficiency    Hyperlipidemia    Osteoporosis    Rheumatoid arthritis with positive rheumatoid factor (HCC)    Sjogren's syndrome (HCC)    Von Reckling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 (HCC)    Screening mammogram, encounter for    High serum high density lipoprotein (HDL)    History of stroke    Multiple lacunar infarcts (HCC)    Dry eye    Cataract of both eyes    Medication monitoring encounter    TIA due to embolism (HCC)    Patellofemoral arthritis of right knee    Eye hemorrhage, right    Primary osteoarthritis of right knee    Synovial cyst of popliteal space (Baker), right knee    Sore throat    Tendinitis of left hip    Hypercalcemia    Arthritis of carpometacarpal (CMC) joint of both thumbs    Primary osteoarthritis of both knees       Plan:    74 y.o. female  with patellofemoral arthritis of the right knee    Discussed steroid injection of the right knee.  Discussed side effects and risk.  Patient expressed understanding.  Patient agreeable.  Discussed possible oral steroid if no relief with the injectable steroid.  Discussed side effects and risks of oral steroid.  Patient to contact the office for possible use of oral steroid if no relief with steroid injection.  Patient expressed understanding.  Patient agreeable.  Discussed likely palpable Baker's cyst of the right knee.  Discussed could send for ultrasound-guided evaluation and aspiration if persistent pain and inflammation.  Patient expressed understanding.  Patient agreeable.  Deferred ultrasound-guided aspiration at this time.  Discussed continued use of Aleve for persistent  pain and inflammation as well as to help decrease size of Baker's cyst.  Discussed patient to follow-up in September for repeat course of gel injections.    Subjective:   Patient ID: Caro Manzanares is a 74 y.o. female .    HPI    Patient presents to the office for follow up of right knee pain.  Patient notes persistent pain in the posterior aspect of the right knee increased with steps.  Patient also notes radiation of pain over the anterior aspect of the right knee.  Patient seeking CSI of the right knee today.    The following portions of the patient's history were reviewed and updated as appropriate: allergies, current medications, past family history, past social history, past surgical history and problem list.    Social History     Socioeconomic History    Marital status: /Civil Union     Spouse name: Not on file    Number of children: Not on file    Years of education: Not on file    Highest education level: Not on file   Occupational History    Occupation: Privately   Tobacco Use    Smoking status: Never    Smokeless tobacco: Never   Vaping Use    Vaping status: Never Used   Substance and Sexual Activity    Alcohol use: Yes     Alcohol/week: 1.0 standard drink of alcohol     Types: 1 Glasses of wine per week    Drug use: No    Sexual activity: Not Currently   Other Topics Concern    Not on file   Social History Narrative    Exercise habits: the gym; frequency is 2 days per week    Travel history: patient denies being  Out of the country during 1/2014-11/12/2014     Social Determinants of Health     Financial Resource Strain: Low Risk  (9/14/2023)    Overall Financial Resource Strain (CARDIA)     Difficulty of Paying Living Expenses: Not very hard   Food Insecurity: Not on file   Transportation Needs: No Transportation Needs (9/14/2023)    PRAPARE - Transportation     Lack of Transportation (Medical): No     Lack of Transportation (Non-Medical): No   Physical Activity: Not on file   Stress: Not on  file   Social Connections: Not on file   Intimate Partner Violence: Not on file   Housing Stability: Not on file     Past Medical History:   Diagnosis Date    Arthralgia     Fall against object 06/09/2023    Foreign body in skin of finger 02/03/2023    Gait disorder 08/22/2023    Memory loss 09/03/2019    Neutropenia (HCC)     Osteoporosis     Pain of left hip 08/22/2023    Rheumatoid arthritis (HCC)     Sjogren's syndrome (HCC)     Stroke (cerebrum) (HCC)     Transient cerebral ischemia      Past Surgical History:   Procedure Laterality Date    BREAST EXCISIONAL BIOPSY Left 1983    benign    BREAST EXCISIONAL BIOPSY Left 1982    benign    BREAST EXCISIONAL BIOPSY Right 1982    benign    CATARACT EXTRACTION, BILATERAL      HYSTERECTOMY      age 52    OOPHORECTOMY      both     No Known Allergies  Current Outpatient Medications on File Prior to Visit   Medication Sig Dispense Refill    aspirin (Aspir-Low) 81 mg EC tablet Take 81 mg by mouth daily        calcium citrate-Vitamin D 200 mg-250 units Take 1 tablet by mouth in the morning      cholecalciferol (VITAMIN D3) 400 units tablet Take 400 Units by mouth daily      clopidogrel (PLAVIX) 75 mg tablet Take 1 tablet (75 mg total) by mouth daily 90 tablet 1    cyanocobalamin (VITAMIN B-12) 100 MCG tablet Take 100 mcg by mouth daily      cycloSPORINE (RESTASIS) 0.05 % ophthalmic emulsion Apply 1 drop to eye 2 (two) times a day      hydroxychloroquine (PLAQUENIL) 200 mg tablet Take 1 tablet (200 mg total) by mouth daily 100 tablet 1    meloxicam (Mobic) 7.5 mg tablet Take 1 tablet (7.5 mg total) by mouth 2 (two) times a day 60 tablet 2    oxybutynin (DITROPAN) 5 mg tablet Take 1 tablet (5 mg total) by mouth 2 (two) times a day 180 tablet 1    simvastatin (ZOCOR) 20 mg tablet Take 1 tablet (20 mg total) by mouth daily 90 tablet 2     No current facility-administered medications on file prior to visit.       Review of Systems   Constitutional:  Negative for chills and  fever.   HENT:  Negative for ear pain and sore throat.    Eyes:  Negative for pain and visual disturbance.   Respiratory:  Negative for cough and shortness of breath.    Cardiovascular:  Negative for chest pain and palpitations.   Gastrointestinal:  Negative for abdominal pain and vomiting.   Genitourinary:  Negative for dysuria and hematuria.   Musculoskeletal:  Positive for arthralgias. Negative for back pain.   Skin:  Negative for color change and rash.   Neurological:  Negative for seizures and syncope.   All other systems reviewed and are negative.    See HPi    Objective:    Vitals:    07/29/24 0950   BP: 120/70   Pulse: 68       Physical Exam  Vitals and nursing note reviewed.   Constitutional:       General: She is not in acute distress.     Appearance: She is well-developed.   HENT:      Head: Normocephalic and atraumatic.   Eyes:      Conjunctiva/sclera: Conjunctivae normal.   Cardiovascular:      Rate and Rhythm: Normal rate and regular rhythm.      Heart sounds: No murmur heard.  Pulmonary:      Effort: Pulmonary effort is normal. No respiratory distress.      Breath sounds: Normal breath sounds.   Abdominal:      Palpations: Abdomen is soft.      Tenderness: There is no abdominal tenderness.   Musculoskeletal:         General: No swelling.      Cervical back: Neck supple.      Right knee: No effusion.   Skin:     General: Skin is warm and dry.      Capillary Refill: Capillary refill takes less than 2 seconds.   Neurological:      Mental Status: She is alert.   Psychiatric:         Mood and Affect: Mood normal.         Right Knee Exam     Range of Motion   Extension:  normal   Flexion:  normal     Other   Erythema: absent  Scars: absent  Effusion: no effusion present    Comments:  Positive patellar grind  Palpable crepitus over the patella  Palpable Baker's cyst            Large joint arthrocentesis: R knee  Universal Protocol:  Consent: Verbal consent obtained.  Risks and benefits: risks, benefits and  "alternatives were discussed  Consent given by: patient  Patient understanding: patient states understanding of the procedure being performed  Patient identity confirmed: verbally with patient  Supporting Documentation  Indications: pain   Procedure Details  Location: knee - R knee  Needle size: 22 G  Ultrasound guidance: no  Approach: anterolateral  Medications administered: 2 mL bupivacaine 0.25 %; 80 mg triamcinolone acetonide 40 mg/mL    Patient tolerance: patient tolerated the procedure well with no immediate complications  Dressing:  Sterile dressing applied              Portions of the record may have been created with voice recognition software.  Occasional wrong word or \"sound a like\" substitutions may have occurred due to the inherent limitations of voice recognition software.  Read the chart carefully and recognize, using context, where substitutions have occurred.  Femoral arthritis of the right knee  "

## 2024-07-30 DIAGNOSIS — M71.21 SYNOVIAL CYST OF POPLITEAL SPACE (BAKER), RIGHT KNEE: Primary | ICD-10-CM

## 2024-08-01 ENCOUNTER — TELEPHONE (OUTPATIENT)
Dept: OBGYN CLINIC | Facility: HOSPITAL | Age: 74
End: 2024-08-01

## 2024-08-01 NOTE — TELEPHONE ENCOUNTER
Caller: Patient    Doctor: Sharmaine    Reason for call: Patient calling to schedule [facility].    Call back#: [phone]

## 2024-08-05 ENCOUNTER — OFFICE VISIT (OUTPATIENT)
Dept: INTERNAL MEDICINE CLINIC | Facility: OTHER | Age: 74
End: 2024-08-05
Payer: MEDICARE

## 2024-08-05 ENCOUNTER — TELEPHONE (OUTPATIENT)
Age: 74
End: 2024-08-05

## 2024-08-05 VITALS
SYSTOLIC BLOOD PRESSURE: 122 MMHG | DIASTOLIC BLOOD PRESSURE: 70 MMHG | OXYGEN SATURATION: 99 % | HEIGHT: 64 IN | HEART RATE: 88 BPM | BODY MASS INDEX: 19.63 KG/M2 | TEMPERATURE: 98.5 F | WEIGHT: 115 LBS

## 2024-08-05 DIAGNOSIS — M05.79 RHEUMATOID ARTHRITIS INVOLVING MULTIPLE SITES WITH POSITIVE RHEUMATOID FACTOR (HCC): Primary | ICD-10-CM

## 2024-08-05 DIAGNOSIS — M17.0 PRIMARY OSTEOARTHRITIS OF BOTH KNEES: ICD-10-CM

## 2024-08-05 DIAGNOSIS — M54.2 NECK PAIN: ICD-10-CM

## 2024-08-05 DIAGNOSIS — M35.09 SJOGREN'S SYNDROME WITH OTHER ORGAN INVOLVEMENT (HCC): ICD-10-CM

## 2024-08-05 PROBLEM — J02.9 SORE THROAT: Status: RESOLVED | Noted: 2023-06-05 | Resolved: 2024-08-05

## 2024-08-05 PROCEDURE — 99213 OFFICE O/P EST LOW 20 MIN: CPT | Performed by: FAMILY MEDICINE

## 2024-08-05 PROCEDURE — G2211 COMPLEX E/M VISIT ADD ON: HCPCS | Performed by: FAMILY MEDICINE

## 2024-08-05 RX ORDER — METHYLPREDNISOLONE 4 MG
TABLET, DOSE PACK ORAL
Qty: 21 EACH | Refills: 0 | Status: SHIPPED | OUTPATIENT
Start: 2024-08-05 | End: 2024-08-12

## 2024-08-05 NOTE — TELEPHONE ENCOUNTER
Caller: patient     Doctor: Sharmaine     Reason for call: would like to have a script for the medication Jennifer recommended at her last visit.      Preferred pharmacy is [facility] in [place]     Call back#: [phone]

## 2024-08-05 NOTE — PROGRESS NOTES
Assessment/Plan:    1. Rheumatoid arthritis involving multiple sites with positive rheumatoid factor (HCC)  -     C-reactive protein; Future  -     Sedimentation rate, automated; Future  -     C-reactive protein  -     Sedimentation rate, automated  2. Neck pain  -     XR spine cervical complete 4 or 5 vw non injury; Future; Expected date: 08/05/2024  3. Sjogren's syndrome with other organ involvement (HCC)  -     C-reactive protein; Future  -     Sedimentation rate, automated; Future  -     C-reactive protein  -     Sedimentation rate, automated  4. Primary osteoarthritis of both knees  -     XR knee 3 vw right non injury; Future; Expected date: 08/05/2024          There are no Patient Instructions on file for this visit.    Return for Next scheduled follow up.    Subjective:      Patient ID: Caro Manzanares is a 74 y.o. female.    Chief Complaint   Patient presents with    Neck Pain     Pain in neck moving down both shoulder and arms, right is worse then left.   Discuss referral to Rheumatologist, on going pains.   Discuss medications   Patient stated she has been in contact with Ortho, would like take Oral Steroid medication Medrol-dose kesha. Ortho provider stated her to discuss this request with her PCP           HPI    Patient with multiple myalgias and arthralgias, does not migrate but seems to be worse at times.  In the past has been unsuccessful for a lot of oral medications.  Feels that it is affecting her activities of daily living, no recent falls and no trauma.      The following portions of the patient's history were reviewed and updated as appropriate: allergies, current medications, past family history, past medical history, past social history, past surgical history and problem list.    Review of Systems        Constitutional:  Denies fever or chills   Eyes:  Denies double , blurry vision or eye pain  HENT:  Denies nasal congestion, sore throat or new hearing issues  Respiratory:  Denies cough or  "shortness of breath or wheezing  Cardiovascular:  Denies palpitations or chest pain  GI:  Denies abdominal pain, nausea, or vomiting, no loose stools, no reflux  Integument:  Denies rash , no open areas  Neurologic:  Denies headache or focal weakness, no dizziness  : no dysuria, or hematuria    Current Outpatient Medications   Medication Sig Dispense Refill    aspirin (Aspir-Low) 81 mg EC tablet Take 81 mg by mouth daily        calcium citrate-Vitamin D 200 mg-250 units Take 1 tablet by mouth in the morning      cholecalciferol (VITAMIN D3) 400 units tablet Take 400 Units by mouth daily      clopidogrel (PLAVIX) 75 mg tablet Take 1 tablet (75 mg total) by mouth daily 90 tablet 1    cyanocobalamin (VITAMIN B-12) 100 MCG tablet Take 100 mcg by mouth daily      cycloSPORINE (RESTASIS) 0.05 % ophthalmic emulsion Apply 1 drop to eye 2 (two) times a day      hydroxychloroquine (PLAQUENIL) 200 mg tablet Take 1 tablet (200 mg total) by mouth daily 100 tablet 1    meloxicam (Mobic) 7.5 mg tablet Take 1 tablet (7.5 mg total) by mouth 2 (two) times a day 60 tablet 2    oxybutynin (DITROPAN) 5 mg tablet Take 1 tablet (5 mg total) by mouth 2 (two) times a day (Patient taking differently: Take 5 mg by mouth in the morning) 180 tablet 1    simvastatin (ZOCOR) 20 mg tablet Take 1 tablet (20 mg total) by mouth daily 90 tablet 2     No current facility-administered medications for this visit.       Objective:    /70 (BP Location: Left arm, Patient Position: Sitting, Cuff Size: Adult)   Pulse 88   Temp 98.5 °F (36.9 °C) (Temporal)   Ht 5' 4\" (1.626 m)   Wt 52.2 kg (115 lb)   SpO2 99%   BMI 19.74 kg/m²        Physical Exam       Constitutional:  Well developed, well nourished, no acute distress, non-toxic appearance   Eyes:  PERRL, conjunctiva normal , non icteric sclera  HENT:  Atraumatic, oropharynx moist. Neck-  supple   Respiratory:  CTA b/l, normal breath sounds, no rales, no wheezing   Cardiovascular:  RRR, no " murmurs, no LE edema b/l  GI:  Soft, nondistended, normal bowel sounds x 4, nontender, no organomegaly, no mass, no rebound, no guarding   Neurologic:  no focal deficits noted   Psychiatric:  Speech and behavior appropriate , AAO x 3      Jaleel Walter,

## 2024-08-05 NOTE — TELEPHONE ENCOUNTER
Caller: Patient    Doctor: Sharmaine    Reason for call: Patient stated she her PCP ordered the methylprednisolone pack.    Call back#: [phone]

## 2024-08-07 ENCOUNTER — APPOINTMENT (OUTPATIENT)
Dept: LAB | Facility: IMAGING CENTER | Age: 74
End: 2024-08-07
Payer: MEDICARE

## 2024-08-07 ENCOUNTER — HOSPITAL ENCOUNTER (OUTPATIENT)
Dept: RADIOLOGY | Facility: IMAGING CENTER | Age: 74
Discharge: HOME | End: 2024-08-07
Payer: MEDICARE

## 2024-08-07 DIAGNOSIS — M17.0 PRIMARY OSTEOARTHRITIS OF BOTH KNEES: ICD-10-CM

## 2024-08-07 DIAGNOSIS — M54.2 NECK PAIN: ICD-10-CM

## 2024-08-07 LAB
CRP SERPL QL: 7.3 MG/L
ERYTHROCYTE [SEDIMENTATION RATE] IN BLOOD: 34 MM/HOUR (ref 0–29)

## 2024-08-07 PROCEDURE — 86140 C-REACTIVE PROTEIN: CPT | Performed by: FAMILY MEDICINE

## 2024-08-07 PROCEDURE — 36415 COLL VENOUS BLD VENIPUNCTURE: CPT | Performed by: FAMILY MEDICINE

## 2024-08-07 PROCEDURE — 72050 X-RAY EXAM NECK SPINE 4/5VWS: CPT

## 2024-08-07 PROCEDURE — 85652 RBC SED RATE AUTOMATED: CPT | Performed by: FAMILY MEDICINE

## 2024-08-07 PROCEDURE — 73562 X-RAY EXAM OF KNEE 3: CPT

## 2024-08-10 ENCOUNTER — PATIENT MESSAGE (OUTPATIENT)
Dept: INTERNAL MEDICINE CLINIC | Facility: OTHER | Age: 74
End: 2024-08-10

## 2024-08-12 ENCOUNTER — TELEPHONE (OUTPATIENT)
Age: 74
End: 2024-08-12

## 2024-08-12 ENCOUNTER — TELEPHONE (OUTPATIENT)
Dept: OBGYN CLINIC | Facility: MEDICAL CENTER | Age: 74
End: 2024-08-12

## 2024-08-12 DIAGNOSIS — M17.11 PRIMARY OSTEOARTHRITIS OF RIGHT KNEE: Primary | ICD-10-CM

## 2024-08-12 DIAGNOSIS — M23.91 INTERNAL DERANGEMENT OF KNEE JOINT, RIGHT: ICD-10-CM

## 2024-08-12 NOTE — TELEPHONE ENCOUNTER
Caller: Caro    Doctor: Dr Schmid     Reason for call: The patient was in the office on 7/29 to see Jennifer. Her cortisone injection did not last. And she is in pain when weightbearing/right knee.   After sitting for a long amount of time when she gets up it is very painful.     She is asking if Jennifer can give her a call when she gets a chance to see what can be done. Thank you .    Call back#: [phone]

## 2024-08-12 NOTE — TELEPHONE ENCOUNTER
Patient had seen Dr. Walter a week ago 8/5. She is aware that patient has taken pack of Prednisone for knee pain. She finished the pack on Sunday. But having problems at night when she wakes up in night to use the toilet she has pain. When she wakes up for good she still feels pain and then when she is on her feet for a while it goes away. Is that normal. Please advise patient accordingly she stated that the pain is really bad at those times.  Thank you!!

## 2024-08-12 NOTE — PATIENT COMMUNICATION
, do you have any recommendations or would you prefer to see the patient in the office again?  Please advise. Thank you!    
Male

## 2024-08-13 DIAGNOSIS — Z00.6 ENCOUNTER FOR EXAMINATION FOR NORMAL COMPARISON OR CONTROL IN CLINICAL RESEARCH PROGRAM: ICD-10-CM

## 2024-08-13 NOTE — TELEPHONE ENCOUNTER
Called patient to let her know that an MRI was ordered. Tried to giver her the number but she said couldn't bare weight and will call back for number. Patient understood and will call to schedule an MRI and a follow up.    If patient calls give her Central Scheduling number and please make sure she schedules a follow up after MRI.

## 2024-08-16 ENCOUNTER — APPOINTMENT (OUTPATIENT)
Dept: LAB | Facility: IMAGING CENTER | Age: 74
End: 2024-08-16
Payer: MEDICARE

## 2024-08-16 ENCOUNTER — HOSPITAL ENCOUNTER (OUTPATIENT)
Dept: RADIOLOGY | Facility: IMAGING CENTER | Age: 74
End: 2024-08-16
Payer: MEDICARE

## 2024-08-16 DIAGNOSIS — M23.91 INTERNAL DERANGEMENT OF KNEE JOINT, RIGHT: ICD-10-CM

## 2024-08-16 DIAGNOSIS — Z00.6 ENCOUNTER FOR EXAMINATION FOR NORMAL COMPARISON OR CONTROL IN CLINICAL RESEARCH PROGRAM: ICD-10-CM

## 2024-08-16 DIAGNOSIS — M17.11 PRIMARY OSTEOARTHRITIS OF RIGHT KNEE: ICD-10-CM

## 2024-08-16 PROCEDURE — 73721 MRI JNT OF LWR EXTRE W/O DYE: CPT

## 2024-08-16 PROCEDURE — 36415 COLL VENOUS BLD VENIPUNCTURE: CPT

## 2024-08-26 ENCOUNTER — OFFICE VISIT (OUTPATIENT)
Dept: OBGYN CLINIC | Facility: CLINIC | Age: 74
End: 2024-08-26
Payer: MEDICARE

## 2024-08-26 ENCOUNTER — TELEPHONE (OUTPATIENT)
Dept: OBGYN CLINIC | Facility: HOSPITAL | Age: 74
End: 2024-08-26

## 2024-08-26 VITALS
WEIGHT: 115 LBS | SYSTOLIC BLOOD PRESSURE: 116 MMHG | BODY MASS INDEX: 19.63 KG/M2 | DIASTOLIC BLOOD PRESSURE: 77 MMHG | HEIGHT: 64 IN | HEART RATE: 91 BPM

## 2024-08-26 DIAGNOSIS — M17.11 PRIMARY OSTEOARTHRITIS OF RIGHT KNEE: Primary | ICD-10-CM

## 2024-08-26 DIAGNOSIS — S83.271A COMPLEX TEAR OF LATERAL MENISCUS OF RIGHT KNEE AS CURRENT INJURY, INITIAL ENCOUNTER: ICD-10-CM

## 2024-08-26 PROCEDURE — 99214 OFFICE O/P EST MOD 30 MIN: CPT

## 2024-08-26 PROCEDURE — 20610 DRAIN/INJ JOINT/BURSA W/O US: CPT

## 2024-08-26 RX ORDER — BUPIVACAINE HYDROCHLORIDE 2.5 MG/ML
2 INJECTION, SOLUTION INFILTRATION; PERINEURAL
Status: COMPLETED | OUTPATIENT
Start: 2024-08-26 | End: 2024-08-26

## 2024-08-26 RX ORDER — KETOROLAC TROMETHAMINE 30 MG/ML
30 INJECTION, SOLUTION INTRAMUSCULAR; INTRAVENOUS
Status: COMPLETED | OUTPATIENT
Start: 2024-08-26 | End: 2024-08-26

## 2024-08-26 RX ADMIN — KETOROLAC TROMETHAMINE 30 MG: 30 INJECTION, SOLUTION INTRAMUSCULAR; INTRAVENOUS at 11:00

## 2024-08-26 RX ADMIN — BUPIVACAINE HYDROCHLORIDE 2 ML: 2.5 INJECTION, SOLUTION INFILTRATION; PERINEURAL at 11:00

## 2024-08-26 NOTE — PROGRESS NOTES
Assessment:  1. Primary osteoarthritis of right knee  Ambulatory referral to Spine & Pain Management      2. 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)    Cerebrovascular accident (CVA) due to vascular occlusion (HCC)    Chronic venous insufficiency    Hyperlipidemia    Osteoporosis    Rheumatoid arthritis with positive rheumatoid factor (HCC)    Sjogren's syndrome (HCC)    Von Reckling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 (HCC)    Screening mammogram, encounter for    High serum high density lipoprotein (HDL)    History of stroke    Multiple lacunar infarcts (HCC)    Dry eye    Cataract of both eyes    Medication monitoring encounter    TIA due to embolism (HCC)    Patellofemoral arthritis of right knee    Eye hemorrhage, right    Primary osteoarthritis of right knee    Synovial cyst of popliteal space (Baker), right knee    Tendinitis of left hip    Hypercalcemia    Arthritis of carpometacarpal (CMC) joint of both thumbs    Primary osteoarthritis of both knees    Neck pain       Plan:    74 y.o. female  with osteoarthritis of the right knee complicated by lateral meniscus tear and degenerative changes of the ACL     Discussed MRI right knee with patient. Discussed underlying pathology of MRI findings.  As patient describes her pain as start up pain as her most significant pain is the initial first step after rest would not recommend intervention of the lateral meniscus at this time.  As intervention would include meniscectomy which would worsen her arthritis in the lateral tibiofemoral compartment and likely would not contribute to great relief of her pain.  Discussed as we have tried gel injections, steroid injections, oral anti-inflammatories without great relief of pain further intervention would would  likely include total knee arthroplasty.  Discussed expected outcomes pre and post total knee arthroplasty.  Discussed with patient history of osteoporosis. Now with osteopenia s/t better control with medications.  Patient states will repeat DEXA scan in 2 years.  Discussed with patient that control of underlying osteoporosis/ osteopenia is important prior to total knee arthroplasty secondary to risk of fracture.  Patient expressed understanding.  Patient agreeable.  Discussed Toradol injection of the right knee secondary to persistent pain and inflammation.  Discussed side effects and risk.  GFR 91 on most recent CMP.  Patient expressed understanding.  Injection administered.  Discussed referral to spine and pain management for further discussion of any other options secondary to persistent right knee pain despite conservative treatment as patient would not like to undergo total knee arthroplasty at this time.  Discussed continuing with oral anti-inflammatories as well as gel injections she has scheduled to start in 09 /16/24 for persistent treatment of underlying arthritis.  Patient expressed understanding.  Patient agreeable.  Discussed continued use of a walker to provide support.  Also discussed lateral  brace of the right knee to be worn at times of increased activity.  Order placed today.  Discussed patient should speak with PCP regarding referral to urology secondary to concerns for urinary urgency as most of her struggles with her right knee pain including the middle of the night when she needs to wake up to use the restroom urgently.  Patient expressed understanding.  Patient agreeable.  Discussed patient should follow-up with rheumatology secondary to underlying rheumatoid arthritis.  Patient states she has appointment scheduled for February.  Patient to follow-up with us for course of gel injections.    Subjective:   Patient ID: Caro Manzanares is a 74 y.o. female .    HPI    Patient presents to  the office for follow up of right knee pain.  Patient notes persistent pain in the posterior aspect of the right knee most severe after periods of rest.  Patient notes after she has been sitting at rest for a while specifically getting out of bed in the morning she has severe pain to the posterior aspect of the right knee with initial steps of weightbearing.  Patient states that she is moving around throughout the day her pain subsides she is no longer experiencing any sort of difficulties with weightbearing or increasing pain.  Patient states frustration with right knee pain in the middle the night when she needs to use the restroom as she has urinary urgency and is unable to make it to the restroom secondary to right knee pain after rest and urinary urgency.  Patient states she has been taking Mobic without great relief of pain also notes use of oral steroid without great relief of pain.  Also denies relief of pain with previous steroid injections or gel injections.  Patient presents today to follow-up MRI of the right knee.    The following portions of the patient's history were reviewed and updated as appropriate: allergies, current medications, past family history, past social history, past surgical history and problem list.    Social History     Socioeconomic History    Marital status: /Civil Union     Spouse name: Not on file    Number of children: Not on file    Years of education: Not on file    Highest education level: Not on file   Occupational History    Occupation: Privately   Tobacco Use    Smoking status: Never    Smokeless tobacco: Never   Vaping Use    Vaping status: Never Used   Substance and Sexual Activity    Alcohol use: Yes     Alcohol/week: 1.0 standard drink of alcohol     Types: 1 Glasses of wine per week    Drug use: No    Sexual activity: Not Currently   Other Topics Concern    Not on file   Social History Narrative    Exercise habits: the gym; frequency is 2 days per week    Travel  history: patient denies being  Out of the country during 1/2014-11/12/2014     Social Determinants of Health     Financial Resource Strain: Low Risk  (9/14/2023)    Overall Financial Resource Strain (CARDIA)     Difficulty of Paying Living Expenses: Not very hard   Food Insecurity: Not on file   Transportation Needs: No Transportation Needs (9/14/2023)    PRAPARE - Transportation     Lack of Transportation (Medical): No     Lack of Transportation (Non-Medical): No   Physical Activity: Not on file   Stress: Not on file   Social Connections: Not on file   Intimate Partner Violence: Not on file   Housing Stability: Not on file     Past Medical History:   Diagnosis Date    Arthralgia     Fall against object 06/09/2023    Foreign body in skin of finger 02/03/2023    Gait disorder 08/22/2023    Memory loss 09/03/2019    Neutropenia (HCC)     Osteoporosis     Pain of left hip 08/22/2023    Rheumatoid arthritis (HCC)     Sjogren's syndrome (HCC)     Stroke (cerebrum) (HCC)     Transient cerebral ischemia      Past Surgical History:   Procedure Laterality Date    BREAST EXCISIONAL BIOPSY Left 1983    benign    BREAST EXCISIONAL BIOPSY Left 1982    benign    BREAST EXCISIONAL BIOPSY Right 1982    benign    CATARACT EXTRACTION, BILATERAL      HYSTERECTOMY      age 52    OOPHORECTOMY      both     No Known Allergies  Current Outpatient Medications on File Prior to Visit   Medication Sig Dispense Refill    aspirin (Aspir-Low) 81 mg EC tablet Take 81 mg by mouth daily        calcium citrate-Vitamin D 200 mg-250 units Take 1 tablet by mouth in the morning      cholecalciferol (VITAMIN D3) 400 units tablet Take 400 Units by mouth daily      clopidogrel (PLAVIX) 75 mg tablet Take 1 tablet (75 mg total) by mouth daily 90 tablet 1    cyanocobalamin (VITAMIN B-12) 100 MCG tablet Take 100 mcg by mouth daily      cycloSPORINE (RESTASIS) 0.05 % ophthalmic emulsion Apply 1 drop to eye 2 (two) times a day      hydroxychloroquine (PLAQUENIL)  200 mg tablet Take 1 tablet (200 mg total) by mouth daily 100 tablet 1    meloxicam (Mobic) 7.5 mg tablet Take 1 tablet (7.5 mg total) by mouth 2 (two) times a day 60 tablet 2    oxybutynin (DITROPAN) 5 mg tablet Take 1 tablet (5 mg total) by mouth 2 (two) times a day (Patient taking differently: Take 5 mg by mouth in the morning) 180 tablet 1    simvastatin (ZOCOR) 20 mg tablet Take 1 tablet (20 mg total) by mouth daily 90 tablet 2     No current facility-administered medications on file prior to visit.       Review of Systems   Constitutional:  Negative for chills and fever.   HENT:  Negative for ear pain and sore throat.    Eyes:  Negative for pain and visual disturbance.   Respiratory:  Negative for cough and shortness of breath.    Cardiovascular:  Negative for chest pain and palpitations.   Gastrointestinal:  Negative for abdominal pain and vomiting.   Genitourinary:  Negative for dysuria and hematuria.   Musculoskeletal:  Positive for arthralgias. Negative for back pain.   Skin:  Negative for color change and rash.   Neurological:  Negative for seizures and syncope.   All other systems reviewed and are negative.    See HPi    Objective:    Vitals:    08/26/24 1106   BP: 116/77   Pulse: 91       Physical Exam  Vitals and nursing note reviewed.   Constitutional:       General: She is not in acute distress.     Appearance: She is well-developed.   HENT:      Head: Normocephalic and atraumatic.   Eyes:      Conjunctiva/sclera: Conjunctivae normal.   Cardiovascular:      Rate and Rhythm: Normal rate and regular rhythm.      Heart sounds: No murmur heard.  Pulmonary:      Effort: Pulmonary effort is normal. No respiratory distress.      Breath sounds: Normal breath sounds.   Abdominal:      Palpations: Abdomen is soft.      Tenderness: There is no abdominal tenderness.   Musculoskeletal:         General: No swelling.      Cervical back: Neck supple.      Right knee: No effusion.      Instability Tests: Medial  "Jacky test negative and lateral Jacky test negative.   Skin:     General: Skin is warm and dry.      Capillary Refill: Capillary refill takes less than 2 seconds.   Neurological:      Mental Status: She is alert.   Psychiatric:         Mood and Affect: Mood normal.         Right Knee Exam     Tenderness   The patient is experiencing tenderness in the lateral joint line, medial joint line and patella.    Range of Motion   Extension:  normal   Flexion:  normal     Tests   Jacky:  Medial - negative Lateral - negative  Varus: negative Valgus: negative    Other   Erythema: absent  Scars: absent  Effusion: no effusion present    Comments:  Valgus laxity  No pain with valgus stress  No pain over MCL  Negative Thessaly's  Positive patellar grind            I have personally reviewed pertinent films in PACS and my interpretation is MRI right knee reveals moderate to severe arthritis of the lateral tibiofemoral compartment and patellofemoral compartment with mild to moderate arthritis of the medial tibiofemoral compartment.  Complex lateral meniscus tear.  Degenerative changes to the ACL.    Large joint arthrocentesis: R knee  Universal Protocol:  Consent: Verbal consent obtained.  Risks and benefits: risks, benefits and alternatives were discussed  Consent given by: patient  Patient understanding: patient states understanding of the procedure being performed  Patient identity confirmed: verbally with patient  Supporting Documentation  Indications: pain   Procedure Details  Location: knee - R knee  Needle size: 22 G  Ultrasound guidance: no  Approach: anterolateral  Medications administered: 2 mL bupivacaine 0.25 %; 30 mg ketorolac 30 mg/mL    Patient tolerance: patient tolerated the procedure well with no immediate complications  Dressing:  Sterile dressing applied           Portions of the record may have been created with voice recognition software.  Occasional wrong word or \"sound a like\" substitutions may have " occurred due to the inherent limitations of voice recognition software.  Read the chart carefully and recognize, using context, where substitutions have occurred.

## 2024-08-26 NOTE — TELEPHONE ENCOUNTER
Caller: Patient    Doctor: Jennifer Parker    Reason for call: Patient called and stated she forgot to tell you her left ankle is swollen when she was at her appt today. Please advise.    Call back#: [phone]

## 2024-08-27 ENCOUNTER — TELEPHONE (OUTPATIENT)
Dept: INTERNAL MEDICINE CLINIC | Age: 74
End: 2024-08-27

## 2024-08-27 NOTE — TELEPHONE ENCOUNTER
Called patient and lvm that she would have to make a separate apt for her left ankle. Please schedule at next available.

## 2024-08-27 NOTE — TELEPHONE ENCOUNTER
Patient dropped of [employer] forms to be completed and will scan into chart and place on providers clipboard to complete   And advised of form fee and will contact once completed

## 2024-08-28 ENCOUNTER — TELEPHONE (OUTPATIENT)
Dept: INTERNAL MEDICINE CLINIC | Age: 74
End: 2024-08-28

## 2024-08-28 LAB
APOB+LDLR+PCSK9 GENE MUT ANL BLD/T: NOT DETECTED
BRCA1+BRCA2 DEL+DUP + FULL MUT ANL BLD/T: NOT DETECTED
MLH1+MSH2+MSH6+PMS2 GN DEL+DUP+FUL M: NOT DETECTED

## 2024-09-16 ENCOUNTER — APPOINTMENT (OUTPATIENT)
Dept: LAB | Facility: IMAGING CENTER | Age: 74
End: 2024-09-16
Payer: MEDICARE

## 2024-09-16 LAB
25(OH)D3 SERPL-MCNC: 62.1 NG/ML (ref 30–100)
ALBUMIN SERPL BCG-MCNC: 3.5 G/DL (ref 3.5–5)
ALP SERPL-CCNC: 62 U/L (ref 34–104)
ALT SERPL W P-5'-P-CCNC: 14 U/L (ref 7–52)
ANION GAP SERPL CALCULATED.3IONS-SCNC: 10 MMOL/L (ref 4–13)
AST SERPL W P-5'-P-CCNC: 15 U/L (ref 13–39)
BASOPHILS # BLD AUTO: 0.03 THOUSANDS/ΜL (ref 0–0.1)
BASOPHILS NFR BLD AUTO: 0 % (ref 0–1)
BILIRUB SERPL-MCNC: 0.51 MG/DL (ref 0.2–1)
BUN SERPL-MCNC: 15 MG/DL (ref 5–25)
CALCIUM SERPL-MCNC: 10.1 MG/DL (ref 8.4–10.2)
CHLORIDE SERPL-SCNC: 100 MMOL/L (ref 96–108)
CHOLEST SERPL-MCNC: 150 MG/DL
CO2 SERPL-SCNC: 31 MMOL/L (ref 21–32)
CREAT SERPL-MCNC: 0.43 MG/DL (ref 0.6–1.3)
EOSINOPHIL # BLD AUTO: 0.12 THOUSAND/ΜL (ref 0–0.61)
EOSINOPHIL NFR BLD AUTO: 2 % (ref 0–6)
ERYTHROCYTE [DISTWIDTH] IN BLOOD BY AUTOMATED COUNT: 13.3 % (ref 11.6–15.1)
GFR SERPL CREATININE-BSD FRML MDRD: 99 ML/MIN/1.73SQ M
GLUCOSE P FAST SERPL-MCNC: 103 MG/DL (ref 65–99)
HCT VFR BLD AUTO: 44.2 % (ref 34.8–46.1)
HDLC SERPL-MCNC: 57 MG/DL
HGB BLD-MCNC: 13.7 G/DL (ref 11.5–15.4)
IMM GRANULOCYTES # BLD AUTO: 0.02 THOUSAND/UL (ref 0–0.2)
IMM GRANULOCYTES NFR BLD AUTO: 0 % (ref 0–2)
LDLC SERPL CALC-MCNC: 76 MG/DL (ref 0–100)
LYMPHOCYTES # BLD AUTO: 1.04 THOUSANDS/ΜL (ref 0.6–4.47)
LYMPHOCYTES NFR BLD AUTO: 14 % (ref 14–44)
MCH RBC QN AUTO: 28.8 PG (ref 26.8–34.3)
MCHC RBC AUTO-ENTMCNC: 31 G/DL (ref 31.4–37.4)
MCV RBC AUTO: 93 FL (ref 82–98)
MONOCYTES # BLD AUTO: 0.57 THOUSAND/ΜL (ref 0.17–1.22)
MONOCYTES NFR BLD AUTO: 8 % (ref 4–12)
NEUTROPHILS # BLD AUTO: 5.68 THOUSANDS/ΜL (ref 1.85–7.62)
NEUTS SEG NFR BLD AUTO: 76 % (ref 43–75)
NRBC BLD AUTO-RTO: 0 /100 WBCS
PLATELET # BLD AUTO: 377 THOUSANDS/UL (ref 149–390)
PMV BLD AUTO: 9.5 FL (ref 8.9–12.7)
POTASSIUM SERPL-SCNC: 3.3 MMOL/L (ref 3.5–5.3)
PROT SERPL-MCNC: 7.1 G/DL (ref 6.4–8.4)
PTH-INTACT SERPL-MCNC: 60 PG/ML (ref 12–88)
RBC # BLD AUTO: 4.75 MILLION/UL (ref 3.81–5.12)
SODIUM SERPL-SCNC: 141 MMOL/L (ref 135–147)
TRIGL SERPL-MCNC: 86 MG/DL
TSH SERPL DL<=0.05 MIU/L-ACNC: 1.26 UIU/ML (ref 0.45–4.5)
WBC # BLD AUTO: 7.46 THOUSAND/UL (ref 4.31–10.16)

## 2024-09-17 ENCOUNTER — TELEPHONE (OUTPATIENT)
Dept: PAIN MEDICINE | Facility: CLINIC | Age: 74
End: 2024-09-17

## 2024-09-17 NOTE — TELEPHONE ENCOUNTER
Spoke with patient and informed her that we are recommending she see Ortho again since she has already had numerous injections in her knees there is nothing further [facility] would be able to offer her. Patient understood and is cancelling appt.

## 2024-09-19 ENCOUNTER — PROCEDURE VISIT (OUTPATIENT)
Dept: OBGYN CLINIC | Facility: CLINIC | Age: 74
End: 2024-09-19
Payer: MEDICARE

## 2024-09-19 VITALS — HEIGHT: 64 IN | BODY MASS INDEX: 19.63 KG/M2 | WEIGHT: 115 LBS

## 2024-09-19 DIAGNOSIS — M17.0 PRIMARY OSTEOARTHRITIS OF BOTH KNEES: Primary | ICD-10-CM

## 2024-09-19 PROCEDURE — 20610 DRAIN/INJ JOINT/BURSA W/O US: CPT

## 2024-09-19 NOTE — PROGRESS NOTES
1. Primary osteoarthritis of both knees          Patient is here for his 1st injection of Orthovisc into the bilateral knee.     Patient rates their pain as 4/10 today    Physical exam of the knee shows no effusion no ecchymosis.      Large joint arthrocentesis: bilateral knee  Universal Protocol:  Consent: Verbal consent obtained.  Risks and benefits: risks, benefits and alternatives were discussed  Consent given by: patient  Patient understanding: patient states understanding of the procedure being performed  Patient identity confirmed: verbally with patient  Supporting Documentation  Indications: pain   Procedure Details  Location: knee - bilateral knee  Needle size: 22 G  Approach: anterolateral    Medications (Right): 30 mg sodium hyaluronate 30 mg/2 mLMedications (Left): 30 mg sodium hyaluronate 30 mg/2 mL   Patient tolerance: patient tolerated the procedure well with no immediate complications  Dressing:  Sterile dressing applied            Patient tolerated procedure follow up 1 week     Patient expresses her desire to undergo total knee arthroplasty of the right knee.  Discussed with patient that she needs to follow-up with PCP regarding underlying osteoporosis .  Patient was on Prolia for the last year.  Recommend repeat DEXA scan prior to total knee arthroplasty.  Discussed risk of fracture significantly increases with history of osteoporosis particularly if not under good control.  Therefore we recommend DEXA scan prior to total knee arthroplasty.    Discussed with patient that she would need preoperative clearance as well as preoperative recommendations from neurology.  Discussed would appreciate neurology recommendations for stopping blood thinners prior to total knee arthroplasty as well as recommendation for DVT prophylaxis following surgical intervention.  Discussed we typically require blood thinners to be stopped at least 5 days prior to total knee arthroplasty however we defer to neurology  recommendations.    Patient to follow-up in 1 week for second injection of  Orthovisc into the bilateral knees.  Once she has obtained clearances we can further discuss total knee arthroplasty.

## 2024-09-23 ENCOUNTER — TELEPHONE (OUTPATIENT)
Dept: INTERNAL MEDICINE CLINIC | Facility: OTHER | Age: 74
End: 2024-09-23

## 2024-09-23 ENCOUNTER — OFFICE VISIT (OUTPATIENT)
Dept: INTERNAL MEDICINE CLINIC | Facility: OTHER | Age: 74
End: 2024-09-23
Payer: MEDICARE

## 2024-09-23 VITALS
OXYGEN SATURATION: 98 % | HEIGHT: 64 IN | BODY MASS INDEX: 19.63 KG/M2 | WEIGHT: 115 LBS | DIASTOLIC BLOOD PRESSURE: 78 MMHG | HEART RATE: 90 BPM | SYSTOLIC BLOOD PRESSURE: 118 MMHG | TEMPERATURE: 97.5 F

## 2024-09-23 DIAGNOSIS — R35.0 URINARY FREQUENCY: ICD-10-CM

## 2024-09-23 DIAGNOSIS — Z51.81 MEDICATION MONITORING ENCOUNTER: ICD-10-CM

## 2024-09-23 DIAGNOSIS — M79.671 CHRONIC PAIN OF BOTH FEET: ICD-10-CM

## 2024-09-23 DIAGNOSIS — R35.1 NOCTURIA: Primary | ICD-10-CM

## 2024-09-23 DIAGNOSIS — Z13.1 SCREENING FOR DIABETES MELLITUS: ICD-10-CM

## 2024-09-23 DIAGNOSIS — E55.9 VITAMIN D DEFICIENCY: ICD-10-CM

## 2024-09-23 DIAGNOSIS — M79.672 CHRONIC PAIN OF BOTH FEET: ICD-10-CM

## 2024-09-23 DIAGNOSIS — M35.09 SJOGREN'S SYNDROME WITH OTHER ORGAN INVOLVEMENT (HCC): ICD-10-CM

## 2024-09-23 DIAGNOSIS — Z00.00 ENCOUNTER FOR ANNUAL WELLNESS VISIT (AWV) IN MEDICARE PATIENT: ICD-10-CM

## 2024-09-23 DIAGNOSIS — I51.89 DIASTOLIC DYSFUNCTION: ICD-10-CM

## 2024-09-23 DIAGNOSIS — R63.4 WEIGHT LOSS: ICD-10-CM

## 2024-09-23 DIAGNOSIS — H04.129 DRY EYE: ICD-10-CM

## 2024-09-23 DIAGNOSIS — M05.79 RHEUMATOID ARTHRITIS INVOLVING MULTIPLE SITES WITH POSITIVE RHEUMATOID FACTOR (HCC): ICD-10-CM

## 2024-09-23 DIAGNOSIS — M81.0 AGE-RELATED OSTEOPOROSIS WITHOUT CURRENT PATHOLOGICAL FRACTURE: ICD-10-CM

## 2024-09-23 DIAGNOSIS — G89.29 CHRONIC PAIN OF BOTH FEET: ICD-10-CM

## 2024-09-23 DIAGNOSIS — E83.52 HYPERCALCEMIA: ICD-10-CM

## 2024-09-23 DIAGNOSIS — E78.2 MIXED HYPERLIPIDEMIA: ICD-10-CM

## 2024-09-23 PROCEDURE — 99214 OFFICE O/P EST MOD 30 MIN: CPT | Performed by: FAMILY MEDICINE

## 2024-09-23 PROCEDURE — G0439 PPPS, SUBSEQ VISIT: HCPCS | Performed by: FAMILY MEDICINE

## 2024-09-23 PROCEDURE — 96372 THER/PROPH/DIAG INJ SC/IM: CPT | Performed by: FAMILY MEDICINE

## 2024-09-23 RX ORDER — TRAMADOL HYDROCHLORIDE 50 MG/1
50 TABLET ORAL 3 TIMES DAILY PRN
Qty: 30 TABLET | Refills: 0 | Status: SHIPPED | OUTPATIENT
Start: 2024-09-23

## 2024-09-23 NOTE — ASSESSMENT & PLAN NOTE
Orders:    traMADol (Ultram) 50 mg tablet; Take 1 tablet (50 mg total) by mouth 3 (three) times a day as needed for moderate pain    TSH, 3rd generation with Free T4 reflex; Future    C-reactive protein; Future    Sedimentation rate, automated; Future    LAILA Screen w/ Reflex to Titer/Pattern; Future    TSH, 3rd generation with Free T4 reflex    C-reactive protein    Sedimentation rate, automated    LAILA Screen w/ Reflex to Titer/Pattern    XR foot 3+ vw left; Future    XR foot 3+ vw right; Future

## 2024-09-23 NOTE — ASSESSMENT & PLAN NOTE
Orders:    Lipid Panel with Direct LDL reflex; Future    Comprehensive metabolic panel; Future    Lipid Panel with Direct LDL reflex    Comprehensive metabolic panel

## 2024-09-23 NOTE — PROGRESS NOTES
Ambulatory Visit  Name: Caro Manzanares      : 1950      MRN: [id number]  Encounter Provider: Jaleel Walter DO  Encounter Date: 2024   Encounter department: [facility]    Assessment & Plan  Urinary frequency    Orders:    Ambulatory Referral to Urogynecology; Future    CBC and differential; Future    CBC and differential    Nocturia    Orders:    Ambulatory Referral to Urogynecology; Future    Age-related osteoporosis without current pathological fracture    Orders:    TSH, 3rd generation with Free T4 reflex; Future    TSH, 3rd generation with Free T4 reflex    Mixed hyperlipidemia    Orders:    Lipid Panel with Direct LDL reflex; Future    Comprehensive metabolic panel; Future    Lipid Panel with Direct LDL reflex    Comprehensive metabolic panel    Medication monitoring encounter         Hypercalcemia    Orders:    Calcium, ionized; Future    Calcium, ionized    Dry eye         Rheumatoid arthritis involving multiple sites with positive rheumatoid factor (HCC)    Orders:    traMADol (Ultram) 50 mg tablet; Take 1 tablet (50 mg total) by mouth 3 (three) times a day as needed for moderate pain    TSH, 3rd generation with Free T4 reflex; Future    C-reactive protein; Future    Sedimentation rate, automated; Future    LAILA Screen w/ Reflex to Titer/Pattern; Future    TSH, 3rd generation with Free T4 reflex    C-reactive protein    Sedimentation rate, automated    LAILA Screen w/ Reflex to Titer/Pattern    XR foot 3+ vw left; Future    XR foot 3+ vw right; Future    Sjogren's syndrome with other organ involvement (HCC)         Vitamin D deficiency    Orders:    Vitamin D 25 hydroxy; Future    Vitamin D 25 hydroxy    Screening for diabetes mellitus    Orders:    Hemoglobin A1C; Future    Hemoglobin A1C    BMI less than 19,adult    Orders:    TSH, 3rd generation with Free T4 reflex; Future    TSH, 3rd generation with Free T4 reflex    Weight loss    Orders:    TSH, 3rd  generation with Free T4 reflex; Future    TSH, 3rd generation with Free T4 reflex    Diastolic dysfunction    Orders:    Echo complete w/ contrast if indicated; Future    Encounter for annual wellness visit (AWV) in Medicare patient         Chronic pain of both feet    Orders:    XR foot 3+ vw left; Future    XR foot 3+ vw right; Future       Preventive health issues were discussed with patient, and age appropriate screening tests were ordered as noted in patient's After Visit Summary. Personalized health advice and appropriate referrals for health education or preventive services given if needed, as noted in patient's After Visit Summary.    History of Present Illness     HPI   Patient Care Team:  Jaleel Walter DO as PCP - General (Family Medicine)  Ananya Rivera MD (Rheumatology)    Review of Systems  Medical History Reviewed by provider this encounter:  Tobacco  Allergies  Meds  Problems  Med Hx  Surg Hx  Fam Hx       Annual Wellness Visit Questionnaire   Caro is here for her Subsequent Wellness visit.     Health Risk Assessment:   Patient rates overall health as good. Patient feels that their physical health rating is slightly worse. Patient is satisfied with their life. Eyesight was rated as same. Hearing was rated as same. Patient feels that their emotional and mental health rating is same. Patients states they are sometimes angry. Patient states they are sometimes unusually tired/fatigued. Pain experienced in the last 7 days has been a lot. Patient's pain rating has been 9/10. Patient states that she has experienced no weight loss or gain in last 6 months.     Depression Screening:   PHQ-2 Score: 1      Fall Risk Screening:   In the past year, patient has experienced: no history of falling in past year      Urinary Incontinence Screening:   Patient has leaked urine accidently in the last six months.     Home Safety:  Patient does not have trouble with stairs inside or outside of their home. Patient  has working smoke alarms and has working carbon monoxide detector. Home safety hazards include: none.     Nutrition:   Current diet is Regular and Limited junk food.     Medications:   Patient is currently taking over-the-counter supplements. OTC medications include: see medication list. Patient is able to manage medications.     Activities of Daily Living (ADLs)/Instrumental Activities of Daily Living (IADLs):   Walk and transfer into and out of bed and chair?: Yes  Dress and groom yourself?: Yes    Bathe or shower yourself?: Yes    Feed yourself? Yes  Do your laundry/housekeeping?: Yes  Manage your money, pay your bills and track your expenses?: Yes  Make your own meals?: Yes    Do your own shopping?: No    Previous Hospitalizations:   Any hospitalizations or ED visits within the last 12 months?: No      Advance Care Planning:   Living will: Yes    Durable POA for healthcare: Yes    Advanced directive: Yes      Comments: Her     Cognitive Screening:   Provider or family/friend/caregiver concerned regarding cognition?: No    PREVENTIVE SCREENINGS      Cardiovascular Screening:    General: Screening Not Indicated and History Lipid Disorder      Diabetes Screening:     General: Screening Current      Colorectal Cancer Screening:     General: Screening Current      Breast Cancer Screening:     General: Screening Current      Cervical Cancer Screening:    General: Screening Not Indicated      Osteoporosis Screening:    General: Screening Not Indicated and History Osteoporosis      Lung Cancer Screening:     General: Screening Not Indicated      Hepatitis C Screening:    General: Screening Current    Screening, Brief Intervention, and Referral to Treatment (SBIRT)    Screening  Typical number of drinks in a day: 0  Typical number of drinks in a week: 0  Interpretation: Low risk drinking behavior.    Single Item Drug Screening:  How often have you used an illegal drug (including marijuana) or a prescription  "medication for non-medical reasons in the past year? never    Single Item Drug Screen Score: 0  Interpretation: Negative screen for possible drug use disorder    Brief Intervention  Alcohol & drug use screenings were reviewed. No concerns regarding substance use disorder identified.     Other Counseling Topics:   Car/seat belt/driving safety, skin self-exam, sunscreen and regular weightbearing exercise and calcium and vitamin D intake.     Social Determinants of Health     Financial Resource Strain: Low Risk  (9/14/2023)    Overall Financial Resource Strain (CARDIA)     Difficulty of Paying Living Expenses: Not very hard   Food Insecurity: No Food Insecurity (9/23/2024)    Hunger Vital Sign     Worried About Running Out of Food in the Last Year: Never true     Ran Out of Food in the Last Year: Never true   Transportation Needs: No Transportation Needs (9/23/2024)    PRAPARE - Transportation     Lack of Transportation (Medical): No     Lack of Transportation (Non-Medical): No   Housing Stability: Low Risk  (9/23/2024)    Housing Stability Vital Sign     Unable to Pay for Housing in the Last Year: No     Number of Times Moved in the Last Year: 0     Homeless in the Last Year: No   Utilities: Not At Risk (9/23/2024)    [facility] Utilities     Threatened with loss of utilities: No     No results found.    Objective     /78 (BP Location: Left arm, Patient Position: Sitting, Cuff Size: Adult)   Pulse 90   Temp 97.5 °F (36.4 °C) (Temporal)   Ht 5' 4\" (1.626 m)   Wt 52.2 kg (115 lb)   SpO2 98%   BMI 19.74 kg/m²     Physical Exam    "

## 2024-09-23 NOTE — ASSESSMENT & PLAN NOTE
Orders:    TSH, 3rd generation with Free T4 reflex; Future    TSH, 3rd generation with Free T4 reflex

## 2024-09-23 NOTE — PROGRESS NOTES
Assessment/Plan:    1. Nocturia  Assessment & Plan:    Orders:    Ambulatory Referral to Urogynecology; Future    Orders:  -     Ambulatory Referral to Urogynecology; Future  2. Urinary frequency  -     Ambulatory Referral to Urogynecology; Future  -     CBC and differential; Future; Expected date: 03/23/2025  -     CBC and differential  3. Age-related osteoporosis without current pathological fracture  Assessment & Plan:    Orders:    TSH, 3rd generation with Free T4 reflex; Future    TSH, 3rd generation with Free T4 reflex    Orders:  -     TSH, 3rd generation with Free T4 reflex; Future; Expected date: 03/23/2025  -     TSH, 3rd generation with Free T4 reflex  4. Mixed hyperlipidemia  Assessment & Plan:    Orders:    Lipid Panel with Direct LDL reflex; Future    Comprehensive metabolic panel; Future    Lipid Panel with Direct LDL reflex    Comprehensive metabolic panel    Orders:  -     Lipid Panel with Direct LDL reflex; Future; Expected date: 03/23/2025  -     Comprehensive metabolic panel; Future; Expected date: 03/23/2025  -     Lipid Panel with Direct LDL reflex  -     Comprehensive metabolic panel  5. Medication monitoring encounter  Assessment & Plan:         6. Hypercalcemia  Assessment & Plan:    Orders:    Calcium, ionized; Future    Calcium, ionized    Orders:  -     Calcium, ionized; Future; Expected date: 03/23/2025  -     Calcium, ionized  7. Dry eye  Assessment & Plan:         8. Rheumatoid arthritis involving multiple sites with positive rheumatoid factor (HCC)  Assessment & Plan:    Orders:    traMADol (Ultram) 50 mg tablet; Take 1 tablet (50 mg total) by mouth 3 (three) times a day as needed for moderate pain    TSH, 3rd generation with Free T4 reflex; Future    C-reactive protein; Future    Sedimentation rate, automated; Future    LAILA Screen w/ Reflex to Titer/Pattern; Future    TSH, 3rd generation with Free T4 reflex    C-reactive protein    Sedimentation rate, automated    LAILA Screen w/  Reflex to Titer/Pattern    XR foot 3+ vw left; Future    XR foot 3+ vw right; Future    Orders:  -     traMADol (Ultram) 50 mg tablet; Take 1 tablet (50 mg total) by mouth 3 (three) times a day as needed for moderate pain  -     TSH, 3rd generation with Free T4 reflex; Future; Expected date: 03/23/2025  -     C-reactive protein; Future; Expected date: 03/23/2025  -     Sedimentation rate, automated; Future; Expected date: 03/23/2025  -     LAILA Screen w/ Reflex to Titer/Pattern; Future; Expected date: 03/23/2025  -     TSH, 3rd generation with Free T4 reflex  -     C-reactive protein  -     Sedimentation rate, automated  -     LAILA Screen w/ Reflex to Titer/Pattern  -     XR foot 3+ vw left; Future; Expected date: 09/23/2024  -     XR foot 3+ vw right; Future; Expected date: 09/23/2024  9. Sjogren's syndrome with other organ involvement (HCC)  Assessment & Plan:         10. Vitamin D deficiency  -     Vitamin D 25 hydroxy; Future; Expected date: 03/23/2025  -     Vitamin D 25 hydroxy  11. Screening for diabetes mellitus  -     Hemoglobin A1C; Future; Expected date: 03/23/2025  -     Hemoglobin A1C  12. BMI less than 19,adult  -     TSH, 3rd generation with Free T4 reflex; Future; Expected date: 03/23/2025  -     TSH, 3rd generation with Free T4 reflex  13. Weight loss  -     TSH, 3rd generation with Free T4 reflex; Future; Expected date: 03/23/2025  -     TSH, 3rd generation with Free T4 reflex  14. Diastolic dysfunction  -     Echo complete w/ contrast if indicated; Future; Expected date: 09/23/2024  15. Encounter for annual wellness visit (AWV) in Medicare patient  Assessment & Plan:         16. Chronic pain of both feet  -     XR foot 3+ vw left; Future; Expected date: 09/23/2024  -     XR foot 3+ vw right; Future; Expected date: 09/23/2024          PROLIA given today in right arm    Patient Instructions   Medicare Preventive Visit Patient Instructions  Thank you for completing your Welcome to Medicare Visit or  Medicare Annual Wellness Visit today. Your next wellness visit will be due in one year (9/24/2025).  The screening/preventive services that you may require over the next 5-10 years are detailed below. Some tests may not apply to you based off risk factors and/or age. Screening tests ordered at today's visit but not completed yet may show as past due. Also, please note that scanned in results may not display below.  Preventive Screenings:  Service Recommendations Previous Testing/Comments   Colorectal Cancer Screening  * Colonoscopy    * Fecal Occult Blood Test (FOBT)/Fecal Immunochemical Test (FIT)  * Fecal DNA/Cologuard Test  * Flexible Sigmoidoscopy Age: 45-75 years old   Colonoscopy: every 10 years (may be performed more frequently if at higher risk)  OR  FOBT/FIT: every 1 year  OR  Cologuard: every 3 years  OR  Sigmoidoscopy: every 5 years  Screening may be recommended earlier than age 45 if at higher risk for colorectal cancer. Also, an individualized decision between you and your healthcare provider will decide whether screening between the ages of 76-85 would be appropriate. Colonoscopy: 12/24/2014  FOBT/FIT: Not on file  Cologuard: Not on file  Sigmoidoscopy: Not on file    Screening Current     Breast Cancer Screening Age: 40+ years old  Frequency: every 1-2 years  Not required if history of left and right mastectomy Mammogram: 02/23/2024    Screening Current   Cervical Cancer Screening Between the ages of 21-29, pap smear recommended once every 3 years.   Between the ages of 30-65, can perform pap smear with HPV co-testing every 5 years.   Recommendations may differ for women with a history of total hysterectomy, cervical cancer, or abnormal pap smears in past. Pap Smear: Not on file    Screening Not Indicated   Hepatitis C Screening Once for adults born between 1945 and 1965  More frequently in patients at high risk for Hepatitis C Hep C Antibody: 04/10/2018    Screening Current   Diabetes Screening 1-2  times per year if you're at risk for diabetes or have pre-diabetes Fasting glucose: 103 mg/dL (9/16/2024)  A1C: No results in last 5 years (No results in last 5 years)  Screening Current   Cholesterol Screening Once every 5 years if you don't have a lipid disorder. May order more often based on risk factors. Lipid panel: 09/16/2024    Screening Not Indicated  History Lipid Disorder     Other Preventive Screenings Covered by Medicare:  Abdominal Aortic Aneurysm (AAA) Screening: covered once if your at risk. You're considered to be at risk if you have a family history of AAA.  Lung Cancer Screening: covers low dose CT scan once per year if you meet all of the following conditions: (1) Age 55-77; (2) No signs or symptoms of lung cancer; (3) Current smoker or have quit smoking within the last 15 years; (4) You have a tobacco smoking history of at least 20 pack years (packs per day multiplied by number of years you smoked); (5) You get a written order from a healthcare provider.  Glaucoma Screening: covered annually if you're considered high risk: (1) You have diabetes OR (2) Family history of glaucoma OR (3)  aged 50 and older OR (4)  American aged 65 and older  Osteoporosis Screening: covered every 2 years if you meet one of the following conditions: (1) You're estrogen deficient and at risk for osteoporosis based off medical history and other findings; (2) Have a vertebral abnormality; (3) On glucocorticoid therapy for more than 3 months; (4) Have primary hyperparathyroidism; (5) On osteoporosis medications and need to assess response to drug therapy.   Last bone density test (DXA Scan): 09/07/2023.  HIV Screening: covered annually if you're between the age of 15-65. Also covered annually if you are younger than 15 and older than 65 with risk factors for HIV infection. For pregnant patients, it is covered up to 3 times per pregnancy.    Immunizations:  Immunization Recommendations   Influenza  Vaccine Annual influenza vaccination during flu season is recommended for all persons aged >= 6 months who do not have contraindications   Pneumococcal Vaccine   * Pneumococcal conjugate vaccine = PCV13 (Prevnar 13), PCV15 (Vaxneuvance), PCV20 (Prevnar 20)  * Pneumococcal polysaccharide vaccine = PPSV23 (Pneumovax) Adults 19-65 yo with certain risk factors or if 65+ yo  If never received any pneumonia vaccine: recommend Prevnar 20 (PCV20)  Give PCV20 if previously received 1 dose of PCV13 or PPSV23   Hepatitis B Vaccine 3 dose series if at intermediate or high risk (ex: diabetes, end stage renal disease, liver disease)   Respiratory syncytial virus (RSV) Vaccine - COVERED BY MEDICARE PART D  * RSVPreF3 (Arexvy) CDC recommends that adults 60 years of age and older may receive a single dose of RSV vaccine using shared clinical decision-making (SCDM)   Tetanus (Td) Vaccine - COST NOT COVERED BY MEDICARE PART B Following completion of primary series, a booster dose should be given every 10 years to maintain immunity against tetanus. Td may also be given as tetanus wound prophylaxis.   Tdap Vaccine - COST NOT COVERED BY MEDICARE PART B Recommended at least once for all adults. For pregnant patients, recommended with each pregnancy.   Shingles Vaccine (Shingrix) - COST NOT COVERED BY MEDICARE PART B  2 shot series recommended in those 19 years and older who have or will have weakened immune systems or those 50 years and older     Health Maintenance Due:      Topic Date Due    Colorectal Cancer Screening  12/24/2024    Breast Cancer Screening: Mammogram  02/23/2025    DXA SCAN  09/07/2025    Hepatitis C Screening  Completed     Immunizations Due:      Topic Date Due    Influenza Vaccine (1) 09/01/2024     Advance Directives   What are advance directives?  Advance directives are legal documents that state your wishes and plans for medical care. These plans are made ahead of time in case you lose your ability to make  decisions for yourself. Advance directives can apply to any medical decision, such as the treatments you want, and if you want to donate organs.   What are the types of advance directives?  There are many types of advance directives, and each state has rules about how to use them. You may choose a combination of any of the following:  Living will:  This is a written record of the treatment you want. You can also choose which treatments you do not want, which to limit, and which to stop at a certain time. This includes surgery, medicine, IV fluid, and tube feedings.   Durable power of  for healthcare (DPAHC):  This is a written record that states who you want to make healthcare choices for you when you are unable to make them for yourself. This person, called a proxy, is usually a family member or a friend. You may choose more than 1 proxy.  Do not resuscitate (DNR) order:  A DNR order is used in case your heart stops beating or you stop breathing. It is a request not to have certain forms of treatment, such as CPR. A DNR order may be included in other types of advance directives.  Medical directive:  This covers the care that you want if you are in a coma, near death, or unable to make decisions for yourself. You can list the treatments you want for each condition. Treatment may include pain medicine, surgery, blood transfusions, dialysis, IV or tube feedings, and a ventilator (breathing machine).  Values history:  This document has questions about your views, beliefs, and how you feel and think about life. This information can help others choose the care that you would choose.  Why are advance directives important?  An advance directive helps you control your care. Although spoken wishes may be used, it is better to have your wishes written down. Spoken wishes can be misunderstood, or not followed. Treatments may be given even if you do not want them. An advance directive may make it easier for your family  to make difficult choices about your care.   Urinary Incontinence   Urinary incontinence (UI)  is when you lose control of your bladder. UI develops because your bladder cannot store or empty urine properly. The 3 most common types of UI are stress incontinence, urge incontinence, or both.  Medicines:   May be given to help strengthen your bladder control. Report any side effects of medication to your healthcare provider.  Do pelvic muscle exercises often:  Your pelvic muscles help you stop urinating. Squeeze these muscles tight for 5 seconds, then relax for 5 seconds. Gradually work up to squeezing for 10 seconds. Do 3 sets of 15 repetitions a day, or as directed. This will help strengthen your pelvic muscles and improve bladder control.  Train your bladder:  Go to the bathroom at set times, such as every 2 hours, even if you do not feel the urge to go. You can also try to hold your urine when you feel the urge to go. For example, hold your urine for 5 minutes when you feel the urge to go. As that becomes easier, hold your urine for 10 minutes.   Self-care:   Keep a UI record.  Write down how often you leak urine and how much you leak. Make a note of what you were doing when you leaked urine.  Drink liquids as directed. You may need to limit the amount of liquid you drink to help control your urine leakage. Do not drink any liquid right before you go to bed. Limit or do not have drinks that contain caffeine or alcohol.   Prevent constipation.  Eat a variety of high-fiber foods. Good examples are high-fiber cereals, beans, vegetables, and whole-grain breads. Walking is the best way to trigger your intestines to have a bowel movement.  Exercise regularly and maintain a healthy weight.  Weight loss and exercise will decrease pressure on your bladder and help you control your leakage.   Use a catheter as directed  to help empty your bladder. A catheter is a tiny, plastic tube that is put into your bladder to drain your  urine.   Go to behavior therapy as directed.  Behavior therapy may be used to help you learn to control your urge to urinate.     © Copyright [employer] 2018 Information is for End User's use only and may not be sold, redistributed or otherwise used for commercial purposes. All illustrations and images included in CareNotes® are the copyrighted property of [employer]. or [employer]         Return in about 6 months (around 3/23/2025) for call me in 1-2 weeks with an update.    Subjective:      Patient ID: Caro Manzanares is a 74 y.o. female.    Chief Complaint   Patient presents with    Medicare Wellness Visit     Sub Medicare Wellness Visit  Labs done 9/16/2024      Osteoporosis     Prolia today  Last one 3/18/2024  Right arm today        HPI      Osteoporosis (Follow-Up): The patient's osteoporosis has been stable since the last visit. Most recent DXA results: T-score <-2.5. Comorbid Illnesses: -3.3 is the worst- on chronic prednisone. She has no comorbid illnesses. She has no complications. She has no significant interval events.   Symptoms: The patient is currently asymptomatic. denies back pain-- and-- denies . Associated symptoms include no decrease in height.  Home precautions: Osteoporosis counseling and education was reviewed with patient and caregivers, including prevention of falls.  Medications: the patient is adherent with her medication regimen. Medication(s): vitamin D,-- a calcium supplement-- and-- prolia.  Disease Management: the patient is doing well with her osteoporosis goals. The patient is due for DEXA  April 2019-appointment scheduled in July July 2020, here for Prolia.  Tolerating well without any issues  Jan 2021: Prolia due today, doing well. , due for DEXA in July March 2022, doing well, no issues or concerns or complaints.  Here for even a D injections which she has been tolerating well.  Today's injection 8.   July 2022, patient here for her last evenity injection.   Prefers to get them in her arms due to slight increase in pain in her thighs.  Tolerating well.  Taking calcium and vitamin-D.  Was on Prolia in the past and will need to resume that  February 2023, here for Prolia injection, doing well.  Due in her right side today.  Has bone density study for this year scheduled  September 2023, on Prolia and tolerating well, bone density study significantly improved  March 2024, patient remains active, here for Prolia, on calcium and vitamin D   July 2024: pt here for PROLIA, tolerating well      Hyperlipidemia, on Zocor and doing well.  Laboratory data reviewed and is all is well controlled.  Tolerating well July 2020, excellent controlled lipid levels  March 2022, doing very well with lipid levels.  No issues or concerns  February 2023, lipid levels well controlled.  Compliant with medications and no side effects  September 2023, hyperlipidemia, well controlled, no issues  Sept 2024: lipids in range     Vitamin-D deficiency.  Has been on over-the-counter calcium and vitamin-D.  Doing well.  On Prolia and has bone density March 2022, vitamin-D levels in range  February 2023, doing well, on supplementation.  No issues  September 2023, well controlled, no issues, compliant with medications  March 2024, lipid levels in range with no issues, remains stable on simvastatin 40 mg  Sept 2024:   vit d 62, in calcium and vit D    Pt with pain in multiple areas, not able to sleep and affecting ADL, feels very stiff in the morning and has trouble getting out of bed. Feels crippled,using a mikala and walking in WC to get going in the morning          The following portions of the patient's history were reviewed and updated as appropriate: allergies, current medications, past family history, past medical history, past social history, past surgical history and problem list.    Review of Systems      Constitutional:  Denies fever or chills   Eyes:  Denies double , blurry vision or eye pain  HENT:  " Denies nasal congestion, sore throat or new hearing issues  Respiratory:  Denies cough or shortness of breath or wheezing  Cardiovascular:  Denies palpitations or chest pain  GI:  Denies abdominal pain, nausea, or vomiting, no loose stools, no reflux  Integument:  Denies rash , no open areas  Neurologic:  Denies headache or focal weakness, no dizziness  : no dysuria, or hematuria      Current Outpatient Medications   Medication Sig Dispense Refill    aspirin (Aspir-Low) 81 mg EC tablet Take 81 mg by mouth daily        calcium citrate-Vitamin D 200 mg-250 units Take 1 tablet by mouth in the morning      cholecalciferol (VITAMIN D3) 400 units tablet Take 400 Units by mouth daily      clopidogrel (PLAVIX) 75 mg tablet Take 1 tablet (75 mg total) by mouth daily 90 tablet 1    cyanocobalamin (VITAMIN B-12) 100 MCG tablet Take 100 mcg by mouth daily      cycloSPORINE (RESTASIS) 0.05 % ophthalmic emulsion Apply 1 drop to eye 2 (two) times a day      hydroxychloroquine (PLAQUENIL) 200 mg tablet Take 1 tablet (200 mg total) by mouth daily 100 tablet 1    meloxicam (Mobic) 7.5 mg tablet Take 1 tablet (7.5 mg total) by mouth 2 (two) times a day 60 tablet 2    oxybutynin (DITROPAN) 5 mg tablet Take 1 tablet (5 mg total) by mouth 2 (two) times a day (Patient taking differently: Take 5 mg by mouth in the morning) 180 tablet 1    simvastatin (ZOCOR) 20 mg tablet Take 1 tablet (20 mg total) by mouth daily 90 tablet 2    traMADol (Ultram) 50 mg tablet Take 1 tablet (50 mg total) by mouth 3 (three) times a day as needed for moderate pain 30 tablet 0     No current facility-administered medications for this visit.       Objective:    /78 (BP Location: Left arm, Patient Position: Sitting, Cuff Size: Adult)   Pulse 90   Temp 97.5 °F (36.4 °C) (Temporal)   Ht 5' 4\" (1.626 m)   Wt 52.2 kg (115 lb)   SpO2 98%   BMI 19.74 kg/m²        Physical Exam       Constitutional:  Well developed, well nourished, no acute distress, " non-toxic appearance   Eyes:  PERRL, conjunctiva normal , non icteric sclera  HENT:  Atraumatic, oropharynx moist. Neck-  supple   Respiratory:  CTA b/l, normal breath sounds, no rales, no wheezing   Cardiovascular:  RRR, no murmurs, no LE edema b/l  GI:  Soft, nondistended, normal bowel sounds x 4, nontender, no organomegaly, no mass, no rebound, no guarding   Neurologic:  no focal deficits noted   Psychiatric:  Speech and behavior appropriate , AAO x 3  MS: gait is slowed with cane, stopped posture    Jaleel Walter,

## 2024-09-23 NOTE — PATIENT INSTRUCTIONS
Medicare Preventive Visit Patient Instructions  Thank you for completing your Welcome to Medicare Visit or Medicare Annual Wellness Visit today. Your next wellness visit will be due in one year (9/24/2025).  The screening/preventive services that you may require over the next 5-10 years are detailed below. Some tests may not apply to you based off risk factors and/or age. Screening tests ordered at today's visit but not completed yet may show as past due. Also, please note that scanned in results may not display below.  Preventive Screenings:  Service Recommendations Previous Testing/Comments   Colorectal Cancer Screening  * Colonoscopy    * Fecal Occult Blood Test (FOBT)/Fecal Immunochemical Test (FIT)  * Fecal DNA/Cologuard Test  * Flexible Sigmoidoscopy Age: 45-75 years old   Colonoscopy: every 10 years (may be performed more frequently if at higher risk)  OR  FOBT/FIT: every 1 year  OR  Cologuard: every 3 years  OR  Sigmoidoscopy: every 5 years  Screening may be recommended earlier than age 45 if at higher risk for colorectal cancer. Also, an individualized decision between you and your healthcare provider will decide whether screening between the ages of 76-85 would be appropriate. Colonoscopy: 12/24/2014  FOBT/FIT: Not on file  Cologuard: Not on file  Sigmoidoscopy: Not on file    Screening Current     Breast Cancer Screening Age: 40+ years old  Frequency: every 1-2 years  Not required if history of left and right mastectomy Mammogram: 02/23/2024    Screening Current   Cervical Cancer Screening Between the ages of 21-29, pap smear recommended once every 3 years.   Between the ages of 30-65, can perform pap smear with HPV co-testing every 5 years.   Recommendations may differ for women with a history of total hysterectomy, cervical cancer, or abnormal pap smears in past. Pap Smear: Not on file    Screening Not Indicated   Hepatitis C Screening Once for adults born between 1945 and 1965  More frequently in  patients at high risk for Hepatitis C Hep C Antibody: 04/10/2018    Screening Current   Diabetes Screening 1-2 times per year if you're at risk for diabetes or have pre-diabetes Fasting glucose: 103 mg/dL (9/16/2024)  A1C: No results in last 5 years (No results in last 5 years)  Screening Current   Cholesterol Screening Once every 5 years if you don't have a lipid disorder. May order more often based on risk factors. Lipid panel: 09/16/2024    Screening Not Indicated  History Lipid Disorder     Other Preventive Screenings Covered by Medicare:  Abdominal Aortic Aneurysm (AAA) Screening: covered once if your at risk. You're considered to be at risk if you have a family history of AAA.  Lung Cancer Screening: covers low dose CT scan once per year if you meet all of the following conditions: (1) Age 55-77; (2) No signs or symptoms of lung cancer; (3) Current smoker or have quit smoking within the last 15 years; (4) You have a tobacco smoking history of at least 20 pack years (packs per day multiplied by number of years you smoked); (5) You get a written order from a healthcare provider.  Glaucoma Screening: covered annually if you're considered high risk: (1) You have diabetes OR (2) Family history of glaucoma OR (3)  aged 50 and older OR (4)  American aged 65 and older  Osteoporosis Screening: covered every 2 years if you meet one of the following conditions: (1) You're estrogen deficient and at risk for osteoporosis based off medical history and other findings; (2) Have a vertebral abnormality; (3) On glucocorticoid therapy for more than 3 months; (4) Have primary hyperparathyroidism; (5) On osteoporosis medications and need to assess response to drug therapy.   Last bone density test (DXA Scan): 09/07/2023.  HIV Screening: covered annually if you're between the age of 15-65. Also covered annually if you are younger than 15 and older than 65 with risk factors for HIV infection. For pregnant  patients, it is covered up to 3 times per pregnancy.    Immunizations:  Immunization Recommendations   Influenza Vaccine Annual influenza vaccination during flu season is recommended for all persons aged >= 6 months who do not have contraindications   Pneumococcal Vaccine   * Pneumococcal conjugate vaccine = PCV13 (Prevnar 13), PCV15 (Vaxneuvance), PCV20 (Prevnar 20)  * Pneumococcal polysaccharide vaccine = PPSV23 (Pneumovax) Adults 19-65 yo with certain risk factors or if 65+ yo  If never received any pneumonia vaccine: recommend Prevnar 20 (PCV20)  Give PCV20 if previously received 1 dose of PCV13 or PPSV23   Hepatitis B Vaccine 3 dose series if at intermediate or high risk (ex: diabetes, end stage renal disease, liver disease)   Respiratory syncytial virus (RSV) Vaccine - COVERED BY MEDICARE PART D  * RSVPreF3 (Arexvy) CDC recommends that adults 60 years of age and older may receive a single dose of RSV vaccine using shared clinical decision-making (SCDM)   Tetanus (Td) Vaccine - COST NOT COVERED BY MEDICARE PART B Following completion of primary series, a booster dose should be given every 10 years to maintain immunity against tetanus. Td may also be given as tetanus wound prophylaxis.   Tdap Vaccine - COST NOT COVERED BY MEDICARE PART B Recommended at least once for all adults. For pregnant patients, recommended with each pregnancy.   Shingles Vaccine (Shingrix) - COST NOT COVERED BY MEDICARE PART B  2 shot series recommended in those 19 years and older who have or will have weakened immune systems or those 50 years and older     Health Maintenance Due:      Topic Date Due   • Colorectal Cancer Screening  12/24/2024   • Breast Cancer Screening: Mammogram  02/23/2025   • DXA SCAN  09/07/2025   • Hepatitis C Screening  Completed     Immunizations Due:      Topic Date Due   • Influenza Vaccine (1) 09/01/2024     Advance Directives   What are advance directives?  Advance directives are legal documents that state  your wishes and plans for medical care. These plans are made ahead of time in case you lose your ability to make decisions for yourself. Advance directives can apply to any medical decision, such as the treatments you want, and if you want to donate organs.   What are the types of advance directives?  There are many types of advance directives, and each state has rules about how to use them. You may choose a combination of any of the following:  Living will:  This is a written record of the treatment you want. You can also choose which treatments you do not want, which to limit, and which to stop at a certain time. This includes surgery, medicine, IV fluid, and tube feedings.   Durable power of  for healthcare (DPAHC):  This is a written record that states who you want to make healthcare choices for you when you are unable to make them for yourself. This person, called a proxy, is usually a family member or a friend. You may choose more than 1 proxy.  Do not resuscitate (DNR) order:  A DNR order is used in case your heart stops beating or you stop breathing. It is a request not to have certain forms of treatment, such as CPR. A DNR order may be included in other types of advance directives.  Medical directive:  This covers the care that you want if you are in a coma, near death, or unable to make decisions for yourself. You can list the treatments you want for each condition. Treatment may include pain medicine, surgery, blood transfusions, dialysis, IV or tube feedings, and a ventilator (breathing machine).  Values history:  This document has questions about your views, beliefs, and how you feel and think about life. This information can help others choose the care that you would choose.  Why are advance directives important?  An advance directive helps you control your care. Although spoken wishes may be used, it is better to have your wishes written down. Spoken wishes can be misunderstood, or not  followed. Treatments may be given even if you do not want them. An advance directive may make it easier for your family to make difficult choices about your care.   Urinary Incontinence   Urinary incontinence (UI)  is when you lose control of your bladder. UI develops because your bladder cannot store or empty urine properly. The 3 most common types of UI are stress incontinence, urge incontinence, or both.  Medicines:   May be given to help strengthen your bladder control. Report any side effects of medication to your healthcare provider.  Do pelvic muscle exercises often:  Your pelvic muscles help you stop urinating. Squeeze these muscles tight for 5 seconds, then relax for 5 seconds. Gradually work up to squeezing for 10 seconds. Do 3 sets of 15 repetitions a day, or as directed. This will help strengthen your pelvic muscles and improve bladder control.  Train your bladder:  Go to the bathroom at set times, such as every 2 hours, even if you do not feel the urge to go. You can also try to hold your urine when you feel the urge to go. For example, hold your urine for 5 minutes when you feel the urge to go. As that becomes easier, hold your urine for 10 minutes.   Self-care:   Keep a UI record.  Write down how often you leak urine and how much you leak. Make a note of what you were doing when you leaked urine.  Drink liquids as directed. You may need to limit the amount of liquid you drink to help control your urine leakage. Do not drink any liquid right before you go to bed. Limit or do not have drinks that contain caffeine or alcohol.   Prevent constipation.  Eat a variety of high-fiber foods. Good examples are high-fiber cereals, beans, vegetables, and whole-grain breads. Walking is the best way to trigger your intestines to have a bowel movement.  Exercise regularly and maintain a healthy weight.  Weight loss and exercise will decrease pressure on your bladder and help you control your leakage.   Use a catheter  as directed  to help empty your bladder. A catheter is a tiny, plastic tube that is put into your bladder to drain your urine.   Go to behavior therapy as directed.  Behavior therapy may be used to help you learn to control your urge to urinate.     © Copyright [employer] 2018 Information is for End User's use only and may not be sold, redistributed or otherwise used for commercial purposes. All illustrations and images included in CareNotes® are the copyrighted property of [employer] Inc[employer] or [employer]

## 2024-09-23 NOTE — TELEPHONE ENCOUNTER
Patient had prolia injection given today, 9/23/24 and is scheduled for her next one on 4/7/25 in [place].

## 2024-09-24 ENCOUNTER — HOSPITAL ENCOUNTER (OUTPATIENT)
Dept: RADIOLOGY | Facility: IMAGING CENTER | Age: 74
Discharge: HOME | End: 2024-09-24
Payer: MEDICARE

## 2024-09-24 ENCOUNTER — TELEPHONE (OUTPATIENT)
Dept: INTERNAL MEDICINE CLINIC | Facility: OTHER | Age: 74
End: 2024-09-24

## 2024-09-24 DIAGNOSIS — G89.29 CHRONIC PAIN OF BOTH FEET: ICD-10-CM

## 2024-09-24 DIAGNOSIS — M79.671 CHRONIC PAIN OF BOTH FEET: ICD-10-CM

## 2024-09-24 DIAGNOSIS — M05.79 RHEUMATOID ARTHRITIS INVOLVING MULTIPLE SITES WITH POSITIVE RHEUMATOID FACTOR (HCC): ICD-10-CM

## 2024-09-24 DIAGNOSIS — M79.672 CHRONIC PAIN OF BOTH FEET: ICD-10-CM

## 2024-09-24 PROCEDURE — 73630 X-RAY EXAM OF FOOT: CPT

## 2024-09-24 NOTE — TELEPHONE ENCOUNTER
Patient stopped in the office today because she forgot to ask you yesterday at her appointment if her calcium is ok.  She stated that for the last 6 months she has decreased the amount of calcium she has been taking on your recommendation.  She wants to know if she should continue taking her current dose or if she needs to increase it or anything?

## 2024-09-26 ENCOUNTER — PROCEDURE VISIT (OUTPATIENT)
Dept: OBGYN CLINIC | Facility: CLINIC | Age: 74
End: 2024-09-26
Payer: MEDICARE

## 2024-09-26 VITALS — WEIGHT: 115 LBS | BODY MASS INDEX: 19.63 KG/M2 | HEIGHT: 64 IN

## 2024-09-26 DIAGNOSIS — M17.0 PRIMARY OSTEOARTHRITIS OF BOTH KNEES: Primary | ICD-10-CM

## 2024-09-26 DIAGNOSIS — M05.79 RHEUMATOID ARTHRITIS INVOLVING MULTIPLE SITES WITH POSITIVE RHEUMATOID FACTOR (HCC): ICD-10-CM

## 2024-09-26 DIAGNOSIS — M17.11 PATELLOFEMORAL ARTHRITIS OF RIGHT KNEE: ICD-10-CM

## 2024-09-26 PROCEDURE — 20610 DRAIN/INJ JOINT/BURSA W/O US: CPT

## 2024-09-26 NOTE — PROGRESS NOTES
1. Primary osteoarthritis of both knees        2. Patellofemoral arthritis of right knee        3. Rheumatoid arthritis involving multiple sites with positive rheumatoid factor (HCC)  Ambulatory Referral to Rheumatology          Patient is here for his 2nd injection of Orthovisc into the bilateral knee.     Patient rates their pain as 4/10 today    Physical exam of the knee shows no effusion no ecchymosis.      Large joint arthrocentesis: bilateral knee  Universal Protocol:  Consent: Verbal consent obtained.  Risks and benefits: risks, benefits and alternatives were discussed  Consent given by: patient  Patient understanding: patient states understanding of the procedure being performed  Patient identity confirmed: verbally with patient  Supporting Documentation  Indications: pain   Procedure Details  Location: knee - bilateral knee  Needle size: 22 G  Approach: anterolateral    Medications (Right): 30 mg sodium hyaluronate 30 mg/2 mLMedications (Left): 30 mg sodium hyaluronate 30 mg/2 mL   Patient tolerance: patient tolerated the procedure well with no immediate complications  Dressing:  Sterile dressing applied            Patient tolerated procedure follow up 1 week     Discussed rheumatology referral secondary to underlying rheumatological condition evidenced by bilateral joint pain in hands, feet, and knees.

## 2024-10-02 ENCOUNTER — OFFICE VISIT (OUTPATIENT)
Dept: OBGYN CLINIC | Facility: CLINIC | Age: 74
End: 2024-10-02
Payer: MEDICARE

## 2024-10-02 DIAGNOSIS — M18.0 ARTHRITIS OF CARPOMETACARPAL (CMC) JOINT OF BOTH THUMBS: Primary | ICD-10-CM

## 2024-10-02 PROCEDURE — 99213 OFFICE O/P EST LOW 20 MIN: CPT | Performed by: SURGERY

## 2024-10-02 NOTE — PROGRESS NOTES
ASSESSMENT/PLAN:      75 yo female with bilateral CMC arthritis     Patient reported no improvement in symptoms with initial injections at last visit, however symptoms are well controlled today and not painful. Upon discussion it seems that Caro has pain primarily only when putting all of her weight on that hand when using her walker. Advised that injections will not help to relieve this pain with direct pressure over the bony prominence of that joint, unfortunately. I did advise suing a comfort brace for this issue. She was given comfort cool brace at her last visit but no longer has this, advised to get another online as her insurance would not cover another one this soon. She was given the option to repeat injections today with the understanding that injecting an asymptomatic joint may flare up some pain temporarily, she would like to defer for the time being    The patient verbalized understanding of exam findings and treatment plan. We engaged in the shared decision-making process and treatment options were discussed at length with the patient. Surgical and conservative management discussed today along with risks and benefits.    Diagnoses and all orders for this visit:    Arthritis of carpometacarpal (CMC) joint of both thumbs        Follow Up:  Return if symptoms worsen or fail to improve.      To Do Next Visit:  Re-evaluation of current issue    ____________________________________________________________________________________________________________________________________________      CHIEF COMPLAINT:  Chief Complaint   Patient presents with    Follow-up     Patient would like injections today last injection was 6/27/24       SUBJECTIVE:  Caro Manzanares is a 74 y.o. year old RHD female who presents for follow up evaluation of the bilateral thumbs. She notes that the right thumb was very painful but and the injections did not help after the last visit. Pain seems to be worse with direct pressure  particularly when using her cane and walker. No significant pain at rest.         I have personally reviewed all the relevant PMH, PSH, SH, FH, Medications and allergies.     PAST MEDICAL HISTORY:  Past Medical History:   Diagnosis Date    Arthralgia     Fall against object 06/09/2023    Foreign body in skin of finger 02/03/2023    Gait disorder 08/22/2023    Memory loss 09/03/2019    Neutropenia (HCC)     Osteoporosis     Pain of left hip 08/22/2023    Rheumatoid arthritis (HCC)     Sjogren's syndrome (HCC)     Stroke (cerebrum) (HCC)     Transient cerebral ischemia        PAST SURGICAL HISTORY:  Past Surgical History:   Procedure Laterality Date    BREAST EXCISIONAL BIOPSY Left 1983    benign    BREAST EXCISIONAL BIOPSY Left 1982    benign    BREAST EXCISIONAL BIOPSY Right 1982    benign    CATARACT EXTRACTION, BILATERAL      HYSTERECTOMY      age 52    OOPHORECTOMY      both       FAMILY HISTORY:  Family History   Problem Relation Age of Onset    Lymphoma Mother 63        ACUTE    Stroke Mother 73    Diabetes Father     Stroke Sister     No Known Problems Sister     No Known Problems Daughter     No Known Problems Maternal Grandmother     No Known Problems Maternal Grandfather     No Known Problems Paternal Grandmother     No Known Problems Paternal Grandfather     No Known Problems Maternal Aunt     No Known Problems Maternal Aunt     No Known Problems Paternal Aunt     No Known Problems Paternal Aunt        SOCIAL HISTORY:  Social History     Tobacco Use    Smoking status: Never    Smokeless tobacco: Never   Vaping Use    Vaping status: Never Used   Substance Use Topics    Alcohol use: Yes     Alcohol/week: 1.0 standard drink of alcohol     Types: 1 Glasses of wine per week    Drug use: No       MEDICATIONS:    Current Outpatient Medications:     aspirin (Aspir-Low) 81 mg EC tablet, Take 81 mg by mouth daily  , Disp: , Rfl:     calcium citrate-Vitamin D 200 mg-250 units, Take 1 tablet by mouth in the morning,  Disp: , Rfl:     cholecalciferol (VITAMIN D3) 400 units tablet, Take 400 Units by mouth daily, Disp: , Rfl:     clopidogrel (PLAVIX) 75 mg tablet, Take 1 tablet (75 mg total) by mouth daily, Disp: 90 tablet, Rfl: 1    cyanocobalamin (VITAMIN B-12) 100 MCG tablet, Take 100 mcg by mouth daily, Disp: , Rfl:     cycloSPORINE (RESTASIS) 0.05 % ophthalmic emulsion, Apply 1 drop to eye 2 (two) times a day, Disp: , Rfl:     hydroxychloroquine (PLAQUENIL) 200 mg tablet, Take 1 tablet (200 mg total) by mouth daily, Disp: 100 tablet, Rfl: 1    meloxicam (Mobic) 7.5 mg tablet, Take 1 tablet (7.5 mg total) by mouth 2 (two) times a day, Disp: 60 tablet, Rfl: 2    oxybutynin (DITROPAN) 5 mg tablet, Take 1 tablet (5 mg total) by mouth 2 (two) times a day (Patient taking differently: Take 5 mg by mouth in the morning), Disp: 180 tablet, Rfl: 1    simvastatin (ZOCOR) 20 mg tablet, Take 1 tablet (20 mg total) by mouth daily, Disp: 90 tablet, Rfl: 2    traMADol (Ultram) 50 mg tablet, Take 1 tablet (50 mg total) by mouth 3 (three) times a day as needed for moderate pain, Disp: 30 tablet, Rfl: 0    ALLERGIES:  No Known Allergies    REVIEW OF SYSTEMS:  Review of Systems   Constitutional:  Negative for chills, fatigue and fever.   HENT:  Negative for hearing loss, nosebleeds and sore throat.    Eyes:  Negative for redness and visual disturbance.   Respiratory:  Negative for cough, shortness of breath and wheezing.    Cardiovascular:  Negative for chest pain, palpitations and leg swelling.   Gastrointestinal:  Negative for abdominal pain, nausea and vomiting.   Endocrine: Negative for polydipsia and polyuria.   Genitourinary:  Negative for difficulty urinating, dysuria and hematuria.   Musculoskeletal:  Positive for arthralgias. Negative for joint swelling and myalgias.   Skin:  Negative for rash and wound.   Neurological:  Negative for speech difficulty, weakness, numbness and headaches.   Psychiatric/Behavioral:  Negative for decreased  concentration and suicidal ideas. The patient is not nervous/anxious.        VITALS:  There were no vitals filed for this visit.    LABS:  HgA1c:   Lab Results   Component Value Date    HGBA1C 5.7 08/13/2019     BMP:   Lab Results   Component Value Date    GLUCOSE 91 03/10/2015    CALCIUM 10.1 09/16/2024     03/10/2015    K 3.3 (L) 09/16/2024    CO2 31 09/16/2024     09/16/2024    BUN 15 09/16/2024    CREATININE 0.43 (L) 09/16/2024       _____________________________________________________  PHYSICAL EXAMINATION:  General: well developed and well nourished, alert, oriented times 3, and appears comfortable  Psychiatric: Normal  HEENT: Normocephalic, Atraumatic Trachea Midline, No torticollis  Pulmonary: No audible wheezing or respiratory distress   Cardiovascular: No pitting edema, 2+ radial pulse   Skin: No masses, erythema, lacerations, fluctation, ulcerations  Neurovascular: Sensation Intact to the Median, Ulnar, Radial Nerve, Motor Intact to the Median, Ulnar, Radial Nerve, and Pulses Intact  Musculoskeletal: Normal, except as noted in detailed exam and in HPI.      MUSCULOSKELETAL EXAMINATION:  Bilateral CMC Exam:  + adduction contracture  Slight hyperextension deformity of MCP joint  Positive localized tenderness over the bony prominences of the volar CMC joint   Grind test is Negative for pain and Negative for crepitus  Metacarpal load shift test mildly positive   No triggering or tenderness over the A1 pulley  No pain with Finkelstein’s maneuver       ___________________________________________________  STUDIES REVIEWED:  No new studies to review       PROCEDURES PERFORMED:  Procedures  No Procedures performed today    _____________________________________________________

## 2024-10-03 ENCOUNTER — PROCEDURE VISIT (OUTPATIENT)
Dept: OBGYN CLINIC | Facility: CLINIC | Age: 74
End: 2024-10-03
Payer: MEDICARE

## 2024-10-03 VITALS — BODY MASS INDEX: 19.63 KG/M2 | WEIGHT: 115 LBS | HEIGHT: 64 IN

## 2024-10-03 DIAGNOSIS — M25.562 BILATERAL CHRONIC KNEE PAIN: ICD-10-CM

## 2024-10-03 DIAGNOSIS — M25.561 BILATERAL CHRONIC KNEE PAIN: ICD-10-CM

## 2024-10-03 DIAGNOSIS — G89.29 BILATERAL CHRONIC KNEE PAIN: ICD-10-CM

## 2024-10-03 DIAGNOSIS — M17.0 BILATERAL PRIMARY OSTEOARTHRITIS OF KNEE: Primary | ICD-10-CM

## 2024-10-03 PROCEDURE — 20610 DRAIN/INJ JOINT/BURSA W/O US: CPT

## 2024-10-03 NOTE — PROGRESS NOTES
1. Bilateral primary osteoarthritis of knee  Large joint arthrocentesis: bilateral knee      2. Bilateral chronic knee pain  Large joint arthrocentesis: bilateral knee            Patient is here for his 3rd injection of Orthovisc into the bilateral knee.     Patient rates their pain as 4/10 today    Physical exam of the knee shows no effusion no ecchymosis.      Large joint arthrocentesis: bilateral knee  Universal Protocol:  Consent: Verbal consent obtained.  Risks and benefits: risks, benefits and alternatives were discussed  Consent given by: patient  Patient understanding: patient states understanding of the procedure being performed  Patient identity confirmed: verbally with patient  Supporting Documentation  Indications: pain   Procedure Details  Location: knee - bilateral knee  Needle size: 22 G  Approach: anterolateral    Medications (Right): 30 mg sodium hyaluronate 30 mg/2 mLMedications (Left): 30 mg sodium hyaluronate 30 mg/2 mL   Patient tolerance: patient tolerated the procedure well with no immediate complications  Dressing:  Sterile dressing applied            Patient tolerated procedure follow up as needed

## 2024-10-04 NOTE — PROGRESS NOTES
Rheumatology Initial Outpatient Visit  Name: Caro Manzanares      : 1950      MRN: [id number]  Encounter Provider: Debbie Morfin MD  Encounter Date: 10/9/2024   Encounter department: [facility] [address] [place] [address]    Assessment & Plan  Rheumatoid arthritis with positive rheumatoid factor, involving unspecified site (HCC)  74-year-old female who presents to establish care for seropositive rheumatoid arthritis and secondary Sjogren's syndrome.  Her disease has been maintained on Plaquenil since time of diagnosis as her inflammatory arthritis has been relatively well-controlled.  Over the past few months she has noticed new onset neuropathy in her feet as well as bilateral ankle swelling.  Ultrasound performed today in clinic showed small ankle effusion of the left ankle with synovial hypertrophy.  Possible that the neuropathy is related to compressive neuropathy from active ankle inflammation versus other peripheral neuropathy.  I recommend a course of prednisone.  If patient has significant improvement in her swelling and neuropathy, we will need to escalate her rheumatoid arthritis treatment likely with methotrexate.  If patient has no improvement in her symptoms, then will need further workup for peripheral neuropathy.  Close follow-up in 3 weeks.  Orders:    predniSONE 5 mg tablet; Take 4 tablets (20 mg total) by mouth daily for 3 days, THEN 3 tablets (15 mg total) daily for 3 days, THEN 2 tablets (10 mg total) daily for 3 days, THEN 1 tablet (5 mg total) daily for 3 days.    CBC and differential; Future    Cryoglobulin; Future    Protein electrophoresis, serum; Future    Protein electrophoresis, urine; Future    Urinalysis with microscopic; Future    C3 complement; Future    C4 complement; Future    CBC and differential    Protein electrophoresis, serum    Protein electrophoresis, urine    Urinalysis with microscopic    C3 complement    C4 complement    Vitamin B12; Future    Vitamin B12     Folate; Future    Folate    Sjogren's syndrome, with unspecified organ involvement (HCC)  Symptoms currently stable on Plaquenil and symptomatic treatments.  Will obtain annual Sjogren's labs for disease monitoring.  She has noticed some weight loss (about 8 pounds), but she attributes this to food tasting bad and not wanting to eat as much.       Other osteoporosis without current pathological fracture  Prior history of osteoporosis based on DEXA scan.  No history of fracture.  She is currently on Prolia, completed 1 year of Evenity around fall 2023.  Prolia managed by primary care.       Other long term (current) drug therapy    Orders:    Vitamin B12; Future    Vitamin B12    Folate; Future    Folate      History of Present Illness     Caro Manzanares is a 74 y.o. female who presents for evaluation of seropositive rheumatoid arthritis (RF 32, CCP > 200) with secondary Sjogren's Syndrome (SSA 56). Previously following with [facility]. She was started on HCQ which did improve her symptoms.     Patient reports that her main issues right now are her right knee and bilateral feet.  Her right knee causes her significant amount of pain.  She is following with orthopedics and  recently had gel injections performed.  She does note that this has improved her symptoms.  She reports that she also at one point received steroids for her knee which did not make any improvement in her symptoms.  She reports that she does intend to undergo knee replacement, but she had to complete her therapy for osteoporosis to improve her bone strength.    More recently, she started develop pain in her feet and swelling in her ankles.  She reports the pain in her feet feels prickly and like she is walking on marbles.  This started more abruptly over the summer, and has gotten significantly worse the past 6 weeks.  Along with this she has noted left greater than right ankle swelling.  The ankles do not cause her as much pain as the feet.  She has  "been really unable to walk due to the pain in her feet and previously up through July she was able to walk reasonable distances aside from her knee pain.    Sicca symptoms are currently under control.  She notes that food taste like paste.  She has not been eating as much is normal.Notes notes about 8 pounds of weight loss.    Review of Systems  Complete ROS conducted as per HPI.      Objective     Ht 5' 4\" (1.626 m)   Wt 48.4 kg (106 lb 12.8 oz)   BMI 18.33 kg/m²     Physical Exam  Physical Exam  Constitutional: well appearing, no acute distress  HEENT: normocephalic, sclera clear, no visible oral or nasal ulcers  Neck: supple, no palpable cervical adenopathy  CV: regular rate and rhythm, no murmur  Pulm: normal respiratory effort, lungs clear to auscultation b/l  Skin: no rashes, no skin thickening  Extremities: warm and well perfused  MSK:  - Observation: Chronic hypertrophy of the bilateral MCPs with slight ulnar variance; hammer deformities of several toes  - Tenderness: No significant tenderness  - Synovitis: Right knee  - Effusion: Present in right knee and left ankle  - ROM: Slightly reduced dorsi and plantarflexion of her left ankle and decreased range of motion of the right knee      Labs and Imaging  I have personally reviewed pertinent labs and imaging.     "

## 2024-10-09 ENCOUNTER — CONSULT (OUTPATIENT)
Age: 74
End: 2024-10-09
Payer: MEDICARE

## 2024-10-09 VITALS — BODY MASS INDEX: 18.23 KG/M2 | WEIGHT: 106.8 LBS | HEIGHT: 64 IN

## 2024-10-09 DIAGNOSIS — Z79.899 OTHER LONG TERM (CURRENT) DRUG THERAPY: ICD-10-CM

## 2024-10-09 DIAGNOSIS — M35.00 SJOGREN'S SYNDROME, WITH UNSPECIFIED ORGAN INVOLVEMENT (HCC): ICD-10-CM

## 2024-10-09 DIAGNOSIS — M05.9 RHEUMATOID ARTHRITIS WITH POSITIVE RHEUMATOID FACTOR, INVOLVING UNSPECIFIED SITE (HCC): Primary | ICD-10-CM

## 2024-10-09 DIAGNOSIS — M81.8 OTHER OSTEOPOROSIS WITHOUT CURRENT PATHOLOGICAL FRACTURE: ICD-10-CM

## 2024-10-09 PROCEDURE — G2211 COMPLEX E/M VISIT ADD ON: HCPCS | Performed by: STUDENT IN AN ORGANIZED HEALTH CARE EDUCATION/TRAINING PROGRAM

## 2024-10-09 PROCEDURE — 99204 OFFICE O/P NEW MOD 45 MIN: CPT | Performed by: STUDENT IN AN ORGANIZED HEALTH CARE EDUCATION/TRAINING PROGRAM

## 2024-10-09 RX ORDER — PREDNISONE 5 MG/1
TABLET ORAL
Qty: 30 TABLET | Refills: 0 | Status: SHIPPED | OUTPATIENT
Start: 2024-10-09 | End: 2024-10-21

## 2024-10-09 NOTE — ASSESSMENT & PLAN NOTE
Symptoms currently stable on Plaquenil and symptomatic treatments.  Will obtain annual Sjogren's labs for disease monitoring.  She has noticed some weight loss (about 8 pounds), but she attributes this to food tasting bad and not wanting to eat as much.

## 2024-10-09 NOTE — ASSESSMENT & PLAN NOTE
Prior history of osteoporosis based on DEXA scan.  No history of fracture.  She is currently on Prolia, completed 1 year of Evenity around fall 2023.  Prolia managed by primary care.

## 2024-10-09 NOTE — ASSESSMENT & PLAN NOTE
74-year-old female who presents to establish care for seropositive rheumatoid arthritis and secondary Sjogren's syndrome.  Her disease has been maintained on Plaquenil since time of diagnosis as her inflammatory arthritis has been relatively well-controlled.  Over the past few months she has noticed new onset neuropathy in her feet as well as bilateral ankle swelling.  Ultrasound performed today in clinic showed small ankle effusion of the left ankle with synovial hypertrophy.  Possible that the neuropathy is related to compressive neuropathy from active ankle inflammation versus other peripheral neuropathy.  I recommend a course of prednisone.  If patient has significant improvement in her swelling and neuropathy, we will need to escalate her rheumatoid arthritis treatment likely with methotrexate.  If patient has no improvement in her symptoms, then will need further workup for peripheral neuropathy.  Close follow-up in 3 weeks.  Orders:    predniSONE 5 mg tablet; Take 4 tablets (20 mg total) by mouth daily for 3 days, THEN 3 tablets (15 mg total) daily for 3 days, THEN 2 tablets (10 mg total) daily for 3 days, THEN 1 tablet (5 mg total) daily for 3 days.    CBC and differential; Future    Cryoglobulin; Future    Protein electrophoresis, serum; Future    Protein electrophoresis, urine; Future    Urinalysis with microscopic; Future    C3 complement; Future    C4 complement; Future    CBC and differential    Protein electrophoresis, serum    Protein electrophoresis, urine    Urinalysis with microscopic    C3 complement    C4 complement    Vitamin B12; Future    Vitamin B12    Folate; Future    Folate

## 2024-10-10 ENCOUNTER — APPOINTMENT (OUTPATIENT)
Dept: LAB | Facility: IMAGING CENTER | Age: 74
End: 2024-10-10
Payer: MEDICARE

## 2024-10-10 LAB
BACTERIA UR QL AUTO: ABNORMAL /HPF
BASOPHILS # BLD AUTO: 0.05 THOUSANDS/ΜL (ref 0–0.1)
BASOPHILS NFR BLD AUTO: 1 % (ref 0–1)
BILIRUB UR QL STRIP: NEGATIVE
C3 SERPL-MCNC: 132 MG/DL (ref 87–200)
C4 SERPL-MCNC: 30 MG/DL (ref 19–52)
CLARITY UR: CLEAR
COLOR UR: ABNORMAL
EOSINOPHIL # BLD AUTO: 0.05 THOUSAND/ΜL (ref 0–0.61)
EOSINOPHIL NFR BLD AUTO: 1 % (ref 0–6)
ERYTHROCYTE [DISTWIDTH] IN BLOOD BY AUTOMATED COUNT: 14 % (ref 11.6–15.1)
FOLATE SERPL-MCNC: 13.1 NG/ML
GLUCOSE UR STRIP-MCNC: NEGATIVE MG/DL
HCT VFR BLD AUTO: 42.7 % (ref 34.8–46.1)
HGB BLD-MCNC: 13.3 G/DL (ref 11.5–15.4)
HGB UR QL STRIP.AUTO: ABNORMAL
IMM GRANULOCYTES # BLD AUTO: 0.04 THOUSAND/UL (ref 0–0.2)
IMM GRANULOCYTES NFR BLD AUTO: 1 % (ref 0–2)
KETONES UR STRIP-MCNC: NEGATIVE MG/DL
LEUKOCYTE ESTERASE UR QL STRIP: ABNORMAL
LYMPHOCYTES # BLD AUTO: 0.57 THOUSANDS/ΜL (ref 0.6–4.47)
LYMPHOCYTES NFR BLD AUTO: 7 % (ref 14–44)
MCH RBC QN AUTO: 28.7 PG (ref 26.8–34.3)
MCHC RBC AUTO-ENTMCNC: 31.1 G/DL (ref 31.4–37.4)
MCV RBC AUTO: 92 FL (ref 82–98)
MONOCYTES # BLD AUTO: 0.25 THOUSAND/ΜL (ref 0.17–1.22)
MONOCYTES NFR BLD AUTO: 3 % (ref 4–12)
NEUTROPHILS # BLD AUTO: 6.76 THOUSANDS/ΜL (ref 1.85–7.62)
NEUTS SEG NFR BLD AUTO: 87 % (ref 43–75)
NITRITE UR QL STRIP: NEGATIVE
NON-SQ EPI CELLS URNS QL MICRO: ABNORMAL /HPF
NRBC BLD AUTO-RTO: 0 /100 WBCS
PH UR STRIP.AUTO: 6.5 [PH]
PLATELET # BLD AUTO: 368 THOUSANDS/UL (ref 149–390)
PMV BLD AUTO: 9.2 FL (ref 8.9–12.7)
PROT UR STRIP-MCNC: ABNORMAL MG/DL
RBC # BLD AUTO: 4.63 MILLION/UL (ref 3.81–5.12)
RBC #/AREA URNS AUTO: ABNORMAL /HPF
SP GR UR STRIP.AUTO: 1.01 (ref 1–1.03)
UROBILINOGEN UR STRIP-ACNC: <2 MG/DL
VIT B12 SERPL-MCNC: 4086 PG/ML (ref 180–914)
WBC # BLD AUTO: 7.72 THOUSAND/UL (ref 4.31–10.16)
WBC #/AREA URNS AUTO: ABNORMAL /HPF

## 2024-10-10 PROCEDURE — 36415 COLL VENOUS BLD VENIPUNCTURE: CPT | Performed by: STUDENT IN AN ORGANIZED HEALTH CARE EDUCATION/TRAINING PROGRAM

## 2024-10-10 PROCEDURE — 85025 COMPLETE CBC W/AUTO DIFF WBC: CPT | Performed by: STUDENT IN AN ORGANIZED HEALTH CARE EDUCATION/TRAINING PROGRAM

## 2024-10-10 PROCEDURE — 86160 COMPLEMENT ANTIGEN: CPT | Performed by: STUDENT IN AN ORGANIZED HEALTH CARE EDUCATION/TRAINING PROGRAM

## 2024-10-10 PROCEDURE — 82607 VITAMIN B-12: CPT | Performed by: STUDENT IN AN ORGANIZED HEALTH CARE EDUCATION/TRAINING PROGRAM

## 2024-10-10 PROCEDURE — 81001 URINALYSIS AUTO W/SCOPE: CPT | Performed by: STUDENT IN AN ORGANIZED HEALTH CARE EDUCATION/TRAINING PROGRAM

## 2024-10-10 PROCEDURE — 82746 ASSAY OF FOLIC ACID SERUM: CPT | Performed by: STUDENT IN AN ORGANIZED HEALTH CARE EDUCATION/TRAINING PROGRAM

## 2024-10-10 PROCEDURE — 86334 IMMUNOFIX E-PHORESIS SERUM: CPT | Performed by: STUDENT IN AN ORGANIZED HEALTH CARE EDUCATION/TRAINING PROGRAM

## 2024-10-10 PROCEDURE — 84165 PROTEIN E-PHORESIS SERUM: CPT | Performed by: STUDENT IN AN ORGANIZED HEALTH CARE EDUCATION/TRAINING PROGRAM

## 2024-10-10 PROCEDURE — 84166 PROTEIN E-PHORESIS/URINE/CSF: CPT | Performed by: STUDENT IN AN ORGANIZED HEALTH CARE EDUCATION/TRAINING PROGRAM

## 2024-10-11 LAB
ALBUMIN SERPL ELPH-MCNC: 3.3 G/DL (ref 3.2–5.1)
ALBUMIN SERPL ELPH-MCNC: 49.2 % (ref 48–70)
ALBUMIN UR ELPH-MCNC: 100 %
ALPHA1 GLOB MFR UR ELPH: 0 %
ALPHA1 GLOB SERPL ELPH-MCNC: 0.42 G/DL (ref 0.15–0.47)
ALPHA1 GLOB SERPL ELPH-MCNC: 6.3 % (ref 1.8–7)
ALPHA2 GLOB MFR UR ELPH: 0 %
ALPHA2 GLOB SERPL ELPH-MCNC: 0.93 G/DL (ref 0.42–1.04)
ALPHA2 GLOB SERPL ELPH-MCNC: 13.9 % (ref 5.9–14.9)
B-GLOBULIN MFR UR ELPH: 0 %
BETA GLOB ABNORMAL SERPL ELPH-MCNC: 0.48 G/DL (ref 0.31–0.57)
BETA1 GLOB SERPL ELPH-MCNC: 7.1 % (ref 4.7–7.7)
BETA2 GLOB SERPL ELPH-MCNC: 8.7 % (ref 3.1–7.9)
BETA2+GAMMA GLOB SERPL ELPH-MCNC: 0.58 G/DL (ref 0.2–0.58)
GAMMA GLOB ABNORMAL SERPL ELPH-MCNC: 0.99 G/DL (ref 0.4–1.66)
GAMMA GLOB MFR UR ELPH: 0 %
GAMMA GLOB SERPL ELPH-MCNC: 14.8 % (ref 6.9–22.3)
IGG/ALB SER: 0.97 {RATIO} (ref 1.1–1.8)
INTERPRETATION UR IFE-IMP: NORMAL
PROT PATTERN SERPL ELPH-IMP: ABNORMAL
PROT PATTERN UR ELPH-IMP: NORMAL
PROT SERPL-MCNC: 6.7 G/DL (ref 6.4–8.2)
PROT UR-MCNC: 10.1 MG/DL

## 2024-10-11 PROCEDURE — 86334 IMMUNOFIX E-PHORESIS SERUM: CPT | Performed by: PATHOLOGY

## 2024-10-11 PROCEDURE — 84165 PROTEIN E-PHORESIS SERUM: CPT | Performed by: PATHOLOGY

## 2024-10-11 PROCEDURE — 84166 PROTEIN E-PHORESIS/URINE/CSF: CPT | Performed by: PATHOLOGY

## 2024-10-17 ENCOUNTER — HOSPITAL ENCOUNTER (OUTPATIENT)
Dept: NON INVASIVE DIAGNOSTICS | Facility: CLINIC | Age: 74
Discharge: HOME | End: 2024-10-17
Payer: MEDICARE

## 2024-10-17 VITALS
BODY MASS INDEX: 18.22 KG/M2 | DIASTOLIC BLOOD PRESSURE: 78 MMHG | WEIGHT: 106.7 LBS | HEIGHT: 64 IN | HEART RATE: 90 BPM | SYSTOLIC BLOOD PRESSURE: 118 MMHG

## 2024-10-17 DIAGNOSIS — M05.79 RHEUMATOID ARTHRITIS INVOLVING MULTIPLE SITES WITH POSITIVE RHEUMATOID FACTOR (HCC): ICD-10-CM

## 2024-10-17 DIAGNOSIS — I51.89 DIASTOLIC DYSFUNCTION: ICD-10-CM

## 2024-10-17 LAB
AORTIC ROOT: 3.6 CM
AORTIC VALVE MEAN VELOCITY: 8.1 M/S
APICAL FOUR CHAMBER EJECTION FRACTION: 71 %
ASCENDING AORTA: 3.7 CM
AV AREA BY CONTINUOUS VTI: 2.4 CM2
AV AREA PEAK VELOCITY: 2.5 CM2
AV LVOT MEAN GRADIENT: 2 MMHG
AV LVOT PEAK GRADIENT: 3 MMHG
AV MEAN GRADIENT: 3 MMHG
AV PEAK GRADIENT: 6 MMHG
AV VALVE AREA: 2.41 CM2
AV VELOCITY RATIO: 0.8
BSA FOR ECHO PROCEDURE: 1.5 M2
DOP CALC AO PEAK VEL: 1.18 M/S
DOP CALC AO VTI: 24.68 CM
DOP CALC LVOT AREA: 3.14 CM2
DOP CALC LVOT CARDIAC INDEX: 2.95 L/MIN/M2
DOP CALC LVOT CARDIAC OUTPUT: 4.42 L/MIN
DOP CALC LVOT DIAMETER: 2 CM
DOP CALC LVOT PEAK VEL VTI: 18.94 CM
DOP CALC LVOT PEAK VEL: 0.94 M/S
DOP CALC LVOT STROKE INDEX: 40 ML/M2
DOP CALC LVOT STROKE VOLUME: 59.47
E WAVE DECELERATION TIME: 232 MS
E/A RATIO: 0.73
FRACTIONAL SHORTENING: 27 (ref 28–44)
INTERVENTRICULAR SEPTUM IN DIASTOLE (PARASTERNAL SHORT AXIS VIEW): 1.1 CM
INTERVENTRICULAR SEPTUM: 1.1 CM (ref 0.6–1.1)
LAAS-AP2: 15.3 CM2
LAAS-AP4: 12.7 CM2
LEFT ATRIUM SIZE: 3 CM
LEFT ATRIUM VOLUME (MOD BIPLANE): 35 ML
LEFT ATRIUM VOLUME INDEX (MOD BIPLANE): 23.3 ML/M2
LEFT INTERNAL DIMENSION IN SYSTOLE: 3 CM (ref 2.1–4)
LEFT VENTRICULAR INTERNAL DIMENSION IN DIASTOLE: 4.1 CM (ref 3.5–6)
LEFT VENTRICULAR POSTERIOR WALL IN END DIASTOLE: 1.1 CM
LEFT VENTRICULAR STROKE VOLUME: 42 ML
LVSV (TEICH): 42 ML
MV E'TISSUE VEL-LAT: 8 CM/S
MV E'TISSUE VEL-SEP: 8 CM/S
MV PEAK A VEL: 0.71 M/S
MV PEAK E VEL: 52 CM/S
MV STENOSIS PRESSURE HALF TIME: 67 MS
MV VALVE AREA P 1/2 METHOD: 3.28
RA PRESSURE ESTIMATED: 15 MMHG
RIGHT ATRIAL 2D VOLUME: 33 ML
RIGHT ATRIUM AREA SYSTOLE A4C: 13.4 CM2
RIGHT VENTRICLE ID DIMENSION: 3.1 CM
RV PSP: 40 MMHG
SINOTUBULAR JUNCTION: 3.6 CM
SL CV LEFT ATRIUM LENGTH A2C: 4.3 CM
SL CV LV EF: 65
SL CV PED ECHO LEFT VENTRICLE DIASTOLIC VOLUME (MOD BIPLANE) 2D: 76 ML
SL CV PED ECHO LEFT VENTRICLE SYSTOLIC VOLUME (MOD BIPLANE) 2D: 34 ML
SL CV SINUS OF VALSALVA 2D: 3.3 CM
STJ: 3.6 CM
TR MAX PG: 25 MMHG
TR PEAK VELOCITY: 2.5 M/S
TRICUSPID ANNULAR PLANE SYSTOLIC EXCURSION: 2 CM
TRICUSPID VALVE PEAK REGURGITATION VELOCITY: 2.51 M/S

## 2024-10-17 PROCEDURE — 93306 TTE W/DOPPLER COMPLETE: CPT

## 2024-10-17 PROCEDURE — 93306 TTE W/DOPPLER COMPLETE: CPT | Performed by: INTERNAL MEDICINE

## 2024-10-17 NOTE — TELEPHONE ENCOUNTER
Reason for call:   [x] Refill   [] Prior Auth  [] Other:     Office:   [x] PCP/Provider -   [] Specialty/Provider -     traMADol (Ultram) 50 mg tablet 50 mg, Oral, 3 times daily PRN       Quantity: 90    Pharmacy: [facility]    Does the patient have enough for 3 days?   [x] Yes   [] No - Send as HP to POD

## 2024-10-18 DIAGNOSIS — I63.81 CEREBROVASCULAR ACCIDENT (CVA) DUE TO OCCLUSION OF SMALL ARTERY (HCC): ICD-10-CM

## 2024-10-18 RX ORDER — CLOPIDOGREL BISULFATE 75 MG/1
75 TABLET ORAL DAILY
Qty: 90 TABLET | Refills: 1 | OUTPATIENT
Start: 2024-10-18

## 2024-10-18 RX ORDER — CLOPIDOGREL BISULFATE 75 MG/1
75 TABLET ORAL DAILY
Qty: 90 TABLET | Refills: 1 | Status: SHIPPED | OUTPATIENT
Start: 2024-10-18

## 2024-10-18 RX ORDER — TRAMADOL HYDROCHLORIDE 50 MG/1
50 TABLET ORAL 3 TIMES DAILY PRN
Qty: 30 TABLET | Refills: 0 | Status: SHIPPED | OUTPATIENT
Start: 2024-10-18

## 2024-10-18 NOTE — TELEPHONE ENCOUNTER
Reason for call:   [x] Refill   [] Prior Auth  [] Other:     Office:   [x] PCP/Provider -  Jaleel Walter DO [facility] [place]   [] Specialty/Provider -     Medication:     clopidogrel (PLAVIX) 75 mg tablet       Dose/Frequency:     Take 1 tablet (75 mg total) by mouth daily       Quantity: 90    Pharmacy: [facility]     Does the patient have enough for 3 days?   [x] Yes   [] No - Send as HP to POD

## 2024-10-22 ENCOUNTER — APPOINTMENT (OUTPATIENT)
Dept: LAB | Facility: CLINIC | Age: 74
End: 2024-10-22
Payer: MEDICARE

## 2024-10-22 DIAGNOSIS — M05.9 RHEUMATOID ARTHRITIS WITH POSITIVE RHEUMATOID FACTOR, INVOLVING UNSPECIFIED SITE (HCC): ICD-10-CM

## 2024-10-22 PROCEDURE — 36415 COLL VENOUS BLD VENIPUNCTURE: CPT

## 2024-10-22 PROCEDURE — 82595 ASSAY OF CRYOGLOBULIN: CPT

## 2024-10-23 PROBLEM — Z00.00 ENCOUNTER FOR ANNUAL WELLNESS VISIT (AWV) IN MEDICARE PATIENT: Status: RESOLVED | Noted: 2024-09-23 | Resolved: 2024-10-23

## 2024-10-29 LAB — CRYOGLOB SER QL 1D COLD INC: NORMAL

## 2024-10-30 NOTE — PROGRESS NOTES
Rheumatology Follow-up Visit  Name: Caro Manzanares      : 1950      MRN: [id number]  Encounter Provider: Debbie Morfin MD  Encounter Date: 10/31/2024   Encounter department: [facility] [address] [place] [address]    Assessment & Plan  Rheumatoid arthritis with positive rheumatoid factor, involving unspecified site (HCC)  74-year-old female who presents for follow-up of seropositive rheumatoid arthritis with secondary Sjogren's.  At last visit she was having pain and swelling of the ankles and feet.  She had improvement though not total resolution of her symptoms with trial of prednisone.  We discussed that based on her response to the prednisone, there is likely some component of active rheumatoid disease.  I would recommend that we begin methotrexate 15 mg weekly with daily folic acid.  Patient understands need for lab monitoring with first set in 1 month and then every 3 months thereafter.  For now we will avoid any additional prednisone given her concern regarding osteoporosis.  Patient may reach out to me if she has worsening of her symptoms while waiting for methotrexate to take effect.  Follow-up in 3 months.  Orders:    folic acid (KP Folic Acid) 1 mg tablet; Take 1 tablet (1 mg total) by mouth daily    CBC and differential; Standing    Comprehensive metabolic panel; Standing    CBC and differential    Comprehensive metabolic panel    methotrexate 2.5 MG tablet; Take 6 tablets (15 mg total) by mouth once a week On the day you take medication, take 3 in morning and 3 in evening.    Sjogren's syndrome, with unspecified organ involvement (HCC)  Sicca symptoms controlled with conservative measures and hydroxychloroquine.       Other osteoporosis without current pathological fracture  Continues on Prolia as managed by PCP.       Other long term (current) drug therapy           History of Present Illness     Caro Manzanares is a 74 y.o. female who presents for follow-up of RA and 2/2 Sjogren's.  "    Interval History:  At last visit, complaining of new neuropathy in her feet and ankle swelling. Trial of prednisone to see if this improved her symptoms.     Patient reports since being on the prednisone she has had improvement in her symptoms.  She reports while she was on the medicine the swelling and pain in her ankles and feet was improved though not totally gone.  She notes that her knees felt better on the prednisone 2.  She has now been off it about a week and a half and still continues to be doing better than she was before starting it.  She still has some swelling and pain in her feet and ankles.    Permanent History:  Diagnosed with seropositive rheumatoid arthritis (RF 32, CCP > 200) with secondary Sjogren's Syndrome (SSA 56). Previously following with [facility] and was started on HCQ which did improve her symptoms.     Prior history of osteoporosis based on DEXA scan. No history of fracture. She is currently on Prolia, completed 1 year of Evenity around fall 2023. Prolia managed by primary care.     Review of Systems  Complete ROS conducted as per HPI.   + Dry eye  + Dry mouth  In addition, denies:  Photosensitive rash  Sicca symptoms  Recurrent oral ulcers  Chest pain   SOB  Joint issues other than noted above        Objective     /72 (BP Location: Left arm, Patient Position: Sitting, Cuff Size: Adult)   Pulse 87   Temp 97.6 °F (36.4 °C) (Tympanic)   Resp 12   Ht 5' 4\" (1.626 m)   Wt 49.5 kg (109 lb 3.2 oz)   SpO2 95%   BMI 18.74 kg/m²     Physical Exam  Physical Exam  Constitutional: well appearing, no acute distress  HEENT: normocephalic, sclera clear, no visible oral or nasal ulcers  Neck: supple, no palpable cervical adenopathy  CV: regular rate and rhythm, no murmur  Pulm: normal respiratory effort, lungs clear to auscultation b/l  Skin: no rashes, no skin thickening  Extremities: warm and well perfused, no edema  MSK: Swelling left ankle with decreased dorsi flexion, chronic synovial " hypertrophy second and third MCPs bilaterally with ulnar deviation    Labs and Imaging  I have personally reviewed pertinent labs and imaging.     Cr, CBC stable  Complements nml  SPEP negative  Cryo negative  UA sml blood

## 2024-10-31 ENCOUNTER — OFFICE VISIT (OUTPATIENT)
Age: 74
End: 2024-10-31
Payer: MEDICARE

## 2024-10-31 VITALS
BODY MASS INDEX: 18.64 KG/M2 | TEMPERATURE: 97.6 F | DIASTOLIC BLOOD PRESSURE: 72 MMHG | HEIGHT: 64 IN | SYSTOLIC BLOOD PRESSURE: 110 MMHG | WEIGHT: 109.2 LBS | RESPIRATION RATE: 12 BRPM | OXYGEN SATURATION: 95 % | HEART RATE: 87 BPM

## 2024-10-31 DIAGNOSIS — Z79.899 OTHER LONG TERM (CURRENT) DRUG THERAPY: ICD-10-CM

## 2024-10-31 DIAGNOSIS — M05.9 RHEUMATOID ARTHRITIS WITH POSITIVE RHEUMATOID FACTOR, INVOLVING UNSPECIFIED SITE (HCC): Primary | ICD-10-CM

## 2024-10-31 DIAGNOSIS — M35.00 SJOGREN'S SYNDROME, WITH UNSPECIFIED ORGAN INVOLVEMENT (HCC): ICD-10-CM

## 2024-10-31 DIAGNOSIS — M81.8 OTHER OSTEOPOROSIS WITHOUT CURRENT PATHOLOGICAL FRACTURE: ICD-10-CM

## 2024-10-31 PROCEDURE — 99214 OFFICE O/P EST MOD 30 MIN: CPT | Performed by: STUDENT IN AN ORGANIZED HEALTH CARE EDUCATION/TRAINING PROGRAM

## 2024-10-31 PROCEDURE — G2211 COMPLEX E/M VISIT ADD ON: HCPCS | Performed by: STUDENT IN AN ORGANIZED HEALTH CARE EDUCATION/TRAINING PROGRAM

## 2024-10-31 RX ORDER — FOLIC ACID 1 MG/1
1 TABLET ORAL DAILY
Qty: 60 TABLET | Refills: 6 | Status: SHIPPED | OUTPATIENT
Start: 2024-10-31

## 2024-10-31 RX ORDER — METHOTREXATE 2.5 MG/1
15 TABLET ORAL WEEKLY
Qty: 24 TABLET | Refills: 6 | Status: SHIPPED | OUTPATIENT
Start: 2024-10-31

## 2024-11-01 ENCOUNTER — TELEPHONE (OUTPATIENT)
Dept: OBGYN CLINIC | Facility: HOSPITAL | Age: 74
End: 2024-11-01

## 2024-11-01 NOTE — TELEPHONE ENCOUNTER
Caller: Obinna    Doctor: Sharmaine    Reason for call: pt wanted to give an update: states she saw the Rheumatologist and was given Prednisone. States she has been feeling much better with litle to no pain in the knees. Cancelled upcoming appt. Will call back if needed.

## 2024-11-11 DIAGNOSIS — M17.0 PRIMARY OSTEOARTHRITIS OF BOTH KNEES: ICD-10-CM

## 2024-11-11 NOTE — TELEPHONE ENCOUNTER
Reason for call:   [x] Refill   [] Prior Auth  [] Other:     Office:   [] PCP/Provider -   [x] Specialty/Provider - Deepak Schmid DO     Medication: meloxicam 7.5 mg    Dose/Frequency: 1 tab BID / 60 tabs      Pharmacy: [facility] [place] [place] - [facility] [address]     Does the patient have enough for 3 days?   [] Yes   [x] No - Send as HP to POD

## 2024-11-12 RX ORDER — MELOXICAM 7.5 MG/1
7.5 TABLET ORAL 2 TIMES DAILY
Qty: 60 TABLET | Refills: 2 | Status: SHIPPED | OUTPATIENT
Start: 2024-11-12

## 2024-11-25 ENCOUNTER — OFFICE VISIT (OUTPATIENT)
Dept: INTERNAL MEDICINE CLINIC | Age: 74
End: 2024-11-25
Payer: MEDICARE

## 2024-11-25 VITALS
TEMPERATURE: 98 F | HEART RATE: 79 BPM | SYSTOLIC BLOOD PRESSURE: 116 MMHG | OXYGEN SATURATION: 99 % | DIASTOLIC BLOOD PRESSURE: 70 MMHG | WEIGHT: 109 LBS | BODY MASS INDEX: 18.61 KG/M2 | HEIGHT: 64 IN

## 2024-11-25 DIAGNOSIS — Z12.31 ENCOUNTER FOR SCREENING MAMMOGRAM FOR BREAST CANCER: ICD-10-CM

## 2024-11-25 DIAGNOSIS — L02.92 BOIL: Primary | ICD-10-CM

## 2024-11-25 PROCEDURE — 99213 OFFICE O/P EST LOW 20 MIN: CPT | Performed by: FAMILY MEDICINE

## 2024-11-25 PROCEDURE — G2211 COMPLEX E/M VISIT ADD ON: HCPCS | Performed by: FAMILY MEDICINE

## 2024-11-25 RX ORDER — CEPHALEXIN 500 MG/1
500 CAPSULE ORAL 3 TIMES DAILY
Qty: 21 CAPSULE | Refills: 0 | Status: SHIPPED | OUTPATIENT
Start: 2024-11-25 | End: 2024-12-02

## 2024-11-25 NOTE — PROGRESS NOTES
Assessment/Plan:    1. Boil  -     cephalexin (KEFLEX) 500 mg capsule; Take 1 capsule (500 mg total) by mouth 3 (three) times a day for 7 days  2. Encounter for screening mammogram for breast cancer  -     Mammo screening bilateral w 3d and cad; Future; Expected date: 11/25/2024          There are no Patient Instructions on file for this visit.    Return for Next scheduled follow up.    Subjective:      Patient ID: Caro Manzanares is a 74 y.o. female.    Chief Complaint   Patient presents with    Mass     Lump between legs tender and bothersome   Patient stated she thinks she has a boil, stated it rubs together when walking.           HPI    Started 2 days ago , felt some discomfort, and now getting bigger and saw a white head    The following portions of the patient's history were reviewed and updated as appropriate: allergies, current medications, past family history, past medical history, past social history, past surgical history and problem list.    Review of Systems      Constitutional:  Denies fever or chills   Eyes:  Denies double , blurry vision or eye pain  HENT:  Denies nasal congestion, sore throat or new hearing issues  Respiratory:  Denies cough or shortness of breath or wheezing  Cardiovascular:  Denies palpitations or chest pain  GI:  Denies abdominal pain, nausea, or vomiting, no loose stools, no reflux  Integument:  Denies rash , no open areas  Neurologic:  Denies headache or focal weakness, no dizziness  : no dysuria, or hematuria    Current Outpatient Medications   Medication Sig Dispense Refill    aspirin (Aspir-Low) 81 mg EC tablet Take 81 mg by mouth daily        calcium citrate-Vitamin D 200 mg-250 units Take 1 tablet by mouth in the morning      cephalexin (KEFLEX) 500 mg capsule Take 1 capsule (500 mg total) by mouth 3 (three) times a day for 7 days 21 capsule 0    cholecalciferol (VITAMIN D3) 400 units tablet Take 400 Units by mouth daily      clopidogrel (PLAVIX) 75 mg tablet TAKE 1  "TABLET EVERY DAY 90 tablet 1    cyanocobalamin (VITAMIN B-12) 100 MCG tablet Take 100 mcg by mouth daily      cycloSPORINE (RESTASIS) 0.05 % ophthalmic emulsion Apply 1 drop to eye 2 (two) times a day      folic acid (KP Folic Acid) 1 mg tablet Take 1 tablet (1 mg total) by mouth daily 60 tablet 6    hydroxychloroquine (PLAQUENIL) 200 mg tablet Take 1 tablet (200 mg total) by mouth daily 100 tablet 1    meloxicam (Mobic) 7.5 mg tablet Take 1 tablet (7.5 mg total) by mouth 2 (two) times a day 60 tablet 2    methotrexate 2.5 MG tablet Take 6 tablets (15 mg total) by mouth once a week On the day you take medication, take 3 in morning and 3 in evening. 24 tablet 6    oxybutynin (DITROPAN) 5 mg tablet Take 1 tablet (5 mg total) by mouth 2 (two) times a day (Patient taking differently: Take 5 mg by mouth in the morning) 180 tablet 1    simvastatin (ZOCOR) 20 mg tablet Take 1 tablet (20 mg total) by mouth daily 90 tablet 2    traMADol (Ultram) 50 mg tablet Take 1 tablet (50 mg total) by mouth 3 (three) times a day as needed for moderate pain 30 tablet 0     No current facility-administered medications for this visit.       Objective:    /70 (BP Location: Left arm, Patient Position: Sitting, Cuff Size: Adult)   Pulse 79   Temp 98 °F (36.7 °C)   Ht 5' 4\" (1.626 m)   Wt 49.4 kg (109 lb)   SpO2 99%   BMI 18.71 kg/m²        Physical Exam         Constitutional:  Well developed, well nourished, no acute distress, non-toxic appearance   Eyes:  PERRL, conjunctiva normal , non icteric sclera  HENT:  Atraumatic, oropharynx moist. Neck-  supple   Respiratory:  CTA b/l, normal breath sounds, no rales, no wheezing   Cardiovascular:  RRR, no murmurs, no LE edema b/l  GI:  Soft, nondistended, normal bowel sounds x 4, nontender, no organomegaly, no mass, no rebound, no guarding   Neurologic:  no focal deficits noted   Psychiatric:  Speech and behavior appropriate , AAO x 3  Skin: L inner thigh with draining purulent material, " surrounding cellulitis, firm area. Warm to touch       Jaleel Walter, DO

## 2024-11-26 ENCOUNTER — TELEPHONE (OUTPATIENT)
Age: 74
End: 2024-11-26

## 2024-11-26 LAB
ALBUMIN SERPL-MCNC: 3.6 G/DL (ref 3.5–5.7)
ALP SERPL-CCNC: 57 U/L (ref 35–120)
ALT SERPL-CCNC: 10 U/L
ANION GAP SERPL CALCULATED.3IONS-SCNC: 8 MMOL/L (ref 3–11)
AST SERPL-CCNC: 13 U/L
BASOPHILS # BLD AUTO: 0 THOU/CMM (ref 0–0.1)
BASOPHILS NFR BLD AUTO: 1 %
BILIRUB SERPL-MCNC: 0.6 MG/DL (ref 0.2–1)
BUN SERPL-MCNC: 15 MG/DL (ref 7–25)
CALCIUM SERPL-MCNC: 9.4 MG/DL (ref 8.5–10.5)
CHLORIDE SERPL-SCNC: 107 MMOL/L (ref 100–109)
CO2 SERPL-SCNC: 27 MMOL/L (ref 21–31)
CREAT SERPL-MCNC: 0.41 MG/DL (ref 0.4–1.1)
CYTOLOGY CMNT CVX/VAG CYTO-IMP: NORMAL
DIFFERENTIAL METHOD BLD: ABNORMAL
EOSINOPHIL # BLD AUTO: 0.1 THOU/CMM (ref 0–0.5)
EOSINOPHIL NFR BLD AUTO: 2 %
ERYTHROCYTE [DISTWIDTH] IN BLOOD BY AUTOMATED COUNT: 14.5 % (ref 12–16)
GFR/BSA.PRED SERPLBLD CYS-BASED-ARV: 102 ML/MIN/{1.73_M2}
GLUCOSE SERPL-MCNC: 85 MG/DL (ref 65–99)
HCT VFR BLD AUTO: 37 % (ref 35–43)
HGB BLD-MCNC: 12.4 G/DL (ref 11.5–14.5)
LYMPHOCYTES # BLD AUTO: 1.3 THOU/CMM (ref 1–3)
LYMPHOCYTES NFR BLD AUTO: 19 %
MCH RBC QN AUTO: 29.5 PG (ref 26–34)
MCHC RBC AUTO-ENTMCNC: 33.4 G/DL (ref 32–37)
MCV RBC AUTO: 88 FL (ref 80–100)
MONOCYTES # BLD AUTO: 0.4 THOU/CMM (ref 0.3–1)
MONOCYTES NFR BLD AUTO: 7 %
NEUTROPHILS # BLD AUTO: 4.8 THOU/CMM (ref 1.8–7.8)
NEUTROPHILS NFR BLD AUTO: 71 %
PLATELET # BLD AUTO: 311 THOU/CMM (ref 140–350)
PMV BLD REES-ECKER: 7.4 FL (ref 7.5–11.3)
POTASSIUM SERPL-SCNC: 3.9 MMOL/L (ref 3.5–5.2)
PROT SERPL-MCNC: 6.3 G/DL (ref 6.3–8.3)
RBC # BLD AUTO: 4.19 MILL/CMM (ref 3.7–4.7)
SODIUM SERPL-SCNC: 142 MMOL/L (ref 135–145)
WBC # BLD AUTO: 6.6 THOU/CMM (ref 4–10)

## 2024-11-26 NOTE — TELEPHONE ENCOUNTER
Patient called to confirm when she should begin lab monitoring. I advised patient, per last office visit notes 10/31, to complete first set in 1 month (which would be this week) and then every 3 months thereafter. Patient understood instructions and will complete first set of labs this week. Thank you.

## 2024-11-27 NOTE — TELEPHONE ENCOUNTER
Please let patient know that I received the results of her blood work and everything is within an acceptable range. She does not need to get repeat labs until 3 months from now. Thanks!

## 2024-12-02 NOTE — TELEPHONE ENCOUNTER
Pt called back went over Dr. Morfin message. Pt aware results within acceptable ranges and to repeat labs in 3 months around end of Feb early March. No further questions

## 2024-12-05 DIAGNOSIS — M05.79 RHEUMATOID ARTHRITIS INVOLVING MULTIPLE SITES WITH POSITIVE RHEUMATOID FACTOR (HCC): ICD-10-CM

## 2024-12-05 RX ORDER — HYDROXYCHLOROQUINE SULFATE 200 MG/1
200 TABLET, FILM COATED ORAL DAILY
Qty: 90 TABLET | Refills: 1 | Status: SHIPPED | OUTPATIENT
Start: 2024-12-05 | End: 2025-03-05

## 2024-12-18 ENCOUNTER — OFFICE VISIT (OUTPATIENT)
Dept: OBGYN CLINIC | Facility: CLINIC | Age: 74
End: 2024-12-18
Payer: MEDICARE

## 2024-12-18 VITALS — BODY MASS INDEX: 18.1 KG/M2 | HEIGHT: 64 IN | WEIGHT: 106 LBS

## 2024-12-18 DIAGNOSIS — M18.0 ARTHRITIS OF CARPOMETACARPAL (CMC) JOINT OF BOTH THUMBS: Primary | ICD-10-CM

## 2024-12-18 PROCEDURE — 20600 DRAIN/INJ JOINT/BURSA W/O US: CPT | Performed by: SURGERY

## 2024-12-18 PROCEDURE — 99213 OFFICE O/P EST LOW 20 MIN: CPT | Performed by: SURGERY

## 2024-12-18 RX ADMIN — LIDOCAINE HYDROCHLORIDE 1 ML: 10 INJECTION, SOLUTION INFILTRATION; PERINEURAL at 15:30

## 2024-12-18 RX ADMIN — BUPIVACAINE HYDROCHLORIDE 0.5 ML: 2.5 INJECTION, SOLUTION INFILTRATION; PERINEURAL at 15:30

## 2024-12-18 RX ADMIN — TRIAMCINOLONE ACETONIDE 20 MG: 40 INJECTION, SUSPENSION INTRA-ARTICULAR; INTRAMUSCULAR at 15:30

## 2024-12-18 NOTE — PROGRESS NOTES
ASSESSMENT/PLAN:      74 y.o. female with bilateral thumb CMC arthritis     Due to acute exacerbation of chronic right thumb CMC arthritis, the decision was made to proceed with a repeat CSI. Right thumb CMC CSI was performed in the office without complication. Post injection protocol/expectations were reviewed. The CSI may be repeated on a 3 month basis as needed. I will see her back in the office on an as needed basis.      The patient verbalized understanding of exam findings and treatment plan. We engaged in the shared decision-making process and treatment options were discussed at length with the patient. Surgical and conservative management discussed today along with risks and benefits.    Diagnoses and all orders for this visit:    Arthritis of carpometacarpal (CMC) joint of both thumbs  -     Small joint arthrocentesis: R thumb CMC      Follow Up:  No follow-ups on file.      To Do Next Visit:  Re-evaluation of current issue    ____________________________________________________________________________________________________________________________________________      CHIEF COMPLAINT:  Chief Complaint   Patient presents with    Follow-up     Patient would like a right cmc injection today       SUBJECTIVE:  Caro Manzanares is a 74 y.o. year old RHD female who presents to the office for bilateral thumb pain. She underwent bilateral thumb CMC CSI's on 6/27/24, which was beneficial for her. She notes pain in a c-shape to the base of her right thumb. She notes her left thumb is doing okay at this time. She has been on a medication for RA and feels this has been beneficial for her.        I have personally reviewed all the relevant PMH, PSH, SH, FH, Medications and allergies.     PAST MEDICAL HISTORY:  Past Medical History:   Diagnosis Date    Arthralgia     Fall against object 06/09/2023    Foreign body in skin of finger 02/03/2023    Gait disorder 08/22/2023    Memory loss 09/03/2019    Neutropenia (HCC)      Osteoporosis     Pain of left hip 08/22/2023    Rheumatoid arthritis (HCC)     Sjogren's syndrome (HCC)     Stroke (cerebrum) (HCC)     Transient cerebral ischemia        PAST SURGICAL HISTORY:  Past Surgical History:   Procedure Laterality Date    BREAST EXCISIONAL BIOPSY Left 1983    benign    BREAST EXCISIONAL BIOPSY Left 1982    benign    BREAST EXCISIONAL BIOPSY Right 1982    benign    CATARACT EXTRACTION, BILATERAL      HYSTERECTOMY      age 52    OOPHORECTOMY      both       FAMILY HISTORY:  Family History   Problem Relation Age of Onset    Lymphoma Mother 63        ACUTE    Stroke Mother 73    Diabetes Father     Stroke Sister     No Known Problems Sister     No Known Problems Daughter     No Known Problems Maternal Grandmother     No Known Problems Maternal Grandfather     No Known Problems Paternal Grandmother     No Known Problems Paternal Grandfather     No Known Problems Maternal Aunt     No Known Problems Maternal Aunt     No Known Problems Paternal Aunt     No Known Problems Paternal Aunt        SOCIAL HISTORY:  Social History     Tobacco Use    Smoking status: Never    Smokeless tobacco: Never   Vaping Use    Vaping status: Never Used   Substance Use Topics    Alcohol use: Yes     Alcohol/week: 1.0 standard drink of alcohol     Types: 1 Glasses of wine per week    Drug use: No       MEDICATIONS:    Current Outpatient Medications:     aspirin (Aspir-Low) 81 mg EC tablet, Take 81 mg by mouth daily  , Disp: , Rfl:     calcium citrate-Vitamin D 200 mg-250 units, Take 1 tablet by mouth in the morning, Disp: , Rfl:     cholecalciferol (VITAMIN D3) 400 units tablet, Take 400 Units by mouth daily, Disp: , Rfl:     clopidogrel (PLAVIX) 75 mg tablet, TAKE 1 TABLET EVERY DAY, Disp: 90 tablet, Rfl: 1    cyanocobalamin (VITAMIN B-12) 100 MCG tablet, Take 100 mcg by mouth daily, Disp: , Rfl:     cycloSPORINE (RESTASIS) 0.05 % ophthalmic emulsion, Apply 1 drop to eye 2 (two) times a day, Disp: , Rfl:     folic acid  (KP Folic Acid) 1 mg tablet, Take 1 tablet (1 mg total) by mouth daily, Disp: 60 tablet, Rfl: 6    hydroxychloroquine (PLAQUENIL) 200 mg tablet, Take 1 tablet (200 mg total) by mouth daily, Disp: 90 tablet, Rfl: 1    meloxicam (Mobic) 7.5 mg tablet, Take 1 tablet (7.5 mg total) by mouth 2 (two) times a day, Disp: 60 tablet, Rfl: 2    methotrexate 2.5 MG tablet, Take 6 tablets (15 mg total) by mouth once a week On the day you take medication, take 3 in morning and 3 in evening., Disp: 24 tablet, Rfl: 6    oxybutynin (DITROPAN) 5 mg tablet, Take 1 tablet (5 mg total) by mouth 2 (two) times a day, Disp: 180 tablet, Rfl: 1    simvastatin (ZOCOR) 20 mg tablet, Take 1 tablet (20 mg total) by mouth daily, Disp: 90 tablet, Rfl: 2    traMADol (Ultram) 50 mg tablet, Take 1 tablet (50 mg total) by mouth 3 (three) times a day as needed for moderate pain, Disp: 30 tablet, Rfl: 0    ALLERGIES:  No Known Allergies    REVIEW OF SYSTEMS:  Review of Systems   Constitutional:  Negative for chills, fever and unexpected weight change.   HENT:  Negative for hearing loss, nosebleeds and sore throat.    Eyes:  Negative for pain, redness and visual disturbance.   Respiratory:  Negative for cough, shortness of breath and wheezing.    Cardiovascular:  Negative for chest pain, palpitations and leg swelling.   Gastrointestinal:  Negative for abdominal pain, nausea and vomiting.   Endocrine: Negative for polydipsia and polyuria.   Genitourinary:  Negative for difficulty urinating and hematuria.   Musculoskeletal:  Positive for arthralgias. Negative for joint swelling and myalgias.   Skin:  Negative for rash and wound.   Neurological:  Negative for dizziness, numbness and headaches.   Psychiatric/Behavioral:  Negative for decreased concentration, dysphoric mood and suicidal ideas. The patient is not nervous/anxious.        VITALS:  There were no vitals filed for this visit.      _____________________________________________________  PHYSICAL  EXAMINATION:  General: well developed and well nourished, alert, oriented times 3, and appears comfortable  Psychiatric: Normal  HEENT: Normocephalic, Atraumatic Trachea Midline, No torticollis  Pulmonary: No audible wheezing or respiratory distress   Cardiovascular: No pitting edema, 2+ radial pulse   Abdominal/GI: abdomen non tender, non distended   Skin: No masses, erythema, lacerations, fluctation, ulcerations  Neurovascular: Sensation Intact to the Median, Ulnar, Radial Nerve, Motor Intact to the Median, Ulnar, Radial Nerve, and Pulses Intact  Musculoskeletal: Normal, except as noted in detailed exam and in HPI.      MUSCULOSKELETAL EXAMINATION:    right CMC Exam:  No adduction contracture  No hyperextension deformity of MCP joint  Positive localized tenderness over radial and dorsal aspect of thumb (CMC joint)  Grind test is Positive for pain and Positive for crepitus  Metacarpal load shift test positive   No triggering or tenderness over the A1 pulley    ___________________________________________________    STUDIES REVIEWED:  I have personally reviewed and interpreted  AP lateral and oblique radiographs of bilateral hands which demonstrate moderate to severe 1st CMC arthrosis.          LABS REVIEWED:    HgA1c:   Lab Results   Component Value Date    HGBA1C 5.7 08/13/2019     BMP:   Lab Results   Component Value Date    GLUCOSE 91 03/10/2015    CALCIUM 9.4 11/26/2024     03/10/2015    K 3.9 11/26/2024    CO2 27 11/26/2024     11/26/2024    BUN 15 11/26/2024    CREATININE 0.41 11/26/2024             PROCEDURES PERFORMED:  Small joint arthrocentesis: R thumb CMC  [place] Protocol:  procedure performed by consultantConsent: Verbal consent obtained. Written consent not obtained.  Risks and benefits: risks, benefits and alternatives were discussed  Consent given by: patient  Patient understanding: patient states understanding of the procedure being performed  Site marked: the operative site was  marked  Patient identity confirmed: verbally with patient  Supporting Documentation  Indications: pain   Procedure Details  Location: thumb - R thumb CMC  Needle size: 25 G  Ultrasound guidance: no  Medications administered: 0.5 mL bupivacaine 0.25 %; 1 mL lidocaine 1 %; 20 mg triamcinolone acetonide 40 mg/mL    Patient tolerance: patient tolerated the procedure well with no immediate complications  Dressing:  Sterile dressing applied           _____________________________________________________      Scribe Attestation      I,:  Ame Hernandez MA am acting as a scribe while in the presence of the attending physician.:       I,:  Bari Strauss MD personally performed the services described in this documentation    as scribed in my presence.:

## 2024-12-19 RX ORDER — BUPIVACAINE HYDROCHLORIDE 2.5 MG/ML
0.5 INJECTION, SOLUTION INFILTRATION; PERINEURAL
Status: COMPLETED | OUTPATIENT
Start: 2024-12-18 | End: 2024-12-18

## 2024-12-19 RX ORDER — LIDOCAINE HYDROCHLORIDE 10 MG/ML
1 INJECTION, SOLUTION INFILTRATION; PERINEURAL
Status: COMPLETED | OUTPATIENT
Start: 2024-12-18 | End: 2024-12-18

## 2024-12-19 RX ORDER — TRIAMCINOLONE ACETONIDE 40 MG/ML
20 INJECTION, SUSPENSION INTRA-ARTICULAR; INTRAMUSCULAR
Status: COMPLETED | OUTPATIENT
Start: 2024-12-18 | End: 2024-12-18

## 2025-01-22 NOTE — PROGRESS NOTES
Rheumatology Follow-up Visit  Name: Caro Manzanares      : 1950      MRN: [id number]  Encounter Provider: Debbie Morfin MD  Encounter Date: 2025   Encounter department: [facility] [address] [place] [address]    Assessment & Plan  Rheumatoid arthritis involving multiple sites with positive rheumatoid factor (HCC)  74-year-old female who presents for follow-up of seropositive rheumatoid arthritis and secondary Sjogren's.  Patient has noted significant improvement since beginning methotrexate.  Recommend increasing methotrexate to 20 mg weekly for better disease control.  We will also increase her folic acid to 2 mg daily to see if this helps with hair loss.  Continue lab monitoring every 3 months.  Patient may also continue to utilize Mobic as needed for osteoarthritic pain.  Patient will follow-up in 3 months.       Sjogren's syndrome, with unspecified organ involvement (HCC)  Continue hydroxychloroquine and conservative treatments.  UTD on eye exam.       Other osteoporosis without current pathological fracture  Remains on Prolia with PCP.       High risk medication use         Long-term use of immunosuppressant medication         Rheumatoid arthritis with positive rheumatoid factor, involving unspecified site (HCC)    Orders:    folic acid (KP Folic Acid) 1 mg tablet; Take 2 tablets (2 mg total) by mouth daily    methotrexate 2.5 MG tablet; Take 8 tablets (20 mg total) by mouth once a week On the day you take medication, take 4 in morning and 4 in evening.    Primary osteoarthritis of both knees    Orders:    meloxicam (Mobic) 7.5 mg tablet; Take 1 tablet (7.5 mg total) by mouth 2 (two) times a day      History of Present Illness     Caro Manzanares is a 74 y.o. female who presents for follow-up of RA and 2/2 Sjogren's.     Interval History:  Patient reports that she has been doing much better since starting the methotrexate.  Patient reports she has very minimal pain.  She has been able to walk a  "bit better.  She still has some swelling in her ankles that tends to get worse as the day goes on.  No issues tolerating the methotrexate.    Permanent History:  Diagnosed with seropositive rheumatoid arthritis (RF 32, CCP > 200) with secondary Sjogren's Syndrome (SSA 56). Previously following with [facility] and was started on HCQ which did improve her symptoms.  Established with [facility] in October 2020 for at which time she was complaining of bilateral ankle swelling and foot pain.  Methotrexate 15 mg was added for additional control of her arthritis.    Prior history of osteoporosis based on DEXA scan. No history of fracture. She is currently on Prolia, completed 1 year of Evenity around fall 2023. Prolia managed by primary care.     Review of Systems  Complete ROS conducted as per HPI.   + Dry eye  + Dry mouth  + Balance issues  In addition, denies:  Photosensitive rash  Recurrent oral ulcers  Chest pain   SOB  Joint issues other than noted above        Objective     /74 (BP Location: Right arm, Patient Position: Sitting, Cuff Size: Adult)   Pulse 76   Temp (!) 97.2 °F (36.2 °C) (Tympanic)   Ht 5' 4\" (1.626 m)   Wt 48.1 kg (106 lb)   SpO2 95%   BMI 18.19 kg/m²     Physical Exam  Physical Exam  Constitutional: well appearing, no acute distress  HEENT: normocephalic, sclera clear, no visible oral or nasal ulcers  Neck: supple, no palpable cervical adenopathy  CV: regular rate and rhythm, no murmur  Pulm: normal respiratory effort, lungs clear to auscultation b/l  Skin: no rashes, no skin thickening  Extremities: warm and well perfused, trace edema left greater than right ankles  MSK: chronic synovial hypertrophy second and third MCPs bilaterally with ulnar deviation, no synovitis or effusions    Labs and Imaging  I have personally reviewed pertinent labs and imaging.     Cr, CBC stable  Complements nml  SPEP negative  Cryo negative  UA sml blood    "

## 2025-01-29 ENCOUNTER — OFFICE VISIT (OUTPATIENT)
Age: 75
End: 2025-01-29
Payer: MEDICARE

## 2025-01-29 VITALS
OXYGEN SATURATION: 95 % | WEIGHT: 106 LBS | HEIGHT: 64 IN | TEMPERATURE: 97.2 F | BODY MASS INDEX: 18.1 KG/M2 | DIASTOLIC BLOOD PRESSURE: 74 MMHG | HEART RATE: 76 BPM | SYSTOLIC BLOOD PRESSURE: 118 MMHG

## 2025-01-29 DIAGNOSIS — M35.00 SJOGREN'S SYNDROME, WITH UNSPECIFIED ORGAN INVOLVEMENT (HCC): ICD-10-CM

## 2025-01-29 DIAGNOSIS — M05.9 RHEUMATOID ARTHRITIS WITH POSITIVE RHEUMATOID FACTOR, INVOLVING UNSPECIFIED SITE (HCC): ICD-10-CM

## 2025-01-29 DIAGNOSIS — M81.8 OTHER OSTEOPOROSIS WITHOUT CURRENT PATHOLOGICAL FRACTURE: ICD-10-CM

## 2025-01-29 DIAGNOSIS — M05.79 RHEUMATOID ARTHRITIS INVOLVING MULTIPLE SITES WITH POSITIVE RHEUMATOID FACTOR (HCC): Primary | ICD-10-CM

## 2025-01-29 DIAGNOSIS — Z79.60 LONG-TERM USE OF IMMUNOSUPPRESSANT MEDICATION: ICD-10-CM

## 2025-01-29 DIAGNOSIS — M17.0 PRIMARY OSTEOARTHRITIS OF BOTH KNEES: ICD-10-CM

## 2025-01-29 DIAGNOSIS — Z79.899 HIGH RISK MEDICATION USE: ICD-10-CM

## 2025-01-29 PROCEDURE — G2211 COMPLEX E/M VISIT ADD ON: HCPCS | Performed by: STUDENT IN AN ORGANIZED HEALTH CARE EDUCATION/TRAINING PROGRAM

## 2025-01-29 PROCEDURE — 99214 OFFICE O/P EST MOD 30 MIN: CPT | Performed by: STUDENT IN AN ORGANIZED HEALTH CARE EDUCATION/TRAINING PROGRAM

## 2025-01-29 RX ORDER — METHOTREXATE 2.5 MG/1
20 TABLET ORAL WEEKLY
Qty: 96 TABLET | Refills: 6 | Status: SHIPPED | OUTPATIENT
Start: 2025-01-29

## 2025-01-29 RX ORDER — FOLIC ACID 1 MG/1
2 TABLET ORAL DAILY
Qty: 180 TABLET | Refills: 6 | Status: SHIPPED | OUTPATIENT
Start: 2025-01-29

## 2025-01-29 RX ORDER — MELOXICAM 7.5 MG/1
7.5 TABLET ORAL 2 TIMES DAILY
Qty: 180 TABLET | Refills: 2 | Status: SHIPPED | OUTPATIENT
Start: 2025-01-29

## 2025-01-29 NOTE — ASSESSMENT & PLAN NOTE
Orders:    folic acid ( Folic Acid) 1 mg tablet; Take 2 tablets (2 mg total) by mouth daily    methotrexate 2.5 MG tablet; Take 8 tablets (20 mg total) by mouth once a week On the day you take medication, take 4 in morning and 4 in evening.

## 2025-01-29 NOTE — ASSESSMENT & PLAN NOTE
74-year-old female who presents for follow-up of seropositive rheumatoid arthritis and secondary Sjogren's.  Patient has noted significant improvement since beginning methotrexate.  Recommend increasing methotrexate to 20 mg weekly for better disease control.  We will also increase her folic acid to 2 mg daily to see if this helps with hair loss.  Continue lab monitoring every 3 months.  Patient may also continue to utilize Mobic as needed for osteoarthritic pain.  Patient will follow-up in 3 months.

## 2025-02-10 ENCOUNTER — TELEPHONE (OUTPATIENT)
Age: 75
End: 2025-02-10

## 2025-02-10 NOTE — TELEPHONE ENCOUNTER
Spoke with the pharmacist at [facility] and made aware that provider is ok on pt being on both Methotrexate and Meloxicam.

## 2025-02-10 NOTE — TELEPHONE ENCOUNTER
Pt is calling regarding her methotrexate. That the mail order pharmacy has been trying to get a hold of Dr. Morfin, as this medication interacts with the Meloxicam. The order is on hold until we reach out to the pharmacy to know if we are ok with pt being on both medications. Her concern is that she needs methotrexate before Friday and being that it comes from a mail order it has to be taken care of right away.    Please advise so we can notfiy [facility] pharmacy

## 2025-02-24 ENCOUNTER — APPOINTMENT (OUTPATIENT)
Dept: LAB | Facility: IMAGING CENTER | Age: 75
End: 2025-02-24
Payer: MEDICARE

## 2025-02-24 ENCOUNTER — HOSPITAL ENCOUNTER (OUTPATIENT)
Dept: RADIOLOGY | Facility: IMAGING CENTER | Age: 75
Discharge: HOME | End: 2025-02-24
Payer: MEDICARE

## 2025-02-24 VITALS — WEIGHT: 106 LBS | HEIGHT: 64 IN | BODY MASS INDEX: 18.1 KG/M2

## 2025-02-24 DIAGNOSIS — Z12.31 ENCOUNTER FOR SCREENING MAMMOGRAM FOR BREAST CANCER: ICD-10-CM

## 2025-02-24 DIAGNOSIS — M05.9 RHEUMATOID ARTHRITIS WITH POSITIVE RHEUMATOID FACTOR, INVOLVING UNSPECIFIED SITE (HCC): ICD-10-CM

## 2025-02-24 LAB
ALBUMIN SERPL BCG-MCNC: 3.8 G/DL (ref 3.5–5)
ALP SERPL-CCNC: 58 U/L (ref 34–104)
ALT SERPL W P-5'-P-CCNC: 12 U/L (ref 7–52)
ANION GAP SERPL CALCULATED.3IONS-SCNC: 8 MMOL/L (ref 4–13)
AST SERPL W P-5'-P-CCNC: 17 U/L (ref 13–39)
BASOPHILS # BLD AUTO: 0.05 THOUSANDS/ÂΜL (ref 0–0.1)
BASOPHILS NFR BLD AUTO: 1 % (ref 0–1)
BILIRUB SERPL-MCNC: 0.72 MG/DL (ref 0.2–1)
BUN SERPL-MCNC: 20 MG/DL (ref 5–25)
CALCIUM SERPL-MCNC: 9.8 MG/DL (ref 8.4–10.2)
CHLORIDE SERPL-SCNC: 106 MMOL/L (ref 96–108)
CO2 SERPL-SCNC: 29 MMOL/L (ref 21–32)
CREAT SERPL-MCNC: 0.5 MG/DL (ref 0.6–1.3)
EOSINOPHIL # BLD AUTO: 0.13 THOUSAND/ÂΜL (ref 0–0.61)
EOSINOPHIL NFR BLD AUTO: 3 % (ref 0–6)
ERYTHROCYTE [DISTWIDTH] IN BLOOD BY AUTOMATED COUNT: 14.5 % (ref 11.6–15.1)
GFR SERPL CREATININE-BSD FRML MDRD: 94 ML/MIN/1.73SQ M
GLUCOSE SERPL-MCNC: 88 MG/DL (ref 65–140)
HCT VFR BLD AUTO: 41.7 % (ref 34.8–46.1)
HGB BLD-MCNC: 13.1 G/DL (ref 11.5–15.4)
IMM GRANULOCYTES # BLD AUTO: 0.01 THOUSAND/UL (ref 0–0.2)
IMM GRANULOCYTES NFR BLD AUTO: 0 % (ref 0–2)
LYMPHOCYTES # BLD AUTO: 1.13 THOUSANDS/ÂΜL (ref 0.6–4.47)
LYMPHOCYTES NFR BLD AUTO: 29 % (ref 14–44)
MCH RBC QN AUTO: 30.5 PG (ref 26.8–34.3)
MCHC RBC AUTO-ENTMCNC: 31.4 G/DL (ref 31.4–37.4)
MCV RBC AUTO: 97 FL (ref 82–98)
MONOCYTES # BLD AUTO: 0.38 THOUSAND/ÂΜL (ref 0.17–1.22)
MONOCYTES NFR BLD AUTO: 10 % (ref 4–12)
NEUTROPHILS # BLD AUTO: 2.18 THOUSANDS/ÂΜL (ref 1.85–7.62)
NEUTS SEG NFR BLD AUTO: 57 % (ref 43–75)
NRBC BLD AUTO-RTO: 0 /100 WBCS
PLATELET # BLD AUTO: 253 THOUSANDS/UL (ref 149–390)
PMV BLD AUTO: 9.8 FL (ref 8.9–12.7)
POTASSIUM SERPL-SCNC: 3.4 MMOL/L (ref 3.5–5.3)
PROT SERPL-MCNC: 6.5 G/DL (ref 6.4–8.4)
RBC # BLD AUTO: 4.3 MILLION/UL (ref 3.81–5.12)
SODIUM SERPL-SCNC: 143 MMOL/L (ref 135–147)
WBC # BLD AUTO: 3.88 THOUSAND/UL (ref 4.31–10.16)

## 2025-02-24 PROCEDURE — 77063 BREAST TOMOSYNTHESIS BI: CPT

## 2025-02-24 PROCEDURE — 77067 SCR MAMMO BI INCL CAD: CPT

## 2025-02-24 PROCEDURE — 85025 COMPLETE CBC W/AUTO DIFF WBC: CPT

## 2025-02-24 PROCEDURE — 80053 COMPREHEN METABOLIC PANEL: CPT

## 2025-02-24 PROCEDURE — 36415 COLL VENOUS BLD VENIPUNCTURE: CPT

## 2025-02-25 ENCOUNTER — RESULTS FOLLOW-UP (OUTPATIENT)
Age: 75
End: 2025-02-25

## 2025-03-13 DIAGNOSIS — I63.9 CEREBRAL INFARCTION (HCC): ICD-10-CM

## 2025-03-13 DIAGNOSIS — E78.5 HYPERLIPIDEMIA, UNSPECIFIED HYPERLIPIDEMIA TYPE: ICD-10-CM

## 2025-03-13 NOTE — TELEPHONE ENCOUNTER
Reason for call:   [x] Refill   [] Prior Auth  [] Other:     Office:   [x] PCP/Provider - Jaleel Walter,    [] Specialty/Provider -     Medication:     simvastatin (ZOCOR) 20 mg tablet       Dose/Frequency:     20 mg, Oral, Daily       Quantity: 90    Pharmacy: [facility] - [facility] [id number]     Local Pharmacy   Does the patient have enough for 3 days?   [] Yes   [] No - Send as HP to POD    Mail Away Pharmacy   Does the patient have enough for 10 days?   [x] Yes   [] No - Send as HP to POD

## 2025-03-14 RX ORDER — SIMVASTATIN 20 MG
20 TABLET ORAL DAILY
Qty: 90 TABLET | Refills: 1 | Status: SHIPPED | OUTPATIENT
Start: 2025-03-14

## 2025-03-18 DIAGNOSIS — M05.79 RHEUMATOID ARTHRITIS INVOLVING MULTIPLE SITES WITH POSITIVE RHEUMATOID FACTOR (HCC): ICD-10-CM

## 2025-03-18 RX ORDER — HYDROXYCHLOROQUINE SULFATE 200 MG/1
200 TABLET, FILM COATED ORAL DAILY
Qty: 90 TABLET | Refills: 1 | Status: SHIPPED | OUTPATIENT
Start: 2025-03-18 | End: 2025-06-16

## 2025-03-18 NOTE — TELEPHONE ENCOUNTER
Requested Prescriptions     Pending Prescriptions Disp Refills    hydroxychloroquine (PLAQUENIL) 200 mg tablet 90 tablet 1     Sig: Take 1 tablet (200 mg total) by mouth daily

## 2025-03-24 ENCOUNTER — APPOINTMENT (OUTPATIENT)
Dept: LAB | Facility: IMAGING CENTER | Age: 75
End: 2025-03-24
Payer: MEDICARE

## 2025-03-24 DIAGNOSIS — M81.0 AGE-RELATED OSTEOPOROSIS WITHOUT CURRENT PATHOLOGICAL FRACTURE: ICD-10-CM

## 2025-03-24 DIAGNOSIS — E78.2 MIXED HYPERLIPIDEMIA: ICD-10-CM

## 2025-03-24 DIAGNOSIS — R63.4 WEIGHT LOSS: ICD-10-CM

## 2025-03-24 DIAGNOSIS — M05.79 RHEUMATOID ARTHRITIS INVOLVING MULTIPLE SITES WITH POSITIVE RHEUMATOID FACTOR (HCC): ICD-10-CM

## 2025-03-24 DIAGNOSIS — Z13.1 SCREENING FOR DIABETES MELLITUS: ICD-10-CM

## 2025-03-24 DIAGNOSIS — E55.9 VITAMIN D DEFICIENCY: ICD-10-CM

## 2025-03-24 DIAGNOSIS — E83.52 HYPERCALCEMIA: ICD-10-CM

## 2025-03-24 DIAGNOSIS — R35.0 URINARY FREQUENCY: ICD-10-CM

## 2025-03-24 LAB
25(OH)D3 SERPL-MCNC: 53.8 NG/ML (ref 30–100)
ALBUMIN SERPL BCG-MCNC: 4.2 G/DL (ref 3.5–5)
ALP SERPL-CCNC: 62 U/L (ref 34–104)
ALT SERPL W P-5'-P-CCNC: 14 U/L (ref 7–52)
ANION GAP SERPL CALCULATED.3IONS-SCNC: 8 MMOL/L (ref 4–13)
AST SERPL W P-5'-P-CCNC: 18 U/L (ref 13–39)
BASOPHILS # BLD AUTO: 0.06 THOUSANDS/ÂΜL (ref 0–0.1)
BASOPHILS NFR BLD AUTO: 1 % (ref 0–1)
BILIRUB SERPL-MCNC: 0.79 MG/DL (ref 0.2–1)
BUN SERPL-MCNC: 20 MG/DL (ref 5–25)
CA-I BLD-SCNC: 1.25 MMOL/L (ref 1.12–1.32)
CALCIUM SERPL-MCNC: 10 MG/DL (ref 8.4–10.2)
CHLORIDE SERPL-SCNC: 105 MMOL/L (ref 96–108)
CHOLEST SERPL-MCNC: 162 MG/DL (ref ?–200)
CO2 SERPL-SCNC: 31 MMOL/L (ref 21–32)
CREAT SERPL-MCNC: 0.58 MG/DL (ref 0.6–1.3)
CRP SERPL QL: <1 MG/L
EOSINOPHIL # BLD AUTO: 0.11 THOUSAND/ÂΜL (ref 0–0.61)
EOSINOPHIL NFR BLD AUTO: 2 % (ref 0–6)
ERYTHROCYTE [DISTWIDTH] IN BLOOD BY AUTOMATED COUNT: 14 % (ref 11.6–15.1)
ERYTHROCYTE [SEDIMENTATION RATE] IN BLOOD: 16 MM/HOUR (ref 0–29)
EST. AVERAGE GLUCOSE BLD GHB EST-MCNC: 120 MG/DL
GFR SERPL CREATININE-BSD FRML MDRD: 90 ML/MIN/1.73SQ M
GLUCOSE P FAST SERPL-MCNC: 99 MG/DL (ref 65–99)
HBA1C MFR BLD: 5.8 %
HCT VFR BLD AUTO: 43.2 % (ref 34.8–46.1)
HDLC SERPL-MCNC: 72 MG/DL
HGB BLD-MCNC: 13.9 G/DL (ref 11.5–15.4)
IMM GRANULOCYTES # BLD AUTO: 0.01 THOUSAND/UL (ref 0–0.2)
IMM GRANULOCYTES NFR BLD AUTO: 0 % (ref 0–2)
LDLC SERPL CALC-MCNC: 71 MG/DL (ref 0–100)
LYMPHOCYTES # BLD AUTO: 1.19 THOUSANDS/ÂΜL (ref 0.6–4.47)
LYMPHOCYTES NFR BLD AUTO: 25 % (ref 14–44)
MCH RBC QN AUTO: 31.2 PG (ref 26.8–34.3)
MCHC RBC AUTO-ENTMCNC: 32.2 G/DL (ref 31.4–37.4)
MCV RBC AUTO: 97 FL (ref 82–98)
MONOCYTES # BLD AUTO: 0.45 THOUSAND/ÂΜL (ref 0.17–1.22)
MONOCYTES NFR BLD AUTO: 9 % (ref 4–12)
NEUTROPHILS # BLD AUTO: 3.01 THOUSANDS/ÂΜL (ref 1.85–7.62)
NEUTS SEG NFR BLD AUTO: 63 % (ref 43–75)
NRBC BLD AUTO-RTO: 0 /100 WBCS
PLATELET # BLD AUTO: 260 THOUSANDS/UL (ref 149–390)
PMV BLD AUTO: 9.5 FL (ref 8.9–12.7)
POTASSIUM SERPL-SCNC: 3.7 MMOL/L (ref 3.5–5.3)
PROT SERPL-MCNC: 6.4 G/DL (ref 6.4–8.4)
RBC # BLD AUTO: 4.46 MILLION/UL (ref 3.81–5.12)
SODIUM SERPL-SCNC: 144 MMOL/L (ref 135–147)
TRIGL SERPL-MCNC: 94 MG/DL (ref ?–150)
TSH SERPL DL<=0.05 MIU/L-ACNC: 1.1 UIU/ML (ref 0.45–4.5)
WBC # BLD AUTO: 4.83 THOUSAND/UL (ref 4.31–10.16)

## 2025-03-27 LAB
DSDNA IGG SERPL IA-ACNC: 8.1 IU/ML (ref ?–15)
NUCLEAR IGG SER IA-RTO: 5.5 RATIO (ref ?–1)

## 2025-03-29 LAB
CENTROMERE B AB SER-ACNC: <0.7 U/ML (ref ?–10)
ENA JO1 AB SER IA-ACNC: <0.5 U/ML (ref ?–10)
ENA SCL70 IGG SER IA-ACNC: 0.8 U/ML (ref ?–10)
ENA SM IGG SER IA-ACNC: 2.2 U/ML (ref ?–10)
ENA SS-A AB SER IA-ACNC: 36 U/ML (ref ?–10)
ENA SS-B IGG SER IA-ACNC: <0.6 U/ML (ref ?–10)
U1 SNRNP IGG SER IA-ACNC: <1.1 U/ML (ref ?–10)

## 2025-04-03 LAB
ANA PAT SER IF-IMP: ABNORMAL
ANA SER QL IF: POSITIVE
ANA TITR SER HEP2 SUBST: ABNORMAL {TITER}

## 2025-04-09 ENCOUNTER — OFFICE VISIT (OUTPATIENT)
Dept: INTERNAL MEDICINE CLINIC | Facility: OTHER | Age: 75
End: 2025-04-09
Payer: MEDICARE

## 2025-04-09 ENCOUNTER — TELEPHONE (OUTPATIENT)
Dept: INTERNAL MEDICINE CLINIC | Facility: OTHER | Age: 75
End: 2025-04-09

## 2025-04-10 ENCOUNTER — TELEPHONE (OUTPATIENT)
Dept: ADMINISTRATIVE | Facility: OTHER | Age: 75
End: 2025-04-10

## 2025-04-10 NOTE — TELEPHONE ENCOUNTER
----- Message from Lakisha FRANCIS sent at 4/9/2025 12:24 PM EDT -----  Regarding: olonoscopy update  04/09/25 12:24 PM    Hello, our patient Caro Manzanares has had CRC:Colonoscopy completed/performed. Please assist in updating the patient chart by pulling the document from chart/media Tab within Chart Review. The date of service is 02/07/2025.     Thank you,  Lakisha Rivera MA  [facility] PR[facility]

## 2025-04-10 NOTE — TELEPHONE ENCOUNTER
04/10/25 10:42 AM     Chart reviewed for CRC: Colonoscopy was/were submitted to the patient's insurance.     Corinne Manzo   [facility]

## 2025-04-10 NOTE — TELEPHONE ENCOUNTER
Upon review of the In Basket request we were able to locate, review, and update the patient chart as requested for CRC: Colonoscopy.    Any additional questions or concerns should be emailed to the Practice Liaisons via the appropriate education email address, please do not reply via In Basket.    Thank you  Corinne Manzo   [facility]

## 2025-04-23 NOTE — PROGRESS NOTES
Rheumatology Follow-up Visit  Name: Caro Manzanares      : 1950      MRN: [id number]  Encounter Provider: Debbie Morfin MD  Encounter Date: 2025   Encounter department: [facility]    Assessment & Plan  Rheumatoid arthritis involving multiple sites with positive rheumatoid factor (HCC)  75-year-old female who presents for follow-up of seropositive rheumatoid arthritis. Overall, her inflammatory arthritis symptoms seem to be well controlled on MTX 20mg weekly and HCQ. She is having some swelling in the L ankle, but seems to be more soft tissue and possible related to mild ankle injury from walking in grass. Recommend wrap and elevation, Meloxicam PRN. Continue lab work monitoring every 3 months. Follow-up in 6 months.       Sjogren's syndrome, with unspecified organ involvement (HCC)  Continue hydroxychloroquine and conservative treatments. UTD on eye exam.        Other osteoporosis without current pathological fracture  Remains on Prolia with PCP.        High risk medication use         Long-term use of immunosuppressant medication         Primary osteoarthritis of both knees    Orders:    meloxicam (Mobic) 7.5 mg tablet; Take 1 tablet (7.5 mg total) by mouth 2 (two) times a day as needed for mild pain      History of Present Illness     Caro Manzanares is a 75 y.o. female who presents for follow-up of RA and 2/2 Sjogren's.     Interval History:  Patient reports overall she has been doing okay. This past weekend noticed more discomfort and swelling in the L ankle. She had been walking in grass prior to the onset which she typically does not due. Has tingling in toes but not too bothersome. No issues with her medications.    Permanent History:  Diagnosed with seropositive rheumatoid arthritis (RF 32, CCP > 200) with secondary Sjogren's Syndrome (+LAILA, SSA 56). [facility] and was [facility] which did improve her symptoms.  Established with [facility] in  "October 2024 at which time she was complaining of bilateral ankle swelling and foot pain.  Methotrexate 15 mg was added for additional control of her arthritis with increase to 20mg weekly Jan 2025.    Prior history of osteoporosis based on DEXA scan. No history of fracture. She is currently on Prolia, completed 1 year of Evenity around fall 2023. Prolia managed by primary care.     Review of Systems  Complete ROS conducted as per HPI.   + Dry eye  + Dry mouth  + Balance issues  In addition, denies:  Photosensitive rash  Recurrent oral ulcers  Chest pain   SOB  Joint issues other than noted above        Objective     /78 (BP Location: Left arm, Patient Position: Sitting, Cuff Size: Standard)   Pulse 69   Temp 98.2 °F (36.8 °C) (Tympanic)   Resp (!) 98   Ht 5' 4\" (1.626 m)   Wt 50.3 kg (110 lb 12.8 oz)   BMI 19.02 kg/m²     Physical Exam  Physical Exam  Constitutional: well appearing, no acute distress  HEENT: normocephalic, sclera clear, no visible oral or nasal ulcers  Neck: supple, no palpable cervical adenopathy  CV: regular rate and rhythm, no murmur  Pulm: normal respiratory effort, lungs clear to auscultation b/l  Skin: no rashes, no skin thickening  Extremities: warm and well perfused, trace edema left greater than right ankles  MSK: chronic synovial hypertrophy second and third MCPs bilaterally with ulnar deviation, no synovitis, mild swelling of the left ankle    Labs and Imaging  I have personally reviewed pertinent labs and imaging.     "

## 2025-04-30 ENCOUNTER — OFFICE VISIT (OUTPATIENT)
Age: 75
End: 2025-04-30
Payer: MEDICARE

## 2025-04-30 VITALS
HEIGHT: 64 IN | WEIGHT: 110.8 LBS | SYSTOLIC BLOOD PRESSURE: 108 MMHG | BODY MASS INDEX: 18.92 KG/M2 | HEART RATE: 69 BPM | DIASTOLIC BLOOD PRESSURE: 78 MMHG | RESPIRATION RATE: 98 BRPM | TEMPERATURE: 98.2 F

## 2025-04-30 DIAGNOSIS — M05.79 RHEUMATOID ARTHRITIS INVOLVING MULTIPLE SITES WITH POSITIVE RHEUMATOID FACTOR (HCC): Primary | ICD-10-CM

## 2025-04-30 DIAGNOSIS — Z79.60 LONG-TERM USE OF IMMUNOSUPPRESSANT MEDICATION: ICD-10-CM

## 2025-04-30 DIAGNOSIS — M35.00 SJOGREN'S SYNDROME, WITH UNSPECIFIED ORGAN INVOLVEMENT (HCC): ICD-10-CM

## 2025-04-30 DIAGNOSIS — Z79.899 HIGH RISK MEDICATION USE: ICD-10-CM

## 2025-04-30 DIAGNOSIS — M81.8 OTHER OSTEOPOROSIS WITHOUT CURRENT PATHOLOGICAL FRACTURE: ICD-10-CM

## 2025-04-30 DIAGNOSIS — M17.0 PRIMARY OSTEOARTHRITIS OF BOTH KNEES: ICD-10-CM

## 2025-04-30 PROCEDURE — G2211 COMPLEX E/M VISIT ADD ON: HCPCS | Performed by: STUDENT IN AN ORGANIZED HEALTH CARE EDUCATION/TRAINING PROGRAM

## 2025-04-30 PROCEDURE — 99214 OFFICE O/P EST MOD 30 MIN: CPT | Performed by: STUDENT IN AN ORGANIZED HEALTH CARE EDUCATION/TRAINING PROGRAM

## 2025-04-30 RX ORDER — MELOXICAM 7.5 MG/1
7.5 TABLET ORAL 2 TIMES DAILY PRN
Qty: 180 TABLET | Refills: 2 | Status: SHIPPED | OUTPATIENT
Start: 2025-04-30

## 2025-04-30 NOTE — ASSESSMENT & PLAN NOTE
75-year-old female who presents for follow-up of seropositive rheumatoid arthritis. Overall, her inflammatory arthritis symptoms seem to be well controlled on MTX 20mg weekly and HCQ. She is having some swelling in the L ankle, but seems to be more soft tissue and possible related to mild ankle injury from walking in grass. Recommend wrap and elevation, Meloxicam PRN. Continue lab work monitoring every 3 months. Follow-up in 6 months.

## 2025-05-22 ENCOUNTER — TELEPHONE (OUTPATIENT)
Dept: NEUROLOGY | Facility: CLINIC | Age: 75
End: 2025-05-22

## 2025-05-22 NOTE — TELEPHONE ENCOUNTER
Lvm to conf appt on 6/3/25 @10:00am at the [facility] .Please conf appt if pt calls back.Thank you in advance

## 2025-05-29 ENCOUNTER — OFFICE VISIT (OUTPATIENT)
Dept: INTERNAL MEDICINE CLINIC | Facility: OTHER | Age: 75
End: 2025-05-29
Payer: MEDICARE

## 2025-05-29 ENCOUNTER — APPOINTMENT (OUTPATIENT)
Dept: LAB | Facility: IMAGING CENTER | Age: 75
End: 2025-05-29
Payer: MEDICARE

## 2025-05-29 ENCOUNTER — DOCUMENTATION (OUTPATIENT)
Dept: INTERNAL MEDICINE CLINIC | Facility: OTHER | Age: 75
End: 2025-05-29

## 2025-05-29 VITALS
BODY MASS INDEX: 18.78 KG/M2 | TEMPERATURE: 98 F | HEART RATE: 66 BPM | WEIGHT: 110 LBS | DIASTOLIC BLOOD PRESSURE: 62 MMHG | SYSTOLIC BLOOD PRESSURE: 116 MMHG | HEIGHT: 64 IN | OXYGEN SATURATION: 98 %

## 2025-05-29 DIAGNOSIS — I63.81 CEREBROVASCULAR ACCIDENT (CVA) DUE TO OCCLUSION OF SMALL ARTERY (HCC): ICD-10-CM

## 2025-05-29 DIAGNOSIS — R20.2 PARESTHESIAS: Primary | ICD-10-CM

## 2025-05-29 LAB — VIT B12 SERPL-MCNC: 2788 PG/ML (ref 180–914)

## 2025-05-29 PROCEDURE — 82607 VITAMIN B-12: CPT | Performed by: FAMILY MEDICINE

## 2025-05-29 PROCEDURE — 36415 COLL VENOUS BLD VENIPUNCTURE: CPT | Performed by: FAMILY MEDICINE

## 2025-05-29 PROCEDURE — 99213 OFFICE O/P EST LOW 20 MIN: CPT | Performed by: FAMILY MEDICINE

## 2025-05-29 PROCEDURE — G2211 COMPLEX E/M VISIT ADD ON: HCPCS | Performed by: FAMILY MEDICINE

## 2025-05-29 RX ORDER — CLOPIDOGREL BISULFATE 75 MG/1
75 TABLET ORAL DAILY
Qty: 90 TABLET | Refills: 1 | Status: SHIPPED | OUTPATIENT
Start: 2025-05-29

## 2025-05-29 NOTE — PROGRESS NOTES
Assessment/Plan:    1. Paresthesias  -     VAS ARTERIAL DUPLEX- LOWER LIMB BILATERAL; Future; Expected date: 05/29/2025  -     Vitamin B12; Future; Expected date: 05/29/2025 (Use expected date for collection)  -     Vitamin B12  2. Cerebrovascular accident (CVA) due to occlusion of small artery (HCC)  -     clopidogrel (PLAVIX) 75 mg tablet; Take 1 tablet (75 mg total) by mouth daily  -     VAS ARTERIAL DUPLEX- LOWER LIMB BILATERAL; Future; Expected date: 05/29/2025     Rule out PAD, repeat B12 levels as would optimally like to stay within 900 or 1000 points, will continue to follow closely    There are no Patient Instructions on file for this visit.    Return for Next scheduled follow up.    Subjective:      Patient ID: Caro Manzanares is a 75 y.o. female.    Chief Complaint   Patient presents with    Pain     Pain- patient states both feet- patient states for about 2-3 month- patient goes to rheumatology        HPI      Patient with several month history of numbness in her feet and cold feet.  Numbness is worse when her feet are colder.  She has no issues at night and symptoms are only there in the daytime.  They do not wake her up at night nor does she feel the tingling.  She has no pain but it is uncomfortable and feels strange when she walks.  No changes in her medical history except for the start of methotrexate which she is tolerating well and has help with her other arthralgias patient denies any rashes, she is not diabetic.  She is on B12 and her last dose was very high.    The following portions of the patient's history were reviewed and updated as appropriate: allergies, current medications, past family history, past medical history, past social history, past surgical history and problem list.    Review of Systems      Constitutional:  Denies fever or chills   Eyes:  Denies double , blurry vision or eye pain  HENT:  Denies nasal congestion, sore throat or new hearing issues  Respiratory:  Denies cough or  "shortness of breath or wheezing  Cardiovascular:  Denies palpitations or chest pain  GI:  Denies abdominal pain, nausea, or vomiting, no loose stools, no reflux  Integument:  Denies rash , no open areas  Neurologic:  Denies headache or focal weakness, no dizziness  : no dysuria, or hematuria      Current Medications[1]    Objective:    /62 (BP Location: Left arm, Patient Position: Sitting, Cuff Size: Standard)   Pulse 66   Temp 98 °F (36.7 °C) (Temporal)   Ht 5' 4\" (1.626 m)   Wt 49.9 kg (110 lb)   SpO2 98%   BMI 18.88 kg/m²        Physical Exam           Jaleel Walter DO         [1]   Current Outpatient Medications   Medication Sig Dispense Refill    aspirin (Aspir-Low) 81 mg EC tablet Take 81 mg by mouth in the morning.      calcium citrate-Vitamin D 200 mg-250 units Take 1 tablet by mouth in the morning      cholecalciferol (VITAMIN D3) 400 units tablet Take 400 Units by mouth in the morning.      clopidogrel (PLAVIX) 75 mg tablet Take 1 tablet (75 mg total) by mouth daily 90 tablet 1    cyanocobalamin (VITAMIN B-12) 100 MCG tablet Take 100 mcg by mouth in the morning.      cycloSPORINE (RESTASIS) 0.05 % ophthalmic emulsion Apply 1 drop to eye in the morning and 1 drop in the evening.      folic acid (KP Folic Acid) 1 mg tablet Take 2 tablets (2 mg total) by mouth daily 180 tablet 6    hydroxychloroquine (PLAQUENIL) 200 mg tablet Take 1 tablet (200 mg total) by mouth daily 90 tablet 1    meloxicam (Mobic) 7.5 mg tablet Take 1 tablet (7.5 mg total) by mouth 2 (two) times a day as needed for mild pain 180 tablet 2    methotrexate 2.5 MG tablet Take 8 tablets (20 mg total) by mouth once a week On the day you take medication, take 4 in morning and 4 in evening. 96 tablet 6    oxybutynin (DITROPAN) 5 mg tablet Take 1 tablet (5 mg total) by mouth 2 (two) times a day 180 tablet 1    simvastatin (ZOCOR) 20 mg tablet Take 1 tablet (20 mg total) by mouth daily 90 tablet 1    traMADol (Ultram) 50 mg tablet Take " 1 tablet (50 mg total) by mouth 3 (three) times a day as needed for moderate pain 30 tablet 0     No current facility-administered medications for this visit.

## 2025-06-03 ENCOUNTER — OFFICE VISIT (OUTPATIENT)
Dept: NEUROLOGY | Facility: CLINIC | Age: 75
End: 2025-06-03
Payer: MEDICARE

## 2025-06-03 VITALS
HEART RATE: 65 BPM | OXYGEN SATURATION: 96 % | DIASTOLIC BLOOD PRESSURE: 68 MMHG | SYSTOLIC BLOOD PRESSURE: 114 MMHG | TEMPERATURE: 97.7 F | WEIGHT: 114 LBS | HEIGHT: 64 IN | BODY MASS INDEX: 19.46 KG/M2

## 2025-06-03 DIAGNOSIS — I67.850 CADASIL (CEREBRAL AD ARTERIOPATHY W INFARCTS AND LEUKOENCEPHALOPATHY): Primary | ICD-10-CM

## 2025-06-03 DIAGNOSIS — I63.9 CEREBROVASCULAR ACCIDENT (CVA) DUE TO VASCULAR OCCLUSION (HCC): ICD-10-CM

## 2025-06-03 DIAGNOSIS — I67.850 CADASIL (CEREBRAL AUTOSOMAL DOMINANT ARTERIOPATHY WITH SUBCORTICAL INFARCTS AND LEUKOENCEPHALOPATHY): ICD-10-CM

## 2025-06-03 DIAGNOSIS — G62.9 NEUROPATHY: ICD-10-CM

## 2025-06-03 DIAGNOSIS — M05.79 RHEUMATOID ARTHRITIS INVOLVING MULTIPLE SITES WITH POSITIVE RHEUMATOID FACTOR (HCC): ICD-10-CM

## 2025-06-03 DIAGNOSIS — I63.81 MULTIPLE LACUNAR INFARCTS (HCC): ICD-10-CM

## 2025-06-03 PROCEDURE — 99215 OFFICE O/P EST HI 40 MIN: CPT | Performed by: PSYCHIATRY & NEUROLOGY

## 2025-06-03 PROCEDURE — G2211 COMPLEX E/M VISIT ADD ON: HCPCS | Performed by: PSYCHIATRY & NEUROLOGY

## 2025-06-03 NOTE — ASSESSMENT & PLAN NOTE
Secondary Prevention -   -for medications - recommend continuation of combination of aspirin, plavix (this combination has been better for her, although no evidence, but has not had any recent strokes on this regimen) and zocor  -Blood Pressure goal < 130/80, BP is currently at goal   -LDL goal <70, last LDL is at goal   -MANNIE screening - no snoring     Therapy-  Not in therapy     Counseling/stroke education -   -I advised patient to avoid using NSAIDs for headaches or other pain and to stick to tylenol if needed  -Recommend lifestyle modifications such as mediterranean diet & regular exercise regimen (brisk walking) atleast 4-5 times a week for 20-30 minutes.   -I educated patient/family regarding medication compliance  -encourage control of diabetes and hypertension; defer management to primary

## 2025-06-03 NOTE — ASSESSMENT & PLAN NOTE
"Not on any medications  Does not want to take any medications at this time   No EMG     Follow up in 1 year.     I would be happy to see the patient sooner if any new questions/concerns arise.  Patient/Guardian was advised to the call the office if they have any questions and concerns in the meantime.     We discussed \"red flag\" headache symptoms including symptoms such as facial droop on one side, weakness/paralysis on either side, speech trouble, numbness on one side, balance issues, any vision changes, extreme dizziness or significant increase in severity of headache or a sudden onset severe headache, patient is to call 9-1-1 immediately or to proceed to the nearest ER.     "

## 2025-06-03 NOTE — TELEPHONE ENCOUNTER
Reason for call:   [x] Refill   [] Prior Auth  [] Other:     Office:   [x] PCP/Provider - Jaleel Walter DO   [] Specialty/Provider -     Medication:     traMADol (Ultram) 50 mg tablet       Dose/Frequency:     Take 1 tablet (50 mg total) by mouth 3 (three) times a day as needed for moderate pain       Quantity: 30    Pharmacy: [address] [phone]          Local Pharmacy   Does the patient have enough for 3 days?   [] Yes   [x] No - Send as HP to POD    Mail Away Pharmacy   Does the patient have enough for 10 days?   [] Yes   [] No - Send as HP to POD

## 2025-06-03 NOTE — PROGRESS NOTES
Name: Caro Manzanares      : 1950      MRN: [id number]  Encounter Provider: Angela Mcgowan MD  Encounter Date: 6/3/2025   Encounter department: 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)  Patient has noticed slightly decline in memory  Last MRI brain was done last year which was stable, and no worsening changes noted   Works out every other day and leads a healthy lifestyle   Is not driving as much  Check Mri brain in 1 year before she returns   Orders:    MRI brain with and without contrast; Future    Cerebrovascular accident (CVA) due to vascular occlusion (HCC)    Secondary Prevention -   -for medications - recommend continuation of combination of aspirin, plavix (this combination has been better for her, although no evidence, but has not had any recent strokes on this regimen) and zocor  -Blood Pressure goal < 130/80, BP is currently at goal   -LDL goal <70, last LDL is at goal   -MANNIE screening - no snoring     Therapy-  Not in therapy     Counseling/stroke education -   -I advised patient to avoid using NSAIDs for headaches or other pain and to stick to tylenol if needed  -Recommend lifestyle modifications such as mediterranean diet & regular exercise regimen (brisk walking) atleast 4-5 times a week for 20-30 minutes.   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     I would be happy to see the patient sooner if any new questions/concerns arise.  Patient/Guardian was advised to the call the office if they have any questions and concerns in the  "meantime.     We discussed \"red flag\" headache symptoms including symptoms such as facial droop on one side, weakness/paralysis on either side, speech trouble, numbness on one side, balance issues, any vision changes, extreme dizziness or significant increase in severity of headache or a sudden onset severe headache, patient is to call 9-1-1 immediately or to proceed to the nearest ER.           History of Present Illness   HPI     Patient is 76 y/o Female who is here as a follow up for history of CADASIL.    Patient doing well overall and denies any new TIA/CVA like symptoms and is complaint w/ her medications and is doing well. She has some mild cognitive memory issues, and slightly delay at times, and may forget peoples names and conversations at times, but overall is ok. She can do her ADLs. She does have mild bruising with aspirin, plavix combination. They have a few trips planned this summer which they are excited about.       Review of Systems   Constitutional: Negative.    HENT: Negative.     Eyes: Negative.    Respiratory: Negative.     Cardiovascular: Negative.    Gastrointestinal: Negative.    Endocrine: Negative.    Genitourinary: Negative.    Musculoskeletal: Negative.    Skin: Negative.    Allergic/Immunologic: Negative.    Neurological: Negative.    Hematological: Negative.    Psychiatric/Behavioral: Negative.     All other systems reviewed and are negative.   I have personally reviewed the MA's review of systems and made changes as necessary.         Objective   /68 (Patient Position: Sitting, Cuff Size: Standard)   Pulse 65   Temp 97.7 °F (36.5 °C) (Temporal)   Ht 5' 4\" (1.626 m)   Wt 51.7 kg (114 lb)   SpO2 96%   BMI 19.57 kg/m²     Physical Exam  General - patient is alert, awake follows commands   Speech - no dysarthria noted, no aphasia noted.     Neuro:   Cranial nerves: PERRL, EOMI, facial sensation intact to soft touch in V1, V2 and V3, no facial asymmetry noted, uvula/palate " midline, tongue midline.   Motor: 5/5 throughout, normal tone, no pronator drift noted.   Sensory - intact to soft touch throughout  Reflexes - 2+ throughout  Coordination - no ataxia/dysmetria noted  Gait - normal    Neurological Exam        Administrative Statements   I have spent a total time of 40 minutes in caring for this patient on the day of the visit/encounter including Risks and benefits of tx options, Instructions for management, Counseling / Coordination of care, Documenting in the medical record, Reviewing/placing orders in the medical record (including tests, medications, and/or procedures), Obtaining or reviewing history  , and Communicating with other healthcare professionals .

## 2025-06-05 RX ORDER — TRAMADOL HYDROCHLORIDE 50 MG/1
50 TABLET ORAL 3 TIMES DAILY PRN
Qty: 30 TABLET | Refills: 0 | Status: SHIPPED | OUTPATIENT
Start: 2025-06-05

## 2025-06-24 ENCOUNTER — HOSPITAL ENCOUNTER (OUTPATIENT)
Dept: NON INVASIVE DIAGNOSTICS | Facility: CLINIC | Age: 75
Discharge: HOME | End: 2025-06-24
Attending: FAMILY MEDICINE
Payer: MEDICARE

## 2025-06-24 DIAGNOSIS — R20.2 PARESTHESIAS: ICD-10-CM

## 2025-06-24 DIAGNOSIS — I63.81 CEREBROVASCULAR ACCIDENT (CVA) DUE TO OCCLUSION OF SMALL ARTERY (HCC): ICD-10-CM

## 2025-06-24 PROCEDURE — 93923 UPR/LXTR ART STDY 3+ LVLS: CPT | Performed by: SURGERY

## 2025-06-24 PROCEDURE — 93923 UPR/LXTR ART STDY 3+ LVLS: CPT

## 2025-06-25 ENCOUNTER — OFFICE VISIT (OUTPATIENT)
Dept: INTERNAL MEDICINE CLINIC | Age: 75
End: 2025-06-25
Payer: MEDICARE

## 2025-06-25 VITALS
HEART RATE: 79 BPM | SYSTOLIC BLOOD PRESSURE: 124 MMHG | OXYGEN SATURATION: 97 % | BODY MASS INDEX: 18.78 KG/M2 | TEMPERATURE: 98.3 F | WEIGHT: 110 LBS | DIASTOLIC BLOOD PRESSURE: 70 MMHG | HEIGHT: 64 IN

## 2025-06-25 DIAGNOSIS — H93.11 TINNITUS OF RIGHT EAR: ICD-10-CM

## 2025-06-25 DIAGNOSIS — H69.91 DYSFUNCTION OF RIGHT EUSTACHIAN TUBE: ICD-10-CM

## 2025-06-25 DIAGNOSIS — H61.21 IMPACTED CERUMEN, RIGHT EAR: ICD-10-CM

## 2025-06-25 DIAGNOSIS — I63.81 CEREBROVASCULAR ACCIDENT (CVA) DUE TO OCCLUSION OF SMALL ARTERY (HCC): Primary | ICD-10-CM

## 2025-06-25 PROCEDURE — 99214 OFFICE O/P EST MOD 30 MIN: CPT | Performed by: PHYSICIAN ASSISTANT

## 2025-06-25 PROCEDURE — 69210 REMOVE IMPACTED EAR WAX UNI: CPT | Performed by: PHYSICIAN ASSISTANT

## 2025-06-25 RX ORDER — FLUTICASONE PROPIONATE 50 MCG
2 SPRAY, SUSPENSION (ML) NASAL DAILY
Qty: 16 G | Refills: 0 | Status: SHIPPED | OUTPATIENT
Start: 2025-06-25

## 2025-06-25 NOTE — PROGRESS NOTES
"Name: Caro Manzanares      : 1950      MRN: [id number]  Encounter Provider: Faith Banks PA-C  Encounter Date: 2025   Encounter department: [facility]  :  Assessment & Plan  Cerebrovascular accident (CVA) due to occlusion of small artery (HCC)  Check carotid doppler   Orders:    VAS carotid complete study; Future    Tinnitus of right ear  Trial flonase NS x 1 week   If not improved recommend ENT and hearing evaluation   Orders:    VAS carotid complete study; Future    Ambulatory Referral to Otolaryngology; Future    Dysfunction of right eustachian tube    Orders:    fluticasone (FLONASE) 50 mcg/act nasal spray; 2 sprays into each nostril daily    Impacted cerumen, right ear  Removed completely, pt reports initial improvement of buzzing but unsure               History of Present Illness   74 y/o female with c/o buzzing in R ear, constant for the past 2 days  Pt denies change in hearing due to this   \"Sounds like cicada bug in ear\"  Pt reports seasonal allergies but no recent URI sx   Pt denies sore throat, ear pain, night sweats, fevers, coughing   Pt denies change in appetite   Pt denies lightheadedness or dizziness, headaches     Review of Systems   Constitutional:  Negative for activity change, appetite change, chills, diaphoresis, fatigue and fever.   HENT:  Negative for congestion, ear discharge, ear pain, hearing loss and sore throat.         Buzzing sound in R ear   Eyes:  Negative for pain and redness.   Respiratory:  Negative for cough, shortness of breath and wheezing.    Cardiovascular:  Negative for chest pain and leg swelling.   Gastrointestinal:  Negative for abdominal pain, constipation, diarrhea and nausea.   Musculoskeletal:  Negative for arthralgias and back pain.   Skin:  Negative for rash and wound.   Neurological:  Negative for dizziness, light-headedness and headaches.   Psychiatric/Behavioral:  Negative for sleep disturbance. The patient is " "not nervous/anxious.        Objective   /70 (BP Location: Left arm, Patient Position: Sitting, Cuff Size: Standard)   Pulse 79   Temp 98.3 °F (36.8 °C) (Temporal)   Ht 5' 4\" (1.626 m)   Wt 49.9 kg (110 lb)   SpO2 97%   BMI 18.88 kg/m²      Physical Exam  Vitals reviewed.   Constitutional:       General: She is not in acute distress.  HENT:      Head: Normocephalic and atraumatic.      Right Ear: There is impacted cerumen.      Left Ear: Tympanic membrane, ear canal and external ear normal. There is no impacted cerumen.      Ears:      Comments: TM intact after cerumen removed     Nose: Nose normal.     Eyes:      General:         Right eye: No discharge.         Left eye: No discharge.      Extraocular Movements: Extraocular movements intact.      Conjunctiva/sclera: Conjunctivae normal.      Pupils: Pupils are equal, round, and reactive to light.       Cardiovascular:      Rate and Rhythm: Normal rate and regular rhythm.   Pulmonary:      Effort: Pulmonary effort is normal. No respiratory distress.      Breath sounds: Normal breath sounds. No wheezing.     Musculoskeletal:      Cervical back: Normal range of motion. No rigidity.   Lymphadenopathy:      Cervical: No cervical adenopathy.     Neurological:      General: No focal deficit present.      Mental Status: She is alert.     Psychiatric:         Mood and Affect: Mood normal.         Ear cerumen removal    Date/Time: 6/25/2025 12:45 PM    Performed by: Faith Banks PA-C  Authorized by: Faith Banks PA-C    Universal Protocol:  Consent: Verbal consent obtained  Consent given by: patient  Timeout called at: 6/25/2025 12:45 PM.  Patient understanding: patient states understanding of the procedure being performed  Patient identity confirmed: verbally with patient    Patient location:  Clinic  Procedure details:     Local anesthetic:  None    Location:  R ear    Procedure type: curette    Post-procedure details:     Complication:  None    " Hearing quality:  Normal    Patient tolerance of procedure:  Tolerated well, no immediate complications

## 2025-06-26 ENCOUNTER — APPOINTMENT (OUTPATIENT)
Dept: LAB | Facility: IMAGING CENTER | Age: 75
End: 2025-06-26
Payer: MEDICARE

## 2025-06-30 ENCOUNTER — HOSPITAL ENCOUNTER (OUTPATIENT)
Dept: NON INVASIVE DIAGNOSTICS | Facility: CLINIC | Age: 75
Discharge: HOME | End: 2025-06-30
Attending: FAMILY MEDICINE
Payer: MEDICARE

## 2025-06-30 DIAGNOSIS — I63.81 CEREBROVASCULAR ACCIDENT (CVA) DUE TO OCCLUSION OF SMALL ARTERY (HCC): ICD-10-CM

## 2025-06-30 DIAGNOSIS — R20.2 PARESTHESIAS: ICD-10-CM

## 2025-06-30 PROCEDURE — 93923 UPR/LXTR ART STDY 3+ LVLS: CPT

## 2025-06-30 PROCEDURE — 93925 LOWER EXTREMITY STUDY: CPT

## 2025-06-30 PROCEDURE — 93925 LOWER EXTREMITY STUDY: CPT | Performed by: SURGERY

## 2025-06-30 PROCEDURE — 93922 UPR/L XTREMITY ART 2 LEVELS: CPT | Performed by: SURGERY

## 2025-07-15 DIAGNOSIS — G62.9 NEUROPATHY: Primary | ICD-10-CM

## 2025-07-15 RX ORDER — GABAPENTIN 100 MG/1
100 CAPSULE ORAL
Qty: 30 CAPSULE | Refills: 5 | Status: SHIPPED | OUTPATIENT
Start: 2025-07-15 | End: 2025-07-30

## 2025-07-18 DIAGNOSIS — R35.0 URINARY FREQUENCY: ICD-10-CM

## 2025-07-19 ENCOUNTER — HOSPITAL ENCOUNTER (OUTPATIENT)
Dept: VASCULAR ULTRASOUND | Facility: HOSPITAL | Age: 75
Discharge: HOME | End: 2025-07-19
Attending: PHYSICIAN ASSISTANT
Payer: MEDICARE

## 2025-07-19 DIAGNOSIS — I63.81 CEREBROVASCULAR ACCIDENT (CVA) DUE TO OCCLUSION OF SMALL ARTERY (HCC): ICD-10-CM

## 2025-07-19 DIAGNOSIS — H93.11 TINNITUS OF RIGHT EAR: ICD-10-CM

## 2025-07-19 PROCEDURE — 93880 EXTRACRANIAL BILAT STUDY: CPT

## 2025-07-19 PROCEDURE — 93880 EXTRACRANIAL BILAT STUDY: CPT | Performed by: SURGERY

## 2025-07-20 RX ORDER — OXYBUTYNIN CHLORIDE 5 MG/1
5 TABLET ORAL 2 TIMES DAILY
Qty: 180 TABLET | Refills: 3 | Status: SHIPPED | OUTPATIENT
Start: 2025-07-20

## 2025-07-30 ENCOUNTER — NURSE TRIAGE (OUTPATIENT)
Age: 75
End: 2025-07-30

## 2025-07-30 ENCOUNTER — OFFICE VISIT (OUTPATIENT)
Age: 75
End: 2025-07-30
Payer: MEDICARE

## 2025-07-30 VITALS
TEMPERATURE: 99 F | WEIGHT: 113.2 LBS | OXYGEN SATURATION: 98 % | DIASTOLIC BLOOD PRESSURE: 62 MMHG | HEIGHT: 64 IN | BODY MASS INDEX: 19.33 KG/M2 | SYSTOLIC BLOOD PRESSURE: 98 MMHG | HEART RATE: 69 BPM

## 2025-07-30 DIAGNOSIS — M25.473 ANKLE EDEMA: ICD-10-CM

## 2025-07-30 DIAGNOSIS — F11.20 OPIOID DEPENDENCE, UNCOMPLICATED (HCC): ICD-10-CM

## 2025-07-30 DIAGNOSIS — M76.60 ACHILLES TENDON PAIN: Primary | ICD-10-CM

## 2025-07-30 PROCEDURE — G2211 COMPLEX E/M VISIT ADD ON: HCPCS | Performed by: FAMILY MEDICINE

## 2025-07-30 PROCEDURE — 99213 OFFICE O/P EST LOW 20 MIN: CPT | Performed by: FAMILY MEDICINE

## 2025-07-31 ENCOUNTER — HOSPITAL ENCOUNTER (OUTPATIENT)
Dept: RADIOLOGY | Facility: IMAGING CENTER | Age: 75
Discharge: HOME | End: 2025-07-31
Payer: MEDICARE

## 2025-07-31 DIAGNOSIS — M25.473 ANKLE EDEMA: ICD-10-CM

## 2025-07-31 DIAGNOSIS — M76.60 ACHILLES TENDON PAIN: ICD-10-CM

## 2025-07-31 PROCEDURE — 73610 X-RAY EXAM OF ANKLE: CPT
